# Patient Record
Sex: MALE | Race: WHITE | Employment: FULL TIME | URBAN - METROPOLITAN AREA
[De-identification: names, ages, dates, MRNs, and addresses within clinical notes are randomized per-mention and may not be internally consistent; named-entity substitution may affect disease eponyms.]

---

## 2017-05-10 PROBLEM — L91.8 ST (SKIN TAG): Status: ACTIVE | Noted: 2017-05-10

## 2017-05-10 PROBLEM — D22.9 ATYPICAL MOLE: Status: ACTIVE | Noted: 2017-05-10

## 2017-12-06 PROBLEM — M41.25 OTHER IDIOPATHIC SCOLIOSIS, THORACOLUMBAR REGION: Status: ACTIVE | Noted: 2017-12-06

## 2017-12-07 PROBLEM — L91.8 ST (SKIN TAG): Status: RESOLVED | Noted: 2017-05-10 | Resolved: 2017-12-07

## 2017-12-07 PROBLEM — D22.9 ATYPICAL MOLE: Status: RESOLVED | Noted: 2017-05-10 | Resolved: 2017-12-07

## 2018-01-02 PROBLEM — B36.9 CUTANEOUS FUNGAL INFECTION: Status: ACTIVE | Noted: 2018-01-02

## 2018-01-23 PROBLEM — J30.1 CHRONIC SEASONAL ALLERGIC RHINITIS DUE TO POLLEN: Status: ACTIVE | Noted: 2018-01-23

## 2018-01-23 PROBLEM — M51.36 BULGING OF LUMBAR INTERVERTEBRAL DISC WITHOUT MYELOPATHY: Status: ACTIVE | Noted: 2018-01-23

## 2018-01-23 PROBLEM — M51.369 BULGING OF LUMBAR INTERVERTEBRAL DISC WITHOUT MYELOPATHY: Status: ACTIVE | Noted: 2018-01-23

## 2018-04-23 ENCOUNTER — HOSPITAL ENCOUNTER (OUTPATIENT)
Age: 38
Discharge: HOME | End: 2018-04-23
Payer: MEDICARE

## 2018-04-23 ENCOUNTER — OFFICE VISIT (OUTPATIENT)
Dept: FAMILY MEDICINE CLINIC | Age: 38
End: 2018-04-23

## 2018-04-23 VITALS
OXYGEN SATURATION: 98 % | RESPIRATION RATE: 16 BRPM | WEIGHT: 226 LBS | SYSTOLIC BLOOD PRESSURE: 130 MMHG | TEMPERATURE: 98 F | HEIGHT: 69 IN | BODY MASS INDEX: 33.47 KG/M2 | HEART RATE: 72 BPM | DIASTOLIC BLOOD PRESSURE: 80 MMHG

## 2018-04-23 DIAGNOSIS — Z00.00 ROUTINE GENERAL MEDICAL EXAMINATION AT A HEALTH CARE FACILITY: ICD-10-CM

## 2018-04-23 DIAGNOSIS — Z00.00 ROUTINE GENERAL MEDICAL EXAMINATION AT HEALTH CARE FACILITY: Primary | ICD-10-CM

## 2018-04-23 DIAGNOSIS — Z71.89 ACP (ADVANCE CARE PLANNING): ICD-10-CM

## 2018-04-23 LAB
ALBUMIN SERPL-MCNC: 4.7 G/DL (ref 3.5–5.2)
ALP BLD-CCNC: 87 U/L (ref 40–129)
ALT SERPL-CCNC: 16 U/L (ref 0–40)
ANION GAP SERPL CALCULATED.3IONS-SCNC: 10 MMOL/L (ref 7–16)
AST SERPL-CCNC: 17 U/L (ref 0–39)
BASOPHILS ABSOLUTE: 0.04 E9/L (ref 0–0.2)
BASOPHILS RELATIVE PERCENT: 0.7 % (ref 0–2)
BILIRUB SERPL-MCNC: 0.3 MG/DL (ref 0–1.2)
BILIRUBIN DIRECT: <0.2 MG/DL (ref 0–0.3)
BILIRUBIN, INDIRECT: NORMAL MG/DL (ref 0–1)
BUN BLDV-MCNC: 15 MG/DL (ref 6–20)
CALCIUM SERPL-MCNC: 9.5 MG/DL (ref 8.6–10.2)
CARBAMAZEPINE DOSE: NORMAL
CARBAMAZEPINE LEVEL: 8.1 MCG/ML (ref 4–10)
CHLORIDE BLD-SCNC: 103 MMOL/L (ref 98–107)
CHOLESTEROL, FASTING: 183 MG/DL (ref 0–199)
CO2: 28 MMOL/L (ref 22–29)
CREAT SERPL-MCNC: 0.9 MG/DL (ref 0.7–1.2)
EOSINOPHILS ABSOLUTE: 0.28 E9/L (ref 0.05–0.5)
EOSINOPHILS RELATIVE PERCENT: 4.8 % (ref 0–6)
GFR AFRICAN AMERICAN: >60
GFR NON-AFRICAN AMERICAN: >60 ML/MIN/1.73
GLUCOSE FASTING: 93 MG/DL (ref 74–109)
HBA1C MFR BLD: 4.7 % (ref 4.8–5.9)
HCT VFR BLD CALC: 43.4 % (ref 37–54)
HDLC SERPL-MCNC: 48 MG/DL
HEMOGLOBIN: 14.9 G/DL (ref 12.5–16.5)
IMMATURE GRANULOCYTES #: 0.02 E9/L
IMMATURE GRANULOCYTES %: 0.3 % (ref 0–5)
LDL CHOLESTEROL CALCULATED: 119 MG/DL (ref 0–99)
LITHIUM DOSE AMOUNT: NORMAL
LITHIUM LEVEL: 0.56 MMOL/L (ref 0.5–1.5)
LYMPHOCYTES ABSOLUTE: 1.51 E9/L (ref 1.5–4)
LYMPHOCYTES RELATIVE PERCENT: 25.9 % (ref 20–42)
MCH RBC QN AUTO: 32.3 PG (ref 26–35)
MCHC RBC AUTO-ENTMCNC: 34.3 % (ref 32–34.5)
MCV RBC AUTO: 93.9 FL (ref 80–99.9)
MONOCYTES ABSOLUTE: 0.34 E9/L (ref 0.1–0.95)
MONOCYTES RELATIVE PERCENT: 5.8 % (ref 2–12)
NEUTROPHILS ABSOLUTE: 3.63 E9/L (ref 1.8–7.3)
NEUTROPHILS RELATIVE PERCENT: 62.5 % (ref 43–80)
PDW BLD-RTO: 11.3 FL (ref 11.5–15)
PLATELET # BLD: 219 E9/L (ref 130–450)
PMV BLD AUTO: 9.7 FL (ref 7–12)
POTASSIUM SERPL-SCNC: 4.2 MMOL/L (ref 3.5–5)
RBC # BLD: 4.62 E12/L (ref 3.8–5.8)
SODIUM BLD-SCNC: 141 MMOL/L (ref 132–146)
T4 FREE: 0.96 NG/DL (ref 0.93–1.7)
TOTAL PROTEIN: 7.5 G/DL (ref 6.4–8.3)
TRIGLYCERIDE, FASTING: 80 MG/DL (ref 0–149)
TSH SERPL DL<=0.05 MIU/L-ACNC: 1.39 UIU/ML (ref 0.27–4.2)
VLDLC SERPL CALC-MCNC: 16 MG/DL
WBC # BLD: 5.8 E9/L (ref 4.5–11.5)

## 2018-04-23 PROCEDURE — 80156 ASSAY CARBAMAZEPINE TOTAL: CPT

## 2018-04-23 PROCEDURE — 84443 ASSAY THYROID STIM HORMONE: CPT

## 2018-04-23 PROCEDURE — 84439 ASSAY OF FREE THYROXINE: CPT

## 2018-04-23 PROCEDURE — 83036 HEMOGLOBIN GLYCOSYLATED A1C: CPT

## 2018-04-23 PROCEDURE — G0439 PPPS, SUBSEQ VISIT: HCPCS | Performed by: NURSE PRACTITIONER

## 2018-04-23 PROCEDURE — 85025 COMPLETE CBC W/AUTO DIFF WBC: CPT

## 2018-04-23 PROCEDURE — 82248 BILIRUBIN DIRECT: CPT

## 2018-04-23 PROCEDURE — 80178 ASSAY OF LITHIUM: CPT

## 2018-04-23 PROCEDURE — 80061 LIPID PANEL: CPT

## 2018-04-23 PROCEDURE — 36415 COLL VENOUS BLD VENIPUNCTURE: CPT

## 2018-04-23 PROCEDURE — 80053 COMPREHEN METABOLIC PANEL: CPT

## 2018-04-23 RX ORDER — PALIPERIDONE 3 MG
9 TABLET, EXTENDED RELEASE 24 HR ORAL EVERY MORNING
Refills: 2 | Status: ON HOLD | COMMUNITY
Start: 2018-03-28 | End: 2018-10-01 | Stop reason: HOSPADM

## 2018-04-23 ASSESSMENT — PATIENT HEALTH QUESTIONNAIRE - PHQ9: SUM OF ALL RESPONSES TO PHQ QUESTIONS 1-9: 0

## 2018-04-23 ASSESSMENT — ANXIETY QUESTIONNAIRES: GAD7 TOTAL SCORE: 6

## 2018-04-23 ASSESSMENT — LIFESTYLE VARIABLES: HOW OFTEN DO YOU HAVE A DRINK CONTAINING ALCOHOL: 0

## 2018-09-21 ENCOUNTER — HOSPITAL ENCOUNTER (INPATIENT)
Age: 38
LOS: 9 days | Discharge: HOME | DRG: 885 | End: 2018-10-01
Attending: EMERGENCY MEDICINE | Admitting: PSYCHIATRY & NEUROLOGY
Payer: MEDICARE

## 2018-09-21 DIAGNOSIS — F20.3 UNDIFFERENTIATED SCHIZOPHRENIA (HCC): Primary | ICD-10-CM

## 2018-09-21 LAB
ACETAMINOPHEN LEVEL: <5 MCG/ML (ref 10–30)
ALBUMIN SERPL-MCNC: 4.4 G/DL (ref 3.5–5.2)
ALP BLD-CCNC: 78 U/L (ref 40–129)
ALT SERPL-CCNC: 18 U/L (ref 0–40)
AMORPHOUS: ABNORMAL
AMPHETAMINE SCREEN, URINE: NOT DETECTED
ANION GAP SERPL CALCULATED.3IONS-SCNC: 14 MMOL/L (ref 7–16)
AST SERPL-CCNC: 16 U/L (ref 0–39)
BACTERIA: ABNORMAL /HPF
BARBITURATE SCREEN URINE: NOT DETECTED
BASOPHILS ABSOLUTE: 0.04 E9/L (ref 0–0.2)
BASOPHILS RELATIVE PERCENT: 0.5 % (ref 0–2)
BENZODIAZEPINE SCREEN, URINE: POSITIVE
BILIRUB SERPL-MCNC: <0.2 MG/DL (ref 0–1.2)
BILIRUBIN URINE: NEGATIVE
BLOOD, URINE: NEGATIVE
BUN BLDV-MCNC: 17 MG/DL (ref 6–20)
CALCIUM SERPL-MCNC: 9.4 MG/DL (ref 8.6–10.2)
CANNABINOID SCREEN URINE: NOT DETECTED
CHLORIDE BLD-SCNC: 101 MMOL/L (ref 98–107)
CLARITY: ABNORMAL
CO2: 25 MMOL/L (ref 22–29)
COCAINE METABOLITE SCREEN URINE: NOT DETECTED
COLOR: YELLOW
CREAT SERPL-MCNC: 1 MG/DL (ref 0.7–1.2)
EKG ATRIAL RATE: 75 BPM
EKG P AXIS: 49 DEGREES
EKG P-R INTERVAL: 144 MS
EKG Q-T INTERVAL: 382 MS
EKG QRS DURATION: 92 MS
EKG QTC CALCULATION (BAZETT): 426 MS
EKG R AXIS: 36 DEGREES
EKG T AXIS: 35 DEGREES
EKG VENTRICULAR RATE: 75 BPM
EOSINOPHILS ABSOLUTE: 0.29 E9/L (ref 0.05–0.5)
EOSINOPHILS RELATIVE PERCENT: 3.8 % (ref 0–6)
ETHANOL: <10 MG/DL (ref 0–0.08)
GFR AFRICAN AMERICAN: >60
GFR NON-AFRICAN AMERICAN: >60 ML/MIN/1.73
GLUCOSE BLD-MCNC: 136 MG/DL (ref 74–109)
GLUCOSE URINE: NEGATIVE MG/DL
HCT VFR BLD CALC: 41.1 % (ref 37–54)
HEMOGLOBIN: 14 G/DL (ref 12.5–16.5)
IMMATURE GRANULOCYTES #: 0.03 E9/L
IMMATURE GRANULOCYTES %: 0.4 % (ref 0–5)
KETONES, URINE: NEGATIVE MG/DL
LEUKOCYTE ESTERASE, URINE: NEGATIVE
LYMPHOCYTES ABSOLUTE: 2.42 E9/L (ref 1.5–4)
LYMPHOCYTES RELATIVE PERCENT: 31.3 % (ref 20–42)
MCH RBC QN AUTO: 32.2 PG (ref 26–35)
MCHC RBC AUTO-ENTMCNC: 34.1 % (ref 32–34.5)
MCV RBC AUTO: 94.5 FL (ref 80–99.9)
METHADONE SCREEN, URINE: NOT DETECTED
MONOCYTES ABSOLUTE: 0.45 E9/L (ref 0.1–0.95)
MONOCYTES RELATIVE PERCENT: 5.8 % (ref 2–12)
NEUTROPHILS ABSOLUTE: 4.49 E9/L (ref 1.8–7.3)
NEUTROPHILS RELATIVE PERCENT: 58.2 % (ref 43–80)
NITRITE, URINE: NEGATIVE
OPIATE SCREEN URINE: NOT DETECTED
PDW BLD-RTO: 11.8 FL (ref 11.5–15)
PH UA: 6.5 (ref 5–9)
PHENCYCLIDINE SCREEN URINE: NOT DETECTED
PLATELET # BLD: 230 E9/L (ref 130–450)
PMV BLD AUTO: 9.8 FL (ref 7–12)
POTASSIUM SERPL-SCNC: 3.7 MMOL/L (ref 3.5–5)
PROPOXYPHENE SCREEN: NOT DETECTED
PROTEIN UA: NEGATIVE MG/DL
RBC # BLD: 4.35 E12/L (ref 3.8–5.8)
RBC UA: ABNORMAL /HPF (ref 0–2)
SALICYLATE, SERUM: <0.3 MG/DL (ref 0–30)
SODIUM BLD-SCNC: 140 MMOL/L (ref 132–146)
SPECIFIC GRAVITY UA: 1.02 (ref 1–1.03)
T3 UPTAKE PERCENT: 31.4 % (ref 22.5–37)
T4 FREE: 1.16 NG/DL (ref 0.93–1.7)
TOTAL PROTEIN: 6.7 G/DL (ref 6.4–8.3)
TRICYCLIC ANTIDEPRESSANTS SCREEN SERUM: NEGATIVE NG/ML
TSH SERPL DL<=0.05 MIU/L-ACNC: 1.52 UIU/ML (ref 0.27–4.2)
UROBILINOGEN, URINE: 0.2 E.U./DL
WBC # BLD: 7.7 E9/L (ref 4.5–11.5)
WBC UA: ABNORMAL /HPF (ref 0–5)

## 2018-09-21 PROCEDURE — G0480 DRUG TEST DEF 1-7 CLASSES: HCPCS

## 2018-09-21 PROCEDURE — 85025 COMPLETE CBC W/AUTO DIFF WBC: CPT

## 2018-09-21 PROCEDURE — 99285 EMERGENCY DEPT VISIT HI MDM: CPT

## 2018-09-21 PROCEDURE — 80307 DRUG TEST PRSMV CHEM ANLYZR: CPT

## 2018-09-21 PROCEDURE — 36415 COLL VENOUS BLD VENIPUNCTURE: CPT

## 2018-09-21 PROCEDURE — 80178 ASSAY OF LITHIUM: CPT

## 2018-09-21 PROCEDURE — 2580000003 HC RX 258

## 2018-09-21 PROCEDURE — 6360000002 HC RX W HCPCS: Performed by: EMERGENCY MEDICINE

## 2018-09-21 PROCEDURE — 96374 THER/PROPH/DIAG INJ IV PUSH: CPT

## 2018-09-21 PROCEDURE — 81001 URINALYSIS AUTO W/SCOPE: CPT

## 2018-09-21 PROCEDURE — 84439 ASSAY OF FREE THYROXINE: CPT

## 2018-09-21 PROCEDURE — 93005 ELECTROCARDIOGRAM TRACING: CPT | Performed by: EMERGENCY MEDICINE

## 2018-09-21 PROCEDURE — 84443 ASSAY THYROID STIM HORMONE: CPT

## 2018-09-21 PROCEDURE — 80053 COMPREHEN METABOLIC PANEL: CPT

## 2018-09-21 RX ORDER — CARBAMAZEPINE 200 MG/1
200 TABLET ORAL 2 TIMES DAILY
Status: ON HOLD | COMMUNITY
End: 2018-10-01 | Stop reason: HOSPADM

## 2018-09-21 RX ORDER — ZIPRASIDONE MESYLATE 20 MG/ML
10 INJECTION, POWDER, LYOPHILIZED, FOR SOLUTION INTRAMUSCULAR ONCE
Status: COMPLETED | OUTPATIENT
Start: 2018-09-21 | End: 2018-09-21

## 2018-09-21 RX ORDER — LORAZEPAM 2 MG/ML
2 INJECTION INTRAMUSCULAR ONCE
Status: COMPLETED | OUTPATIENT
Start: 2018-09-21 | End: 2018-09-21

## 2018-09-21 RX ADMIN — ZIPRASIDONE MESYLATE 10 MG: 20 INJECTION, POWDER, LYOPHILIZED, FOR SOLUTION INTRAMUSCULAR at 21:56

## 2018-09-21 RX ADMIN — LORAZEPAM 2 MG: 2 INJECTION INTRAMUSCULAR; INTRAVENOUS at 21:55

## 2018-09-21 RX ADMIN — WATER 1.2 ML: 1 INJECTION INTRAMUSCULAR; INTRAVENOUS; SUBCUTANEOUS at 21:56

## 2018-09-22 PROBLEM — F29 PSYCHOSIS (HCC): Status: ACTIVE | Noted: 2018-09-22

## 2018-09-22 PROBLEM — F31.9 BIPOLAR 1 DISORDER (HCC): Status: ACTIVE | Noted: 2018-09-22

## 2018-09-22 LAB
LITHIUM DOSE AMOUNT: ABNORMAL
LITHIUM LEVEL: 0.49 MMOL/L (ref 0.5–1.5)

## 2018-09-22 PROCEDURE — 6370000000 HC RX 637 (ALT 250 FOR IP): Performed by: NURSE PRACTITIONER

## 2018-09-22 PROCEDURE — 99222 1ST HOSP IP/OBS MODERATE 55: CPT | Performed by: PSYCHIATRY & NEUROLOGY

## 2018-09-22 PROCEDURE — 1240000000 HC EMOTIONAL WELLNESS R&B

## 2018-09-22 RX ORDER — MAGNESIUM HYDROXIDE/ALUMINUM HYDROXICE/SIMETHICONE 120; 1200; 1200 MG/30ML; MG/30ML; MG/30ML
30 SUSPENSION ORAL PRN
Status: DISCONTINUED | OUTPATIENT
Start: 2018-09-22 | End: 2018-10-01 | Stop reason: HOSPADM

## 2018-09-22 RX ORDER — ACETAMINOPHEN 325 MG/1
650 TABLET ORAL EVERY 4 HOURS PRN
Status: DISCONTINUED | OUTPATIENT
Start: 2018-09-22 | End: 2018-10-01 | Stop reason: HOSPADM

## 2018-09-22 RX ORDER — TRIHEXYPHENIDYL HYDROCHLORIDE 5 MG/1
5 TABLET ORAL 2 TIMES DAILY
Status: DISCONTINUED | OUTPATIENT
Start: 2018-09-22 | End: 2018-09-22

## 2018-09-22 RX ORDER — TRAZODONE HYDROCHLORIDE 50 MG/1
50 TABLET ORAL NIGHTLY PRN
Status: DISCONTINUED | OUTPATIENT
Start: 2018-09-22 | End: 2018-10-01 | Stop reason: HOSPADM

## 2018-09-22 RX ORDER — HALOPERIDOL 5 MG/ML
10 INJECTION INTRAMUSCULAR EVERY 6 HOURS PRN
Status: DISCONTINUED | OUTPATIENT
Start: 2018-09-22 | End: 2018-10-01 | Stop reason: HOSPADM

## 2018-09-22 RX ORDER — CARBAMAZEPINE 200 MG/1
200 TABLET ORAL 2 TIMES DAILY
Status: DISCONTINUED | OUTPATIENT
Start: 2018-09-22 | End: 2018-09-23

## 2018-09-22 RX ORDER — TRIHEXYPHENIDYL HYDROCHLORIDE 5 MG/1
10 TABLET ORAL
Status: DISCONTINUED | OUTPATIENT
Start: 2018-09-23 | End: 2018-10-01 | Stop reason: HOSPADM

## 2018-09-22 RX ORDER — CLONAZEPAM 0.5 MG/1
1 TABLET ORAL 4 TIMES DAILY PRN
Status: DISCONTINUED | OUTPATIENT
Start: 2018-09-22 | End: 2018-10-01 | Stop reason: HOSPADM

## 2018-09-22 RX ORDER — TRIHEXYPHENIDYL HYDROCHLORIDE 5 MG/1
5 TABLET ORAL EVERY EVENING
Status: DISCONTINUED | OUTPATIENT
Start: 2018-09-22 | End: 2018-10-01 | Stop reason: HOSPADM

## 2018-09-22 RX ORDER — LITHIUM CARBONATE 450 MG
450 TABLET, EXTENDED RELEASE ORAL 2 TIMES DAILY
Status: DISCONTINUED | OUTPATIENT
Start: 2018-09-22 | End: 2018-10-01 | Stop reason: HOSPADM

## 2018-09-22 RX ORDER — BENZTROPINE MESYLATE 1 MG/ML
2 INJECTION INTRAMUSCULAR; INTRAVENOUS 2 TIMES DAILY PRN
Status: DISCONTINUED | OUTPATIENT
Start: 2018-09-22 | End: 2018-10-01 | Stop reason: HOSPADM

## 2018-09-22 RX ORDER — OLANZAPINE 10 MG/1
10 TABLET ORAL
Status: ACTIVE | OUTPATIENT
Start: 2018-09-22 | End: 2018-09-22

## 2018-09-22 RX ORDER — HYDROXYZINE PAMOATE 50 MG/1
50 CAPSULE ORAL EVERY 6 HOURS PRN
Status: DISCONTINUED | OUTPATIENT
Start: 2018-09-22 | End: 2018-10-01 | Stop reason: HOSPADM

## 2018-09-22 RX ORDER — CETIRIZINE HYDROCHLORIDE 10 MG/1
10 TABLET ORAL DAILY
Status: DISCONTINUED | OUTPATIENT
Start: 2018-09-22 | End: 2018-10-01 | Stop reason: HOSPADM

## 2018-09-22 RX ADMIN — CARBAMAZEPINE 200 MG: 200 TABLET ORAL at 20:38

## 2018-09-22 RX ADMIN — TRIHEXYPHENIDYL HYDROCHLORIDE 5 MG: 5 TABLET ORAL at 18:58

## 2018-09-22 RX ADMIN — TRAZODONE HYDROCHLORIDE 50 MG: 50 TABLET ORAL at 20:38

## 2018-09-22 RX ADMIN — CETIRIZINE HYDROCHLORIDE 10 MG: 10 TABLET ORAL at 10:53

## 2018-09-22 RX ADMIN — LITHIUM CARBONATE 450 MG: 450 TABLET, EXTENDED RELEASE ORAL at 20:38

## 2018-09-22 RX ADMIN — PALIPERIDONE 9 MG: 6 TABLET, EXTENDED RELEASE ORAL at 10:53

## 2018-09-22 RX ADMIN — LITHIUM CARBONATE 450 MG: 450 TABLET, EXTENDED RELEASE ORAL at 10:53

## 2018-09-22 RX ADMIN — CARBAMAZEPINE 200 MG: 200 TABLET ORAL at 10:53

## 2018-09-22 ASSESSMENT — SLEEP AND FATIGUE QUESTIONNAIRES
DO YOU HAVE DIFFICULTY SLEEPING: NO
AVERAGE NUMBER OF SLEEP HOURS: 6
DO YOU HAVE DIFFICULTY SLEEPING: NO
DO YOU USE A SLEEP AID: NO
AVERAGE NUMBER OF SLEEP HOURS: 6
DO YOU USE A SLEEP AID: NO

## 2018-09-22 ASSESSMENT — PAIN - FUNCTIONAL ASSESSMENT: PAIN_FUNCTIONAL_ASSESSMENT: 0-10

## 2018-09-22 ASSESSMENT — LIFESTYLE VARIABLES
HISTORY_ALCOHOL_USE: NO
HISTORY_ALCOHOL_USE: NO

## 2018-09-22 NOTE — PROGRESS NOTES
`[facility]  Admission Note   This is a 30year old  male with hx of autism, mental retardation diagnosis. Patient has a GUARDIAN. Treats recently at [facility]. Was crying for 4 hours at workshop and mother and caregiver brought to [facility]. Recently had increase in [facility] last week and decompensating last several weeks. Upset that friend son in [place] and afraid he will get hurt with [facility]. Patient is A&O to person only on admission. Denies drugs and alcohol. Not a smoker. Was given IM Ativan 2 mg and Geodon 10 mg at 2156 in [facility] for agitation. Patient made threats at home wanting to kill self by throwing self down steps or jumping off roof. Barbara Pittman (mother) [phone]  Admission Type:   Admission Type: Involuntary    Reason for admission:  Reason for Admission: \"I was crying. \"    PATIENT STRENGTHS:  Strengths: Communication, Positive Support    Patient Strengths and Limitations:  Limitations: Limited education -> difficulty reading or writing, Difficult relationships / poor social skills, Demonstrates discomfort with /lack of social skills, Multiple barriers to leisure interests, Difficulty problem solving/relies on others to help solve problems    Addictive Behavior:   Addictive Behavior  In the past 3 months, have you felt or has someone told you that you have a problem with:  : None  Do you have a history of Chemical Use?: No  Do you have a history of Alcohol Use?: No  Do you have a history of Street Drug Abuse?: No  Histroy of Prescripton Drug Abuse?: No    Medical Problems:   Past Medical History:   Diagnosis Date    ADHD (attention deficit hyperactivity disorder)     Autism     PATIENT IS COOPERATIVE AND VERBAL PER MOM    Autism     Bipolar 1 disorder ([facility]     Mental retardation     Schizoaffective disorder (HCC)        Status EXAM:  Status and Exam  Normal: No  Facial Expression: Sad, Flat, Worried  Affect: Appropriate  Level of Consciousness:

## 2018-09-22 NOTE — H&P
Weaknesses: [x] Emotional          [] Motivational     MEDICATIONS: Current Facility-Administered Medications: carBAMazepine (TEGRETOL) tablet 200 mg, 200 mg, Oral, BID  clonazePAM (KLONOPIN) tablet 1 mg, 1 mg, Oral, 4x Daily PRN  paliperidone (INVEGA) extended release tablet 9 mg, 9 mg, Oral, QAM  lithium (ESKALITH) extended release tablet 450 mg, 450 mg, Oral, BID  cetirizine (ZYRTEC) tablet 10 mg, 10 mg, Oral, Daily  acetaminophen (TYLENOL) tablet 650 mg, 650 mg, Oral, Q4H PRN  hydrOXYzine (VISTARIL) capsule 50 mg, 50 mg, Oral, Q6H PRN  OLANZapine (ZYPREXA) tablet 10 mg, 10 mg, Oral, Once PRN  haloperidol lactate (HALDOL) injection 10 mg, 10 mg, Intramuscular, Q6H PRN  traZODone (DESYREL) tablet 50 mg, 50 mg, Oral, Nightly PRN  benztropine mesylate (COGENTIN) injection 2 mg, 2 mg, Intramuscular, BID PRN  magnesium hydroxide (MILK OF MAGNESIA) 400 MG/5ML suspension 30 mL, 30 mL, Oral, Daily PRN  aluminum & magnesium hydroxide-simethicone (MAALOX) 200-200-20 MG/5ML suspension 30 mL, 30 mL, Oral, PRN  trihexyphenidyl (ARTANE) tablet 5 mg, 5 mg, Oral, QPM    Medical Review of Systems:     All other than marked systmes have been reviewed and are all negative.     Constitutional Symptoms: []  fever []  Chills  Skin Symptoms: [] rash []  Pruritus   Eye Symptoms: [] Vision unchanged []  recent vision problems[] blurred vision   Respiratory Symptoms:[] shortness of breath [] cough  Cardiovascular Symptoms:  [] chest pain   [] palpitations   Gastrointestinal Symptoms: []  abdominal pain []  nausea []  vomiting []  diarrhea  Genitourinary Symptoms: []  dysuria  []  hematuria   Musculoskeletal Symptoms: []  back pain []  muscle pain []  joint pain  Neurologic Symptoms: []  headache []  dizziness  Hematolymphoid Symptoms: [] Adenopathy [] Bruises   [] Schimosis       VITALS: BP (!) 146/92   Pulse 90   Temp 98.1 °F (36.7 °C) (Oral)   Resp 16   Ht 5' 11\" (1.803 m)   Wt 230 lb (104.3 kg)   SpO2 98%   BMI 32.08 kg/m²

## 2018-09-22 NOTE — PROGRESS NOTES
Declined to participate in morning group. Only willing to share that he needs to rest, turning away from staff when encouragement attempted. States \"sleep\" when asked for daily goal.        Recreation assessment completed.

## 2018-09-23 PROCEDURE — 1240000000 HC EMOTIONAL WELLNESS R&B

## 2018-09-23 PROCEDURE — 6370000000 HC RX 637 (ALT 250 FOR IP): Performed by: NURSE PRACTITIONER

## 2018-09-23 PROCEDURE — 6370000000 HC RX 637 (ALT 250 FOR IP): Performed by: PSYCHIATRY & NEUROLOGY

## 2018-09-23 PROCEDURE — 99231 SBSQ HOSP IP/OBS SF/LOW 25: CPT | Performed by: PSYCHIATRY & NEUROLOGY

## 2018-09-23 RX ORDER — CARBAMAZEPINE 200 MG/1
200 TABLET ORAL 3 TIMES DAILY
Status: DISCONTINUED | OUTPATIENT
Start: 2018-09-23 | End: 2018-10-01 | Stop reason: HOSPADM

## 2018-09-23 RX ORDER — PALIPERIDONE 6 MG/1
12 TABLET, EXTENDED RELEASE ORAL EVERY MORNING
Status: DISCONTINUED | OUTPATIENT
Start: 2018-09-24 | End: 2018-09-25

## 2018-09-23 RX ADMIN — PALIPERIDONE 9 MG: 6 TABLET, EXTENDED RELEASE ORAL at 08:28

## 2018-09-23 RX ADMIN — CETIRIZINE HYDROCHLORIDE 10 MG: 10 TABLET ORAL at 08:28

## 2018-09-23 RX ADMIN — LITHIUM CARBONATE 450 MG: 450 TABLET, EXTENDED RELEASE ORAL at 20:51

## 2018-09-23 RX ADMIN — TRIHEXYPHENIDYL HYDROCHLORIDE 5 MG: 5 TABLET ORAL at 16:53

## 2018-09-23 RX ADMIN — TRAZODONE HYDROCHLORIDE 50 MG: 50 TABLET ORAL at 20:51

## 2018-09-23 RX ADMIN — CARBAMAZEPINE 200 MG: 200 TABLET ORAL at 08:28

## 2018-09-23 RX ADMIN — LITHIUM CARBONATE 450 MG: 450 TABLET, EXTENDED RELEASE ORAL at 08:28

## 2018-09-23 RX ADMIN — TRIHEXYPHENIDYL HYDROCHLORIDE 10 MG: 5 TABLET ORAL at 08:28

## 2018-09-23 RX ADMIN — CARBAMAZEPINE 200 MG: 200 TABLET ORAL at 20:51

## 2018-09-23 RX ADMIN — HYDROXYZINE PAMOATE 50 MG: 50 CAPSULE ORAL at 16:53

## 2018-09-23 RX ADMIN — CARBAMAZEPINE 200 MG: 200 TABLET ORAL at 13:49

## 2018-09-23 NOTE — PROGRESS NOTES
Heightened    [] Exaggerated       Thought Process and Association:  [] Logical [x] Illogical                                        [] Linear and Goal Directed  [x] Tangential  [] Circumstantial      Thought Content:  [] Denies [x] Endorses [x] Suicidal [] Homicidal  [x] Delusional                                       [x] Paranoid  [] Somatic  [] Grandiose     Perception: []  None  [x] Auditory   [] Visual  [] tactile   [] olfactory  [] Illusions          Insight: [] Intact  [] Fair  [x] Limited    Judgement:  [] Intact  [] Fair  [x] Limited      Assessment/Plan:        Patient Active Problem List   Diagnosis Code    Mental retardation F79    Schizoaffective disorder, bipolar type ([facility] [address] F25.0    Mild intellectual disability F70    Bipolar affective disorder, mixed, severe, with psychotic behavior ([facility] [address] F31.64    Encounter for lithium monitoring Z51.81, Z79.899    Taking multiple medications for chronic disease R69    Other idiopathic scoliosis, thoracolumbar region M41.25    Cutaneous fungal infection B36.9    Chronic seasonal allergic rhinitis due to pollen J30.1    Bulging of lumbar intervertebral disc without myelopathy M51.26    Bipolar 1 disorder ([facility] [address] F31.9    Psychosis ([facility] [address] F29         Plan:    []  Patient is refusing medications  [x] Improving as expected   [] Not improving as expected   [] Worsening    []  At Baseline     Increase Invega 12 mg QD  Increase Tegretol 200 mg TID      Reason for more than one antipsychotic:  [] N/A  [] 3 failed monotherapy(drugs tried):  [] Cross over to a new antipsychotic  [] Taper to monotherapy from polypharmacy  [] Augmentation of Clozapine therapy due to treatment resistance to single therapy      Signed:  Mathieu Esteban  9/23/2018  1:52 PM

## 2018-09-24 PROCEDURE — 99231 SBSQ HOSP IP/OBS SF/LOW 25: CPT | Performed by: PSYCHIATRY & NEUROLOGY

## 2018-09-24 PROCEDURE — 6370000000 HC RX 637 (ALT 250 FOR IP): Performed by: PSYCHIATRY & NEUROLOGY

## 2018-09-24 PROCEDURE — 6370000000 HC RX 637 (ALT 250 FOR IP): Performed by: NURSE PRACTITIONER

## 2018-09-24 PROCEDURE — 1240000000 HC EMOTIONAL WELLNESS R&B

## 2018-09-24 RX ADMIN — CARBAMAZEPINE 200 MG: 200 TABLET ORAL at 09:22

## 2018-09-24 RX ADMIN — CETIRIZINE HYDROCHLORIDE 10 MG: 10 TABLET ORAL at 09:22

## 2018-09-24 RX ADMIN — LITHIUM CARBONATE 450 MG: 450 TABLET, EXTENDED RELEASE ORAL at 20:28

## 2018-09-24 RX ADMIN — CARBAMAZEPINE 200 MG: 200 TABLET ORAL at 14:46

## 2018-09-24 RX ADMIN — PALIPERIDONE 12 MG: 6 TABLET, EXTENDED RELEASE ORAL at 09:22

## 2018-09-24 RX ADMIN — CLONAZEPAM 1 MG: 0.5 TABLET ORAL at 09:22

## 2018-09-24 RX ADMIN — LITHIUM CARBONATE 450 MG: 450 TABLET, EXTENDED RELEASE ORAL at 09:22

## 2018-09-24 RX ADMIN — TRIHEXYPHENIDYL HYDROCHLORIDE 10 MG: 5 TABLET ORAL at 09:22

## 2018-09-24 RX ADMIN — CARBAMAZEPINE 200 MG: 200 TABLET ORAL at 20:28

## 2018-09-24 RX ADMIN — TRIHEXYPHENIDYL HYDROCHLORIDE 5 MG: 5 TABLET ORAL at 16:46

## 2018-09-24 RX ADMIN — TRAZODONE HYDROCHLORIDE 50 MG: 50 TABLET ORAL at 20:28

## 2018-09-24 NOTE — PROGRESS NOTES
Spoke with pt mother by phone, she advised last dose of [place] was given on the 4th of this month. Was at 234 mg. Dosing frequency increased to about every three weeks as pt was having break through behaviors. Dennie Port, NP has been advised.

## 2018-09-25 PROCEDURE — 99232 SBSQ HOSP IP/OBS MODERATE 35: CPT | Performed by: PSYCHIATRY & NEUROLOGY

## 2018-09-25 PROCEDURE — 1240000000 HC EMOTIONAL WELLNESS R&B

## 2018-09-25 PROCEDURE — 6370000000 HC RX 637 (ALT 250 FOR IP): Performed by: PSYCHIATRY & NEUROLOGY

## 2018-09-25 PROCEDURE — 6370000000 HC RX 637 (ALT 250 FOR IP): Performed by: NURSE PRACTITIONER

## 2018-09-25 RX ORDER — PALIPERIDONE 6 MG/1
6 TABLET, EXTENDED RELEASE ORAL EVERY MORNING
Status: DISCONTINUED | OUTPATIENT
Start: 2018-09-25 | End: 2018-09-26

## 2018-09-25 RX ADMIN — TRIHEXYPHENIDYL HYDROCHLORIDE 5 MG: 5 TABLET ORAL at 17:55

## 2018-09-25 RX ADMIN — CETIRIZINE HYDROCHLORIDE 10 MG: 10 TABLET ORAL at 09:55

## 2018-09-25 RX ADMIN — LITHIUM CARBONATE 450 MG: 450 TABLET, EXTENDED RELEASE ORAL at 21:15

## 2018-09-25 RX ADMIN — CARBAMAZEPINE 200 MG: 200 TABLET ORAL at 09:55

## 2018-09-25 RX ADMIN — CARBAMAZEPINE 200 MG: 200 TABLET ORAL at 13:10

## 2018-09-25 RX ADMIN — PALIPERIDONE 6 MG: 6 TABLET, EXTENDED RELEASE ORAL at 09:54

## 2018-09-25 RX ADMIN — HYDROXYZINE PAMOATE 50 MG: 50 CAPSULE ORAL at 19:47

## 2018-09-25 RX ADMIN — TRIHEXYPHENIDYL HYDROCHLORIDE 10 MG: 5 TABLET ORAL at 08:11

## 2018-09-25 RX ADMIN — LITHIUM CARBONATE 450 MG: 450 TABLET, EXTENDED RELEASE ORAL at 09:57

## 2018-09-25 RX ADMIN — TRAZODONE HYDROCHLORIDE 50 MG: 50 TABLET ORAL at 21:17

## 2018-09-25 RX ADMIN — CARBAMAZEPINE 200 MG: 200 TABLET ORAL at 21:15

## 2018-09-26 LAB
LITHIUM DOSE AMOUNT: NORMAL
LITHIUM LEVEL: 0.8 MMOL/L (ref 0.5–1.5)

## 2018-09-26 PROCEDURE — 99231 SBSQ HOSP IP/OBS SF/LOW 25: CPT | Performed by: PSYCHIATRY & NEUROLOGY

## 2018-09-26 PROCEDURE — 80178 ASSAY OF LITHIUM: CPT

## 2018-09-26 PROCEDURE — 1240000000 HC EMOTIONAL WELLNESS R&B

## 2018-09-26 PROCEDURE — 36415 COLL VENOUS BLD VENIPUNCTURE: CPT

## 2018-09-26 PROCEDURE — 6370000000 HC RX 637 (ALT 250 FOR IP): Performed by: PSYCHIATRY & NEUROLOGY

## 2018-09-26 PROCEDURE — 6370000000 HC RX 637 (ALT 250 FOR IP): Performed by: NURSE PRACTITIONER

## 2018-09-26 RX ADMIN — CETIRIZINE HYDROCHLORIDE 10 MG: 10 TABLET ORAL at 09:09

## 2018-09-26 RX ADMIN — PALIPERIDONE 6 MG: 6 TABLET, EXTENDED RELEASE ORAL at 09:09

## 2018-09-26 RX ADMIN — TRIHEXYPHENIDYL HYDROCHLORIDE 5 MG: 5 TABLET ORAL at 16:42

## 2018-09-26 RX ADMIN — LITHIUM CARBONATE 450 MG: 450 TABLET, EXTENDED RELEASE ORAL at 09:09

## 2018-09-26 RX ADMIN — CARBAMAZEPINE 200 MG: 200 TABLET ORAL at 09:09

## 2018-09-26 RX ADMIN — CARBAMAZEPINE 200 MG: 200 TABLET ORAL at 21:02

## 2018-09-26 RX ADMIN — TRIHEXYPHENIDYL HYDROCHLORIDE 10 MG: 5 TABLET ORAL at 09:09

## 2018-09-26 RX ADMIN — CARBAMAZEPINE 200 MG: 200 TABLET ORAL at 14:39

## 2018-09-26 RX ADMIN — LITHIUM CARBONATE 450 MG: 450 TABLET, EXTENDED RELEASE ORAL at 21:02

## 2018-09-26 RX ADMIN — CLONAZEPAM 1 MG: 0.5 TABLET ORAL at 09:09

## 2018-09-26 ASSESSMENT — PAIN SCALES - GENERAL: PAINLEVEL_OUTOF10: 0

## 2018-09-27 LAB
7-AMINOCLONAZEPAM, URINE: 589 NG/ML
ALPHA-HYDROXYALPRAZOLAM, URINE: <5 NG/ML
ALPHA-HYDROXYMIDAZOLAM, URINE: <20 NG/ML
ALPRAZOLAM, URINE: <5 NG/ML
CHLORDIAZEPOXIDE, URINE: <20 NG/ML
CLONAZEPAM, URINE: 12 NG/ML
DIAZEPAM, URINE: <20 NG/ML
LORAZEPAM, URINE: <20 NG/ML
MIDAZOLAM, URINE: <20 NG/ML
NORDIAZEPAM, URINE: <20 NG/ML
OXAZEPAM, URINE: <20 NG/ML
TEMAZEPAM, URINE: <20 NG/ML

## 2018-09-27 PROCEDURE — 1240000000 HC EMOTIONAL WELLNESS R&B

## 2018-09-27 PROCEDURE — 6370000000 HC RX 637 (ALT 250 FOR IP): Performed by: NURSE PRACTITIONER

## 2018-09-27 PROCEDURE — 6370000000 HC RX 637 (ALT 250 FOR IP): Performed by: PSYCHIATRY & NEUROLOGY

## 2018-09-27 RX ORDER — QUETIAPINE FUMARATE 25 MG/1
25 TABLET, FILM COATED ORAL 3 TIMES DAILY
Status: DISCONTINUED | OUTPATIENT
Start: 2018-09-27 | End: 2018-09-28

## 2018-09-27 RX ORDER — QUETIAPINE FUMARATE 25 MG/1
50 TABLET, FILM COATED ORAL 2 TIMES DAILY
Status: DISCONTINUED | OUTPATIENT
Start: 2018-09-27 | End: 2018-09-27

## 2018-09-27 RX ADMIN — CARBAMAZEPINE 200 MG: 200 TABLET ORAL at 14:12

## 2018-09-27 RX ADMIN — TRIHEXYPHENIDYL HYDROCHLORIDE 10 MG: 5 TABLET ORAL at 08:52

## 2018-09-27 RX ADMIN — CLONAZEPAM 1 MG: 0.5 TABLET ORAL at 08:52

## 2018-09-27 RX ADMIN — CARBAMAZEPINE 200 MG: 200 TABLET ORAL at 08:52

## 2018-09-27 RX ADMIN — LITHIUM CARBONATE 450 MG: 450 TABLET, EXTENDED RELEASE ORAL at 20:32

## 2018-09-27 RX ADMIN — QUETIAPINE FUMARATE 25 MG: 25 TABLET ORAL at 08:56

## 2018-09-27 RX ADMIN — TRAZODONE HYDROCHLORIDE 50 MG: 50 TABLET ORAL at 20:32

## 2018-09-27 RX ADMIN — CETIRIZINE HYDROCHLORIDE 10 MG: 10 TABLET ORAL at 08:52

## 2018-09-27 RX ADMIN — QUETIAPINE FUMARATE 25 MG: 25 TABLET ORAL at 20:31

## 2018-09-27 RX ADMIN — TRIHEXYPHENIDYL HYDROCHLORIDE 5 MG: 5 TABLET ORAL at 17:45

## 2018-09-27 RX ADMIN — LITHIUM CARBONATE 450 MG: 450 TABLET, EXTENDED RELEASE ORAL at 08:52

## 2018-09-27 RX ADMIN — CARBAMAZEPINE 200 MG: 200 TABLET ORAL at 20:31

## 2018-09-27 RX ADMIN — QUETIAPINE FUMARATE 25 MG: 25 TABLET ORAL at 14:12

## 2018-09-27 NOTE — PROGRESS NOTES
Group Therapy Note    Date: 9/27/2018  Start Time: 10:00  End Time:  10:45  Number of Participants: 8    Type of Group: Psychoeducation    Wellness Binder Information  Module Name:  Stress  Session Number:  1    Patient's Goal:  Patient will identify ways to manage daily stressors in recovery. Notes:  Patient was interactive during group sharing ways to manage daily stressors in recovery and shared one way to implement effective coping skills. Status After Intervention:  Improved    Participation Level:  Active Listener and Interactive    Participation Quality: Appropriate, Attentive, Sharing and Supportive      Speech:  normal      Thought Process/Content: Logical      Affective Functioning: Congruent      Mood: depressed      Level of consciousness:  Alert, Oriented x4 and Attentive      Response to Learning: Able to verbalize current knowledge/experience, Able to verbalize/acknowledge new learning, Able to retain information, Capable of insight, Able to change behavior and Progressing to goal      Endings: None Reported    Modes of Intervention: Education, Support, Socialization, Exploration, Clarifying and Problem-solving      Discipline Responsible: Psychoeducational Specialist      Signature:  Lorrie Matta

## 2018-09-27 NOTE — CARE COORDINATION
Spoke with pts mother and discussed that dr will be keeping pt today and possibly a few days due to changing medication. Pts mother agrees and will talk to pt about this.

## 2018-09-27 NOTE — PROGRESS NOTES
DATE OF SERVICE:     9/27/2018    Fabio Quinones seen today for the purpose of continuation of care. Nursing, social work reports, laboratory studies and vital signs are reviewed. Patient chief complaint today is:             [] Depression      [x] Anxiety        [] Psychosis         [] Suicidal/Homicidal                         [x] Delusions           [] Aggression          Subjective: Today patient is anxious. Patient is taking medications as ordered. Patient remains paranoid and restless. Will start Seroquel today to see if it helps patient. Sleep:  [] Good [x] Fair  [] Poor  Appetite:  [] Good [x] Fair  [] Poor    Depression:  [] Mild [] Moderate [] Severe                [] Constant [] Sporadic     Anxiety: [] Mild [] Moderate [x] Severe    [x] Constant [] Sporadic     Delusions: [] Mild [] Moderate [x] Severe     [x] Constant [] Sporadic     [x] Paranoid [] Somatic [] Grandiose     Hallucinations: [] Mild [] Moderate [] Severe     [] Constant [] Sporadic    [] Auditory  [] Visual [] Tactile       Suicidal: [] Constant [] Sporadic  Homicidal: [] Constant [] Sporadic    Unscheduled Medications     [] Patient Receiving Emergency Medications \" Chemical Restraint\"   [] Requesting PRN medications for anxiety    Medical Review of Systems:     All other than marked systmes have been reviewed and are all negative.     Constitutional Symptoms: []  fever []  Chills  Skin Symptoms: [] rash []  Pruritus   Eye Symptoms: [] Vision unchanged []  recent vision problems[] blurred vision   Respiratory Symptoms:[] shortness of breath [] cough  Cardiovascular Symptoms:  [] chest pain   [] palpitations   Gastrointestinal Symptoms: []  abdominal pain []  nausea []  vomiting []  diarrhea  Genitourinary Symptoms: []  dysuria  []  hematuria   Musculoskeletal Symptoms: []  back pain []  muscle pain []  joint pain  Neurologic Symptoms: []  headache []  dizziness  Hematolymphoid Symptoms: [] Adenopathy [] Bruises   [] Somatic  [] Grandiose    Perception: [x]  None  [] Auditory   [] Visual  [] tactile   [] olfactory  [] Illusions         Insight: [] Intact  [] Fair  [x] Limited    Judgement:  [] Intact  [] Fair  [x] Limited      Assessment/Plan:        Patient Active Problem List   Diagnosis Code    Mental retardation F79    Schizoaffective disorder, bipolar type ([facility] [address] F25.0    Mild intellectual disability F70    Bipolar affective [facility], mixed, severe, with psychotic behavior [facility] F31.64    Encounter for lithium monitoring Z51.81, Z79.899    Taking multiple medications for chronic disease R69    Other idiopathic scoliosis, thoracolumbar region M41.25    Cutaneous fungal infection B36.9    Chronic seasonal allergic rhinitis due to pollen J30.1    Bulging of lumbar intervertebral disc without myelopathy M51.26    Bipolar 1 disorder ([facility] [address] F31.9    Psychosis [facility] F29         Plan:    []  Patient is refusing medications  [x] Improving as expected Will start Seroquel 25 mg TID   [] Not improving as expected   [] Worsening    []  At Baseline       Reason for more than one antipsychotic:  [x] N/A  [] 3 failed monotherapy(drugs tried):  [] Cross over to a new antipsychotic  [] Taper to monotherapy from polypharmacy  [] Augmentation of Clozapine therapy due to treatment resistance to single therapy      Signed:  Garret Streeter  9/27/2018  3:25 PM

## 2018-09-28 PROCEDURE — 6370000000 HC RX 637 (ALT 250 FOR IP): Performed by: NURSE PRACTITIONER

## 2018-09-28 PROCEDURE — 1240000000 HC EMOTIONAL WELLNESS R&B

## 2018-09-28 PROCEDURE — 6370000000 HC RX 637 (ALT 250 FOR IP): Performed by: PSYCHIATRY & NEUROLOGY

## 2018-09-28 RX ORDER — QUETIAPINE FUMARATE 25 MG/1
50 TABLET, FILM COATED ORAL 3 TIMES DAILY
Status: DISCONTINUED | OUTPATIENT
Start: 2018-09-28 | End: 2018-10-01 | Stop reason: HOSPADM

## 2018-09-28 RX ADMIN — CARBAMAZEPINE 200 MG: 200 TABLET ORAL at 20:18

## 2018-09-28 RX ADMIN — CETIRIZINE HYDROCHLORIDE 10 MG: 10 TABLET ORAL at 08:33

## 2018-09-28 RX ADMIN — QUETIAPINE FUMARATE 25 MG: 25 TABLET ORAL at 08:33

## 2018-09-28 RX ADMIN — LITHIUM CARBONATE 450 MG: 450 TABLET, EXTENDED RELEASE ORAL at 08:33

## 2018-09-28 RX ADMIN — TRIHEXYPHENIDYL HYDROCHLORIDE 10 MG: 5 TABLET ORAL at 08:33

## 2018-09-28 RX ADMIN — CLONAZEPAM 1 MG: 0.5 TABLET ORAL at 08:33

## 2018-09-28 RX ADMIN — TRIHEXYPHENIDYL HYDROCHLORIDE 5 MG: 5 TABLET ORAL at 16:59

## 2018-09-28 RX ADMIN — LITHIUM CARBONATE 450 MG: 450 TABLET, EXTENDED RELEASE ORAL at 20:18

## 2018-09-28 RX ADMIN — CARBAMAZEPINE 200 MG: 200 TABLET ORAL at 08:33

## 2018-09-28 RX ADMIN — TRAZODONE HYDROCHLORIDE 50 MG: 50 TABLET ORAL at 20:18

## 2018-09-28 RX ADMIN — QUETIAPINE FUMARATE 50 MG: 25 TABLET ORAL at 20:18

## 2018-09-28 RX ADMIN — QUETIAPINE FUMARATE 50 MG: 25 TABLET ORAL at 14:30

## 2018-09-28 RX ADMIN — CARBAMAZEPINE 200 MG: 200 TABLET ORAL at 14:30

## 2018-09-28 NOTE — PROGRESS NOTES
Patients mother reported to this nurse that the patient was taking Seroquel 300 mg in the past ( Feb) and according to her he did not do well on the medication and had multiple side effects including, tightness in his jaw and it also effected his speech. Perfect served Gee Sharma NP. To update her.

## 2018-09-28 NOTE — PROGRESS NOTES
Group Therapy Note    Date: 9/28/2018  Start Time:345  End Time: 440  Number of Participants: 14    Type of Group: Recreational    Wellness Binder Information  Module Name: music   Session Number: na  Patient's Goal: will be able to share and enjoy favorite songs. Notes:  Engaged and sharing in group. Status After Intervention:  Improved    Participation Level:  Active Listener and Interactive    Participation Quality: Appropriate, Attentive, Sharing and Supportive      Speech:  normal      Thought Process/Content: Logical      Affective Functioning: Congruent      Mood: euthymic      Level of consciousness:  Alert, Oriented x4 and Attentive      Response to Learning: Able to verbalize/acknowledge new learning, Able to retain information and Progressing to goal      Endings: None Reported    Modes of Intervention: Education, Support, Socialization, Exploration and Problem-solving      Discipline Responsible: Psychoeducational Specialist      Signature:  Jared Hicks

## 2018-09-28 NOTE — PROGRESS NOTES
Group Therapy Note    Date: 9/28/2018  Start Time: 1000  End Time:  2830  Number of Participants: 5    Type of Group: Psychoeducation    Wellness Binder Information  Module Name:  Unhealthy vs healthy relationships. Session Number: 4-4    Patient's Goal:  Will be able to id characteristics of healthy vs unhealthy relationships   Notes: pleasant and engaged in group, able to id aspects of her past healthy relationships. Status After Intervention:  Improved    Participation Level:  Active Listener and Interactive    Participation Quality: Appropriate, Attentive, Sharing and Supportive      Speech:  normal      Thought Process/Content: Logical      Affective Functioning: Congruent      Mood: euthymic      Level of consciousness:  Alert, Oriented x4 and Attentive      Response to Learning: Able to verbalize/acknowledge new learning, Able to retain information and Progressing to goal      Endings: None Reported    Modes of Intervention: Education, Support, Socialization, Exploration and Problem-solving      Discipline Responsible: Psychoeducational Specialist      Signature:  Rula Blake

## 2018-09-29 PROCEDURE — 6370000000 HC RX 637 (ALT 250 FOR IP): Performed by: NURSE PRACTITIONER

## 2018-09-29 PROCEDURE — 6370000000 HC RX 637 (ALT 250 FOR IP): Performed by: PSYCHIATRY & NEUROLOGY

## 2018-09-29 PROCEDURE — 1240000000 HC EMOTIONAL WELLNESS R&B

## 2018-09-29 RX ADMIN — CARBAMAZEPINE 200 MG: 200 TABLET ORAL at 20:47

## 2018-09-29 RX ADMIN — LITHIUM CARBONATE 450 MG: 450 TABLET, EXTENDED RELEASE ORAL at 08:26

## 2018-09-29 RX ADMIN — QUETIAPINE FUMARATE 50 MG: 25 TABLET ORAL at 08:26

## 2018-09-29 RX ADMIN — TRIHEXYPHENIDYL HYDROCHLORIDE 5 MG: 5 TABLET ORAL at 16:59

## 2018-09-29 RX ADMIN — CARBAMAZEPINE 200 MG: 200 TABLET ORAL at 08:26

## 2018-09-29 RX ADMIN — QUETIAPINE FUMARATE 50 MG: 25 TABLET ORAL at 20:47

## 2018-09-29 RX ADMIN — CLONAZEPAM 1 MG: 0.5 TABLET ORAL at 20:47

## 2018-09-29 RX ADMIN — CARBAMAZEPINE 200 MG: 200 TABLET ORAL at 13:41

## 2018-09-29 RX ADMIN — LITHIUM CARBONATE 450 MG: 450 TABLET, EXTENDED RELEASE ORAL at 20:47

## 2018-09-29 RX ADMIN — TRIHEXYPHENIDYL HYDROCHLORIDE 10 MG: 5 TABLET ORAL at 08:26

## 2018-09-29 RX ADMIN — CETIRIZINE HYDROCHLORIDE 10 MG: 10 TABLET ORAL at 08:28

## 2018-09-29 RX ADMIN — CLONAZEPAM 1 MG: 0.5 TABLET ORAL at 13:41

## 2018-09-29 RX ADMIN — QUETIAPINE FUMARATE 50 MG: 25 TABLET ORAL at 13:41

## 2018-09-29 RX ADMIN — TRAZODONE HYDROCHLORIDE 50 MG: 50 TABLET ORAL at 20:47

## 2018-09-30 PROCEDURE — 6370000000 HC RX 637 (ALT 250 FOR IP): Performed by: NURSE PRACTITIONER

## 2018-09-30 PROCEDURE — 1240000000 HC EMOTIONAL WELLNESS R&B

## 2018-09-30 PROCEDURE — 6370000000 HC RX 637 (ALT 250 FOR IP): Performed by: PSYCHIATRY & NEUROLOGY

## 2018-09-30 RX ADMIN — TRIHEXYPHENIDYL HYDROCHLORIDE 5 MG: 5 TABLET ORAL at 17:16

## 2018-09-30 RX ADMIN — QUETIAPINE FUMARATE 50 MG: 25 TABLET ORAL at 08:48

## 2018-09-30 RX ADMIN — QUETIAPINE FUMARATE 50 MG: 25 TABLET ORAL at 20:47

## 2018-09-30 RX ADMIN — CARBAMAZEPINE 200 MG: 200 TABLET ORAL at 13:48

## 2018-09-30 RX ADMIN — CLONAZEPAM 1 MG: 0.5 TABLET ORAL at 20:48

## 2018-09-30 RX ADMIN — LITHIUM CARBONATE 450 MG: 450 TABLET, EXTENDED RELEASE ORAL at 08:48

## 2018-09-30 RX ADMIN — CLONAZEPAM 1 MG: 0.5 TABLET ORAL at 08:49

## 2018-09-30 RX ADMIN — CARBAMAZEPINE 200 MG: 200 TABLET ORAL at 08:48

## 2018-09-30 RX ADMIN — LITHIUM CARBONATE 450 MG: 450 TABLET, EXTENDED RELEASE ORAL at 20:47

## 2018-09-30 RX ADMIN — CARBAMAZEPINE 200 MG: 200 TABLET ORAL at 20:47

## 2018-09-30 RX ADMIN — QUETIAPINE FUMARATE 50 MG: 25 TABLET ORAL at 13:48

## 2018-09-30 RX ADMIN — TRIHEXYPHENIDYL HYDROCHLORIDE 10 MG: 5 TABLET ORAL at 08:49

## 2018-09-30 RX ADMIN — CETIRIZINE HYDROCHLORIDE 10 MG: 10 TABLET ORAL at 08:48

## 2018-09-30 ASSESSMENT — PAIN SCALES - GENERAL
PAINLEVEL_OUTOF10: 0
PAINLEVEL_OUTOF10: 0

## 2018-09-30 NOTE — PLAN OF CARE
Problem: Altered Mood, Psychotic Behavior:  Goal: Able to demonstrate trust by eating, participating in treatment and following staff's direction  Able to demonstrate trust by eating, participating in treatment and following staff's direction   Outcome: Ongoing    Goal: Able to verbalize decrease in frequency and intensity of hallucinations  Able to verbalize decrease in frequency and intensity of hallucinations   Outcome: Ongoing      Comments: Pt. Denies SI, HI, and hallucinations this shift. Pt. Denies physical complaints currently.
Problem: Depressive Behavior With or Without Suicide Precautions:  Goal: Ability to disclose and discuss suicidal ideas will improve  Ability to disclose and discuss suicidal ideas will improve   Outcome: Met This Shift      Problem: Altered Mood, Psychotic Behavior:  Goal: Able to verbalize reality based thinking  Able to verbalize reality based thinking   Outcome: Ongoing  Patient at nurses station. Repetitive swaying. Restless. Rambling and mumbled speech is hard to understand. Intrusive. denies suicidal and homicidal thoughts. Denies hallucinations. Will continue to observe and support.
Problem: Depressive Behavior With or Without Suicide Precautions:  Goal: Ability to disclose and discuss suicidal ideas will improve  Ability to disclose and discuss suicidal ideas will improve   Outcome: Ongoing    Goal: Able to verbalize support systems  Able to verbalize support systems   Outcome: Ongoing  Pt is stable and alert. Pt cooperative. Denies suicidal or homicidal ideations. Denies hallucinations. Reports no goals at this time. Will follow and monitor.
Problem: Depressive Behavior With or Without Suicide Precautions:  Goal: Ability to disclose and discuss suicidal ideas will improve  Ability to disclose and discuss suicidal ideas will improve   Outcome: Ongoing  Pt is stable and without distress at this time. Pt denies suicidal or homicidal ideations. Pt cooperative. Denies hallucinations. Will follow and monitor.    Goal: Patient specific goal  Patient specific goal   Outcome: Ongoing
Problem: Depressive Behavior With or Without Suicide Precautions:  Goal: Able to verbalize and/or display a decrease in depressive symptoms  Able to verbalize and/or display a decrease in depressive symptoms   Outcome: Ongoing    Goal: Able to verbalize support systems  Able to verbalize support systems   Outcome: Ongoing  Pt is stable and without distress. Pt denies suicidal or homicidal ideations. Pt is without distress. Will follow and monitor.
Problem: Depressive Behavior With or Without Suicide Precautions:  Goal: Able to verbalize and/or display a decrease in depressive symptoms  Able to verbalize and/or display a decrease in depressive symptoms   Outcome: Ongoing  Patient Is pleasant and cooperative but does present with slowed cognition and delayed responses. Patient denies depression and anxiety and reports that he is doing good. Patient denies SI, HI, and AVH. Patient is attending groups and eating well. Unsure if patient understands some questions asked during assessment. Patient does not present with any on the floor issues. Will monitor closely. Goal: Absence of self-harm  Absence of self-harm   Outcome: Ongoing  No evidence of self harm. Patient reports that he does not have any thoughts of harming self. Patient denies SI as well.
Problem: Depressive Behavior With or Without Suicide Precautions:  Goal: Able to verbalize and/or display a decrease in depressive symptoms  Able to verbalize and/or display a decrease in depressive symptoms   Outcome: Ongoing  Patient is pleasant and cooperative, with improving boundaries. Patient is denying anxiety and depression. Patient denies SI, HI, AVH. Patient is in good control and denies any on the floor issues. Will monitor closely. Problem: Altered Mood, Psychotic Behavior:  Goal: Ability to interact with others will improve  Ability to interact with others will improve   Outcome: Ongoing  Patient is isolative, but periodically interacts with other patients and his boundaries have improved.
Unmotivated  Present Suicidal Ideation: Yes (no plan)  Present Homicidal Ideation: No    Daily Assessment Last Entry:   Daily Sleep (WDL): Within Defined Limits         Patient Currently in Pain: Other (comment) (Pt resting in bed apparently asleep LOLI)  Daily Nutrition (WDL): Within Defined Limits    Patient Monitoring:  Frequency of Checks: 4 times per hour, close    Psychiatric Symptoms:   Depression Symptoms  Depression Symptoms: Change in energy level, Impaired concentration  Anxiety Symptoms  Anxiety Symptoms: Compulsive  Karal Symptoms  Karla Symptoms: Flight of ideas     Psychosis Symptoms  Delusion Type: No problems reported or observed. Suicide Risk CSSR-S:  1) Within the past month, have you wished you were dead or wished you could go to sleep and not wake up? : YES  2) Within the past month, have you actually had any thoughts of killing yourself? : YES  3) Within the past month, have you been thinking about how you might kill yourself? : YES  5) Within the past month, have you started to work out or worked out the details of how to kill yourself?  Do you intend to carry out this plan? : YES  6)  Have you ever done anything, started to do anything, or prepared to do anything to end your life?: NO  Change in Result no Change in Plan of care no      EDUCATION:   Learner Progress Toward Treatment Goals: Reviewed results and recommendations of this team, Reviewed group plan and strategies, Reviewed signs, symptoms and risk of self harm and violent behavior and Reviewed goals and plan of care    Method: Small group    Outcome: Needs reinforcement    PATIENT GOALS:  \" I don't know\"    PLAN/TREATMENT RECOMMENDATIONS UPDATE: To continue to supp    GOALS UPDATE:   Time frame for Short-Term Goals:        Viktoriya Nunes RN

## 2018-09-30 NOTE — PROGRESS NOTES
Group Therapy Note     Date: 9/30/2018  Start Time: 1015  End Time:  1100  Number of Participants: 20     Type of Group: Cognitive Skills     Wellness Binder Information  Module Name:    Session Number:       Patient's Goal: Identification of the 4 types of communication and what we can improve upon in regards to bad characteristics of communication patterns that have developed. Notes:  Pt was active and verbal during group and was able to identify ways to improve interactions with others. Status After Intervention:  Improved    Participation Level:  Active Listener and Interactive    Participation Quality: Appropriate, Attentive, Sharing and Supportive      Speech:  normal      Thought Process/Content: Logical      Affective Functioning: Congruent      Mood: anxious      Level of consciousness:  Alert, Oriented x4 and Attentive      Response to Learning: Able to verbalize current knowledge/experience and Able to retain information      Endings: None Reported    Modes of Intervention: Education, Support, Socialization, Exploration, Clarifying, Problem-solving and Activity      Discipline Responsible: /Counselor      Signature:  Berny Myles MSW, LSW

## 2018-10-01 VITALS
BODY MASS INDEX: 32.2 KG/M2 | SYSTOLIC BLOOD PRESSURE: 86 MMHG | HEIGHT: 71 IN | WEIGHT: 230 LBS | TEMPERATURE: 98.1 F | DIASTOLIC BLOOD PRESSURE: 43 MMHG | HEART RATE: 55 BPM | RESPIRATION RATE: 20 BRPM | OXYGEN SATURATION: 96 %

## 2018-10-01 PROCEDURE — 99238 HOSP IP/OBS DSCHRG MGMT 30/<: CPT | Performed by: PSYCHIATRY & NEUROLOGY

## 2018-10-01 PROCEDURE — 6370000000 HC RX 637 (ALT 250 FOR IP): Performed by: NURSE PRACTITIONER

## 2018-10-01 PROCEDURE — 6370000000 HC RX 637 (ALT 250 FOR IP): Performed by: PSYCHIATRY & NEUROLOGY

## 2018-10-01 RX ORDER — CARBAMAZEPINE 200 MG/1
200 TABLET ORAL 3 TIMES DAILY
Qty: 90 TABLET | Refills: 3 | Status: ON HOLD | OUTPATIENT
Start: 2018-10-01 | End: 2019-04-15 | Stop reason: HOSPADM

## 2018-10-01 RX ORDER — QUETIAPINE FUMARATE 50 MG/1
50 TABLET, FILM COATED ORAL 3 TIMES DAILY
Qty: 90 TABLET | Refills: 0 | Status: SHIPPED | OUTPATIENT
Start: 2018-10-01 | End: 2019-01-23 | Stop reason: ALTCHOICE

## 2018-10-01 RX ORDER — LITHIUM CARBONATE 450 MG
450 TABLET, EXTENDED RELEASE ORAL 2 TIMES DAILY
Qty: 60 TABLET | Refills: 0 | Status: ON HOLD | OUTPATIENT
Start: 2018-10-01 | End: 2019-04-15 | Stop reason: HOSPADM

## 2018-10-01 RX ADMIN — LITHIUM CARBONATE 450 MG: 450 TABLET, EXTENDED RELEASE ORAL at 08:35

## 2018-10-01 RX ADMIN — QUETIAPINE FUMARATE 50 MG: 25 TABLET ORAL at 08:35

## 2018-10-01 RX ADMIN — CETIRIZINE HYDROCHLORIDE 10 MG: 10 TABLET ORAL at 08:35

## 2018-10-01 RX ADMIN — CARBAMAZEPINE 200 MG: 200 TABLET ORAL at 08:35

## 2018-10-01 RX ADMIN — CARBAMAZEPINE 200 MG: 200 TABLET ORAL at 14:34

## 2018-10-01 RX ADMIN — CLONAZEPAM 1 MG: 0.5 TABLET ORAL at 08:35

## 2018-10-01 RX ADMIN — TRIHEXYPHENIDYL HYDROCHLORIDE 10 MG: 5 TABLET ORAL at 08:35

## 2018-10-01 RX ADMIN — QUETIAPINE FUMARATE 50 MG: 25 TABLET ORAL at 14:34

## 2018-10-01 ASSESSMENT — PAIN SCALES - GENERAL: PAINLEVEL_OUTOF10: 0

## 2018-10-01 NOTE — DISCHARGE SUMMARY
Physician Discharge Summary      Physical Examination   VS/Measurements:     BP (!) 86/43   Pulse 55   Temp 98.1 °F (36.7 °C) (Oral)   Resp 20   Ht 5' 11\" (1.803 m)   Wt 230 lb (104.3 kg)   SpO2 96%   BMI 32.08 kg/m²         Discharge Medications:                    Medication List      CHANGE how you take these medications    carBAMazepine 200 MG tablet  Commonly known as:  TEGRETOL  Take 1 tablet by mouth 3 times daily  What changed:  · when to take this  · Another medication with the same name was removed. Continue taking this medication, and follow the directions you see here. QUEtiapine 50 MG tablet  Commonly known as:  SEROQUEL  Take 1 tablet by mouth 3 times daily  What changed:  · medication strength  · how much to take  · when to take this        CONTINUE taking these medications    clonazePAM 1 MG tablet  Commonly known as:  KLONOPIN     INVEGA SUSTENNA 234 MG/1.5ML Susp IM injection  Generic drug:  paliperidone palmitate     lithium 450 MG extended release tablet  Commonly known as:  ESKALITH  Take 1 tablet by mouth 2 times daily     loratadine 10 MG tablet  Commonly known as:  CLARITIN  TAKE 1 TABLET BY MOUTH DAILY     trihexyphenidyl 5 MG tablet  Commonly known as:  ARTANE        STOP taking these medications    folic acid 1 MG tablet  Commonly known as:  FOLVITE     INVEGA 3 MG extended release tablet  Generic drug:  paliperidone           Where to Get Your Medications      These medications were sent to [address] [place] [address] -  [phone]  [address] [place] [address]    Phone:  [phone]   · carBAMazepine 200 MG tablet  · lithium 450 MG extended release tablet  · QUEtiapine 50 MG tablet          Psychiatric: Mental Status Examination:    Cognition:      [x] Alert  [x] Awake  [x] Oriented  [x] Person  [x] Place [x] Time    [] drowsy  [] tired  [] lethargic  [] distractable       Attention/Concentration:   [] Attentive  [] Family    [] Guardian      SignedWilfred Camila   10/1/2018   10:20 AM

## 2018-10-01 NOTE — PROGRESS NOTES
Patient has been in control. Smiling, watching football game with the male peers on unit and getting along with everyone. Patient was showered by this nurse. One step commands given and patient followed. At times needed assistance but was smiling and laughing and talking while showering. No behavior or management problems. No aggression, remains in control. Taking meds going to groups. No problems on unit. Have not observed patient talking to unseen others. Safety needs met observing closely.

## 2018-10-01 NOTE — PROGRESS NOTES
Patient attended community meeting/morning stretch.  Patient identified goal for the day as: \"Play guitar today\"

## 2018-10-01 NOTE — CARE COORDINATION
In order to ensure appropriate transition and discharge planning is in place, the following documents have been transmitted to [facility], as the new outpatient provider:     The d/c diagnosis was transmitted to the next care provider   The reason for hospitalization was transmitted to the next care provider   The d/c medications (dosage and indication) were transmitted to the next care provider    The continuing care plan was transmitted to the next care provider

## 2018-10-01 NOTE — CARE COORDINATION
In order to ensure appropriate transition and discharge planning is in place, the following documents have been transmitted to [facility], as the new outpatient provider:     The d/c diagnosis was transmitted to the next care provider   The reason for hospitalization was transmitted to the next care provider   The d/c medications (dosage and indication) were transmitted to the next care provider    The continuing care plan was transmitted to the next care provider

## 2018-10-23 ENCOUNTER — OFFICE VISIT (OUTPATIENT)
Dept: FAMILY MEDICINE CLINIC | Age: 38
End: 2018-10-23
Payer: MEDICARE

## 2018-10-23 VITALS
HEIGHT: 71 IN | SYSTOLIC BLOOD PRESSURE: 122 MMHG | RESPIRATION RATE: 19 BRPM | WEIGHT: 242.63 LBS | HEART RATE: 75 BPM | BODY MASS INDEX: 33.97 KG/M2 | OXYGEN SATURATION: 98 % | DIASTOLIC BLOOD PRESSURE: 68 MMHG | TEMPERATURE: 97.8 F

## 2018-10-23 DIAGNOSIS — R63.2 EXCESSIVE DIETARY CALORIC INTAKE: ICD-10-CM

## 2018-10-23 DIAGNOSIS — K62.5 RECTAL BLEEDING: Primary | ICD-10-CM

## 2018-10-23 DIAGNOSIS — R19.5 POSITIVE OCCULT STOOL BLOOD TEST: ICD-10-CM

## 2018-10-23 DIAGNOSIS — Z23 FLU VACCINE NEED: ICD-10-CM

## 2018-10-23 DIAGNOSIS — K59.03 DRUG-INDUCED CONSTIPATION: ICD-10-CM

## 2018-10-23 LAB
HEMOCCULT STL QL: ABNORMAL

## 2018-10-23 PROCEDURE — 1111F DSCHRG MED/CURRENT MED MERGE: CPT | Performed by: NURSE PRACTITIONER

## 2018-10-23 PROCEDURE — 99214 OFFICE O/P EST MOD 30 MIN: CPT | Performed by: NURSE PRACTITIONER

## 2018-10-23 PROCEDURE — 90688 IIV4 VACCINE SPLT 0.5 ML IM: CPT | Performed by: NURSE PRACTITIONER

## 2018-10-23 PROCEDURE — G0008 ADMIN INFLUENZA VIRUS VAC: HCPCS | Performed by: NURSE PRACTITIONER

## 2018-10-23 PROCEDURE — 1036F TOBACCO NON-USER: CPT | Performed by: NURSE PRACTITIONER

## 2018-10-23 PROCEDURE — 82272 OCCULT BLD FECES 1-3 TESTS: CPT | Performed by: NURSE PRACTITIONER

## 2018-10-23 PROCEDURE — G8417 CALC BMI ABV UP PARAM F/U: HCPCS | Performed by: NURSE PRACTITIONER

## 2018-10-23 PROCEDURE — G8482 FLU IMMUNIZE ORDER/ADMIN: HCPCS | Performed by: NURSE PRACTITIONER

## 2018-10-23 PROCEDURE — G8427 DOCREV CUR MEDS BY ELIG CLIN: HCPCS | Performed by: NURSE PRACTITIONER

## 2018-10-23 RX ORDER — DOCUSATE SODIUM 100 MG/1
100 CAPSULE, LIQUID FILLED ORAL 2 TIMES DAILY
Qty: 60 CAPSULE | Refills: 1 | Status: ON HOLD
Start: 2018-10-23 | End: 2021-07-15 | Stop reason: HOSPADM

## 2018-10-23 ASSESSMENT — ENCOUNTER SYMPTOMS
SORE THROAT: 0
NAUSEA: 0
RHINORRHEA: 0
FACIAL SWELLING: 0
SINUS PRESSURE: 0
SHORTNESS OF BREATH: 0
BLOOD IN STOOL: 1
SINUS PAIN: 0
COLOR CHANGE: 0
CONSTIPATION: 1
TROUBLE SWALLOWING: 0
VOMITING: 0
VOICE CHANGE: 0
WHEEZING: 0
COUGH: 0
CHEST TIGHTNESS: 0
ABDOMINAL PAIN: 0
DIARRHEA: 0
BACK PAIN: 0

## 2018-10-23 NOTE — PROGRESS NOTES
environmental allergies, food allergies and immunocompromised state. Neurological: Negative for dizziness, tremors, seizures, syncope, facial asymmetry, speech difficulty, weakness, light-headedness, numbness and headaches. Hematological: Negative for adenopathy. Does not bruise/bleed easily. Psychiatric/Behavioral: Negative for agitation, behavioral problems, confusion, decreased concentration, dysphoric mood, hallucinations, self-injury, sleep disturbance and suicidal ideas. The patient is not nervous/anxious and is not hyperactive. OBJECTIVE:     VS:  Wt Readings from Last 3 Encounters:   10/23/18 242 lb 10 oz (110.1 kg)   04/23/18 226 lb (102.5 kg)   02/19/18 212 lb (96.2 kg)                       Vitals:    10/23/18 1550   BP: 122/68   Pulse: 75   Resp: 19   Temp: 97.8 °F (36.6 °C)   TempSrc: Temporal   SpO2: 98%   Weight: 242 lb 10 oz (110.1 kg)   Height: 5' 11\" (1.803 m)       General: Alert and oriented to person, place, and time, well developed and well nourished, in no acute distress  SKIN: Warm and dry, intact without any rash, masses or lesions  HEAD: normocephalic, atraumatic  Eyes: sclera/conjunctiva clear, PERRLA, EOMI's intact  Neck: supple and non-tender without mass, trachea midline, no cervical lymphadenopathy, no bruit, no thyromegaly or nodules  Cardiovascular: regular rate and regular rhythm, normal S1 and S2,  no murmurs, rubs, clicks, or gallop. Distal pulses intact, no carotid bruits. No edema  Pulmonary/Chest: clear to auscultation bilaterally, no wheezes, rales or rhonchi, normal air movement, no respiratory distress  Abdomen: soft, non-tender, non-distended, normal bowel sounds, no masses or hepatosplenomegaly  Musculoskeletal: Normal ROM, no joint swelling, deformity or tenderness   Rectal: + occult test, no external hemorrhoids or internal hemorrhoids, normal sphincter tone no rectal masses or lesions noted, tolerated well chaperone present.    Neurologic: reflexes normal

## 2018-10-23 NOTE — PATIENT INSTRUCTIONS
Patient Education        Colonoscopy: Before Your Procedure  What is a colonoscopy? A colonoscopy is a test that lets a doctor look inside your colon. The doctor uses a thin, lighted tube called a colonoscope to look for problems. These include small growths called polyps, cancer, or bleeding. During the test, the doctor can take samples of tissue that can be checked for cancer or other problems. This is called a biopsy. The doctor can also take out polyps. Before the test, you will need to stop eating solid foods. You also will drink a liquid or take a tablet that cleans out your colon. This helps your doctor be able to see inside your colon during the test.  Follow-up care is a key part of your treatment and safety. Be sure to make and go to all appointments, and call your doctor if you are having problems. It's also a good idea to know your test results and keep a list of the medicines you take. What happens before the procedure?   Preparing for the procedure    · Understand exactly what procedure is planned, along with the risks, benefits, and other options. · Tell your doctors ALL the medicines, vitamins, supplements, and herbal remedies you take. Some of these can increase the risk of bleeding or interact with anesthesia.     · If you take blood thinners, such as warfarin (Coumadin), clopidogrel (Plavix), or aspirin, be sure to talk to your doctor. He or she will tell you if you should stop taking these medicines before your procedure. Make sure that you understand exactly what your doctor wants you to do.     · Your doctor will tell you which medicines to take or stop before your procedure. You may need to stop taking certain medicines a week or more before the procedure. So talk to your doctor as soon as you can.     · If you have an advance directive, let your doctor know. It may include a living will and a durable power of  for health care.  Bring a copy to the hospital. If you don't have belly. The doctor will gently put a gloved finger into your anus. Then the doctor puts the scope in and moves it into your colon. The scope goes in easily because it is lubricated.     · The doctor may also use small tools to take tissue samples for a biopsy or to remove polyps. This does not hurt.     · The test usually takes 30 to 45 minutes. But it may take longer. It depends on what is found and what is done. Going home   · Be sure you have someone to drive you home. Anesthesia and pain medicine make it unsafe for you to drive.     · You will be given more specific instructions about recovering from your procedure. When should you call your doctor? · You have questions or concerns.     · You don't understand how to prepare for your procedure.     · You are having trouble with the bowel prep.     · You become ill before the procedure (such as fever, flu, or a cold).     · You need to reschedule or have changed your mind about having the procedure. Where can you learn more? Go to [email or web address]. org and sign in to your [employer] account. Enter C315 in the [employer] box to learn more about \"Colonoscopy: Before Your Procedure. \"     If you do not have an account, please click on the \"Sign Up Now\" link. Current as of: May 12, 2017  Content Version: 11.7  © 0714-3060 Healthwise, Incorporated. Care instructions adapted under license by [facility] ([facility]). If you have questions about a medical condition or this instruction, always ask your healthcare professional. [place] [address] any warranty or liability for your use of this information. Patient Education        Colonoscopy: What to Expect at Home  Your Recovery  After you have a colonoscopy, you will stay at the clinic for 1 to 2 hours until the medicines wear off. Then you can go home. But you will need to arrange for a ride.  Your doctor will tell you when you can eat and do your other usual activities. Your doctor will talk to you about when you will need your next colonoscopy. Your doctor can help you decide how often you need to be checked. This will depend on the results of your test and your risk for colorectal cancer. After the test, you may be bloated or have gas pains. You may need to pass gas. If a biopsy was done or a polyp was removed, you may have streaks of blood in your stool (feces) for a few days. Problems such as heavy rectal bleeding may not occur until several weeks after the test. This isn't common. But it can happen after polyps are removed. This care sheet gives you a general idea about how long it will take for you to recover. But each person recovers at a different pace. Follow the steps below to get better as quickly as possible. How can you care for yourself at home? Activity    · Rest when you feel tired.     · You can do your normal activities when it feels okay to do so. Diet    · Follow your doctor's directions for eating.     · Unless your doctor has told you not to, drink plenty of fluids. This helps to replace the fluids that were lost during the colon prep.     · Do not drink alcohol. Medicines    · Your doctor will tell you if and when you can restart your medicines. He or she will also give you instructions about taking any new medicines.     · If you take blood thinners, such as warfarin (Coumadin), clopidogrel (Plavix), or aspirin, be sure to talk to your doctor. He or she will tell you if and when to start taking those medicines again. Make sure that you understand exactly what your doctor wants you to do.     · If polyps were removed or a biopsy was done during the test, your doctor may tell you not to take aspirin or other anti-inflammatory medicines for a few days. These include ibuprofen (Advil, Motrin) and naproxen (Aleve).    Other instructions    · For your safety, do not drive or operate machinery until the medicine wears off and you can think

## 2019-01-23 ENCOUNTER — OFFICE VISIT (OUTPATIENT)
Dept: FAMILY MEDICINE CLINIC | Age: 39
End: 2019-01-23
Payer: MEDICARE

## 2019-01-23 VITALS
HEART RATE: 77 BPM | SYSTOLIC BLOOD PRESSURE: 122 MMHG | RESPIRATION RATE: 19 BRPM | OXYGEN SATURATION: 99 % | WEIGHT: 248.25 LBS | BODY MASS INDEX: 34.75 KG/M2 | DIASTOLIC BLOOD PRESSURE: 64 MMHG | TEMPERATURE: 98.7 F | HEIGHT: 71 IN

## 2019-01-23 DIAGNOSIS — F31.9 BIPOLAR 1 DISORDER (HCC): ICD-10-CM

## 2019-01-23 DIAGNOSIS — R73.01 IMPAIRED FASTING BLOOD SUGAR: ICD-10-CM

## 2019-01-23 DIAGNOSIS — R63.2 EXCESSIVE DIETARY CALORIC INTAKE: ICD-10-CM

## 2019-01-23 DIAGNOSIS — R69 TAKING MULTIPLE MEDICATIONS FOR CHRONIC DISEASE: Primary | ICD-10-CM

## 2019-01-23 DIAGNOSIS — F25.0 SCHIZOAFFECTIVE DISORDER, BIPOLAR TYPE (HCC): ICD-10-CM

## 2019-01-23 PROBLEM — B36.9 CUTANEOUS FUNGAL INFECTION: Status: RESOLVED | Noted: 2018-01-02 | Resolved: 2019-01-23

## 2019-01-23 PROCEDURE — G8427 DOCREV CUR MEDS BY ELIG CLIN: HCPCS | Performed by: NURSE PRACTITIONER

## 2019-01-23 PROCEDURE — 1036F TOBACCO NON-USER: CPT | Performed by: NURSE PRACTITIONER

## 2019-01-23 PROCEDURE — G8417 CALC BMI ABV UP PARAM F/U: HCPCS | Performed by: NURSE PRACTITIONER

## 2019-01-23 PROCEDURE — G8482 FLU IMMUNIZE ORDER/ADMIN: HCPCS | Performed by: NURSE PRACTITIONER

## 2019-01-23 PROCEDURE — 99213 OFFICE O/P EST LOW 20 MIN: CPT | Performed by: NURSE PRACTITIONER

## 2019-01-23 RX ORDER — TRAZODONE HYDROCHLORIDE 50 MG/1
200 TABLET ORAL NIGHTLY
Refills: 0 | COMMUNITY
Start: 2018-12-19 | End: 2019-05-28

## 2019-01-23 RX ORDER — PALIPERIDONE 6 MG
6 TABLET, EXTENDED RELEASE 24 HR ORAL DAILY
Refills: 0 | Status: ON HOLD | COMMUNITY
Start: 2019-01-19 | End: 2019-04-15 | Stop reason: HOSPADM

## 2019-01-23 ASSESSMENT — ENCOUNTER SYMPTOMS
SHORTNESS OF BREATH: 0
CONSTIPATION: 0
TROUBLE SWALLOWING: 0
ABDOMINAL PAIN: 0
BACK PAIN: 0
CHEST TIGHTNESS: 0
VOMITING: 0
FACIAL SWELLING: 0
WHEEZING: 0
VOICE CHANGE: 0
SORE THROAT: 0
COUGH: 0
DIARRHEA: 0
COLOR CHANGE: 0
RHINORRHEA: 0
NAUSEA: 0
SINUS PRESSURE: 0
SINUS PAIN: 0

## 2019-02-02 ENCOUNTER — HOSPITAL ENCOUNTER (OUTPATIENT)
Age: 39
Discharge: HOME | End: 2019-02-02
Payer: MEDICARE

## 2019-02-02 DIAGNOSIS — R73.01 IMPAIRED FASTING BLOOD SUGAR: ICD-10-CM

## 2019-02-02 DIAGNOSIS — R69 TAKING MULTIPLE MEDICATIONS FOR CHRONIC DISEASE: ICD-10-CM

## 2019-02-02 DIAGNOSIS — F31.9 BIPOLAR 1 DISORDER (HCC): ICD-10-CM

## 2019-02-02 DIAGNOSIS — F25.0 SCHIZOAFFECTIVE DISORDER, BIPOLAR TYPE (HCC): ICD-10-CM

## 2019-02-02 LAB
ALBUMIN SERPL-MCNC: 4.8 G/DL (ref 3.5–5.2)
ALP BLD-CCNC: 89 U/L (ref 40–129)
ALT SERPL-CCNC: 19 U/L (ref 0–40)
ANION GAP SERPL CALCULATED.3IONS-SCNC: 11 MMOL/L (ref 7–16)
AST SERPL-CCNC: 17 U/L (ref 0–39)
BASOPHILS ABSOLUTE: 0.04 E9/L (ref 0–0.2)
BASOPHILS RELATIVE PERCENT: 0.5 % (ref 0–2)
BILIRUB SERPL-MCNC: 0.5 MG/DL (ref 0–1.2)
BUN BLDV-MCNC: 13 MG/DL (ref 6–20)
CALCIUM SERPL-MCNC: 9.6 MG/DL (ref 8.6–10.2)
CARBAMAZEPINE DOSE: NORMAL
CARBAMAZEPINE LEVEL: 7.5 MCG/ML (ref 4–10)
CHLORIDE BLD-SCNC: 102 MMOL/L (ref 98–107)
CO2: 24 MMOL/L (ref 22–29)
CREAT SERPL-MCNC: 0.9 MG/DL (ref 0.7–1.2)
EOSINOPHILS ABSOLUTE: 0.15 E9/L (ref 0.05–0.5)
EOSINOPHILS RELATIVE PERCENT: 2 % (ref 0–6)
GFR AFRICAN AMERICAN: >60
GFR NON-AFRICAN AMERICAN: >60 ML/MIN/1.73
GLUCOSE BLD-MCNC: 91 MG/DL (ref 74–99)
HBA1C MFR BLD: 4.6 % (ref 4–5.6)
HCT VFR BLD CALC: 43.8 % (ref 37–54)
HEMOGLOBIN: 14.8 G/DL (ref 12.5–16.5)
IMMATURE GRANULOCYTES #: 0.02 E9/L
IMMATURE GRANULOCYTES %: 0.3 % (ref 0–5)
LITHIUM DOSE AMOUNT: NORMAL
LITHIUM LEVEL: 0.92 MMOL/L (ref 0.5–1.5)
LYMPHOCYTES ABSOLUTE: 1.73 E9/L (ref 1.5–4)
LYMPHOCYTES RELATIVE PERCENT: 22.5 % (ref 20–42)
MCH RBC QN AUTO: 31.9 PG (ref 26–35)
MCHC RBC AUTO-ENTMCNC: 33.8 % (ref 32–34.5)
MCV RBC AUTO: 94.4 FL (ref 80–99.9)
MONOCYTES ABSOLUTE: 0.42 E9/L (ref 0.1–0.95)
MONOCYTES RELATIVE PERCENT: 5.5 % (ref 2–12)
NEUTROPHILS ABSOLUTE: 5.33 E9/L (ref 1.8–7.3)
NEUTROPHILS RELATIVE PERCENT: 69.2 % (ref 43–80)
PDW BLD-RTO: 11.5 FL (ref 11.5–15)
PLATELET # BLD: 227 E9/L (ref 130–450)
PMV BLD AUTO: 10.1 FL (ref 7–12)
POTASSIUM SERPL-SCNC: 4.2 MMOL/L (ref 3.5–5)
RBC # BLD: 4.64 E12/L (ref 3.8–5.8)
SODIUM BLD-SCNC: 137 MMOL/L (ref 132–146)
TOTAL PROTEIN: 7.3 G/DL (ref 6.4–8.3)
WBC # BLD: 7.7 E9/L (ref 4.5–11.5)

## 2019-02-02 PROCEDURE — 85025 COMPLETE CBC W/AUTO DIFF WBC: CPT

## 2019-02-02 PROCEDURE — 83036 HEMOGLOBIN GLYCOSYLATED A1C: CPT

## 2019-02-02 PROCEDURE — 36415 COLL VENOUS BLD VENIPUNCTURE: CPT

## 2019-02-02 PROCEDURE — 80053 COMPREHEN METABOLIC PANEL: CPT

## 2019-02-02 PROCEDURE — 80178 ASSAY OF LITHIUM: CPT

## 2019-02-02 PROCEDURE — 80156 ASSAY CARBAMAZEPINE TOTAL: CPT

## 2019-02-04 ENCOUNTER — TELEPHONE (OUTPATIENT)
Dept: FAMILY MEDICINE CLINIC | Age: 39
End: 2019-02-04

## 2019-04-09 ENCOUNTER — HOSPITAL ENCOUNTER (INPATIENT)
Age: 39
LOS: 6 days | Discharge: HOME | DRG: 885 | End: 2019-04-15
Attending: EMERGENCY MEDICINE | Admitting: PSYCHIATRY & NEUROLOGY
Payer: MEDICARE

## 2019-04-09 DIAGNOSIS — F31.9 BIPOLAR 1 DISORDER (HCC): Primary | ICD-10-CM

## 2019-04-09 DIAGNOSIS — F23 ACUTE PSYCHOSIS (HCC): ICD-10-CM

## 2019-04-09 DIAGNOSIS — F99 PSYCHIATRIC COMPLAINT: ICD-10-CM

## 2019-04-09 LAB
ACETAMINOPHEN LEVEL: <5 MCG/ML (ref 10–30)
AMPHETAMINE SCREEN, URINE: NOT DETECTED
ANION GAP SERPL CALCULATED.3IONS-SCNC: 6 MMOL/L (ref 7–16)
BARBITURATE SCREEN URINE: NOT DETECTED
BENZODIAZEPINE SCREEN, URINE: NOT DETECTED
BUN BLDV-MCNC: 17 MG/DL (ref 6–20)
CALCIUM SERPL-MCNC: 9.1 MG/DL (ref 8.6–10.2)
CANNABINOID SCREEN URINE: NOT DETECTED
CHLORIDE BLD-SCNC: 106 MMOL/L (ref 98–107)
CO2: 29 MMOL/L (ref 22–29)
COCAINE METABOLITE SCREEN URINE: NOT DETECTED
CREAT SERPL-MCNC: 1.1 MG/DL (ref 0.7–1.2)
ETHANOL: <10 MG/DL (ref 0–0.08)
GFR AFRICAN AMERICAN: >60
GFR NON-AFRICAN AMERICAN: >60 ML/MIN/1.73
GLUCOSE BLD-MCNC: 132 MG/DL (ref 74–99)
HCT VFR BLD CALC: 41 % (ref 37–54)
HEMOGLOBIN: 13.9 G/DL (ref 12.5–16.5)
MCH RBC QN AUTO: 32.3 PG (ref 26–35)
MCHC RBC AUTO-ENTMCNC: 33.9 % (ref 32–34.5)
MCV RBC AUTO: 95.3 FL (ref 80–99.9)
METHADONE SCREEN, URINE: NOT DETECTED
OPIATE SCREEN URINE: NOT DETECTED
PDW BLD-RTO: 11.5 FL (ref 11.5–15)
PHENCYCLIDINE SCREEN URINE: NOT DETECTED
PLATELET # BLD: 191 E9/L (ref 130–450)
PMV BLD AUTO: 9.6 FL (ref 7–12)
POTASSIUM SERPL-SCNC: 3.6 MMOL/L (ref 3.5–5)
PROPOXYPHENE SCREEN: NOT DETECTED
RBC # BLD: 4.3 E12/L (ref 3.8–5.8)
SALICYLATE, SERUM: <0.3 MG/DL (ref 0–30)
SODIUM BLD-SCNC: 141 MMOL/L (ref 132–146)
TRICYCLIC ANTIDEPRESSANTS SCREEN SERUM: NEGATIVE NG/ML
WBC # BLD: 8.1 E9/L (ref 4.5–11.5)

## 2019-04-09 PROCEDURE — 1240000000 HC EMOTIONAL WELLNESS R&B

## 2019-04-09 PROCEDURE — 85027 COMPLETE CBC AUTOMATED: CPT

## 2019-04-09 PROCEDURE — 99284 EMERGENCY DEPT VISIT MOD MDM: CPT

## 2019-04-09 PROCEDURE — 80307 DRUG TEST PRSMV CHEM ANLYZR: CPT

## 2019-04-09 PROCEDURE — 80048 BASIC METABOLIC PNL TOTAL CA: CPT

## 2019-04-09 PROCEDURE — G0480 DRUG TEST DEF 1-7 CLASSES: HCPCS

## 2019-04-09 PROCEDURE — 36415 COLL VENOUS BLD VENIPUNCTURE: CPT

## 2019-04-09 RX ORDER — BENZTROPINE MESYLATE 1 MG/ML
2 INJECTION INTRAMUSCULAR; INTRAVENOUS 2 TIMES DAILY PRN
Status: DISCONTINUED | OUTPATIENT
Start: 2019-04-09 | End: 2019-04-15 | Stop reason: HOSPADM

## 2019-04-09 RX ORDER — OLANZAPINE 10 MG/1
10 TABLET ORAL
Status: ACTIVE | OUTPATIENT
Start: 2019-04-09 | End: 2019-04-09

## 2019-04-09 RX ORDER — ACETAMINOPHEN 325 MG/1
650 TABLET ORAL EVERY 4 HOURS PRN
Status: DISCONTINUED | OUTPATIENT
Start: 2019-04-09 | End: 2019-04-15 | Stop reason: HOSPADM

## 2019-04-09 RX ORDER — HALOPERIDOL 5 MG/ML
10 INJECTION INTRAMUSCULAR EVERY 6 HOURS PRN
Status: DISCONTINUED | OUTPATIENT
Start: 2019-04-09 | End: 2019-04-15 | Stop reason: HOSPADM

## 2019-04-09 RX ORDER — MAGNESIUM HYDROXIDE/ALUMINUM HYDROXICE/SIMETHICONE 120; 1200; 1200 MG/30ML; MG/30ML; MG/30ML
30 SUSPENSION ORAL PRN
Status: DISCONTINUED | OUTPATIENT
Start: 2019-04-09 | End: 2019-04-15 | Stop reason: HOSPADM

## 2019-04-09 RX ORDER — TRAZODONE HYDROCHLORIDE 50 MG/1
50 TABLET ORAL NIGHTLY PRN
Status: DISCONTINUED | OUTPATIENT
Start: 2019-04-09 | End: 2019-04-15 | Stop reason: HOSPADM

## 2019-04-09 RX ORDER — HYDROXYZINE PAMOATE 50 MG/1
50 CAPSULE ORAL EVERY 6 HOURS PRN
Status: DISCONTINUED | OUTPATIENT
Start: 2019-04-09 | End: 2019-04-15 | Stop reason: HOSPADM

## 2019-04-09 ASSESSMENT — SLEEP AND FATIGUE QUESTIONNAIRES
AVERAGE NUMBER OF SLEEP HOURS: 0
DO YOU USE A SLEEP AID: NO
DO YOU HAVE DIFFICULTY SLEEPING: NO

## 2019-04-09 ASSESSMENT — LIFESTYLE VARIABLES: HISTORY_ALCOHOL_USE: NO

## 2019-04-09 NOTE — ED NOTES
Patient was sleeping at the time SW Intern attempted to assess him. See collateral information from [address] in [address] note. Per patient's mother, the patient has been experiencing psychosis. Patient believes there are people in the wall and the  is coming to kill him. Patient has made gestures to harm others around him. Patient was punching himself in the chest. Patient has poor sleep and appetite. According to [facility]) records the patient was admitted on 09/21/18. Per [facility] note from 09/21/18: the patient's mother denied he has a hx of suicide attempts and drug/alcohol.     Juliet BRANDON Student      ROMA العراقي, [place]  04/09/19 2005

## 2019-04-09 NOTE — ED NOTES
REGINALDO Turner, received phone call from [facility]. Patient is being sent in due to increased psychosis and decompensating over the past 2 months. Patient has been pale, off balance, and agitated. Patient has a mental health hx of schizoaffective disorder, ADHD, and Pervasive Developmental Disorder. Patient has been receiving Invega injections 234 Q 3 Weeks. 6 mg tab QD. Patient mother reportedly brought patient to his outpatient appointment today and was requesting patient to be given Invega injection Q 2 Weeks. Per [facility] staff they will not provide this and recommended patient needs to be evaluated inpatient for possible medication adjustment.      ROMA Monroe, [facility]  04/09/19 6056

## 2019-04-09 NOTE — LETTER
Evelin [address] [place] [address]  Phone: [phone]          April 15, 2019     Patient: Cliff Lopez   YOB: 1980   Date of Visit: 4/9/2019       To Whom it May Concern:    Lawanda Hitchcock has been hospitalized at [facility] in [facility] from 4/9/19 through 4/15/19. He is able to return to work without restrictions    If you have any questions or concerns, please don't hesitate to call.     Sincerely,               Julian Hernandez., MSW, LSW

## 2019-04-10 PROBLEM — J06.9 VIRAL UPPER RESPIRATORY ILLNESS: Status: ACTIVE | Noted: 2019-04-10

## 2019-04-10 LAB
INFLUENZA A BY PCR: NOT DETECTED
INFLUENZA B BY PCR: NOT DETECTED

## 2019-04-10 PROCEDURE — 99221 1ST HOSP IP/OBS SF/LOW 40: CPT | Performed by: NURSE PRACTITIONER

## 2019-04-10 PROCEDURE — 87502 INFLUENZA DNA AMP PROBE: CPT

## 2019-04-10 PROCEDURE — 6370000000 HC RX 637 (ALT 250 FOR IP): Performed by: INTERNAL MEDICINE

## 2019-04-10 PROCEDURE — 6370000000 HC RX 637 (ALT 250 FOR IP): Performed by: NURSE PRACTITIONER

## 2019-04-10 PROCEDURE — 6370000000 HC RX 637 (ALT 250 FOR IP): Performed by: PSYCHIATRY & NEUROLOGY

## 2019-04-10 PROCEDURE — 1240000000 HC EMOTIONAL WELLNESS R&B

## 2019-04-10 RX ORDER — TRIHEXYPHENIDYL HYDROCHLORIDE 5 MG/1
5 TABLET ORAL 2 TIMES DAILY
Status: DISCONTINUED | OUTPATIENT
Start: 2019-04-10 | End: 2019-04-15 | Stop reason: HOSPADM

## 2019-04-10 RX ORDER — CARBAMAZEPINE 200 MG/1
200 TABLET ORAL 3 TIMES DAILY
Status: DISCONTINUED | OUTPATIENT
Start: 2019-04-10 | End: 2019-04-10

## 2019-04-10 RX ORDER — DOCUSATE SODIUM 100 MG/1
100 CAPSULE, LIQUID FILLED ORAL 2 TIMES DAILY
Status: DISCONTINUED | OUTPATIENT
Start: 2019-04-10 | End: 2019-04-15 | Stop reason: HOSPADM

## 2019-04-10 RX ORDER — LITHIUM CARBONATE 450 MG
450 TABLET, EXTENDED RELEASE ORAL 2 TIMES DAILY
Status: DISCONTINUED | OUTPATIENT
Start: 2019-04-10 | End: 2019-04-10

## 2019-04-10 RX ORDER — CARBAMAZEPINE 200 MG/1
300 TABLET ORAL 2 TIMES DAILY
Status: DISCONTINUED | OUTPATIENT
Start: 2019-04-10 | End: 2019-04-12

## 2019-04-10 RX ORDER — CLONAZEPAM 0.5 MG/1
1 TABLET ORAL 4 TIMES DAILY PRN
Status: DISCONTINUED | OUTPATIENT
Start: 2019-04-10 | End: 2019-04-10

## 2019-04-10 RX ORDER — ARIPIPRAZOLE 15 MG/1
15 TABLET ORAL 2 TIMES DAILY
Status: DISCONTINUED | OUTPATIENT
Start: 2019-04-10 | End: 2019-04-12

## 2019-04-10 RX ORDER — TRAZODONE HYDROCHLORIDE 50 MG/1
200 TABLET ORAL NIGHTLY
Status: DISCONTINUED | OUTPATIENT
Start: 2019-04-10 | End: 2019-04-15 | Stop reason: HOSPADM

## 2019-04-10 RX ORDER — CLONAZEPAM 0.5 MG/1
1 TABLET ORAL 2 TIMES DAILY
Status: DISCONTINUED | OUTPATIENT
Start: 2019-04-10 | End: 2019-04-15 | Stop reason: HOSPADM

## 2019-04-10 RX ORDER — ARIPIPRAZOLE 15 MG/1
15 TABLET ORAL 2 TIMES DAILY
Status: ON HOLD | COMMUNITY
End: 2019-04-15 | Stop reason: HOSPADM

## 2019-04-10 RX ORDER — DIVALPROEX SODIUM 500 MG/1
1000 TABLET, DELAYED RELEASE ORAL 2 TIMES DAILY
Status: DISCONTINUED | OUTPATIENT
Start: 2019-04-10 | End: 2019-04-15 | Stop reason: HOSPADM

## 2019-04-10 RX ORDER — FLUTICASONE PROPIONATE 50 MCG
1 SPRAY, SUSPENSION (ML) NASAL DAILY
Status: DISCONTINUED | OUTPATIENT
Start: 2019-04-10 | End: 2019-04-15 | Stop reason: HOSPADM

## 2019-04-10 RX ORDER — CETIRIZINE HYDROCHLORIDE 10 MG/1
10 TABLET ORAL DAILY
Status: DISCONTINUED | OUTPATIENT
Start: 2019-04-10 | End: 2019-04-15 | Stop reason: HOSPADM

## 2019-04-10 RX ORDER — GUAIFENESIN 100 MG/5ML
200 SOLUTION ORAL EVERY 4 HOURS PRN
Status: DISCONTINUED | OUTPATIENT
Start: 2019-04-10 | End: 2019-04-10

## 2019-04-10 RX ORDER — CARBAMAZEPINE 200 MG/1
200 TABLET ORAL 2 TIMES DAILY
Status: DISCONTINUED | OUTPATIENT
Start: 2019-04-10 | End: 2019-04-10

## 2019-04-10 RX ADMIN — CARBAMAZEPINE 200 MG: 200 TABLET ORAL at 11:02

## 2019-04-10 RX ADMIN — DOCUSATE SODIUM 100 MG: 100 CAPSULE, LIQUID FILLED ORAL at 11:02

## 2019-04-10 RX ADMIN — ARIPIPRAZOLE 15 MG: 15 TABLET ORAL at 11:02

## 2019-04-10 RX ADMIN — FLUTICASONE PROPIONATE 1 SPRAY: 50 SPRAY, METERED NASAL at 20:09

## 2019-04-10 RX ADMIN — CETIRIZINE HYDROCHLORIDE 10 MG: 10 TABLET, FILM COATED ORAL at 11:02

## 2019-04-10 RX ADMIN — TRAZODONE HYDROCHLORIDE 200 MG: 50 TABLET ORAL at 20:07

## 2019-04-10 RX ADMIN — CARBAMAZEPINE 300 MG: 200 TABLET ORAL at 20:06

## 2019-04-10 RX ADMIN — ARIPIPRAZOLE 15 MG: 15 TABLET ORAL at 20:07

## 2019-04-10 RX ADMIN — DIVALPROEX SODIUM 1000 MG: 500 TABLET, DELAYED RELEASE ORAL at 11:02

## 2019-04-10 RX ADMIN — DIVALPROEX SODIUM 1000 MG: 500 TABLET, DELAYED RELEASE ORAL at 20:07

## 2019-04-10 RX ADMIN — DOCUSATE SODIUM 100 MG: 100 CAPSULE, LIQUID FILLED ORAL at 20:07

## 2019-04-10 RX ADMIN — CLONAZEPAM 1 MG: 0.5 TABLET ORAL at 20:07

## 2019-04-10 RX ADMIN — CLONAZEPAM 1 MG: 0.5 TABLET ORAL at 11:01

## 2019-04-10 ASSESSMENT — PAIN SCALES - GENERAL: PAINLEVEL_OUTOF10: 0

## 2019-04-10 ASSESSMENT — SLEEP AND FATIGUE QUESTIONNAIRES
DIFFICULTY STAYING ASLEEP: YES
RESTFUL SLEEP: NO
AVERAGE NUMBER OF SLEEP HOURS: 2
DO YOU HAVE DIFFICULTY SLEEPING: YES
DIFFICULTY FALLING ASLEEP: YES
SLEEP PATTERN: DIFFICULTY FALLING ASLEEP;RESTLESSNESS
DO YOU USE A SLEEP AID: NO
DIFFICULTY ARISING: NO

## 2019-04-10 ASSESSMENT — LIFESTYLE VARIABLES: HISTORY_ALCOHOL_USE: NO

## 2019-04-10 NOTE — PROGRESS NOTES
Talked with Mother Richie Lynn. Patient is being switched to Abilify and was to receive aristoda 667 mg's yesterday  But was brought to hospital. Has been on oral Abilify one week.

## 2019-04-10 NOTE — ED NOTES
Patient accepted to 7S, room 7515 by Dr. Brent Garcia. Admitting notified.       Jonathan Morrison RN  04/09/19 2052

## 2019-04-10 NOTE — CARE COORDINATION
Met with pt to complete biopsychosocial and cssr-s. Pt irritable but cooperative. Pt stated he has a bad cold and doesn't feel well. Pt labile affect flat. Pt admitted to hearing people in the walls prior to admission. Pt denied si hi and or a/v hallucinations. Pt lives with mom and plans to return upon dc. Pt active with [facility]. SW to call to schedule appts. Phone call to mom [phone] to gather collateral information. Mom stated that there have been recent medication changes and is worried that this has been an issue for him as well. Mom stated pt has been hearing voices in the wall. Pt is able to return, there is staff in the home with him throughout the week as well. Mom is very supportive to pt.

## 2019-04-10 NOTE — PROGRESS NOTES
Attended afternoon meet and greet, was active in snoezelen room activity. Patient was # 1 out of 8. Group was facilitated from [phone].

## 2019-04-10 NOTE — PROGRESS NOTES
Attended community meeting, shared goal for the day as to shower and get my own clothes. Recreation assessment completed.

## 2019-04-10 NOTE — CONSULTS
Hospital Medicine  Consult History & Physical        Chief Complaint:    Medical management    Date of Service:   4/9/2019    History Of Present Illness:      41 y.o. male who we are asked to see/evaluate by Yvonne Rodríguez, * for medical management. Admitted to [facility] for increased aggression and psychosis. Patient has pervasive developmental disorder. Patient's medications recently changed due to ineffectiveness, last received Invega Sustenna 234 mg on 04/03/19, and has been on abilify PO for one week. Patient reportedly having AH. [facility] is consulted for medical management. Per the RN, his mom stated he was having nasal congestion. The patient himself does not have any complaints at the time of exam. Breath sounds are clear throughout, he does appear to have rhinorrhea. Robitussin will be ordered PRN  Nasal saline spray will be ordered. Past Medical History:        Diagnosis Date    ADHD (attention deficit hyperactivity disorder)     Autism     PATIENT IS COOPERATIVE AND VERBAL PER MOM    Autism     Bipolar 1 disorder (HCC)     Mental retardation     Schizoaffective disorder ([facility] [address]        Past Surgical History:        Procedure Laterality Date    OTHER SURGICAL HISTORY      FASCIITIS AND HEEL SPUR    WISDOM TOOTH EXTRACTION  2006       Medications Prior to Admission:      Prior to Admission medications    Medication Sig Start Date End Date Taking?  Authorizing Provider   ARIPiprazole (ABILIFY) 15 MG tablet Take 15 mg by mouth 2 times daily   Yes Historical Provider, MD   INVEGA 6 MG extended release tablet Take 6 mg by mouth daily  1/19/19  Yes Historical Provider, MD   traZODone (DESYREL) 50 MG tablet Take 200 mg by mouth nightly  12/19/18  Yes Historical Provider, MD   docusate sodium (COLACE) 100 MG capsule Take 1 capsule by mouth 2 times daily 10/23/18  Yes Justina Lin APRN - CNP   carBAMazepine (TEGRETOL) 200 MG tablet Take 1 tablet by mouth 3 times daily 10/1/18 Yes Latoya Hutton MD   lithium (ESKALITH) 450 MG extended release tablet Take 1 tablet by mouth 2 times daily 10/1/18  Yes Latoya Hutton MD   loratadine (CLARITIN) 10 MG tablet TAKE 1 TABLET BY MOUTH DAILY 6/1/18  Yes LUCINA Morris CNP   clonazePAM (KLONOPIN) 1 MG tablet Take 1 mg by mouth 4 times daily as needed for Anxiety. . 3/22/17  Yes Historical Provider, MD   trihexyphenidyl (ARTANE) 5 MG tablet Take 5 mg by mouth 2 times daily    Yes Historical Provider, MD       Allergies:    Seasonal    Social History:      TOBACCO:   reports that he has never smoked. He has never used smokeless tobacco.  ETOH:   reports that he does not drink alcohol. Family History:          Problem Relation Age of Onset    High Blood Pressure Mother     Diabetes Mother       family history reviewed and found to be negative for contributing factors    REVIEW OF SYSTEMS:     Pertinent positives as noted in the HPI. All other systems reviewed and negative. PHYSICAL EXAM:  /86   Pulse 110   Temp 99.8 °F (37.7 °C) (Oral)   Resp 20   Ht 5' 9\" (1.753 m)   Wt 245 lb 5 oz (111.3 kg)   SpO2 98%   BMI 36.23 kg/m²   General appearance: No apparent distress, appears stated age and cooperative. HEENT: Normal cephalic, atraumatic without obvious deformity. Pupils equal, round, and reactive to light. Extra ocular muscles intact. Conjunctivae/corneas clear. Neck: Supple, with full range of motion. No jugular venous distention. Trachea midline. Respiratory:  Normal respiratory effort. Clear to auscultation, bilaterally without Rales/Wheezes/Rhonchi. Cardiovascular: Regular rate and rhythm with normal S1/S2 without murmurs, rubs or gallops. Brisk capillary refill. 2+ lower extremity pulses (dorsalis pedis). Abdomen: Soft, non-tender, non-distended with normal bowel sounds. Musculoskeletal: No clubbing, cyanosis or edema bilaterally. Full range of motion without deformity. Skin: Normal skin color.   No rashes or lesions. Neurologic:  Neurovascularly intact without any focal sensory/motor deficits. Psychiatric: Alert and oriented, thought content appropriate, normal insight        Labs:     Recent Labs     04/09/19 1955   WBC 8.1   HGB 13.9   HCT 41.0        Recent Labs     04/09/19 1955      K 3.6      CO2 29   BUN 17   CREATININE 1.1   CALCIUM 9.1     No results for input(s): AST, ALT, BILIDIR, BILITOT, ALKPHOS in the last 72 hours. No results for input(s): INR in the last 72 hours. No results for input(s): Eward Pong in the last 72 hours. Urinalysis:      Lab Results   Component Value Date    NITRU Negative 09/21/2018    WBCUA NONE 09/21/2018    BACTERIA RARE 09/21/2018    RBCUA NONE 09/21/2018    BLOODU Negative 09/21/2018    SPECGRAV 1.020 09/21/2018    GLUCOSEU Negative 09/21/2018         ASSESSMENT:  Upper respiratory viral infection  - robitussin PRN cough  - saline nasal spray PRN for rhinorrhea  - symptom management  - flu negative    Acute psychosis  - management per primary    Will sign off, please call if services are needed in the future. -  has a past medical history of ADHD (attention deficit hyperactivity disorder), Autism, Autism, Bipolar 1 disorder ([address] Mental retardation, and Schizoaffective disorder ([address] -  Body mass index is 36.23 kg/m².   - DVT Prophylaxis  Activity and ambulation    LUICNA Amanda - CNS  [facility]  Please contact me via [address] Note     The patient was seen in conjunction with the nurse practitioner with independent history,who was seen and examined independently by myself and, after review,  I agree with the history, physical, and assessment documented by the mid-level above  the note which has been adjusted to reflect my findings, with the addition of the following:      Subjective:      COMPLAINS OF SINUS CONGESTION, WILL START ON NASAL STEROID      Physical Exam:  Fortunato Chavez  ABD:SOFT POS BS  Extrem:NO EDEMA  NEURO:CN 2-12 INTACT      Assessment  NASAL CONGESTION    PLAN  CONT NASAL SALINE  START [address] , DO

## 2019-04-10 NOTE — H&P
PSYCHIATRIC EVALUATION  (HISTORY & PHYSICAL)     CHIEF COMPLAINT:   [x] Mood Problems [x] Anxiety Problems [x] Psychosis                    [] Suicidal/Homicidal   [x] Aggression  [] Other    HISTORY OF PRESENT ILLNESS: Guille Weiner  is a 41 y.o. male who has a previous psychiatric history of schizoaffective disorder and presents for admission with increased aggression and psychosis. Patient has pervasive developmental disorder. Patient's medications recently changed due to ineffectiveness, last received Invega Sustenna 234 mg on 04/03/19, and has been on abilify PO for one week. Patient reportedly having AH. Symptoms are constant, severe, and worsened by nothing.     Precipitating Factors:     [] Family Stress   [] Recent loss/grief Stress   [] Health Stress   [] Relationship Stress    [] Legal Stress   [x] Environmental Stress    [] Occupational Stress   [] Financial Stress   [] Substance Abuse [] Other      PAST PSYCHIATRIC HISTORY:   History of psychiatric Hospitalization:    [] Denies    [x] yes  [] Days ago     []  Weeks Ago    [] Months ago  [] Years ago              [] [facility]  [] [facility]  [] Other:        [] Once  [x] More than once    Outpatient treatment:  [] Hallie Zazueta  [] Wan  [] [employer]              [] [employer]  [] [employer]      [x] [facility] [] [facility]      [] [facility] [] CSN  [] [facility] [] [facility]             [x] currently  [] in the past  [] Non-Compliant    [] Denies    Previous suicide attempt: [x]Denies            [] yes  [] OD  [] Cutting  [] Hanging  [] Gun  [] Other    Previous psych medications:  [] Was prescribed               [x] Currently Taking       [] Never taken medications      PAST MEDICAL HISTORY:       Diagnosis Date    ADHD (attention deficit hyperactivity disorder)     Autism     PATIENT IS COOPERATIVE AND VERBAL PER MOM    Autism     [facility] [address]     Mental retardation     Schizoaffective disorder ([facility] [address]        FAMILY PSYCHIATRIC HISTORY:  Family History   Problem Relation Age of Onset    High Blood Pressure Mother     Diabetes Mother            [x] Denies       [] Endorses               [] Father                [] Depression  [] Anxiety  [] Bipolar  [] Psychosis  []  Other                  [] Mother               [] Depression  [] Anxiety  [] Bipolar  [] Psychosis  []  Other                  [] Sibling               [] Depression  [] Anxiety  [] Bipolar  [] Psychosis  []  Other                  [] Grandparent               [] Depression  [] Anxiety  [] Bipolar  [] Psychosis  []  Other       SOCIAL HISTORY:     1. Living Situation:[x] Private Residence [] Homeless [] [address]             [] Assisted Living [] [address]  [] Shelter [] Other   2. Employment:  [] Unemployed  [] Employed  [x] Disabled  [] Retired   1. Legal History: [x] No Arrest [] Arrest  [] Theft  []  Assault  [] Substances   4. History of Trama/ Abuse: [x] Denies  [] Emotional [] Physical [] Sexual   5. Spirituality: [x] Spiritual [] Not Spiritual   6. Substance Abuse: [x] Denies  [] Drug of choice      [] Amphetamines [] Marijuana [] Cocaine      [] Opioids  [] Alcohol  [] Benzodiazepines     For further SH review SW note. Risk Assessment:  1. Risk Factors:   [] Depression  [x] Anxiety  [x] Psychosis   [] Suicidal/Homicidal Thoughts [] Suicide Attempt [] Substance Abuse     2. Protective Factors: [x] Controlled Environment         [x] Supportive Family []         [] Mormonism Support     3. Level of Risk: [] Mild [] Moderate [x] Severe      Strengths & Weaknesses:    1. Strengths: [x] Ability to communicate feelings     [] Independent ADL's     [x] Supportive Family    [] Current Health Status     2.  Weaknesses: [x] Emotional          [] Motivational     MEDICATIONS: Current Facility-Administered Medications: acetaminophen (TYLENOL) tablet 650 mg, 650 mg, Oral, Q4H PRN  hydrOXYzine (VISTARIL) capsule 50 mg, 50 mg, Oral, Q6H PRN  haloperidol lactate (HALDOL) injection 10 mg, 10 mg, Intramuscular, Q6H PRN  traZODone (DESYREL) tablet 50 mg, 50 mg, Oral, Nightly PRN  benztropine mesylate (COGENTIN) injection 2 mg, 2 mg, Intramuscular, BID PRN  magnesium hydroxide (MILK OF MAGNESIA) 400 MG/5ML suspension 30 mL, 30 mL, Oral, Daily PRN  aluminum & magnesium hydroxide-simethicone (MAALOX) 200-200-20 MG/5ML suspension 30 mL, 30 mL, Oral, PRN    Medical Review of Systems:     All other than marked systmes have been reviewed and are all negative.     Constitutional Symptoms: []  fever []  Chills  Skin Symptoms: [] rash []  Pruritus   Eye Symptoms: [] Vision unchanged []  recent vision problems[] blurred vision   Respiratory Symptoms:[] shortness of breath [] cough  Cardiovascular Symptoms:  [] chest pain   [] palpitations   Gastrointestinal Symptoms: []  abdominal pain []  nausea []  vomiting []  diarrhea  Genitourinary Symptoms: []  dysuria  []  hematuria   Musculoskeletal Symptoms: []  back pain []  muscle pain []  joint pain  Neurologic Symptoms: []  headache []  dizziness  Hematolymphoid Symptoms: [] Adenopathy [] Bruises   [] Schimosis       VITALS: /86   Pulse 110   Temp 99.8 °F (37.7 °C) (Oral)   Resp 20   Ht 5' 9\" (1.753 m)   Wt 245 lb 5 oz (111.3 kg)   SpO2 98%   BMI 36.23 kg/m²     ALLERGIES: Seasonal            Physical Examination:    Head:  [x] Atraumatic:  [x] normocephalic  Skin and Mucosa       [] Moist [] Dry [] Pale [x] Normal   Neck: [x] Thyroid [] Palpable    [x] Not palpable []  venus distention [] adenopathy   Chest: [x] Clear [] Rhonchi  [] Wheezing   CV: [x] S1 [x] S2 [x] No murmer   Abdomen:  [x] Soft   [] Tender  [] Viceromegaly   Extremities:  [x] No Edema   [] Edema    Cranial Nerves Examination:    CN II: [x] Pupils are reactive to light [] Pupils are non reactive to light  CN III, IV, VI:[x] No eye deviation  [x] No diplopia or ptosis   CN V: [x] Facial Sensation is intact  [] Facial Sensation is not intact   CN IIIV:  [x] Hearing is Insight: [] Intact  [] Fair  [x] Limited    Judgement:  [] Intact  [] Fair  [x] Limited        ASSESSMENT  Patient Active Problem List   Diagnosis    Mental retardation    [facility]oaffective [facility], [facility] type [facility] [address]    Mild intellectual disability    Bipolar affective disorder, mixed, severe, with psychotic behavior ([facility] [address]    Encounter for lithium monitoring    Taking multiple medications for chronic disease    Other idiopathic scoliosis, thoracolumbar region    Chronic seasonal allergic rhinitis due to pollen    Bulging of lumbar intervertebral disc without myelopathy    Bipolar 1 disorder ([address]    Psychosis ([facility] [address]    Rectal bleeding    Excessive dietary caloric intake    Drug-induced constipation    Positive occult stool blood test    Acute psychosis ([facility] [address]     Recommendations and plan of treatment:  1- admit to inpatient unit  2- Unit milleiu   3- Medication Management I discussed risk benefits and side effects of medications. Patient is aware and willing to comply with treatment. 4- Group therapy and one on one. 5- Routine precautions    Met with patient and discussed the risks and benefits associated with treatment and the patient expressed understanding.        Signed:  Verena Paget  4/10/2019  9:03 AM

## 2019-04-10 NOTE — ED PROVIDER NOTES
Patient is a 29-year-old male who presents to ED for psychiatric evaluation. Per EMR, Significant history of Schizoaffective disorder: bipolar type, autism. Patient was sent from [facility], seen outpatient by Dr. Jeffrey Jefferson. Patient sent for medication administration-- antipsychotic. Per mother, who is with the patient, patient has been having auditory hallucinations for the past several months and is going to try a new antipsychotic medication per psychiatry. Patient does not answer questions on interaction and history is taken mostly from EMR/mother. Review of Systems   Unable to perform ROS: Psychiatric disorder       Physical Exam   Constitutional: He appears well-developed and well-nourished. No distress. HENT:   Head: Normocephalic and atraumatic. Right Ear: External ear normal.   Left Ear: External ear normal.   Mouth/Throat: Oropharynx is clear and moist. No oropharyngeal exudate. Eyes: Pupils are equal, round, and reactive to light. Conjunctivae and EOM are normal. Right eye exhibits no discharge. Left eye exhibits no discharge. Neck: Normal range of motion. Neck supple. No thyromegaly present. Cardiovascular: Normal rate, regular rhythm and normal heart sounds. No murmur heard. Pulmonary/Chest: Effort normal and breath sounds normal. No respiratory distress. He has no wheezes. He has no rales. He exhibits no tenderness. Abdominal: Soft. He exhibits no distension and no mass. There is no tenderness. There is no rebound and no guarding. Musculoskeletal: Normal range of motion. He exhibits no tenderness. Neurological: He is alert. Skin: Skin is warm and dry. No rash noted. He is not diaphoretic. Psychiatric: He has a normal mood and affect. Thought content is paranoid. Cognition and memory are impaired.        Procedures    MDM  Number of Diagnoses or Management Options  Diagnosis management comments: Patient is a 29-year-old male who presents to ED for psychiatric evaluation-- history of auditory hallucinations and is receiving antipsychotic medication on admission. On arrival to ED, physical exam significant for patient alert, in no apparent distress. Labs reviewed and were unremarkable. Patient medically cleared for psychiatric evaluation.    --------------------------------------------- PAST HISTORY ---------------------------------------------  Past Medical History:  has a past medical history of ADHD (attention deficit hyperactivity disorder), Autism, Autism, Bipolar 1 disorder ([facility] [address] Mental retardation, and Schizoaffective disorder ([facility] [address] Past Surgical History:  has a past surgical history that includes [place] tooth extraction (2006) and other surgical history. Social History:  reports that he has never smoked. He has never used smokeless tobacco. He reports that he does not drink alcohol or use drugs. Family History: family history includes Diabetes in his mother; High Blood Pressure in his mother. The patients home medications have been reviewed.     Allergies: Seasonal    -------------------------------------------------- RESULTS -------------------------------------------------    LABS:  Results for orders placed or performed during the hospital encounter of 04/09/19   CBC   Result Value Ref Range    WBC 8.1 4.5 - 11.5 E9/L    RBC 4.30 3.80 - 5.80 E12/L    Hemoglobin 13.9 12.5 - 16.5 g/dL    Hematocrit 41.0 37.0 - 54.0 %    MCV 95.3 80.0 - 99.9 fL    MCH 32.3 26.0 - 35.0 pg    MCHC 33.9 32.0 - 34.5 %    RDW 11.5 11.5 - 15.0 fL    Platelets 876 119 - 991 E9/L    MPV 9.6 7.0 - 12.0 fL   Basic Metabolic Panel   Result Value Ref Range    Sodium 141 132 - 146 mmol/L    Potassium 3.6 3.5 - 5.0 mmol/L    Chloride 106 98 - 107 mmol/L    CO2 29 22 - 29 mmol/L    Anion Gap 6 (L) 7 - 16 mmol/L    Glucose 132 (H) 74 - 99 mg/dL    BUN 17 6 - 20 mg/dL    CREATININE 1.1 0.7 - 1.2 mg/dL    GFR Non-African American >60 >=60 mL/min/1.73    GFR African American >60 admission.    --------------------------------- ADDITIONAL PROVIDER NOTES ---------------------------------  Consultations:  Time: 9:33 PM. Spoke with Social Work. Discussed case. They will admit the patient. This patient's ED course included: a personal history and physicial examination, re-evaluation prior to disposition and multiple bedside re-evaluations    This patient has remained hemodynamically stable during their ED course. Diagnosis:  1. Acute psychosis ([facility] [address]    2. Psychiatric complaint        Disposition:  Patient's disposition: Admit to mental health unit - medically cleared for admission  Patient's condition is stable. Mine Gu DO  Resident  04/09/19 8110    ATTENDING PROVIDER ATTESTATION:     Abena Petty presented to the emergency department for evaluation of Psychiatric Evaluation (Sent in from [address] [place] [address], declining over last couple months, pyschotic, off balance, agitated)   and was initially evaluated by the Medical Resident. See Original ED Note for H&P and ED course above. I have reviewed and discussed the case, including pertinent history (medical, surgical, family and social) and exam findings with the Medical Resident assigned to Abena Petty. I have personally performed and/or participated in the history, exam, medical decision making, and procedures and agree with all pertinent clinical information. I have reviewed my findings and recommendations with the assigned Medical Resident, Abena Petty and members of family present at the time of disposition. My findings/plan: The primary encounter diagnosis was Bipolar 1 disorder ([facility] [address] Diagnoses of Acute psychosis ([facility] [address] and Psychiatric complaint were also pertinent to this visit.   Discharge Medication List as of 4/15/2019  2:12 PM      START taking these medications    Details   divalproex (DEPAKOTE) 500 MG DR tablet Take 2 tablets by mouth 2 times daily, Disp-120 tablet, R-0Normal Franck Brito, DO Franck Brito,   04/23/19 2046

## 2019-04-10 NOTE — GROUP NOTE
Group Therapy Note    Date: April 10    Group Start Time: 1010  Group End Time: 1055  Group Topic: Psychoeducation    [facility]  [id number] [place], [place]        Group Therapy Note    Attendees: 12                                                                      Group Therapy Note    Date: 4/10/2019  Start Time: 1010  End Time:  2162  Number of Participants: 12    Type of Group: Psychoeducation    Wellness Binder Information  Module Name:  Dimensions of Wellness  Session Number:  na    Patient's Goal: Patient will be able to identify [employer]'s 8 dimensions of wellness       Modes of Intervention: Education, Support, Socialization, Exploration and Problem-solving      Discipline Responsible: Psychoeducational Specialist      Signature:  Luisana Petty [place]      Notes:  Quiet and kept to self in group. observed rocking in chair. Status After Intervention:  Improved    Participation Level:  Active Listener and Interactive    Participation Quality: Appropriate, Attentive, Sharing and Supportive      Speech:  normal      Thought Process/Content: Logical      Affective Functioning: Flat      Mood: euthymic      Level of consciousness:  Inattentive      Response to Learning: Progressing to goal      Endings: None Reported    Modes of Intervention: Education, Support, Socialization, Exploration and Problem-solving      Discipline Responsible: Psychoeducational Specialist      Signature:  Joanie Ching

## 2019-04-10 NOTE — PROGRESS NOTES
Chance Bowen was ordered Artane which is a nonformulary medication. If the medication has not been administered by 1400 on the following day from the time the order was placed, a pharmacist will follow-up with the nurse of the patient to assess the capability of the patient to bring in the medication. If it is determined that the patient cannot supply the medication and it is not available to be dispensed from the pharmacy, a call will be placed to the ordering provider to discuss alternative options.       Celso Sahni RPh 4/10/2019 10:27 AM

## 2019-04-11 PROCEDURE — 6370000000 HC RX 637 (ALT 250 FOR IP): Performed by: PSYCHIATRY & NEUROLOGY

## 2019-04-11 PROCEDURE — 1240000000 HC EMOTIONAL WELLNESS R&B

## 2019-04-11 PROCEDURE — 6370000000 HC RX 637 (ALT 250 FOR IP): Performed by: NURSE PRACTITIONER

## 2019-04-11 PROCEDURE — 99231 SBSQ HOSP IP/OBS SF/LOW 25: CPT | Performed by: NURSE PRACTITIONER

## 2019-04-11 PROCEDURE — 6370000000 HC RX 637 (ALT 250 FOR IP): Performed by: CLINICAL NURSE SPECIALIST

## 2019-04-11 RX ADMIN — CLONAZEPAM 1 MG: 0.5 TABLET ORAL at 20:04

## 2019-04-11 RX ADMIN — DIVALPROEX SODIUM 1000 MG: 500 TABLET, DELAYED RELEASE ORAL at 20:05

## 2019-04-11 RX ADMIN — TRAZODONE HYDROCHLORIDE 200 MG: 50 TABLET ORAL at 20:04

## 2019-04-11 RX ADMIN — ARIPIPRAZOLE 15 MG: 15 TABLET ORAL at 08:27

## 2019-04-11 RX ADMIN — CETIRIZINE HYDROCHLORIDE 10 MG: 10 TABLET, FILM COATED ORAL at 08:27

## 2019-04-11 RX ADMIN — DOCUSATE SODIUM 100 MG: 100 CAPSULE, LIQUID FILLED ORAL at 08:28

## 2019-04-11 RX ADMIN — CARBAMAZEPINE 300 MG: 200 TABLET ORAL at 20:04

## 2019-04-11 RX ADMIN — DOCUSATE SODIUM 100 MG: 100 CAPSULE, LIQUID FILLED ORAL at 20:05

## 2019-04-11 RX ADMIN — FLUTICASONE PROPIONATE 1 SPRAY: 50 SPRAY, METERED NASAL at 08:29

## 2019-04-11 RX ADMIN — CARBAMAZEPINE 300 MG: 200 TABLET ORAL at 08:27

## 2019-04-11 RX ADMIN — CLONAZEPAM 1 MG: 0.5 TABLET ORAL at 08:27

## 2019-04-11 RX ADMIN — TRIHEXYPHENIDYL HYDROCHLORIDE 5 MG: 5 TABLET ORAL at 20:04

## 2019-04-11 RX ADMIN — DIVALPROEX SODIUM 1000 MG: 500 TABLET, DELAYED RELEASE ORAL at 08:28

## 2019-04-11 RX ADMIN — ARIPIPRAZOLE 15 MG: 15 TABLET ORAL at 20:04

## 2019-04-11 RX ADMIN — SALINE NASAL SPRAY 1 SPRAY: 1.5 SOLUTION NASAL at 08:27

## 2019-04-11 ASSESSMENT — PAIN SCALES - GENERAL
PAINLEVEL_OUTOF10: 0
PAINLEVEL_OUTOF10: 0

## 2019-04-11 NOTE — PLAN OF CARE
Patient slow and poverty of content. Minimal conversation. Difficult to understand. Denies suicidal and homicidal thoughts. Denies hallucinations.

## 2019-04-11 NOTE — PLAN OF CARE
Pt denies suicidal ideations, homicidal ideations and hallucinations. Pt avoids gaze when speaking. Pt quiet but in control thus far this shift. Appetite appropriate. Appropriate with his visitor. Showered. Will continue to monitor.

## 2019-04-11 NOTE — PROGRESS NOTES
DATE OF SERVICE:     4/11/2019    Janeth Bojorquez seen today for the purpose of continuation of care. Nursing, social work reports, laboratory studies and vital signs are reviewed. Patient chief complaint today is:             [x] Depression      [x] Anxiety        [x] Psychosis         [] Suicidal/Homicidal                         [x] Delusions           [] Aggression          Subjective: Today patient is isolative to room. Currently denies AVH. No irritability noted by nursing. Sleep:  [] Good [] Fair  [] Poor  Appetite:  [] Good [] Fair  [] Poor    Depression:  [] Mild [] Moderate [] Severe                [] Constant [] Sporadic     Anxiety: [] Mild [] Moderate [] Severe    [] Constant [] Sporadic     Delusions: [] Mild [] Moderate [] Severe     [] Constant [] Sporadic     [] Paranoid [] Somatic [] Grandiose     Hallucinations: [] Mild [] Moderate [] Severe     [] Constant [] Sporadic    [] Auditory  [] Visual [] Tactile       Suicidal: [] Constant [] Sporadic  Homicidal: [] Constant [] Sporadic    Unscheduled Medications     [] Patient Receiving Emergency Medications \" Chemical Restraint\"   [] Requesting PRN medications for anxiety    Medical Review of Systems:     All other than marked systmes have been reviewed and are all negative.     Constitutional Symptoms: []  fever []  Chills  Skin Symptoms: [] rash []  Pruritus   Eye Symptoms: [] Vision unchanged []  recent vision problems[] blurred vision   Respiratory Symptoms:[] shortness of breath [] cough  Cardiovascular Symptoms:  [] chest pain   [] palpitations   Gastrointestinal Symptoms: []  abdominal pain []  nausea []  vomiting []  diarrhea  Genitourinary Symptoms: []  dysuria  []  hematuria   Musculoskeletal Symptoms: []  back pain []  muscle pain []  joint pain  Neurologic Symptoms: []  headache []  dizziness  Hematolymphoid Symptoms: [] Adenopathy [] Bruises   [] Schimosis       Psychiatric Review of systems  Delusions:  [] Denies [] Endorses Withdrawals:  [] Denies [] Endorses    Hallucinations: [] Denies [] Endorses    Extra Pyramidal Symptoms: [] Denies [] Endorses      BP (!) 105/56   Pulse 89   Temp 98.1 °F (36.7 °C) (Oral)   Resp 18   Ht 5' 9\" (1.753 m)   Wt 245 lb 5 oz (111.3 kg)   SpO2 98%   BMI 36.23 kg/m²     Mental Status Examination:    Cognition:      [x] Alert  [x] Awake  [x] Oriented  [x] Person  [x] Place [x] Time      [] drowsy  [] tired  [] lethargic  [] distractable  [] Other     Attention/Concentration:   [x] Attentive  [] Distracted        Memory Recent and Remote: [x] Intact   [] Impaired [] Partially Impaired     Language: [] Able to recognize and name objects          [] Unable to recognize and name Objects    Fund of Knowledge:  [] Poor [x]  Fair  [] Good    Speech: [] Normal  [x] Soft  [] Slow  [] Fast [] Pressured            [] Loud [] Dysarthria  [] Incoherent    Appearance: [] Well Groomed  [x] Casual Dressed  [] Unkept  [] Disheveled          [] Normal weight[] Thin  [] Overweight  [] Obese           Attitude: [] Positive  [] Hostile  [] Demanding  [x] Guarded  [] Defensive         [x] Cooperative  []  Uncooperative      Behavior:  [] Normal Gait  [] Walks with Assistance  [] Nadia Chair    [] Walks with Urbano Sos  [x] In Hospital Bed  [] Sitting in Chair    Muscle-Skeletal:  [x] Normal Muscle Tone [] Muscle Atrophy       [] Abnormal Muscle Movement     Eye Contact:  [] Good eye contact  [x] Intermittent Eye Contact  [] Poor Eye Contact     Mood: [] Depressed  [x] Anxious  [] Irritated  [] Euthymic   [] Angry [] Restless    Affect:  [] Congruent  [] Incongruent  [x] Labile  [] Constricted  [] Flat  [] Bizarre     Thought Process and Association:  [] Logical [] Illogical       [] Linear and Goal Directed  [x] Tangential  [] Circumstantial     Thought Content:  [] Denies [] Endorses [] Suicidal [] Homicidal  [x] Delusional      [x] Paranoid  [] Somatic  [] Grandiose    Perception: [x]  None  [] Auditory   [] Visual  [] tactile   [] olfactory  [] Illusions         Insight: [] Intact  [] Fair  [x] Limited    Judgement:  [] Intact  [] Fair  [x] Limited      Assessment/Plan:        Patient Active Problem List   Diagnosis Code    Mental retardation F79    Schizoaffective disorder, bipolar type ([facility] [address] F25.0    Mild intellectual disability F70    Bipolar affective disorder, mixed, severe, with psychotic behavior ([facility] [address] F31.64    Encounter for lithium monitoring Z51.81, Z79.899    Taking multiple medications for chronic disease R69    Other idiopathic scoliosis, thoracolumbar region M41.25    Chronic seasonal allergic rhinitis due to pollen J30.1    Bulging of lumbar intervertebral disc without myelopathy M51.26    Bipolar 1 disorder ([facility] [address] F31.9    Psychosis ([facility] [address] F29    Rectal bleeding K62.5    Excessive dietary caloric intake R63.2    Drug-induced constipation K59.03    Positive occult stool blood test R19.5    Acute psychosis ([facility] [address] F23    Viral upper respiratory illness J06.9         Plan:    []  Patient is refusing medications  [] Improving as expected   [x] Not improving as expected   [] Worsening    []  At Baseline       Will continue current medications.      Reason for more than one antipsychotic:  [x] N/A  [] 3 failed monotherapy(drugs tried):  [] Cross over to a new antipsychotic  [] Taper to monotherapy from polypharmacy  [] Augmentation of Clozapine therapy due to treatment resistance to single therapy      Signed:  Michela Marshall  4/11/2019  9:56 AM

## 2019-04-11 NOTE — PLAN OF CARE
Patient presently denies suicidal and homicidal thoughts. Unable to assess patient A/V hallucinations due to mental status. Patient showered with assistance from mother. Quiet and cooperative. No unit issues this shift. Compliant with medications. Will continue to monitor and support throughout remainder of shift.

## 2019-04-12 PROCEDURE — 99231 SBSQ HOSP IP/OBS SF/LOW 25: CPT | Performed by: NURSE PRACTITIONER

## 2019-04-12 PROCEDURE — 6370000000 HC RX 637 (ALT 250 FOR IP): Performed by: NURSE PRACTITIONER

## 2019-04-12 PROCEDURE — 1240000000 HC EMOTIONAL WELLNESS R&B

## 2019-04-12 RX ORDER — CARBAMAZEPINE 200 MG/1
200 TABLET ORAL 2 TIMES DAILY
Status: DISCONTINUED | OUTPATIENT
Start: 2019-04-12 | End: 2019-04-15 | Stop reason: HOSPADM

## 2019-04-12 RX ADMIN — CARBAMAZEPINE 200 MG: 200 TABLET ORAL at 20:36

## 2019-04-12 RX ADMIN — TRIHEXYPHENIDYL HYDROCHLORIDE 5 MG: 5 TABLET ORAL at 20:36

## 2019-04-12 RX ADMIN — TRIHEXYPHENIDYL HYDROCHLORIDE 5 MG: 5 TABLET ORAL at 08:41

## 2019-04-12 RX ADMIN — ARIPIPRAZOLE 15 MG: 15 TABLET ORAL at 08:42

## 2019-04-12 RX ADMIN — DOCUSATE SODIUM 100 MG: 100 CAPSULE, LIQUID FILLED ORAL at 20:36

## 2019-04-12 RX ADMIN — DIVALPROEX SODIUM 1000 MG: 500 TABLET, DELAYED RELEASE ORAL at 20:36

## 2019-04-12 RX ADMIN — CETIRIZINE HYDROCHLORIDE 10 MG: 10 TABLET, FILM COATED ORAL at 08:42

## 2019-04-12 RX ADMIN — CLONAZEPAM 1 MG: 0.5 TABLET ORAL at 08:42

## 2019-04-12 RX ADMIN — DOCUSATE SODIUM 100 MG: 100 CAPSULE, LIQUID FILLED ORAL at 08:43

## 2019-04-12 RX ADMIN — CARBAMAZEPINE 200 MG: 200 TABLET ORAL at 08:42

## 2019-04-12 RX ADMIN — DIVALPROEX SODIUM 1000 MG: 500 TABLET, DELAYED RELEASE ORAL at 08:41

## 2019-04-12 RX ADMIN — TRAZODONE HYDROCHLORIDE 200 MG: 50 TABLET ORAL at 20:36

## 2019-04-12 RX ADMIN — FLUTICASONE PROPIONATE 1 SPRAY: 50 SPRAY, METERED NASAL at 08:40

## 2019-04-12 RX ADMIN — CLONAZEPAM 1 MG: 0.5 TABLET ORAL at 20:36

## 2019-04-12 ASSESSMENT — PAIN SCALES - GENERAL: PAINLEVEL_OUTOF10: 0

## 2019-04-12 NOTE — PLAN OF CARE
Anxiously waiting for mother to visit  childlike but remains in control    denies SI/HI  isolative to self  cooperative with meds  will continue to monitor

## 2019-04-12 NOTE — PROGRESS NOTES
DATE OF SERVICE:     4/12/2019    Darcy Taylor seen today for the purpose of continuation of care. Nursing, social work reports, laboratory studies and vital signs are reviewed. Patient chief complaint today is:             [x] Depression      [x] Anxiety        [] Psychosis         [] Suicidal/Homicidal                         [x] Delusions           [] Aggression          Subjective: Today patient is bright and pleasant. Is medication compliant. Patient denies AVH, SI, or HI. Has not been an issue on unit. Is medication compliant. Sleep:  [x] Good [] Fair  [] Poor  Appetite:  [x] Good [] Fair  [] Poor    Depression:  [x] Mild [] Moderate [] Severe                [x] Constant [] Sporadic     Anxiety: [] Mild [] Moderate [x] Severe    [x] Constant [] Sporadic     Delusions: [] Mild [x] Moderate [] Severe     [] Constant [x] Sporadic     [x] Paranoid [] Somatic [] Grandiose     Hallucinations: [] Mild [] Moderate [] Severe     [] Constant [] Sporadic    [] Auditory  [] Visual [] Tactile       Suicidal: [] Constant [] Sporadic  Homicidal: [] Constant [] Sporadic    Unscheduled Medications     [] Patient Receiving Emergency Medications \" Chemical Restraint\"   [] Requesting PRN medications for anxiety    Medical Review of Systems:     All other than marked systmes have been reviewed and are all negative.     Constitutional Symptoms: []  fever []  Chills  Skin Symptoms: [] rash []  Pruritus   Eye Symptoms: [] Vision unchanged []  recent vision problems[] blurred vision   Respiratory Symptoms:[] shortness of breath [] cough  Cardiovascular Symptoms:  [] chest pain   [] palpitations   Gastrointestinal Symptoms: []  abdominal pain []  nausea []  vomiting []  diarrhea  Genitourinary Symptoms: []  dysuria  []  hematuria   Musculoskeletal Symptoms: []  back pain []  muscle pain []  joint pain  Neurologic Symptoms: []  headache []  dizziness  Hematolymphoid Symptoms: [] Adenopathy [] Bruises   [] Schimosis Psychiatric Review of systems  Delusions:  [] Denies [] Endorses   Withdrawals:  [] Denies [] Endorses    Hallucinations: [] Denies [] Endorses    Extra Pyramidal Symptoms: [] Denies [] Endorses      /62   Pulse 73   Temp 98.4 °F (36.9 °C) (Oral)   Resp 16   Ht 5' 9\" (1.753 m)   Wt 245 lb 5 oz (111.3 kg)   SpO2 98%   BMI 36.23 kg/m²     Mental Status Examination:    Cognition:      [x] Alert  [x] Awake  [x] Oriented  [x] Person  [x] Place [x] Time      [] drowsy  [] tired  [] lethargic  [] distractable  [] Other     Attention/Concentration:   [x] Attentive  [] Distracted        Memory Recent and Remote: [x] Intact   [] Impaired [] Partially Impaired     Language: [] Able to recognize and name objects          [] Unable to recognize and name Objects    Fund of Knowledge:  [] Poor [x]  Fair  [] Good    Speech: [] Normal  [x] Soft  [] Slow  [] Fast [] Pressured            [] Loud [] Dysarthria  [] Incoherent    Appearance: [] Well Groomed  [x] Casual Dressed  [] Unkept  [] Disheveled          [] Normal weight[] Thin  [] Overweight  [] Obese           Attitude: [] Positive  [] Hostile  [] Demanding  [] Guarded  [] Defensive         [x] Cooperative  []  Uncooperative      Behavior:  [x] Normal Gait  [] Walks with Assistance  [] Nadia Chair    [] Walks with Makayla Reas  [] In Hospital Bed  [] Sitting in Chair    Muscle-Skeletal:  [x] Normal Muscle Tone [] Muscle Atrophy       [] Abnormal Muscle Movement     Eye Contact:  [] Good eye contact  [] Intermittent Eye Contact  [x] Poor Eye Contact     Mood: [] Depressed  [x] Anxious  [] Irritated  [] Euthymic   [] Angry [] Restless    Affect:  [] Congruent  [] Incongruent  [x] Labile  [] Constricted  [] Flat  [] Bizarre     Thought Process and Association:  [] Logical [] Illogical       [] Linear and Goal Directed  [x] Tangential  [] Circumstantial     Thought Content:  [] Denies [] Endorses [] Suicidal [] Homicidal  [] Delusional      [x] Paranoid  [] Somatic  [] Grandiose    Perception: [x]  None  [] Auditory   [] Visual  [] tactile   [] olfactory  [] Illusions         Insight: [] Intact  [] Fair  [x] Limited    Judgement:  [] Intact  [] Fair  [x] Limited      Assessment/Plan:        Patient Active Problem List   Diagnosis Code    Mental retardation F79    Schizoaffective disorder, bipolar type ([facility] F25.0    Mild intellectual disability F70    Bipolar affective disorder, mixed, severe, with psychotic behavior ([facility] F31.64    Encounter for lithium monitoring Z51.81, Z79.899    Taking multiple medications for chronic disease R69    Other idiopathic scoliosis, thoracolumbar region M41.25    Chronic seasonal allergic rhinitis due to pollen J30.1    Bulging of lumbar intervertebral disc without myelopathy M51.26    Bipolar 1 disorder (HCC) F31.9    Psychosis (HCC) F29    Rectal bleeding K62.5    Excessive dietary caloric intake R63.2    Drug-induced constipation K59.03    Positive occult stool blood test R19.5    Acute psychosis ([facility] F23    Viral upper respiratory illness J06.9         Plan:    []  Patient is refusing medications  [x] Improving as expected   [] Not improving as expected   [] Worsening    []  At Baseline       Will order Aristada 882mg and Initio     Reason for more than one antipsychotic:  [x] N/A  [] 3 failed monotherapy(drugs tried):  [] Cross over to a new antipsychotic  [] Taper to monotherapy from polypharmacy  [] Augmentation of Clozapine therapy due to treatment resistance to single therapy      Signed:  Michela Marshall  4/12/2019  8:42 AM

## 2019-04-12 NOTE — PROGRESS NOTES
Group Therapy Note    Date: 4/12/2019  Start Time: 10:00  End Time:  10:40  Number of Participants: 13    Type of Group: Psychoeducation    Wellness Binder Information  Module Name:  Happiness  Session Number:  n/a    Patient's Goal:  Patient will identify 3 things they can to to increase happiness in recovery. Notes:  Patient was interactive during group sharing 3 things he or she can do to increase happiness in recovery. Patient participated in positive ball activity. Status After Intervention:  Improved    Participation Level:  Active Listener and Interactive    Participation Quality: Appropriate, Attentive, Sharing and Supportive      Speech:  normal      Thought Process/Content: Logical      Affective Functioning: Congruent      Mood: euthymic      Level of consciousness:  Alert, Oriented x4 and Attentive      Response to Learning: Able to verbalize current knowledge/experience, Able to verbalize/acknowledge new learning, Able to retain information, Capable of insight, Able to change behavior and Progressing to goal      Endings: None Reported    Modes of Intervention: Education, Support, Socialization, Exploration, Clarifying, Problem-solving and Activity      Discipline Responsible: Psychoeducational Specialist      Signature:  Tiffany Hernandes

## 2019-04-12 NOTE — PROGRESS NOTES
Group Therapy Note     Date: 4/12/2019  Start Time:340  End Time:  048  Number of Participants: 11     Type of Group: Recreational     Wellness Binder Information  Module Name:  Meet and greet/music   Session Number:  na     Patient's Goal:  Patient will be able to identify staff flow and expectations of the floor. Patient will also be able to identify favorite song and why it is important to him/her.      Notes: pleasant and engaged in group. Status After Intervention:  Improved     Participation Level:  Active Listener and Interactive     Participation Quality: Appropriate, Attentive and Sharing        Speech:  normal        Thought Process/Content: Logical        Affective Functioning: Congruent        Mood: euthymic        Level of consciousness:  Alert, Oriented x4 and Attentive        Response to Learning: Able to retain information and Progressing to goal        Endings: None Reported     Modes of Intervention: Education, Support, Socialization, Activity and Media        Discipline Responsible: Psychoeducational Specialist        Signature:  Renetta Block

## 2019-04-13 VITALS
SYSTOLIC BLOOD PRESSURE: 118 MMHG | HEART RATE: 80 BPM | TEMPERATURE: 97.6 F | RESPIRATION RATE: 16 BRPM | WEIGHT: 245.31 LBS | OXYGEN SATURATION: 98 % | HEIGHT: 69 IN | DIASTOLIC BLOOD PRESSURE: 68 MMHG | BODY MASS INDEX: 36.33 KG/M2

## 2019-04-13 LAB
CHOLESTEROL, TOTAL: 152 MG/DL (ref 0–199)
HBA1C MFR BLD: 4.8 % (ref 4–5.6)
HDLC SERPL-MCNC: 32 MG/DL
LDL CHOLESTEROL CALCULATED: 102 MG/DL (ref 0–99)
TRIGL SERPL-MCNC: 92 MG/DL (ref 0–149)
VLDLC SERPL CALC-MCNC: 18 MG/DL

## 2019-04-13 PROCEDURE — 36415 COLL VENOUS BLD VENIPUNCTURE: CPT

## 2019-04-13 PROCEDURE — 1240000000 HC EMOTIONAL WELLNESS R&B

## 2019-04-13 PROCEDURE — 6370000000 HC RX 637 (ALT 250 FOR IP): Performed by: NURSE PRACTITIONER

## 2019-04-13 PROCEDURE — 99231 SBSQ HOSP IP/OBS SF/LOW 25: CPT | Performed by: NURSE PRACTITIONER

## 2019-04-13 PROCEDURE — 80061 LIPID PANEL: CPT

## 2019-04-13 PROCEDURE — 83036 HEMOGLOBIN GLYCOSYLATED A1C: CPT

## 2019-04-13 RX ADMIN — DIVALPROEX SODIUM 1000 MG: 500 TABLET, DELAYED RELEASE ORAL at 09:06

## 2019-04-13 RX ADMIN — CETIRIZINE HYDROCHLORIDE 10 MG: 10 TABLET, FILM COATED ORAL at 09:06

## 2019-04-13 RX ADMIN — CARBAMAZEPINE 200 MG: 200 TABLET ORAL at 09:06

## 2019-04-13 RX ADMIN — CARBAMAZEPINE 200 MG: 200 TABLET ORAL at 20:13

## 2019-04-13 RX ADMIN — DIVALPROEX SODIUM 1000 MG: 500 TABLET, DELAYED RELEASE ORAL at 20:13

## 2019-04-13 RX ADMIN — TRIHEXYPHENIDYL HYDROCHLORIDE 5 MG: 5 TABLET ORAL at 09:06

## 2019-04-13 RX ADMIN — DOCUSATE SODIUM 100 MG: 100 CAPSULE, LIQUID FILLED ORAL at 09:06

## 2019-04-13 RX ADMIN — CLONAZEPAM 1 MG: 0.5 TABLET ORAL at 20:13

## 2019-04-13 RX ADMIN — DOCUSATE SODIUM 100 MG: 100 CAPSULE, LIQUID FILLED ORAL at 20:13

## 2019-04-13 RX ADMIN — FLUTICASONE PROPIONATE 1 SPRAY: 50 SPRAY, METERED NASAL at 09:07

## 2019-04-13 RX ADMIN — CLONAZEPAM 1 MG: 0.5 TABLET ORAL at 09:06

## 2019-04-13 RX ADMIN — TRIHEXYPHENIDYL HYDROCHLORIDE 5 MG: 5 TABLET ORAL at 20:13

## 2019-04-13 RX ADMIN — TRAZODONE HYDROCHLORIDE 200 MG: 50 TABLET ORAL at 20:13

## 2019-04-13 NOTE — PLAN OF CARE
Jose Manuelaretha Tomlin w nice visit fr supportive mom. Conf re meds and progress. Denies si, hi, denies a/v H. Although rocking to music during olivier's therapy group, peers did no ridiculing beh / appeared very accepting and supportive of Jose Manuel Tomlin.    Mom had reported earlier in [facility] that Jose Manuel Tomlin responds well to music therapies

## 2019-04-13 NOTE — PLAN OF CARE
Patient presents with impaired concentration and delayed processing. Patient needs additional time for set up and to formulate answers. Patient denies SI, HI, AVH, but is seen talking to self at times. Patient presents depressed and saddened and talks about his mom frequently. Patient presents anxious at times, but only surrounded by numerous peers. Patient presents with no on the floor issues. Will monitor closely.  No self harm witnessed

## 2019-04-13 NOTE — PROGRESS NOTES
DATE OF SERVICE:     4/13/2019    Janeth Bojorquez seen today for the purpose of continuation of care. Nursing, social work reports, laboratory studies and vital signs are reviewed. Patient chief complaint today is:             [x] Depression      [x] Anxiety        [] Psychosis         [] Suicidal/Homicidal                         [x] Delusions           [] Aggression          Subjective: Today patient is bright, is smiling,  and is pleasant. Is medication compliant. Patient denies AVH, SI, or HI. Sleep:  [x] Good [] Fair  [] Poor  Appetite:  [x] Good [] Fair  [] Poor    Depression:  [x] Mild [] Moderate [] Severe                [x] Constant [] Sporadic     Anxiety: [] Mild [] Moderate [x] Severe    [x] Constant [] Sporadic     Delusions: [] Mild [x] Moderate [] Severe     [] Constant [x] Sporadic     [x] Paranoid [] Somatic [] Grandiose     Hallucinations: [] Mild [] Moderate [] Severe     [] Constant [] Sporadic    [] Auditory  [] Visual [] Tactile       Suicidal: [] Constant [] Sporadic  Homicidal: [] Constant [] Sporadic    Unscheduled Medications     [] Patient Receiving Emergency Medications \" Chemical Restraint\"   [] Requesting PRN medications for anxiety    Medical Review of Systems:     All other than marked systmes have been reviewed and are all negative.     Constitutional Symptoms: []  fever []  Chills  Skin Symptoms: [] rash []  Pruritus   Eye Symptoms: [] Vision unchanged []  recent vision problems[] blurred vision   Respiratory Symptoms:[] shortness of breath [] cough  Cardiovascular Symptoms:  [] chest pain   [] palpitations   Gastrointestinal Symptoms: []  abdominal pain []  nausea []  vomiting []  diarrhea  Genitourinary Symptoms: []  dysuria  []  hematuria   Musculoskeletal Symptoms: []  back pain []  muscle pain []  joint pain  Neurologic Symptoms: []  headache []  dizziness  Hematolymphoid Symptoms: [] Adenopathy [] Bruises   [] Schimosis       Psychiatric Review of systems  Delusions:  [] Denies [] Endorses   Withdrawals:  [] Denies [] Endorses    Hallucinations: [] Denies [] Endorses    Extra Pyramidal Symptoms: [] Denies [] Endorses      /68   Pulse 80   Temp 97.6 °F (36.4 °C) (Temporal)   Resp 16   Ht 5' 9\" (1.753 m)   Wt 245 lb 5 oz (111.3 kg)   SpO2 98%   BMI 36.23 kg/m²     Mental Status Examination:    Cognition:      [x] Alert  [x] Awake  [x] Oriented  [x] Person  [x] Place [x] Time      [] drowsy  [] tired  [] lethargic  [] distractable  [] Other     Attention/Concentration:   [x] Attentive  [] Distracted        Memory Recent and Remote: [x] Intact   [] Impaired [] Partially Impaired     Language: [] Able to recognize and name objects          [] Unable to recognize and name Objects    Fund of Knowledge:  [] Poor [x]  Fair  [] Good    Speech: [] Normal  [x] Soft  [] Slow  [] Fast [] Pressured            [] Loud [] Dysarthria  [] Incoherent    Appearance: [] Well Groomed  [x] Casual Dressed  [] Unkept  [] Disheveled          [] Normal weight[] Thin  [] Overweight  [] Obese           Attitude: [] Positive  [] Hostile  [] Demanding  [] Guarded  [] Defensive         [x] Cooperative  []  Uncooperative      Behavior:  [x] Normal Gait  [] Walks with Assistance  [] Nadia Chair    [] Walks with Caryn Pickler  [] In Hospital Bed  [] Sitting in Chair    Muscle-Skeletal:  [x] Normal Muscle Tone [] Muscle Atrophy       [] Abnormal Muscle Movement     Eye Contact:  [] Good eye contact  [] Intermittent Eye Contact  [x] Poor Eye Contact     Mood: [] Depressed  [x] Anxious  [] Irritated  [] Euthymic   [] Angry [] Restless    Affect:  [] Congruent  [] Incongruent  [x] Labile  [] Constricted  [] Flat  [] Bizarre     Thought Process and Association:  [] Logical [] Illogical       [] Linear and Goal Directed  [x] Tangential  [] Circumstantial     Thought Content:  [] Denies [] Endorses [] Suicidal [] Homicidal  [] Delusional      [x] Paranoid  [] Somatic  [] Grandiose    Perception: [x]  None  [] Auditory   [] Visual  [] tactile   [] olfactory  [] Illusions         Insight: [] Intact  [] Fair  [x] Limited    Judgement:  [] Intact  [] Fair  [x] Limited      Assessment/Plan:        Patient Active Problem List   Diagnosis Code    Mental retardation F79    Schizoaffective disorder, bipolar type ([facility] F25.0    Mild intellectual disability F70    Bipolar affective disorder, mixed, severe, with psychotic behavior [facility] F31.64    Encounter for lithium monitoring Z51.81, Z79.899    Taking multiple medications for chronic disease R69    Other idiopathic scoliosis, thoracolumbar region M41.25    Chronic seasonal allergic rhinitis due to pollen J30.1    Bulging of lumbar intervertebral disc without myelopathy M51.26    Bipolar 1 disorder (HCC) F31.9    Psychosis (HCC) F29    Rectal bleeding K62.5    Excessive dietary caloric intake R63.2    Drug-induced constipation K59.03    Positive occult stool blood test R19.5    Acute psychosis ([facility] F23    Viral upper respiratory illness J06.9         Plan:    []  Patient is refusing medications  [x] Improving as expected   [] Not improving as expected   [] Worsening    []  At Baseline       Will continue current meds and order labs for the am    Reason for more than one antipsychotic:  [x] N/A  [] 3 failed monotherapy(drugs tried):  [] Cross over to a new antipsychotic  [] Taper to monotherapy from polypharmacy  [] Augmentation of Clozapine therapy due to treatment resistance to single therapy      Signed:  Silvia Linares  4/13/2019  12:50 PM

## 2019-04-14 LAB
ALBUMIN SERPL-MCNC: 3.9 G/DL (ref 3.5–5.2)
ALP BLD-CCNC: 90 U/L (ref 40–129)
ALT SERPL-CCNC: 11 U/L (ref 0–40)
ANION GAP SERPL CALCULATED.3IONS-SCNC: 10 MMOL/L (ref 7–16)
AST SERPL-CCNC: 10 U/L (ref 0–39)
BASOPHILS ABSOLUTE: 0.02 E9/L (ref 0–0.2)
BASOPHILS RELATIVE PERCENT: 0.3 % (ref 0–2)
BILIRUB SERPL-MCNC: 0.4 MG/DL (ref 0–1.2)
BUN BLDV-MCNC: 15 MG/DL (ref 6–20)
CALCIUM SERPL-MCNC: 9 MG/DL (ref 8.6–10.2)
CARBAMAZEPINE DOSE: NORMAL
CARBAMAZEPINE LEVEL: 4.7 MCG/ML (ref 4–10)
CHLORIDE BLD-SCNC: 108 MMOL/L (ref 98–107)
CO2: 25 MMOL/L (ref 22–29)
CREAT SERPL-MCNC: 0.7 MG/DL (ref 0.7–1.2)
EOSINOPHILS ABSOLUTE: 0.42 E9/L (ref 0.05–0.5)
EOSINOPHILS RELATIVE PERCENT: 5.6 % (ref 0–6)
GFR AFRICAN AMERICAN: >60
GFR NON-AFRICAN AMERICAN: >60 ML/MIN/1.73
GLUCOSE BLD-MCNC: 87 MG/DL (ref 74–99)
HCT VFR BLD CALC: 41.6 % (ref 37–54)
HEMOGLOBIN: 14.2 G/DL (ref 12.5–16.5)
IMMATURE GRANULOCYTES #: 0.03 E9/L
IMMATURE GRANULOCYTES %: 0.4 % (ref 0–5)
LYMPHOCYTES ABSOLUTE: 2.1 E9/L (ref 1.5–4)
LYMPHOCYTES RELATIVE PERCENT: 27.8 % (ref 20–42)
MCH RBC QN AUTO: 32.3 PG (ref 26–35)
MCHC RBC AUTO-ENTMCNC: 34.1 % (ref 32–34.5)
MCV RBC AUTO: 94.8 FL (ref 80–99.9)
MONOCYTES ABSOLUTE: 0.46 E9/L (ref 0.1–0.95)
MONOCYTES RELATIVE PERCENT: 6.1 % (ref 2–12)
NEUTROPHILS ABSOLUTE: 4.53 E9/L (ref 1.8–7.3)
NEUTROPHILS RELATIVE PERCENT: 59.8 % (ref 43–80)
PDW BLD-RTO: 11.3 FL (ref 11.5–15)
PLATELET # BLD: 211 E9/L (ref 130–450)
PMV BLD AUTO: 10.1 FL (ref 7–12)
POTASSIUM SERPL-SCNC: 4 MMOL/L (ref 3.5–5)
RBC # BLD: 4.39 E12/L (ref 3.8–5.8)
SODIUM BLD-SCNC: 143 MMOL/L (ref 132–146)
TOTAL PROTEIN: 6.6 G/DL (ref 6.4–8.3)
VALPROIC ACID LEVEL: 66 MCG/ML (ref 50–100)
WBC # BLD: 7.6 E9/L (ref 4.5–11.5)

## 2019-04-14 PROCEDURE — 1240000000 HC EMOTIONAL WELLNESS R&B

## 2019-04-14 PROCEDURE — 36415 COLL VENOUS BLD VENIPUNCTURE: CPT

## 2019-04-14 PROCEDURE — 80164 ASSAY DIPROPYLACETIC ACD TOT: CPT

## 2019-04-14 PROCEDURE — 6370000000 HC RX 637 (ALT 250 FOR IP): Performed by: NURSE PRACTITIONER

## 2019-04-14 PROCEDURE — 80156 ASSAY CARBAMAZEPINE TOTAL: CPT

## 2019-04-14 PROCEDURE — 80053 COMPREHEN METABOLIC PANEL: CPT

## 2019-04-14 PROCEDURE — 85025 COMPLETE CBC W/AUTO DIFF WBC: CPT

## 2019-04-14 PROCEDURE — 99231 SBSQ HOSP IP/OBS SF/LOW 25: CPT | Performed by: NURSE PRACTITIONER

## 2019-04-14 RX ADMIN — CLONAZEPAM 1 MG: 0.5 TABLET ORAL at 08:50

## 2019-04-14 RX ADMIN — CETIRIZINE HYDROCHLORIDE 10 MG: 10 TABLET, FILM COATED ORAL at 08:50

## 2019-04-14 RX ADMIN — TRIHEXYPHENIDYL HYDROCHLORIDE 5 MG: 5 TABLET ORAL at 20:31

## 2019-04-14 RX ADMIN — CLONAZEPAM 1 MG: 0.5 TABLET ORAL at 20:31

## 2019-04-14 RX ADMIN — DOCUSATE SODIUM 100 MG: 100 CAPSULE, LIQUID FILLED ORAL at 20:31

## 2019-04-14 RX ADMIN — DIVALPROEX SODIUM 1000 MG: 500 TABLET, DELAYED RELEASE ORAL at 20:31

## 2019-04-14 RX ADMIN — DOCUSATE SODIUM 100 MG: 100 CAPSULE, LIQUID FILLED ORAL at 08:50

## 2019-04-14 RX ADMIN — FLUTICASONE PROPIONATE 1 SPRAY: 50 SPRAY, METERED NASAL at 08:51

## 2019-04-14 RX ADMIN — CARBAMAZEPINE 200 MG: 200 TABLET ORAL at 08:50

## 2019-04-14 RX ADMIN — CARBAMAZEPINE 200 MG: 200 TABLET ORAL at 20:31

## 2019-04-14 RX ADMIN — TRIHEXYPHENIDYL HYDROCHLORIDE 5 MG: 5 TABLET ORAL at 08:50

## 2019-04-14 RX ADMIN — TRAZODONE HYDROCHLORIDE 200 MG: 50 TABLET ORAL at 20:30

## 2019-04-14 RX ADMIN — DIVALPROEX SODIUM 1000 MG: 500 TABLET, DELAYED RELEASE ORAL at 08:50

## 2019-04-14 ASSESSMENT — PAIN SCALES - GENERAL: PAINLEVEL_OUTOF10: 0

## 2019-04-14 NOTE — PLAN OF CARE
Patient is pleasant and cooperative. Patient presents with impaired concentration and delayed answers. Patient presents internally stimulated and fixated on his mother. Patient frequently comes up to nurses station and just stands and smiles. Patient denies SI, HI, and AVH. PAtient free from loud outbursts. No behavorial problems, will monitor closely.

## 2019-04-15 PROCEDURE — 99238 HOSP IP/OBS DSCHRG MGMT 30/<: CPT | Performed by: NURSE PRACTITIONER

## 2019-04-15 PROCEDURE — 6370000000 HC RX 637 (ALT 250 FOR IP): Performed by: NURSE PRACTITIONER

## 2019-04-15 RX ORDER — DIVALPROEX SODIUM 500 MG/1
1000 TABLET, DELAYED RELEASE ORAL 2 TIMES DAILY
Qty: 120 TABLET | Refills: 0 | Status: ON HOLD | OUTPATIENT
Start: 2019-04-15 | End: 2019-05-06 | Stop reason: HOSPADM

## 2019-04-15 RX ORDER — CLONAZEPAM 1 MG/1
1 TABLET ORAL 2 TIMES DAILY
Qty: 6 TABLET | Refills: 0 | Status: ON HOLD
Start: 2019-04-15 | End: 2019-05-06 | Stop reason: HOSPADM

## 2019-04-15 RX ORDER — CARBAMAZEPINE 200 MG/1
200 TABLET ORAL 2 TIMES DAILY
Qty: 90 TABLET | Refills: 0 | Status: ON HOLD | OUTPATIENT
Start: 2019-04-15 | End: 2019-05-06 | Stop reason: HOSPADM

## 2019-04-15 RX ADMIN — CARBAMAZEPINE 200 MG: 200 TABLET ORAL at 08:51

## 2019-04-15 RX ADMIN — TRIHEXYPHENIDYL HYDROCHLORIDE 5 MG: 5 TABLET ORAL at 08:51

## 2019-04-15 RX ADMIN — DIVALPROEX SODIUM 1000 MG: 500 TABLET, DELAYED RELEASE ORAL at 08:51

## 2019-04-15 RX ADMIN — FLUTICASONE PROPIONATE 1 SPRAY: 50 SPRAY, METERED NASAL at 08:52

## 2019-04-15 RX ADMIN — DOCUSATE SODIUM 100 MG: 100 CAPSULE, LIQUID FILLED ORAL at 08:52

## 2019-04-15 RX ADMIN — CETIRIZINE HYDROCHLORIDE 10 MG: 10 TABLET, FILM COATED ORAL at 08:51

## 2019-04-15 RX ADMIN — CLONAZEPAM 1 MG: 0.5 TABLET ORAL at 08:51

## 2019-04-15 ASSESSMENT — PAIN SCALES - GENERAL: PAINLEVEL_OUTOF10: 0

## 2019-04-15 NOTE — PROGRESS NOTES
[facility]  Discharge Note    Pt discharged with followings belongings:   Dentures: None  Vision - Corrective Lenses: None  Hearing Aid: None  Jewelry: None  Clothing: Other (Comment)(as previous charted)  Were All Patient Medications Collected?: Not Applicable  Other Valuables: Other (Comment)(as previous charted)   Valuables  returned to patient. Patient left department with Departure Mode: With parents via Mobility at Departure: Ambulatory, discharged to Discharged to: Private Residence.  Patient education on aftercare instructions:   Patient verbalize understanding of AVS:  .    Status EXAM upon discharge:  Status and Exam  Normal: Yes  Facial Expression: Brightened, Exaggerated  Affect: Congruent  Level of Consciousness: Alert  Mood:Normal: No  Mood: Anxious  Motor Activity:Normal: Yes  Motor Activity: Decreased  Interview Behavior: Cooperative  Preception: [place] to Person, [place] to Place  Attention:Normal: No  Attention: Distractible  Thought Processes: Other(See comment)(delayed)  Thought Content:Normal: No  Thought Content: Poverty of Content, Preoccupations  Hallucinations: None  Delusions: No  Delusions: Obsessions  Memory:Normal: No  Memory: Poor Recent, Poor Remote  Insight and Judgment: No  Insight and Judgment: Poor Judgment, Poor Insight  Present Suicidal Ideation: No  Present Homicidal Ideation: No    Brian Raines RN

## 2019-04-15 NOTE — DISCHARGE SUMMARY
[]  Uncooperative      Behavior:  [x] Normal Gait  [] Walks with Assistance  [] [address]    [] Walks with Aaron Bee  [] In Hospital Bed  [] Sitting in Chair    Muscle-Skeletal:  [x] Normal Muscle Tone [] Muscle Atrophy       [] Abnormal Muscle Movement     Eye Contact:  [x] Good eye contact  [] Intermittent Eye Contact  [] Poor Eye Contact     Mood: [] Depressed  [] Anxious  [] Irritated  [x] Euthymic   [] Angry [] Restless    Affect:  [x] Congruent  [] Incongruent  [] Labile  [] Constricted  [] Flat  [] Bizarre     Thought Process and Association:  [] Logical [] Illogical       [x] Linear and Goal Directed  [] Tangential  [] Circumstantial     Thought Content:  [x] Denies [] Endorses [] Suicidal [] Homicidal  [] Delusional      [] Paranoid  [] Somatic  [] Grandiose    Perception: [x]  None  [] Auditory   [] Visual  [] tactile   [] olfactory  [] Illusions         Insight: [] Intact  [x] Fair  [] Limited    Judgement:  [] Intact  [x] Fair  [] Limited    Hospital Course:   Admit Date: 4/9/2019     Discharge Date: 4/15/2019  Admitted from:  [x]  Emergency Room  []  Home  []  Another facility   []  [facility]     Admitting diagnosis:   Patient Active Problem List   Diagnosis    Mental retardation    Schizoaffective disorder, bipolar type ([address]    Mild intellectual disability    Bipolar affective disorder, mixed, severe, with psychotic behavior ([address]    Encounter for lithium monitoring    Taking multiple medications for chronic disease    Other idiopathic scoliosis, thoracolumbar region    Chronic seasonal allergic rhinitis due to pollen    Bulging of lumbar intervertebral disc without myelopathy    Bipolar 1 disorder ([address]    Psychosis ([address]    Rectal bleeding    Excessive dietary caloric intake    Drug-induced constipation    Positive occult stool blood test    Acute psychosis ([address]    Viral upper respiratory illness      Length of stay:  6 days              Celi Ernst was admitted in Psychiatric unit

## 2019-04-15 NOTE — PLAN OF CARE
Pt is out on the unit at present and affect is brightened. Approached this RN smiling and stated \"I feel good\". Pt has been calm and behavior in control. His mother came in during visiting hours and assisted pt in getting washed up. Pt denies any harmful ideations.

## 2019-04-15 NOTE — PLAN OF CARE
Mood is improved  denies SI/HI  behavior in control  childlike  isolative to room most of this a.m.   Attended treatment team today  will continue to monitor

## 2019-04-18 ENCOUNTER — HOSPITAL ENCOUNTER (OUTPATIENT)
Age: 39
Discharge: HOME | End: 2019-04-18
Payer: MEDICARE

## 2019-04-18 LAB — VALPROIC ACID LEVEL: 73 MCG/ML (ref 50–100)

## 2019-04-18 PROCEDURE — 36415 COLL VENOUS BLD VENIPUNCTURE: CPT

## 2019-04-18 PROCEDURE — 80164 ASSAY DIPROPYLACETIC ACD TOT: CPT

## 2019-05-01 ENCOUNTER — HOSPITAL ENCOUNTER (INPATIENT)
Age: 39
LOS: 14 days | Discharge: HOME | DRG: 885 | End: 2019-05-15
Attending: EMERGENCY MEDICINE | Admitting: PSYCHIATRY & NEUROLOGY
Payer: MEDICARE

## 2019-05-01 DIAGNOSIS — F25.9 SCHIZOAFFECTIVE DISORDER, UNSPECIFIED TYPE (HCC): Primary | ICD-10-CM

## 2019-05-01 LAB
ACETAMINOPHEN LEVEL: <5 MCG/ML (ref 10–30)
ALBUMIN SERPL-MCNC: 4.3 G/DL (ref 3.5–5.2)
ALP BLD-CCNC: 70 U/L (ref 40–129)
ALT SERPL-CCNC: 14 U/L (ref 0–40)
AMPHETAMINE SCREEN, URINE: NOT DETECTED
ANION GAP SERPL CALCULATED.3IONS-SCNC: 13 MMOL/L (ref 7–16)
AST SERPL-CCNC: 12 U/L (ref 0–39)
BACTERIA: ABNORMAL /HPF
BARBITURATE SCREEN URINE: NOT DETECTED
BASOPHILS ABSOLUTE: 0.04 E9/L (ref 0–0.2)
BASOPHILS RELATIVE PERCENT: 0.6 % (ref 0–2)
BENZODIAZEPINE SCREEN, URINE: NOT DETECTED
BILIRUB SERPL-MCNC: 0.3 MG/DL (ref 0–1.2)
BILIRUBIN URINE: NEGATIVE
BLOOD, URINE: NORMAL
BUN BLDV-MCNC: 9 MG/DL (ref 6–20)
CALCIUM SERPL-MCNC: 9 MG/DL (ref 8.6–10.2)
CANNABINOID SCREEN URINE: NOT DETECTED
CHLORIDE BLD-SCNC: 105 MMOL/L (ref 98–107)
CLARITY: CLEAR
CO2: 22 MMOL/L (ref 22–29)
COCAINE METABOLITE SCREEN URINE: NOT DETECTED
COLOR: YELLOW
CREAT SERPL-MCNC: 0.7 MG/DL (ref 0.7–1.2)
EOSINOPHILS ABSOLUTE: 0.1 E9/L (ref 0.05–0.5)
EOSINOPHILS RELATIVE PERCENT: 1.4 % (ref 0–6)
ETHANOL: <10 MG/DL (ref 0–0.08)
GFR AFRICAN AMERICAN: >60
GFR NON-AFRICAN AMERICAN: >60 ML/MIN/1.73
GLUCOSE BLD-MCNC: 143 MG/DL (ref 74–99)
GLUCOSE URINE: NEGATIVE MG/DL
HCT VFR BLD CALC: 41.4 % (ref 37–54)
HEMOGLOBIN: 14.1 G/DL (ref 12.5–16.5)
IMMATURE GRANULOCYTES #: 0.04 E9/L
IMMATURE GRANULOCYTES %: 0.6 % (ref 0–5)
KETONES, URINE: NEGATIVE MG/DL
LEUKOCYTE ESTERASE, URINE: NEGATIVE
LYMPHOCYTES ABSOLUTE: 1.26 E9/L (ref 1.5–4)
LYMPHOCYTES RELATIVE PERCENT: 17.6 % (ref 20–42)
MCH RBC QN AUTO: 32.3 PG (ref 26–35)
MCHC RBC AUTO-ENTMCNC: 34.1 % (ref 32–34.5)
MCV RBC AUTO: 95 FL (ref 80–99.9)
METHADONE SCREEN, URINE: NOT DETECTED
MONOCYTES ABSOLUTE: 0.5 E9/L (ref 0.1–0.95)
MONOCYTES RELATIVE PERCENT: 7 % (ref 2–12)
NEUTROPHILS ABSOLUTE: 5.21 E9/L (ref 1.8–7.3)
NEUTROPHILS RELATIVE PERCENT: 72.8 % (ref 43–80)
NITRITE, URINE: NEGATIVE
OPIATE SCREEN URINE: NOT DETECTED
PDW BLD-RTO: 11.3 FL (ref 11.5–15)
PH UA: 7 (ref 5–9)
PHENCYCLIDINE SCREEN URINE: NOT DETECTED
PLATELET # BLD: 199 E9/L (ref 130–450)
PMV BLD AUTO: 10.2 FL (ref 7–12)
POTASSIUM REFLEX MAGNESIUM: 4 MMOL/L (ref 3.5–5)
PROPOXYPHENE SCREEN: NOT DETECTED
PROTEIN UA: NEGATIVE MG/DL
RBC # BLD: 4.36 E12/L (ref 3.8–5.8)
RBC UA: ABNORMAL /HPF (ref 0–2)
SALICYLATE, SERUM: <0.3 MG/DL (ref 0–30)
SODIUM BLD-SCNC: 140 MMOL/L (ref 132–146)
SPECIFIC GRAVITY UA: <=1.005 (ref 1–1.03)
T4 TOTAL: 7.2 MCG/DL (ref 4.5–11.7)
TOTAL PROTEIN: 6.8 G/DL (ref 6.4–8.3)
TRICYCLIC ANTIDEPRESSANTS SCREEN SERUM: NEGATIVE NG/ML
TSH SERPL DL<=0.05 MIU/L-ACNC: 0.9 UIU/ML (ref 0.27–4.2)
UROBILINOGEN, URINE: 0.2 E.U./DL
VALPROIC ACID LEVEL: 71 MCG/ML (ref 50–100)
WBC # BLD: 7.2 E9/L (ref 4.5–11.5)
WBC UA: ABNORMAL /HPF (ref 0–5)

## 2019-05-01 PROCEDURE — 1240000000 HC EMOTIONAL WELLNESS R&B

## 2019-05-01 PROCEDURE — 80053 COMPREHEN METABOLIC PANEL: CPT

## 2019-05-01 PROCEDURE — 81001 URINALYSIS AUTO W/SCOPE: CPT

## 2019-05-01 PROCEDURE — G0480 DRUG TEST DEF 1-7 CLASSES: HCPCS

## 2019-05-01 PROCEDURE — 99284 EMERGENCY DEPT VISIT MOD MDM: CPT

## 2019-05-01 PROCEDURE — 80307 DRUG TEST PRSMV CHEM ANLYZR: CPT

## 2019-05-01 PROCEDURE — 84443 ASSAY THYROID STIM HORMONE: CPT

## 2019-05-01 PROCEDURE — 85025 COMPLETE CBC W/AUTO DIFF WBC: CPT

## 2019-05-01 PROCEDURE — 80164 ASSAY DIPROPYLACETIC ACD TOT: CPT

## 2019-05-01 PROCEDURE — 84436 ASSAY OF TOTAL THYROXINE: CPT

## 2019-05-01 PROCEDURE — 93005 ELECTROCARDIOGRAM TRACING: CPT | Performed by: NURSE PRACTITIONER

## 2019-05-01 PROCEDURE — 36415 COLL VENOUS BLD VENIPUNCTURE: CPT

## 2019-05-01 RX ORDER — CLONAZEPAM 1 MG/1
1 TABLET ORAL 4 TIMES DAILY PRN
Refills: 0 | Status: ON HOLD | COMMUNITY
Start: 2019-04-23 | End: 2019-06-25 | Stop reason: HOSPADM

## 2019-05-01 NOTE — ED NOTES
Received pt to the [facility] Accompanied by mother. Per note from Gael, pt has increased talk about death and crying episodes. Treats at [facility]. Mother confirmed medications and list completed.      Nitish Ko RN  05/01/19 0624

## 2019-05-01 NOTE — ED NOTES
Completed collateral assessment. Patient is a 41year old, male presenting to ED for aggressive/combative behaviors, auditory & visual hallucinations, crying spells, & preoccupations with death. Patient reportedly became very aggressive at workshop today. Per patient mother, pt symptoms have been worsening over the past 1 week. Per mother, patient recently had a medication adjustment where his depakote was decreased. Patient has a mental health hx of schizoaffective disorder, ADHD, and Pervasive Developmental Disorder. Patient is in current treatment with Dr. Pete Hines at [facility]. Pt last psychiatric admission was on 4/9/2019. Patient has had poor sleep, poor appetite, shonna hopelessness/helplessness. SHONNA hx of suicide attempts or self injurious behaviors. Patient cooperative, oriented x 3, calm mood, congruent affect, baseline thought process/speech pattern. Per family, patient has been having auditory & visual hallucinations. Patient mother/guardian, Natty Hebert, provided verbal consent for treatment and inpatient admission.           Alyx Michelle, [place], [place]  05/01/19 [id number]

## 2019-05-01 NOTE — ED PROVIDER NOTES
Department of Emergency Medicine   ED  Provider Note  Admit Date/RoomTime: 5/1/2019  5:53 PM  ED Room: 7518/7518-A          History of Present Illness:  5/1/19, Time: 6:17 PM         Karmen Acosta is a 41 y.o. male presenting to the ED for psych eval, beginning upon arrival.  The complaint has been persistent, moderate in severity, and worsened by nothing. Per family, pt has been sent in from workshop for aggressive behavior and episodes of crying. Pt is preoccupied with death and talking about hurting himself. This has happened several times in the past. Upon arrival to the ED pt denies SI. Family believes that his medications need to be changed. He follows w/ Dr. Marily Potter. HX MR, autism, ADHD and schizoaffective disorder. Pt denies GI symptoms, abdominal pain, urinary symptoms, headache, dizziness, changes in vision, SOB, chest pain, cold symptoms, back pain, neck pain, extremity pain, weakness, numbness, tingling or any other symptoms at this time. Review of Systems:   Pertinent positives and negatives are stated within HPI, all other systems reviewed and are negative.      --------------------------------------------- PAST HISTORY ---------------------------------------------  Past Medical History:  has a past medical history of ADHD (attention deficit hyperactivity disorder), Autism, Autism, B[facility] disorder ([facility] Mental retardation, and Schizoaffective disorder ([facility] Past Surgical History:  has a past surgical history that includes [place] tooth extraction (2006) and other surgical history. Social History:  reports that he has never smoked. He has never used smokeless tobacco. He reports that he does not drink alcohol or use drugs. Family History: family history includes Diabetes in his mother; High Blood Pressure in his mother. The patients home medications have been reviewed.     Allergies: Seasonal      ---------------------------------------------------PHYSICAL EXAM--------------------------------------    Constitutional/General: Alert and oriented x3, well appearing, non toxic in NAD  Head: Normocephalic and atraumatic  Eyes: PERRL, EOMI  Neck: Supple, full ROM,  Pulmonary: Lungs clear to auscultation bilaterally, no  Cardiovascular:  Regular rate. Regular rhythm. No murmurs, gallops, or rubs. 2+ distal pulses  Chest: no chest wall tenderness  Abdomen: Soft. Non tender. Non distended. +BS. No rebound, guarding, or rigidity. No pulsatile masses appreciated. Musculoskeletal: Moves all extremities x 4. Warm and well perfused, no clubbing, cyanosis, or edema. Capillary refill <3 seconds  Skin: warm and dry. No rashes. Neurologic: GCS 15, CN II-XII grossly intact, no focal deficits, symmetric strength 5/5 in the upper and lower extremities bilaterally  Psych: Normal Affect    -------------------------------------------------- RESULTS -------------------------------------------------  I have personally reviewed all laboratory and imaging results for this patient. Results are listed below.      LABS:  Results for orders placed or performed during the hospital encounter of 05/01/19   CBC Auto Differential   Result Value Ref Range    WBC 7.2 4.5 - 11.5 E9/L    RBC 4.36 3.80 - 5.80 E12/L    Hemoglobin 14.1 12.5 - 16.5 g/dL    Hematocrit 41.4 37.0 - 54.0 %    MCV 95.0 80.0 - 99.9 fL    MCH 32.3 26.0 - 35.0 pg    MCHC 34.1 32.0 - 34.5 %    RDW 11.3 (L) 11.5 - 15.0 fL    Platelets 352 487 - 133 E9/L    MPV 10.2 7.0 - 12.0 fL    Neutrophils % 72.8 43.0 - 80.0 %    Immature Granulocytes % 0.6 0.0 - 5.0 %    Lymphocytes % 17.6 (L) 20.0 - 42.0 %    Monocytes % 7.0 2.0 - 12.0 %    Eosinophils % 1.4 0.0 - 6.0 %    Basophils % 0.6 0.0 - 2.0 %    Neutrophils # 5.21 1.80 - 7.30 E9/L    Immature Granulocytes # 0.04 E9/L    Lymphocytes # 1.26 (L) 1.50 - 4.00 E9/L    Monocytes # 0.50 0.10 - 0.95 E9/L    Eosinophils # 0.10 0.05 - 0.50 E9/L    Basophils # 0.04 0.00 - 0.20 E9/L   Comprehensive Metabolic Panel w/ Reflex to MG   Result Value Ref Range    Sodium 140 132 - 146 mmol/L    Potassium reflex Magnesium 4.0 3.5 - 5.0 mmol/L    Chloride 105 98 - 107 mmol/L    CO2 22 22 - 29 mmol/L    Anion Gap 13 7 - 16 mmol/L    Glucose 143 (H) 74 - 99 mg/dL    BUN 9 6 - 20 mg/dL    CREATININE 0.7 0.7 - 1.2 mg/dL    GFR Non-African American >60 >=60 mL/min/1.73    GFR African American >60     Calcium 9.0 8.6 - 10.2 mg/dL    Total Protein 6.8 6.4 - 8.3 g/dL    Alb 4.3 3.5 - 5.2 g/dL    Total Bilirubin 0.3 0.0 - 1.2 mg/dL    Alkaline Phosphatase 70 40 - 129 U/L    ALT 14 0 - 40 U/L    AST 12 0 - 39 U/L   Urinalysis, reflex to microscopic   Result Value Ref Range    Color, UA Yellow Straw/Yellow    Clarity, UA Clear Clear    Glucose, Ur Negative Negative mg/dL    Bilirubin Urine Negative Negative    Ketones, Urine Negative Negative mg/dL    Specific Gravity, UA <=1.005 1.005 - 1.030    Blood, Urine TRACE-INTACT Negative    pH, UA 7.0 5.0 - 9.0    Protein, UA Negative Negative mg/dL    Urobilinogen, Urine 0.2 <2.0 E.U./dL    Nitrite, Urine Negative Negative    Leukocyte Esterase, Urine Negative Negative   TSH without Reflex   Result Value Ref Range    TSH 0.903 0.270 - 4.200 uIU/mL   T4   Result Value Ref Range    T4, Total 7.2 4.5 - 11.7 mcg/dL   Serum Drug Screen   Result Value Ref Range    Ethanol Lvl <10 mg/dL    Acetaminophen Level <5.0 (L) 10.0 - 29.8 mcg/mL    Salicylate, Serum <3.2 0.0 - 30.0 mg/dL    TCA Scrn NEGATIVE Cutoff:300 ng/mL   Urine Drug Screen   Result Value Ref Range    Amphetamine Screen, Urine NOT DETECTED Negative <1000 ng/mL    Barbiturate Screen, Ur NOT DETECTED Negative < 200 ng/mL    Benzodiazepine Screen, Urine NOT DETECTED Negative < 200 ng/mL    Cannabinoid Scrn, Ur NOT DETECTED Negative < 50ng/mL    Cocaine Metabolite Screen, Urine NOT DETECTED Negative < 300 ng/mL    Opiate Scrn, Ur NOT DETECTED Negative < 300ng/mL    PCP Screen, Urine NOT DETECTED Negative < 25 ng/mL    Methadone Screen, Urine NOT DETECTED Negative <300 ng/mL    Propoxyphene Scrn, Ur NOT DETECTED Negative <300 ng/mL   Microscopic Urinalysis   Result Value Ref Range    WBC, UA NONE 0 - 5 /HPF    RBC, UA 1-3 0 - 2 /HPF    Bacteria, UA RARE (A) /HPF   Valproic acid level, total   Result Value Ref Range    Valproic Acid Lvl 71 50 - 100 mcg/mL   EKG 12 Lead   Result Value Ref Range    Ventricular Rate 86 BPM    Atrial Rate 86 BPM    P-R Interval 134 ms    QRS Duration 94 ms    Q-T Interval 370 ms    QTc Calculation (Bazett) 442 ms    P Axis 51 degrees    R Axis 26 degrees    T Axis 43 degrees       RADIOLOGY:  Interpreted by Radiologist.  No orders to display     ------------------------- NURSING NOTES AND VITALS REVIEWED ---------------------------   The nursing notes within the ED encounter and vital signs as below have been reviewed by myself. /74   Pulse 83   Temp 98.4 °F (36.9 °C) (Temporal)   Resp 18   Ht 5' 9\" (1.753 m)   Wt 245 lb (111.1 kg)   SpO2 98%   BMI 36.18 kg/m²   Oxygen Saturation Interpretation: Normal    The patients available past medical records and past encounters were reviewed.         ------------------------------ ED COURSE/MEDICAL DECISION MAKING----------------------  Medications   acetaminophen (TYLENOL) tablet 650 mg (has no administration in time range)   hydrOXYzine (VISTARIL) capsule 50 mg (has no administration in time range)   OLANZapine (ZYPREXA) tablet 10 mg (has no administration in time range)   haloperidol lactate (HALDOL) injection 10 mg (has no administration in time range)   traZODone (DESYREL) tablet 50 mg (has no administration in time range)   benztropine mesylate (COGENTIN) injection 2 mg (has no administration in time range)   magnesium hydroxide (MILK OF MAGNESIA) 400 MG/5ML suspension 30 mL (has no administration in time range)   aluminum & magnesium hydroxide-simethicone (MAALOX) 200-200-20 MG/5ML suspension 30 mL (has no administration in time range)       Medical Decision Making:    Labs and EKG obtained and reviewed. Consult placed to Social Work. Pt is medically cleared. Disposition as per consultant. Will admit patient to inpatient psych. This patient's ED course included: a personal history and physicial examination and re-evaluation prior to disposition    This patient has remained hemodynamically stable during their ED course. Re-Evaluations:           Re-evaluation. Patients symptoms show no change    Consultations:             Social Work    Counseling: The emergency provider has spoken with the patient and discussed todays results, in addition to providing specific details for the plan of care and counseling regarding the diagnosis and prognosis. Questions are answered at this time and they are agreeable with the plan.       --------------------------------- IMPRESSION AND DISPOSITION ---------------------------------    IMPRESSION  1. Schizoaffective disorder, unspecified type ([facility]        DISPOSITION  Disposition: as per consultant  Patient condition is stable    5/1/19, 6:17 PM.    This note is prepared by Yakelin Murillo, acting as Scribe for Jossie Bryson MD.    Jossie Bryson MD:  The scribe's documentation has been prepared under my direction and personally reviewed by me in its entirety. I confirm that the note above accurately reflects all work, treatment, procedures, and medical decision making performed by me.              Jossie Bryson MD  05/02/19 [id number]

## 2019-05-02 PROBLEM — F25.9 SCHIZOAFFECTIVE DISORDER, CHRONIC CONDITION WITH ACUTE EXACERBATION (HCC): Status: ACTIVE | Noted: 2019-05-02

## 2019-05-02 LAB
EKG ATRIAL RATE: 86 BPM
EKG P AXIS: 51 DEGREES
EKG P-R INTERVAL: 134 MS
EKG Q-T INTERVAL: 370 MS
EKG QRS DURATION: 94 MS
EKG QTC CALCULATION (BAZETT): 442 MS
EKG R AXIS: 26 DEGREES
EKG T AXIS: 43 DEGREES
EKG VENTRICULAR RATE: 86 BPM

## 2019-05-02 PROCEDURE — 99221 1ST HOSP IP/OBS SF/LOW 40: CPT | Performed by: NURSE PRACTITIONER

## 2019-05-02 PROCEDURE — 6370000000 HC RX 637 (ALT 250 FOR IP): Performed by: PSYCHIATRY & NEUROLOGY

## 2019-05-02 PROCEDURE — 93010 ELECTROCARDIOGRAM REPORT: CPT | Performed by: INTERNAL MEDICINE

## 2019-05-02 PROCEDURE — 1240000000 HC EMOTIONAL WELLNESS R&B

## 2019-05-02 PROCEDURE — 6370000000 HC RX 637 (ALT 250 FOR IP): Performed by: NURSE PRACTITIONER

## 2019-05-02 RX ORDER — ACETAMINOPHEN 325 MG/1
650 TABLET ORAL EVERY 4 HOURS PRN
Status: DISCONTINUED | OUTPATIENT
Start: 2019-05-02 | End: 2019-05-15 | Stop reason: HOSPADM

## 2019-05-02 RX ORDER — DOCUSATE SODIUM 100 MG/1
100 CAPSULE, LIQUID FILLED ORAL 2 TIMES DAILY PRN
Status: DISCONTINUED | OUTPATIENT
Start: 2019-05-02 | End: 2019-05-15 | Stop reason: HOSPADM

## 2019-05-02 RX ORDER — TRAZODONE HYDROCHLORIDE 50 MG/1
200 TABLET ORAL NIGHTLY
Status: DISCONTINUED | OUTPATIENT
Start: 2019-05-02 | End: 2019-05-15 | Stop reason: HOSPADM

## 2019-05-02 RX ORDER — CLONAZEPAM 0.5 MG/1
1 TABLET ORAL 4 TIMES DAILY PRN
Status: DISCONTINUED | OUTPATIENT
Start: 2019-05-02 | End: 2019-05-15 | Stop reason: HOSPADM

## 2019-05-02 RX ORDER — MAGNESIUM HYDROXIDE/ALUMINUM HYDROXICE/SIMETHICONE 120; 1200; 1200 MG/30ML; MG/30ML; MG/30ML
30 SUSPENSION ORAL PRN
Status: DISCONTINUED | OUTPATIENT
Start: 2019-05-02 | End: 2019-05-15 | Stop reason: HOSPADM

## 2019-05-02 RX ORDER — CARBAMAZEPINE 200 MG/1
200 TABLET ORAL 2 TIMES DAILY
Status: DISCONTINUED | OUTPATIENT
Start: 2019-05-02 | End: 2019-05-02

## 2019-05-02 RX ORDER — TRAZODONE HYDROCHLORIDE 50 MG/1
50 TABLET ORAL NIGHTLY PRN
Status: DISCONTINUED | OUTPATIENT
Start: 2019-05-02 | End: 2019-05-15 | Stop reason: HOSPADM

## 2019-05-02 RX ORDER — DIVALPROEX SODIUM 250 MG/1
1000 TABLET, DELAYED RELEASE ORAL 2 TIMES DAILY
Status: DISCONTINUED | OUTPATIENT
Start: 2019-05-02 | End: 2019-05-02

## 2019-05-02 RX ORDER — HYDROXYZINE PAMOATE 50 MG/1
50 CAPSULE ORAL EVERY 6 HOURS PRN
Status: DISCONTINUED | OUTPATIENT
Start: 2019-05-02 | End: 2019-05-15 | Stop reason: HOSPADM

## 2019-05-02 RX ORDER — BENZTROPINE MESYLATE 1 MG/ML
2 INJECTION INTRAMUSCULAR; INTRAVENOUS 2 TIMES DAILY PRN
Status: DISCONTINUED | OUTPATIENT
Start: 2019-05-02 | End: 2019-05-15 | Stop reason: HOSPADM

## 2019-05-02 RX ORDER — OLANZAPINE 10 MG/1
10 TABLET ORAL
Status: COMPLETED | OUTPATIENT
Start: 2019-05-02 | End: 2019-05-02

## 2019-05-02 RX ORDER — DIVALPROEX SODIUM 500 MG/1
2000 TABLET, DELAYED RELEASE ORAL 2 TIMES DAILY
Status: DISCONTINUED | OUTPATIENT
Start: 2019-05-02 | End: 2019-05-10

## 2019-05-02 RX ORDER — CETIRIZINE HYDROCHLORIDE 10 MG/1
10 TABLET ORAL DAILY
Status: DISCONTINUED | OUTPATIENT
Start: 2019-05-02 | End: 2019-05-15 | Stop reason: HOSPADM

## 2019-05-02 RX ORDER — HALOPERIDOL 5 MG/ML
10 INJECTION INTRAMUSCULAR EVERY 6 HOURS PRN
Status: DISCONTINUED | OUTPATIENT
Start: 2019-05-02 | End: 2019-05-15 | Stop reason: HOSPADM

## 2019-05-02 RX ORDER — TRIHEXYPHENIDYL HYDROCHLORIDE 5 MG/1
5 TABLET ORAL 2 TIMES DAILY
Status: DISCONTINUED | OUTPATIENT
Start: 2019-05-02 | End: 2019-05-15 | Stop reason: HOSPADM

## 2019-05-02 RX ADMIN — DIVALPROEX SODIUM 1000 MG: 250 TABLET, DELAYED RELEASE ORAL at 09:01

## 2019-05-02 RX ADMIN — TRIHEXYPHENIDYL HYDROCHLORIDE 5 MG: 5 TABLET ORAL at 20:42

## 2019-05-02 RX ADMIN — CETIRIZINE HYDROCHLORIDE 10 MG: 10 TABLET, FILM COATED ORAL at 09:01

## 2019-05-02 RX ADMIN — CLONAZEPAM 1 MG: 0.5 TABLET ORAL at 21:56

## 2019-05-02 RX ADMIN — OLANZAPINE 10 MG: 10 TABLET, FILM COATED ORAL at 01:01

## 2019-05-02 RX ADMIN — CARBAMAZEPINE 200 MG: 200 TABLET ORAL at 09:01

## 2019-05-02 RX ADMIN — HYDROXYZINE PAMOATE 50 MG: 50 CAPSULE ORAL at 01:01

## 2019-05-02 RX ADMIN — CLONAZEPAM 1 MG: 0.5 TABLET ORAL at 11:06

## 2019-05-02 RX ADMIN — TRIHEXYPHENIDYL HYDROCHLORIDE 5 MG: 5 TABLET ORAL at 09:01

## 2019-05-02 RX ADMIN — DIVALPROEX SODIUM 2000 MG: 500 TABLET, DELAYED RELEASE ORAL at 20:42

## 2019-05-02 RX ADMIN — TRAZODONE HYDROCHLORIDE 200 MG: 50 TABLET ORAL at 20:42

## 2019-05-02 RX ADMIN — TRAZODONE HYDROCHLORIDE 50 MG: 50 TABLET ORAL at 01:01

## 2019-05-02 ASSESSMENT — SLEEP AND FATIGUE QUESTIONNAIRES
RESTFUL SLEEP: NO
DIFFICULTY FALLING ASLEEP: YES
DO YOU USE A SLEEP AID: YES
DIFFICULTY ARISING: NO
DO YOU USE A SLEEP AID: YES
SLEEP PATTERN: DIFFICULTY FALLING ASLEEP
DO YOU HAVE DIFFICULTY SLEEPING: YES
SLEEP PATTERN: DIFFICULTY FALLING ASLEEP
DO YOU HAVE DIFFICULTY SLEEPING: YES
DIFFICULTY STAYING ASLEEP: YES
DIFFICULTY FALLING ASLEEP: YES
DIFFICULTY ARISING: NO
DIFFICULTY STAYING ASLEEP: YES
AVERAGE NUMBER OF SLEEP HOURS: 0
RESTFUL SLEEP: NO
AVERAGE NUMBER OF SLEEP HOURS: 0

## 2019-05-02 ASSESSMENT — LIFESTYLE VARIABLES
HISTORY_ALCOHOL_USE: NO
HISTORY_ALCOHOL_USE: NO

## 2019-05-02 ASSESSMENT — PAIN SCALES - GENERAL
PAINLEVEL_OUTOF10: 0
PAINLEVEL_OUTOF10: 0

## 2019-05-02 NOTE — PROGRESS NOTES
Patient out on unit, cooperative. Medication compliant. Pacing through halls crying. Redirectable. Will continue to monitor.

## 2019-05-02 NOTE — CARE COORDINATION
Met with pt to complete biopsychosocial and cssr-s. Pts mood depressed, anxious, affect full. Pt minimally cooperative and stated that he doesn't think he is able to return home upon d/c as he has been breaking coffee tables at home and not been able to sleep. Pt stated that she has been hearing and seeing \"things\" but denied at this time. Pt was observed to be staring at sw and slower to process than previous admissions. Pt denied si hi and or a/v hallucinations. Pt stated mom will not accept him back at this time to the home but sw will reach out to pts guardian, mom - gurwinder to discuss d/c plans [phone]. [facility] will call to confirm appts upon d/c. Phone call to pts mother who stated that she will be taking him home upon d/c and she does not know where pt is getting this information. She stated that he has been delusional and acting bizarre since last discharge from the hospital.     Mom does not want pt back on seroquel and would like depakote increased but not 2000 mg 2 times per day XR. She also asked about klonopin and or risperdal     She stated pt is not sleeping and needs a new medication for sleep. She would like to see if the dr will call her.     Electronically signed by ROMA Birch, MANDO on 5/2/2019 at 10:36 AM

## 2019-05-02 NOTE — PLAN OF CARE
Patient became tearful. \" He \" . Sad  and loud. Attempted to verbally de-escalate. his friend from school . Medicated with klonopin 1 mg with good results. denies SI/HI and hallucinations.

## 2019-05-02 NOTE — H&P
PSYCHIATRIC EVALUATION  (HISTORY & PHYSICAL)     CHIEF COMPLAINT:   [x] Mood Problems [x] Anxiety Problems [x] Psychosis                    [x] Suicidal/Homicidal   [x] Aggression  [] Other    HISTORY OF PRESENT ILLNESS: Guille Weiner  is a 41 y.o. male who has a previous psychiatric history of MR, autism, ADHD, and schizoaffective disorder presents for admission with aggressive behaviors. Symptoms onset was years ago and is becoming severe for the last week. This presentation associates with AVH, aggression, depression, crying spells, poor sleep/appetite, and preoccupied with death. Symptoms are sporadic and usually is worsened by medication change. Precipitating factor:  Per ED note, patient's Depakote was recently decreased.  Patient has had       Precipitating Factors:     [] Family Stress   [] Recent loss/grief Stress   [] Health Stress   [] Relationship Stress    [] Legal Stress   [x] Environmental Stress    [] Occupational Stress   [] Financial Stress   [] Substance Abuse [] Other      PAST PSYCHIATRIC HISTORY:     History of psychiatric Hospitalization:    [] Denies    [x] yes  [] Days ago     []  Weeks Ago    [x] Months ago  [] Years ago              [x] [facility]  [] [facility]  [] Other:        [] Once  [] More than once    Outpatient treatment:  [] [facility]  [] [place]  [] [facility]              [] [employer]  [] [employer]      [x] [address] [place] [address] [] [facility]      [] [facility] [] [facility]  [] [facility] [] [facility]  [] Other               [x] currently  [] in the past  [] Non-Compliant    [] Denies    Previous suicide attempt: [x]Denies                [] yes  [] OD  [] Cutting  [] Hanging  [] Gun  [] Other    Previous psych medications:  [] Was prescribed               [x] Currently Taking       [] Never taken medications      PAST MEDICAL HISTORY:       Diagnosis Date    ADHD (attention deficit hyperactivity disorder)     Autism     PATIENT IS COOPERATIVE AND VERBAL PER MOM    Autism     [facility] ([facility]     Mental retardation     Schizoaffective disorder ([facility]        FAMILY PSYCHIATRIC HISTORY:  Family History   Problem Relation Age of Onset    High Blood Pressure Mother     Diabetes Mother         [x] Denies      [] Endorses    [] Father     [] Depression  [] Anxiety  [] Bipolar  [] Psychosis  []  Other      [] Mother    [] Depression  [] Anxiety  [] Bipolar  [] Psychosis  []  Other      [] Sibling    [] Depression  [] Anxiety  [] Bipolar  [] Psychosis  []  Other      [] Grandparent    [] Depression  [] Anxiety  [] Bipolar  [] Psychosis  []  Other      SOCIAL HISTORY:     1. Living Situation:[x] Private Residence [] Homeless [] [address]                                   [] Assisted Living [] [address]  [] Shelter [] Other   2. Employment:  [] Unemployed  [] Employed  [x] Disabled  [] Retired   1. Legal History: [x] No Arrest [] Arrest  [] Theft  []  Assault  [] Substances   4. History of Trama/ Abuse: [x] Denies  [] Emotional [] Physical [] Sexual   5. Spirituality: [x] Spiritual [] Not Spiritual   6. Substance Abuse: [x] Denies  [] Drug of choice                                       [] Amphetamines [] Marijuana [] Cocaine                                       [] Opioids  [] Alcohol  [] Benzodiazepines      For further SH review SW note.     Risk Assessment:  1. Risk Factors:   [] Depression  [x] Anxiety  [x] Psychosis   [] Suicidal/Homicidal Thoughts [] Suicide Attempt [] Substance Abuse      2. Protective Factors: [x] Controlled Environment                                          [x] Supportive Family []                                          [] Sabianism Support      3.  Level of Risk: [] Mild [] Moderate [x] Severe       Strengths & Weaknesses:     1. Strengths: [x] Ability to communicate feelings                          [] Independent ADL's                           [x] Supportive Family                          [] Current Health Status      2. Weaknesses: [x] Emotional Examination:     CN II: [x] Pupils are reactive to light [] Pupils are non reactive to light  CN III, IV, VI:[x] No eye deviation  [x] No diplopia or ptosis   CN V: [x] Facial Sensation is intact  [] Facial Sensation is not intact   CN IIIV:  [x] Hearing is normal to rubbing fingers   CN IX, X:  [x] Normal gag reflex and phonation   CN XI: [x] Shoulder shrug and neck rotation is normal  CNXII: [x] Tongue is midline no deviation or atrophy          For further PE refer to ED note      MENTAL STATUS EXAM:       Cognition:      [x] Alert  [x] Awake  [x] Oriented  [x] Person  [x] Place [x] Time      [] drowsy  [] tired  [] lethargic  [] distractable     Attention/Concentration:   [] Attentive  [x] Distracted        Memory Recent and Remote: [] Intact   [] Impaired [] Partially Impaired     Language: [] Able to recognize and name objects          [] Unable to recognize and name Objects    Fund of Knowledge:  [] Poor [x]  Fair  [] Good    Speech: [] Normal  [] Soft  [] Slow  [] Fast [] Pressured  [x] poverty at times            [] Loud [] Dysarthria  [] Incoherent       Appearance: [] Well Groomed  [] Casual Dressed  [] Unkept  [x] Disheveled          [] Normal weight  [] Thin  [x] Overweight  [] Obese           Attitude: [] Positive  [] Hostile  [] Demanding  [] Guarded  [] Defensive         [x] Cooperative  []  Uncooperative      Behavior:  [x] Normal Gait  [] Abnormal Gait [] Walks with Assistance  [] Nadia Chair     [] Walks with Carmela Sanders  [] In Hospital Bed  [] Sitting in Chair    Muscle-Skeletal:  [x] Normal Muscle Tone [] Muscle Atrophy            [] Abnormal Muscle Movement     Eye Contact:  [] Good eye contact  [x] Intermittent Eye Contact  [] Poor Eye Contact    [] Excessive Eye Contact   [] Intrusive Eye Contact    Mood: [x] Depressed  [x] Anxious  [] Irritated  [] Euthymic   [] Angry [] Restless                   [] Apathetic    Affect:  [x] Congruent  [] Incongruent  [] Labile  [x] Constricted  [x] Flat  [x] Bizarre                     [] Heightened    [] Exaggerated      Thought Process and Association:  [] Logical [x] Illogical       [] Linear and Goal Directed  [] Tangential  [x] Circumstantial     Thought Content:  [] Denies [x] Endorses [x] Suicidal [] Homicidal  [] Delusional      [] Paranoid  [] Somatic  [] Grandiose    Perception: []  None  [x] Auditory   [x] Visual  [] tactile   [] olfactory  [] Illusions         Insight: [] Intact  [] Fair  [x] Limited    Judgement:  [] Intact  [] Fair  [x] Limited        ASSESSMENT    Patient Active Problem List   Diagnosis    Mental retardation    [facility], [facility]    Mild intellectual disability    Bipolar affective disorder, mixed, severe, with psychotic behavior ([address]    Encounter for lithium monitoring    Taking multiple medications for chronic disease    Other idiopathic scoliosis, thoracolumbar region    Chronic seasonal allergic rhinitis due to pollen    Bulging of lumbar intervertebral disc without myelopathy    Bipolar 1 disorder ([address]    Psychosis ([address]    Rectal bleeding    Excessive dietary caloric intake    Drug-induced constipation    Positive occult stool blood test    Acute psychosis ([address]    Viral upper respiratory illness    Schizoaffective disorder ([address]     Recommendations and plan of treatment:  1- admit to inpatient unit  2- [facility]   3- Medication Management  4- Group therapy and one on one. 5- Routine precautions      Signed:  Gordon Rubio  5/2/2019  7:10 AM      I saw and examined the patient and I agree with the above documentation.

## 2019-05-02 NOTE — PROGRESS NOTES
Patient declined to attend community meeting.  Attempted to have patient identify goal for the day but patient appeared internally stimulated not giving verbal response when asked to share goal.

## 2019-05-02 NOTE — PLAN OF CARE
Patient remains tearful. Liable. Minimal conversation. Difficult to understand. Denies SI?HI and hallucinations.

## 2019-05-02 NOTE — PROGRESS NOTES
Admission note:  Pt arrived on  The 3-11pm shift. Pt was a poor historian due to poverty of content, preoccupied, responding to auditory hallucinations. C/O \"can't sleep, meds aren't working. Denied suicidal/homicidal ideations and hallucinations, but was preoccupied and responding to auditory hallucinations. No delusions revealed. Pt cooperative with admission. Thoughts fragmented, slowed, blocking. Alert to person, place and situation. He completed the interview and went to bed . Refused HS snack. Cooperative with PRN medications--see MAR.      `[facility]  Admission Note     Admission Type:   Admission Type: Voluntary(by guardian )    Reason for admission:  Reason for Admission: \"I couldn't sleep\"    PATIENT STRENGTHS:  Strengths: Connection to output provider, Positive Support    Patient Strengths and Limitations:  Limitations: General negative or hopeless attitude about future/recovery, Multiple barriers to leisure interests    Addictive Behavior:   Addictive Behavior  In the past 3 months, have you felt or has someone told you that you have a problem with:  : None  Do you have a history of Chemical Use?: No  Do you have a history of Alcohol Use?: No  Do you have a history of Street Drug Abuse?: No  Histroy of Prescripton Drug Abuse?: No    Medical Problems:   Past Medical History:   Diagnosis Date    ADHD (attention deficit hyperactivity disorder)     Autism     PATIENT IS COOPERATIVE AND VERBAL PER MOM    Autism     [facility]     Mental retardation     Schizoaffective disorder (H[facility])        Status EXAM:  Status and Exam  Normal: No  Facial Expression: Flat  Affect: Blunt  Level of Consciousness: Lethargic  Mood:Normal: No  Mood: Anxious  Motor Activity:Normal: No  Motor Activity: (fidgety)  Interview Behavior: Cooperative  Preception: Grimes to Person, Grimes to Place, Grimes to Situation  Attention:Normal: No  Attention: Distractible, Unable to Concentrate  Thought Processes: Blocking  Thought Content:Normal: No  Thought Content: Preoccupations, Poverty of Content  Hallucinations: Auditory (Comment)(Denied but responded to unseen others)  Delusions: No  Memory:Normal: No  Memory: Poor Recent, Poor Remote  Insight and Judgment: No  Insight and Judgment: Poor Judgment, Unmotivated  Present Suicidal Ideation: No  Present Homicidal Ideation: No    Tobacco Screening:  Practical Counseling, on admission, michael X, if applicable and completed (first 3 are required if patient doesn't refuse):            ( )  Recognizing danger situations (included triggers and roadblocks)                    ( )  Coping skills (new ways to manage stress, exercise, relaxation techniques, changing routine, distraction)                                                           ( )  Basic information about quitting (benefits of quitting, techniques in how to quit, available resources  ( ) Referral for counseling faxed to [facility]                                           ( ) Patient refused counseling  ( x) Patient has not smoked in the last 30 days    Metabolic Screening:    Lab Results   Component Value Date    LABA1C 4.8 04/13/2019       Lab Results   Component Value Date    CHOL 152 04/13/2019    CHOL 215 (H) 04/18/2017    CHOL 165 10/07/2013    CHOL 153 02/26/2012     Lab Results   Component Value Date    TRIG 92 04/13/2019    TRIG 103 04/18/2017    TRIG 155 (H) 10/07/2013    TRIG 154 (H) 02/26/2012     Lab Results   Component Value Date    HDL 32 04/13/2019    HDL 48 04/23/2018    HDL 50 04/18/2017    HDL 35.0 (A) 10/07/2013    HDL 26.0 (A) 02/26/2012     No components found for: LDLCAL  Lab Results   Component Value Date    LABVLDL 18 04/13/2019    LABVLDL 16 04/23/2018    LABVLDL 21 04/18/2017         Body mass index is 36.18 kg/m².     BP Readings from Last 2 Encounters:   05/02/19 108/74   04/13/19 118/68           Pt admitted with followings belongings:  Dentures: None  Vision - Corrective Lenses: None  Hearing Aid: None  Jewelry: None  Body Piercings Removed: N/A  Clothing: None  Were All Patient Medications Collected?: Not Applicable  Other Valuables: None     Valuables sent home with-none. Valuables placed in safe in security envelope, number:  -none. Patient's home medications were -none. Patient oriented to surroundings and program expectations and copy of patient rights given. Received admission packet: With copies of code of conduct and treatment agreement. Consents reviewed, signed none. Refused -to complete forms. Patient verbalize understanding: To immediately seek any staff with any self threatening and/or violent ideations. Patient education on precautions: Suicide precautions staggered q 15 min checks for close observation.                     Lashonda Ng RN

## 2019-05-02 NOTE — PLAN OF CARE
[facility]  Initial Interdisciplinary Treatment Plan NOTE    Review Date & Time: 5/2/2019    10:23 AM      Patient was in treatment team    Admission Type:   Admission Type: Voluntary(by guardian )    Reason for admission:  Reason for Admission: \"I couldn't sleep\"      Estimated Length of Stay Update:   5 days  Estimated Discharge Date Update:  5/6/19    PATIENT STRENGTHS:  Patient Strengths Strengths: Connection to output provider, Positive Support  Patient Strengths and Limitations:Limitations: Difficulty problem solving/relies on others to help solve problems, Multiple barriers to leisure interests  Addictive Behavior:Addictive Behavior  In the past 3 months, have you felt or has someone told you that you have a problem with:  : None  Do you have a history of Chemical Use?: No  Do you have a history of Alcohol Use?: No  Do you have a history of Street Drug Abuse?: No  Histroy of Prescripton Drug Abuse?: No  Medical Problems:  Past Medical History:   Diagnosis Date    ADHD (attention deficit hyperactivity disorder)     Autism     PATIENT IS COOPERATIVE AND VERBAL PER MOM    Autism     Bipolar 1 disorder ([facility]     Mental retardation     Schizoaffective disorder ([facility]        EDUCATION:   Learner Progress Toward Treatment Goals: Reviewed group plan and strategies    Method: Group    Outcome: Verbalized understanding and Needs reinforcement    PATIENT GOALS:  none    PLAN/TREATMENT RECOMMENDATIONS UPDATE: encourage daily goals and groups    GOALS UPDATE:   Time frame for Short-Term Goals:  By discharge    Darnell Sage RN

## 2019-05-03 PROCEDURE — 1240000000 HC EMOTIONAL WELLNESS R&B

## 2019-05-03 PROCEDURE — 6370000000 HC RX 637 (ALT 250 FOR IP): Performed by: NURSE PRACTITIONER

## 2019-05-03 PROCEDURE — 99231 SBSQ HOSP IP/OBS SF/LOW 25: CPT | Performed by: NURSE PRACTITIONER

## 2019-05-03 RX ADMIN — TRIHEXYPHENIDYL HYDROCHLORIDE 5 MG: 5 TABLET ORAL at 09:22

## 2019-05-03 RX ADMIN — TRIHEXYPHENIDYL HYDROCHLORIDE 5 MG: 5 TABLET ORAL at 20:28

## 2019-05-03 RX ADMIN — DIVALPROEX SODIUM 2000 MG: 500 TABLET, DELAYED RELEASE ORAL at 20:28

## 2019-05-03 RX ADMIN — TRAZODONE HYDROCHLORIDE 200 MG: 50 TABLET ORAL at 20:28

## 2019-05-03 RX ADMIN — DIVALPROEX SODIUM 2000 MG: 500 TABLET, DELAYED RELEASE ORAL at 09:22

## 2019-05-03 RX ADMIN — CETIRIZINE HYDROCHLORIDE 10 MG: 10 TABLET, FILM COATED ORAL at 09:21

## 2019-05-03 ASSESSMENT — PAIN SCALES - GENERAL
PAINLEVEL_OUTOF10: 0

## 2019-05-03 NOTE — PROGRESS NOTES
DATE OF SERVICE:     5/3/2019    Guille Weiner seen today for the purpose of continuation of care. Nursing, social work reports, laboratory studies and vital signs are reviewed. Patient chief complaint today is:             [x] Depression      [x] Anxiety        [] Psychosis         [] Suicidal/Homicidal                         [] Delusions           [] Aggression          Subjective: Today patient states that he slept good last night. Converses very little. Appears sad. Med compliant. Signed voluntary. Mom is not guardian. Sleep:  [] Good [x] Fair  [] Poor  Appetite:  [] Good [x] Fair  [] Poor    Depression:  [] Mild [x] Moderate [] Severe                [x] Constant [] Sporadic     Anxiety: [] Mild [] Moderate [x] Severe    [x] Constant [] Sporadic     Delusions: [] Mild [] Moderate [] Severe     [] Constant [] Sporadic     [] Paranoid [] Somatic [] Grandiose     Hallucinations: [] Mild [] Moderate [] Severe     [] Constant [] Sporadic    [] Auditory  [] Visual [] Tactile       Suicidal: [] Constant [] Sporadic  Homicidal: [] Constant [] Sporadic    Unscheduled Medications     [] Patient Receiving Emergency Medications \" Chemical Restraint\"   [] Requesting PRN medications for anxiety    Medical Review of Systems:     All other than marked systmes have been reviewed and are all negative.     Constitutional Symptoms: []  fever []  Chills  Skin Symptoms: [] rash []  Pruritus   Eye Symptoms: [] Vision unchanged []  recent vision problems[] blurred vision   Respiratory Symptoms:[] shortness of breath [] cough  Cardiovascular Symptoms:  [] chest pain   [] palpitations   Gastrointestinal Symptoms: []  abdominal pain []  nausea []  vomiting []  diarrhea  Genitourinary Symptoms: []  dysuria  []  hematuria   Musculoskeletal Symptoms: []  back pain []  muscle pain []  joint pain  Neurologic Symptoms: []  headache []  dizziness  Hematolymphoid Symptoms: [] Adenopathy [] Bruises   [] Schimosis       Psychiatric Review of systems  Delusions:  [] Denies [] Endorses   Withdrawals:  [] Denies [] Endorses    Hallucinations: [] Denies [] Endorses    Extra Pyramidal Symptoms: [] Denies [] Endorses      /64   Pulse 84   Temp 97.5 °F (36.4 °C) (Oral)   Resp 16   Ht 5' 9\" (1.753 m)   Wt 245 lb (111.1 kg)   SpO2 98%   BMI 36.18 kg/m²     MENTAL STATUS EXAM:         Cognition:       [x] Alert  [x] Awake  [x] Oriented  [x] Person  [x] Place [x] Time       [] drowsy  [] tired  [] lethargic  [] distractable      Attention/Concentration:   [] Attentive  [x] Distracted         Memory Recent and Remote: [] Intact   [] Impaired [] Partially Impaired      Language: [] Able to recognize and name objects                         [] Unable to recognize and name [address] [place] [address] of Knowledge:  [] Poor [x]  Fair  [] Good     Speech: [] Normal  [] Soft  [] Slow  [] Fast [] Pressured  [x] poverty at times                                    [] Loud [] Dysarthria  [] Incoherent        Appearance: [] Well Groomed  [] Casual Dressed  [] Unkept  [x] Disheveled                         [] Normal weight  [] Thin  [x] Overweight  [] Obese           Attitude: [] Positive  [] Hostile  [] Demanding  [] Guarded  [] Defensive                    [x] Cooperative  []  Uncooperative       Behavior:  [x] Normal Gait  [] Abnormal Gait [] Walks with Assistance  [] Nadia Chair                           [] Walks with Dasia Beka  [] In Hospital Bed  [] Sitting in Chair     Muscle-Skeletal:  [x] Normal Muscle Tone [] Muscle Atrophy                                  [] Abnormal Muscle Movement      Eye Contact:   [] Good eye contact  [x] Intermittent Eye Contact  [] Poor Eye Contact               [] Excessive Eye Contact   [] Intrusive Eye Contact     Mood: [x] Depressed  [x] Anxious  [] Irritated  [] Euthymic   [] Angry [] Restless                   [] Apathetic     Affect:  [x] Congruent  [] Incongruent  [] Labile  [x] Constricted  [x] Flat  [x] Bizarre [] Heightened    [] Exaggerated       Thought Process and Association:  [] Logical [x] Illogical                                        [] Linear and Goal Directed  [] Tangential  [x] Circumstantial      Thought Content:  [] Denies [x] Endorses [x] Suicidal [] Homicidal  [] Delusional                                       [] Paranoid  [] Somatic  [] Grandiose     Perception: []  None  [x] Auditory   [x] Visual  [] tactile   [] olfactory  [] Illusions          Insight: [] Intact  [] Fair  [x] Limited    Judgement:  [] Intact  [] Fair  [x] Limited       Assessment/Plan:        Patient Active Problem List   Diagnosis Code    Mental retardation F79    Schizoaffective disorder, bipolar type ([facility]) F25.0    Mild intellectual disability F70    Bipolar affective disorder, mixed, severe, with psychotic behavior ([facility] F31.64    Encounter for lithium monitoring Z51.81, Z79.899    Taking multiple medications for chronic disease R69    Other idiopathic scoliosis, thoracolumbar region M41.25    Chronic seasonal allergic rhinitis due to pollen J30.1    Bulging of lumbar intervertebral disc without myelopathy M51.26    Bipolar 1 disorder ([facility]) F31.9    Psychosis ([address] F29    Rectal bleeding K62.5    Excessive dietary caloric intake R63.2    Drug-induced constipation K59.03    Positive occult stool blood test R19.5    Acute psychosis ([facility] F23    Viral upper respiratory illness J06.9    Schizoaffective disorder ([facility]) F25.9    Schizoaffective disorder, chronic condition with acute exacerbation ([facility] F25.8         Plan:    []  Patient is refusing medications  [x] Improving as expected   [] Not improving as expected   [] Worsening    []  At Baseline     Continue current treatment    Reason for more than one antipsychotic:  [x] N/A  [] 3 failed monotherapy(drugs tried):  [] Cross over to a new antipsychotic  [] Taper to monotherapy from polypharmacy  [] Augmentation of Clozapine therapy due to treatment

## 2019-05-03 NOTE — PROGRESS NOTES
Patient declined to attend community meeting. Patient identified goal for the day as:  \"Be positive\"

## 2019-05-03 NOTE — PLAN OF CARE
[address] [place] [address]  Day 3 Interdisciplinary Treatment Plan NOTE    Review Date & Time:  5/3/2019    10:54 AM      Patient was in treatment team    Admission Type:   Admission Type: Voluntary(by guardian )    Reason for admission:  Reason for Admission: \"I couldn't sleep\"  Estimated Length of Stay Update:   5 to 7 days  Estimated Discharge Date Update:  5/7/19    PATIENT STRENGTHS:  Patient Strengths Strengths: Connection to output provider, Positive Support  Patient Strengths and Limitations:Limitations: Difficulty problem solving/relies on others to help solve problems, Multiple barriers to leisure interests  Addictive Behavior:Addictive Behavior  In the past 3 months, have you felt or has someone told you that you have a problem with:  : None  Do you have a history of Chemical Use?: No  Do you have a history of Alcohol Use?: No  Do you have a history of Street Drug Abuse?: No  Histroy of Prescripton Drug Abuse?: No  Medical Problems:  Past Medical History:   Diagnosis Date    ADHD (attention deficit hyperactivity disorder)     Autism     PATIENT IS COOPERATIVE AND VERBAL PER MOM    Autism     Bipolar 1 disorder ([facility]     Mental retardation     Schizoaffective disorder ([facility]        Risk:  Fall RiskTotal: 69  Dread Scale Dread Scale Score: 22  BVC Total: 0  Change in scores no .  Changes to plan of Care  No     Status EXAM:   Status and Exam  Normal: No  Facial Expression: Sad, Expressionless, Flat  Affect: Blunt  Level of Consciousness: Alert  Mood:Normal: No  Mood: Anxious, Depressed, Sad  Motor Activity:Normal: No  Motor Activity: (fidgety)  Interview Behavior: Cooperative, Evasive  Preception: [place] to Person, [place] to Place, [place] to Situation  Attention:Normal: No  Attention: Distractible  Thought Processes: Blocking, Circumstantial  Thought Content:Normal: No  Thought Content: Poverty of Content  Hallucinations: None  Delusions: Yes  Delusions: Obsessions  Memory:Normal: No  Memory: Poor Remote, Poor Recent  Insight and Judgment: No  Insight and Judgment: Poor Judgment, Poor Insight, Unmotivated, Unrealistic  Present Suicidal Ideation: No  Present Homicidal Ideation: No    Daily Assessment Last Entry:   Daily Sleep (WDL): Within Defined Limits         Patient Currently in Pain: No  Daily Nutrition (WDL): Within Defined Limits    Patient Monitoring:  Frequency of Checks: 4 times per hour, close    Psychiatric Symptoms:   Depression Symptoms  Depression Symptoms: No problems reported or observed. Anxiety Symptoms  Anxiety Symptoms: No problems reported or observed. Karla Symptoms  Karla Symptoms: No problems reported or observed. Psychosis Symptoms  Hallucination Type: No problems reported or observed. Delusion Type: No problems reported or observed.     Suicide Risk CSSR-S:  1) Within the past month, have you wished you were dead or wished you could go to sleep and not wake up? : No  2) Have you actually had any thoughts of killing yourself? : No  6) Have you ever done anything, started to do anything, or prepared to do anything to end your life?: No  Change in Result no  Change in Plan of care no       EDUCATION:   Learner Progress Toward Treatment Goals: Reviewed results and recommendations of this team    Method: Group    Outcome: Verbalized understanding    PATIENT GOALS:  Be positive    PLAN/TREATMENT RECOMMENDATIONS UPDATE: encourage daily goals and groups    GOALS UPDATE:   Time frame for Short-Term Goals:  By discharge      Dom Hunter RN

## 2019-05-03 NOTE — PLAN OF CARE
Patient with minimal conversation. Prolong staring. Focused on death. \" kill her\". \"shoot herself\". Appetite poor. Eating 25 %. Denies suicidal and homicidal thoughts. Denies hallucinations.

## 2019-05-03 NOTE — PROGRESS NOTES
Patient continues to present with impaired concentration and requires extra time to comprehend. Patient presents flat with a blank stare. Patient is expressionless. Patient continues to be fixated on death and believes that he is dead. Patient denies SI, HI, and AVH but presents internally stimulated and is seen talking to self. Patient is hard to understand at times and presents paranoid and watchful of surroundings. Patient presents depressed and anxious. Will monitor closely.

## 2019-05-04 PROCEDURE — 6370000000 HC RX 637 (ALT 250 FOR IP): Performed by: NURSE PRACTITIONER

## 2019-05-04 PROCEDURE — 1240000000 HC EMOTIONAL WELLNESS R&B

## 2019-05-04 PROCEDURE — 99231 SBSQ HOSP IP/OBS SF/LOW 25: CPT | Performed by: NURSE PRACTITIONER

## 2019-05-04 RX ADMIN — CLONAZEPAM 1 MG: 0.5 TABLET ORAL at 10:14

## 2019-05-04 RX ADMIN — CETIRIZINE HYDROCHLORIDE 10 MG: 10 TABLET, FILM COATED ORAL at 10:14

## 2019-05-04 RX ADMIN — TRAZODONE HYDROCHLORIDE 200 MG: 50 TABLET ORAL at 21:38

## 2019-05-04 RX ADMIN — DIVALPROEX SODIUM 2000 MG: 500 TABLET, DELAYED RELEASE ORAL at 21:37

## 2019-05-04 RX ADMIN — DIVALPROEX SODIUM 2000 MG: 500 TABLET, DELAYED RELEASE ORAL at 10:14

## 2019-05-04 RX ADMIN — TRIHEXYPHENIDYL HYDROCHLORIDE 5 MG: 5 TABLET ORAL at 10:14

## 2019-05-04 RX ADMIN — TRIHEXYPHENIDYL HYDROCHLORIDE 5 MG: 5 TABLET ORAL at 21:37

## 2019-05-04 ASSESSMENT — PAIN SCALES - GENERAL: PAINLEVEL_OUTOF10: 0

## 2019-05-04 NOTE — PLAN OF CARE
Patient affect flat and withdrawn. He is isolative to his room and bed. Did not attend groups. Denies SI, HI, and hallucinations. Refused dinner despite staff encouragement. No unit problems.

## 2019-05-04 NOTE — PROGRESS NOTES
Patient's mother here to try and get him to eat and to bathe him. Good rapport noted between the two. Patient currently eating in dining area with her at his side.

## 2019-05-04 NOTE — PROGRESS NOTES
DATE OF SERVICE:     5/4/2019    Guille Weiner seen today for the purpose of continuation of care. Nursing, social work reports, laboratory studies and vital signs are reviewed. Patient chief complaint today is:             [x] Depression      [x] Anxiety        [] Psychosis         [] Suicidal/Homicidal                         [] Delusions           [] Aggression          Subjective: Today patient speaking very little. States that he slept good last night. Did not eat breakfast today. Med compliant. Sleep:  [] Good [x] Fair  [] Poor  Appetite:  [] Good [x] Fair  [] Poor    Depression:  [] Mild [x] Moderate [] Severe                [x] Constant [] Sporadic     Anxiety: [] Mild [] Moderate [x] Severe    [x] Constant [] Sporadic     Delusions: [] Mild [] Moderate [] Severe     [] Constant [] Sporadic     [] Paranoid [] Somatic [] Grandiose     Hallucinations: [] Mild [] Moderate [] Severe     [] Constant [] Sporadic    [] Auditory  [] Visual [] Tactile       Suicidal: [] Constant [] Sporadic  Homicidal: [] Constant [] Sporadic    Unscheduled Medications     [] Patient Receiving Emergency Medications \" Chemical Restraint\"   [] Requesting PRN medications for anxiety    Medical Review of Systems:     All other than marked systmes have been reviewed and are all negative.     Constitutional Symptoms: []  fever []  Chills  Skin Symptoms: [] rash []  Pruritus   Eye Symptoms: [] Vision unchanged []  recent vision problems[] blurred vision   Respiratory Symptoms:[] shortness of breath [] cough  Cardiovascular Symptoms:  [] chest pain   [] palpitations   Gastrointestinal Symptoms: []  abdominal pain []  nausea []  vomiting []  diarrhea  Genitourinary Symptoms: []  dysuria  []  hematuria   Musculoskeletal Symptoms: []  back pain []  muscle pain []  joint pain  Neurologic Symptoms: []  headache []  dizziness  Hematolymphoid Symptoms: [] Adenopathy [] Bruises   [] Schimosis       Psychiatric Review of systems  Delusions:  [] Denies [] Endorses   Withdrawals:  [] Denies [] Endorses    Hallucinations: [] Denies [] Endorses    Extra Pyramidal Symptoms: [] Denies [] Endorses      /64   Pulse 84   Temp 97.5 °F (36.4 °C) (Oral)   Resp 16   Ht 5' 9\" (1.753 m)   Wt 245 lb (111.1 kg)   SpO2 98%   BMI 36.18 kg/m²     MENTAL STATUS EXAM:         Cognition:       [x] Alert  [x] Awake  [x] Oriented  [x] Person  [x] Place [x] Time       [] drowsy  [] tired  [] lethargic  [] distractable      Attention/Concentration:   [] Attentive  [x] Distracted         Memory Recent and Remote: [] Intact   [] Impaired [] Partially Impaired      Language: [] Able to recognize and name objects                         [] Unable to recognize and name [address] [place] [address] Knowledge:  [] Poor [x]  Fair  [] Good     Speech: [] Normal  [] Soft  [] Slow  [] Fast [] Pressured  [x] poverty at times                                    [] Loud [] Dysarthria  [] Incoherent        Appearance: [] Well Groomed  [] Casual Dressed  [] Unkept  [x] Disheveled                         [] Normal weight  [] Thin  [x] Overweight  [] Obese           Attitude: [] Positive  [] Hostile  [] Demanding  [] Guarded  [] Defensive                    [x] Cooperative  []  Uncooperative       Behavior:  [x] Normal Gait  [] Abnormal Gait [] Walks with Assistance  [] Nadia Chair                           [] Walks with Carmela Sanders  [] In Hospital Bed  [] Sitting in Chair     Muscle-Skeletal:  [x] Normal Muscle Tone [] Muscle Atrophy                                  [] Abnormal Muscle Movement      Eye Contact:   [] Good eye contact  [x] Intermittent Eye Contact  [] Poor Eye Contact               [] Excessive Eye Contact   [] Intrusive Eye Contact     Mood: [x] Depressed  [x] Anxious  [] Irritated  [] Euthymic   [] Angry [] Restless                   [] Apathetic     Affect:  [x] Congruent  [] Incongruent  [] Labile  [x] Constricted  [] Flat  [x] Bizarre                     [] Heightened    [] Exaggerated       Thought Process and Association:  [] Logical [x] Illogical                                        [] Linear and Goal Directed  [] Tangential  [x] Circumstantial      Thought Content:  [] Denies [x] Endorses [x] Suicidal [] Homicidal  [] Delusional                                       [] Paranoid  [] Somatic  [] Grandiose     Perception: []  None  [x] Auditory   [x] Visual  [] tactile   [] olfactory  [] Illusions          Insight: [] Intact  [] Fair  [x] Limited    Judgement:  [] Intact  [] Fair  [x] Limited       Assessment/Plan:        Patient Active Problem List   Diagnosis Code    Mental retardation F79    Schizoaffective disorder, bipolar type ([facility]) F25.0    Mild intellectual disability F70    Bipolar affective disorder, mixed, severe, with psychotic behavior ([facility] F31.64    Encounter for lithium monitoring Z51.81, Z79.899    Taking multiple medications for chronic disease R69    Other idiopathic scoliosis, thoracolumbar region M41.25    Chronic seasonal allergic rhinitis due to pollen J30.1    Bulging of lumbar intervertebral disc without myelopathy M51.26    Bipolar 1 disorder ([facility]) F31.9    Psychosis ([address] F29    Rectal bleeding K62.5    Excessive dietary caloric intake R63.2    Drug-induced constipation K59.03    Positive occult stool blood test R19.5    Acute psychosis ([facility] F23    Viral upper respiratory illness J06.9    Schizoaffective disorder ([facility]) F25.9    Schizoaffective disorder, chronic condition with acute exacerbation ([facility] F25.8         Plan:    []  Patient is refusing medications  [x] Improving as expected   [] Not improving as expected   [] Worsening    []  At Baseline     Continue current treatment    Reason for more than one antipsychotic:  [x] N/A  [] 3 failed monotherapy(drugs tried):  [] Cross over to a new antipsychotic  [] Taper to monotherapy from polypharmacy  [] Augmentation of Clozapine therapy due to treatment resistance to

## 2019-05-04 NOTE — PLAN OF CARE
Pt is stable, though has a flat affect, avoids eye contact. Pt speaks very little. Does denies suicidal / homicidal ideations. Not able to assess for hallucinations due to present state of mind. Will follow and monitor.

## 2019-05-05 PROCEDURE — 6370000000 HC RX 637 (ALT 250 FOR IP): Performed by: NURSE PRACTITIONER

## 2019-05-05 PROCEDURE — 99231 SBSQ HOSP IP/OBS SF/LOW 25: CPT | Performed by: NURSE PRACTITIONER

## 2019-05-05 PROCEDURE — 1240000000 HC EMOTIONAL WELLNESS R&B

## 2019-05-05 RX ADMIN — DIVALPROEX SODIUM 2000 MG: 500 TABLET, DELAYED RELEASE ORAL at 21:42

## 2019-05-05 RX ADMIN — CETIRIZINE HYDROCHLORIDE 10 MG: 10 TABLET, FILM COATED ORAL at 09:54

## 2019-05-05 RX ADMIN — TRIHEXYPHENIDYL HYDROCHLORIDE 5 MG: 5 TABLET ORAL at 21:43

## 2019-05-05 RX ADMIN — CLONAZEPAM 1 MG: 0.5 TABLET ORAL at 09:54

## 2019-05-05 RX ADMIN — TRAZODONE HYDROCHLORIDE 200 MG: 50 TABLET ORAL at 21:44

## 2019-05-05 RX ADMIN — DIVALPROEX SODIUM 2000 MG: 500 TABLET, DELAYED RELEASE ORAL at 09:54

## 2019-05-05 RX ADMIN — TRIHEXYPHENIDYL HYDROCHLORIDE 5 MG: 5 TABLET ORAL at 09:54

## 2019-05-05 NOTE — PROGRESS NOTES
Patient flat guarded , gives yes no answers to questions. Patient denies SI,HI, and hallucinations. Patient did not remember where is room was patient was taken by staff to his room. Patient too medications this evening without difficulty.  Voices no concerns at this time will continue to monitor and observe,

## 2019-05-05 NOTE — GROUP NOTE
Group Therapy Note    Date: May 4    Group Start Time: 2000  Group End Time: 2030  Group Topic: Wrap-Up    [facility] ACUTE  1    Anamaria Leija, RN        Group Therapy Note  Patient attended [address] [facility] group.

## 2019-05-05 NOTE — PROGRESS NOTES
Ate well at breakfast. Was initially fed by staff but he soon took over and did well. Talkative-talked about his friends, motorcycles, and fires. Rocks when he gets excited.  Will continue to monitor and assess

## 2019-05-05 NOTE — PROGRESS NOTES
DATE OF SERVICE:     5/5/2019    Elana Clement seen today for the purpose of continuation of care. Nursing, social work reports, laboratory studies and vital signs are reviewed. Patient chief complaint today is:             [x] Depression      [x] Anxiety        [] Psychosis         [] Suicidal/Homicidal                         [] Delusions           [] Aggression          Subjective: Today patient is pleasant. Speaking very little. Mom came last night to give shower and feed him. Med compliant. Sleep:  [] Good [x] Fair  [] Poor  Appetite:  [] Good [x] Fair  [] Poor    Depression:  [] Mild [x] Moderate [] Severe                [x] Constant [] Sporadic     Anxiety: [] Mild [] Moderate [x] Severe    [x] Constant [] Sporadic     Delusions: [] Mild [] Moderate [] Severe     [] Constant [] Sporadic     [] Paranoid [] Somatic [] Grandiose     Hallucinations: [] Mild [] Moderate [] Severe     [] Constant [] Sporadic    [] Auditory  [] Visual [] Tactile       Suicidal: [] Constant [] Sporadic  Homicidal: [] Constant [] Sporadic    Unscheduled Medications     [] Patient Receiving Emergency Medications \" Chemical Restraint\"   [] Requesting PRN medications for anxiety    Medical Review of Systems:     All other than marked systmes have been reviewed and are all negative.     Constitutional Symptoms: []  fever []  Chills  Skin Symptoms: [] rash []  Pruritus   Eye Symptoms: [] Vision unchanged []  recent vision problems[] blurred vision   Respiratory Symptoms:[] shortness of breath [] cough  Cardiovascular Symptoms:  [] chest pain   [] palpitations   Gastrointestinal Symptoms: []  abdominal pain []  nausea []  vomiting []  diarrhea  Genitourinary Symptoms: []  dysuria  []  hematuria   Musculoskeletal Symptoms: []  back pain []  muscle pain []  joint pain  Neurologic Symptoms: []  headache []  dizziness  Hematolymphoid Symptoms: [] Adenopathy [] Bruises   [] Schimosis       Psychiatric Review of systems  Delusions: [] Denies [] Endorses   Withdrawals:  [] Denies [] Endorses    Hallucinations: [] Denies [] Endorses    Extra Pyramidal Symptoms: [] Denies [] Endorses      /76   Pulse 88   Temp 98.8 °F (37.1 °C) (Temporal)   Resp 18   Ht 5' 9\" (1.753 m)   Wt 245 lb (111.1 kg)   SpO2 98%   BMI 36.18 kg/m²     MENTAL STATUS EXAM:         Cognition:       [x] Alert  [x] Awake  [x] Oriented  [x] Person  [x] Place [x] Time       [] drowsy  [] tired  [] lethargic  [] distractable      Attention/Concentration:   [] Attentive  [x] Distracted         Memory Recent and Remote: [] Intact   [] Impaired [] Partially Impaired      Language: [] Able to recognize and name objects                         [] Unable to recognize and name [facility] of Knowledge:  [] Poor [x]  Fair  [] Good     Speech: [] Normal  [] Soft  [] Slow  [] Fast [] Pressured  [x] poverty at times                                    [] Loud [] Dysarthria  [] Incoherent        Appearance: [] Well Groomed  [] Casual Dressed  [] Unkept  [x] Disheveled                         [] Normal weight  [] Thin  [x] Overweight  [] Obese           Attitude: [] Positive  [] Hostile  [] Demanding  [] Guarded  [] Defensive                    [x] Cooperative  []  Uncooperative       Behavior:  [x] Normal Gait  [] Abnormal Gait [] Walks with Assistance  [] Gladies Shepard                           [] Walks with Lorena Hawthorne  [] In Hospital Bed  [] Sitting in Chair     Muscle-Skeletal:  [x] Normal Muscle Tone [] Muscle Atrophy                                  [] Abnormal Muscle Movement      Eye Contact:   [] Good eye contact  [x] Intermittent Eye Contact  [] Poor Eye Contact               [] Excessive Eye Contact   [] Intrusive Eye Contact     Mood: [x] Depressed  [x] Anxious  [] Irritated  [] Euthymic   [] Angry [] Restless                   [] Apathetic     Affect:  [x] Congruent  [] Incongruent  [] Labile  [x] Constricted  [] Flat  [x] Bizarre                     [] Heightened    [] Exaggerated       Thought Process and Association:  [] Logical [x] Illogical                                        [] Linear and Goal Directed  [] Tangential  [x] Circumstantial      Thought Content:  [] Denies [x] Endorses [x] Suicidal [] Homicidal  [] Delusional                                       [] Paranoid  [] Somatic  [] Grandiose     Perception: []  None  [x] Auditory   [x] Visual  [] tactile   [] olfactory  [] Illusions          Insight: [] Intact  [] Fair  [x] Limited    Judgement:  [] Intact  [] Fair  [x] Limited       Assessment/Plan:        Patient Active Problem List   Diagnosis Code    Mental retardation F79    Schizoaffective disorder, bipolar type ([facility]) F25.0    Mild intellectual disability F70    Bipolar affective disorder, mixed, severe, with psychotic behavior ([address] F31.64    Encounter for lithium monitoring Z51.81, Z79.899    Taking multiple medications for chronic disease R69    Other idiopathic scoliosis, thoracolumbar region M41.25    Chronic seasonal allergic rhinitis due to pollen J30.1    Bulging of lumbar intervertebral disc without myelopathy M51.26    Bipolar 1 disorder ([facility]) F31.9    Psychosis ([address] F29    Rectal bleeding K62.5    Excessive dietary caloric intake R63.2    Drug-induced constipation K59.03    Positive occult stool blood test R19.5    Acute psychosis ([address] F23    Viral upper respiratory illness J06.9    Schizoaffective disorder ([facility]) F25.9    Schizoaffective disorder, chronic condition with acute exacerbation ([address] F25.8         Plan:    []  Patient is refusing medications  [x] Improving as expected   [] Not improving as expected   [] Worsening    []  At Baseline     Continue current treatment    Reason for more than one antipsychotic:  [x] N/A  [] 3 failed monotherapy(drugs tried):  [] Cross over to a new antipsychotic  [] Taper to monotherapy from polypharmacy  [] Augmentation of Clozapine therapy due to treatment resistance to single therapy      Signed:  Yumiko Dowling  5/5/2019  11:04 AM

## 2019-05-06 LAB — VALPROIC ACID LEVEL: 148 MCG/ML (ref 50–100)

## 2019-05-06 PROCEDURE — 1240000000 HC EMOTIONAL WELLNESS R&B

## 2019-05-06 PROCEDURE — 6370000000 HC RX 637 (ALT 250 FOR IP): Performed by: NURSE PRACTITIONER

## 2019-05-06 PROCEDURE — 80164 ASSAY DIPROPYLACETIC ACD TOT: CPT

## 2019-05-06 PROCEDURE — 36415 COLL VENOUS BLD VENIPUNCTURE: CPT

## 2019-05-06 PROCEDURE — 99231 SBSQ HOSP IP/OBS SF/LOW 25: CPT | Performed by: NURSE PRACTITIONER

## 2019-05-06 RX ORDER — DIVALPROEX SODIUM 500 MG/1
2000 TABLET, DELAYED RELEASE ORAL 2 TIMES DAILY
Qty: 240 TABLET | Refills: 0 | Status: SHIPPED | OUTPATIENT
Start: 2019-05-06 | End: 2019-05-13 | Stop reason: HOSPADM

## 2019-05-06 RX ADMIN — CETIRIZINE HYDROCHLORIDE 10 MG: 10 TABLET, FILM COATED ORAL at 10:48

## 2019-05-06 RX ADMIN — TRIHEXYPHENIDYL HYDROCHLORIDE 5 MG: 5 TABLET ORAL at 10:48

## 2019-05-06 NOTE — GROUP NOTE
Group Therapy Note    Date: May 5    Group Start Time: 2000  Group End Time: 2022  Group Topic: Wrap-Up    [facility]    Perla Waller, BARBARA; Мария Edwards RN        Group Therapy Note         Pt attended and participated in  group.

## 2019-05-06 NOTE — PLAN OF CARE
Problem: Anger Management/Homicidal Ideation:  Goal: Ability to verbalize frustrations and anger appropriately will improve  Description  Ability to verbalize frustrations and anger appropriately will improve  5/6/2019 1952 by Monica Lew RN  Outcome: Ongoing     Problem: Altered Mood, Psychotic Behavior:  Goal: Able to verbalize reality based thinking  Description  Able to verbalize reality based thinking  5/6/2019 1952 by Monica Lew RN  Outcome: Ongoing   Pt was irritable earlier in the shift but his mood did improve when his mother came to visit. Affect is brighter, smiling more but it is inappropriate much of the time because he talks a lot about guns and people getting shot or him shooting people. Also asking to have gasoline poured on him and set on fire. Remains in control at present.

## 2019-05-06 NOTE — PROGRESS NOTES
DATE OF SERVICE:     5/6/2019    Keiry Benavides seen today for the purpose of continuation of care. Nursing, social work reports, laboratory studies and vital signs are reviewed. Patient chief complaint today is:             [x] Depression      [x] Anxiety        [] Psychosis         [] Suicidal/Homicidal                         [] Delusions           [] Aggression          Subjective: Today patient is pleasant. Patient's mother reports she does not feel patient is ready to be discharged per . Patient is medication compliant, denies SI, HI, or AVH. Sleep:  [] Good [x] Fair  [] Poor  Appetite:  [] Good [x] Fair  [] Poor    Depression:  [] Mild [x] Moderate [] Severe                [x] Constant [] Sporadic     Anxiety: [] Mild [] Moderate [x] Severe    [x] Constant [] Sporadic     Delusions: [] Mild [x] Moderate [] Severe     [] Constant [x] Sporadic     [x] Paranoid [] Somatic [] Grandiose     Hallucinations: [] Mild [] Moderate [] Severe     [] Constant [] Sporadic    [] Auditory  [] Visual [] Tactile       Suicidal: [] Constant [] Sporadic  Homicidal: [] Constant [] Sporadic    Unscheduled Medications     [] Patient Receiving Emergency Medications \" Chemical Restraint\"   [] Requesting PRN medications for anxiety    Medical Review of Systems:     All other than marked systmes have been reviewed and are all negative.     Constitutional Symptoms: []  fever []  Chills  Skin Symptoms: [] rash []  Pruritus   Eye Symptoms: [] Vision unchanged []  recent vision problems[] blurred vision   Respiratory Symptoms:[] shortness of breath [] cough  Cardiovascular Symptoms:  [] chest pain   [] palpitations   Gastrointestinal Symptoms: []  abdominal pain []  nausea []  vomiting []  diarrhea  Genitourinary Symptoms: []  dysuria  []  hematuria   Musculoskeletal Symptoms: []  back pain []  muscle pain []  joint pain  Neurologic Symptoms: []  headache []  dizziness  Hematolymphoid Symptoms: [] Adenopathy [] Constricted  [] Flat  [] Bizarre                     [] Heightened    [] Exaggerated       Thought Process and Association:  [] Logical [x] Illogical                                        [] Linear and Goal Directed  [] Tangential  [x] Circumstantial      Thought Content:  [] Denies [] Endorses [] Suicidal [] Homicidal  [x] Delusional                                       [x] Paranoid  [] Somatic  [] Grandiose     Perception: [x]  None  [] Auditory   [] Visual  [] tactile   [] olfactory  [] Illusions          Insight: [] Intact  [] Fair  [x] Limited    Judgement:  [] Intact  [] Fair  [x] Limited       Assessment/Plan:        Patient Active Problem List   Diagnosis Code    Mental retardation F79    Schizoaffective disorder, bipolar type (HCC) F25.0    Mild intellectual disability F70    Bipolar affective disorder, mixed, severe, with psychotic behavior ([facility] F31.64    Encounter for lithium monitoring Z51.81, Z79.899    Taking multiple medications for chronic disease R69    Other idiopathic scoliosis, thoracolumbar region M41.25    Chronic seasonal allergic rhinitis due to pollen J30.1    Bulging of lumbar intervertebral disc without myelopathy M51.26    Bipolar 1 disorder (HCC) F31.9    Psychosis ([address] F29    Rectal bleeding K62.5    Excessive dietary caloric intake R63.2    Drug-induced constipation K59.03    Positive occult stool blood test R19.5    Acute psychosis ([address] F23    Viral upper respiratory illness J06.9    Schizoaffective disorder (HCC) F25.9    Schizoaffective disorder, chronic condition with acute exacerbation ([facility] F25.8         Plan:    []  Patient is refusing medications  [x] Improving as expected   [] Not improving as expected   [] Worsening    []  At Baseline     Continue current treatment, patient's mother does not feel patient is ready for discharge.     Reason for more than one antipsychotic:  [x] N/A  [] 3 failed monotherapy(drugs tried):  [] Cross over to a new antipsychotic  [] Taper to monotherapy from polypharmacy  [] Augmentation of Clozapine therapy due to treatment resistance to single therapy      Signed:  Liu Batista  5/6/2019  10:56 AM

## 2019-05-06 NOTE — CARE COORDINATION
Mom stated pt is not ready to come home as she was here lastnight and he is still pre-occupied with delusions of fires. She would like sw to speak with      Phone call to NP to advise of moms concerns.

## 2019-05-07 PROCEDURE — 6370000000 HC RX 637 (ALT 250 FOR IP): Performed by: NURSE PRACTITIONER

## 2019-05-07 PROCEDURE — 99231 SBSQ HOSP IP/OBS SF/LOW 25: CPT | Performed by: NURSE PRACTITIONER

## 2019-05-07 PROCEDURE — 1240000000 HC EMOTIONAL WELLNESS R&B

## 2019-05-07 RX ADMIN — DIVALPROEX SODIUM 2000 MG: 500 TABLET, DELAYED RELEASE ORAL at 18:39

## 2019-05-07 RX ADMIN — TRIHEXYPHENIDYL HYDROCHLORIDE 5 MG: 5 TABLET ORAL at 09:52

## 2019-05-07 RX ADMIN — TRIHEXYPHENIDYL HYDROCHLORIDE 5 MG: 5 TABLET ORAL at 18:40

## 2019-05-07 RX ADMIN — DIVALPROEX SODIUM 2000 MG: 500 TABLET, DELAYED RELEASE ORAL at 09:51

## 2019-05-07 RX ADMIN — CETIRIZINE HYDROCHLORIDE 10 MG: 10 TABLET, FILM COATED ORAL at 09:52

## 2019-05-07 RX ADMIN — TRAZODONE HYDROCHLORIDE 200 MG: 50 TABLET ORAL at 18:40

## 2019-05-07 ASSESSMENT — PAIN SCALES - GENERAL: PAINLEVEL_OUTOF10: 0

## 2019-05-07 NOTE — PROGRESS NOTES
DATE OF SERVICE:     5/7/2019    Keiry Benavides seen today for the purpose of continuation of care. Nursing, social work reports, laboratory studies and vital signs are reviewed. Patient chief complaint today is:             [x] Depression      [x] Anxiety        [] Psychosis         [] Suicidal/Homicidal                         [x] Delusions           [] Aggression          Subjective: Today patient is pleasant, but kept threatening me while in his room about calling various people on me. Patient was reportedly talking about guns last night on evening shift and about having gasoline poured on him and him being set on fire. Patient is medication compliant, denies SI, HI, or AVH. Sleep:  [] Good [x] Fair  [] Poor  Appetite:  [] Good [x] Fair  [] Poor    Depression:  [] Mild [x] Moderate [] Severe                [x] Constant [] Sporadic     Anxiety: [] Mild [] Moderate [x] Severe    [x] Constant [] Sporadic     Delusions: [] Mild [x] Moderate [] Severe     [] Constant [x] Sporadic     [x] Paranoid [] Somatic [] Grandiose     Hallucinations: [] Mild [] Moderate [] Severe     [] Constant [] Sporadic    [] Auditory  [] Visual [] Tactile       Suicidal: [] Constant [] Sporadic  Homicidal: [] Constant [] Sporadic    Unscheduled Medications     [] Patient Receiving Emergency Medications \" Chemical Restraint\"   [] Requesting PRN medications for anxiety    Medical Review of Systems:     All other than marked systmes have been reviewed and are all negative.     Constitutional Symptoms: []  fever []  Chills  Skin Symptoms: [] rash []  Pruritus   Eye Symptoms: [] Vision unchanged []  recent vision problems[] blurred vision   Respiratory Symptoms:[] shortness of breath [] cough  Cardiovascular Symptoms:  [] chest pain   [] palpitations   Gastrointestinal Symptoms: []  abdominal pain []  nausea []  vomiting []  diarrhea  Genitourinary Symptoms: []  dysuria  []  hematuria   Musculoskeletal Symptoms: []  back pain []  muscle pain []  joint pain  Neurologic Symptoms: []  headache []  dizziness  Hematolymphoid Symptoms: [] Adenopathy [] Bruises   [] Schimosis       Psychiatric Review of systems  Delusions:  [] Denies [] Endorses   Withdrawals:  [] Denies [] Endorses    Hallucinations: [] Denies [] Endorses    Extra Pyramidal Symptoms: [] Denies [] Endorses      /76   Pulse 88   Temp 98.8 °F (37.1 °C) (Temporal)   Resp 18   Ht 5' 9\" (1.753 m)   Wt 245 lb (111.1 kg)   SpO2 98%   BMI 36.18 kg/m²     MENTAL STATUS EXAM:         Cognition:       [x] Alert  [x] Awake  [x] Oriented  [x] Person  [x] Place [x] Time       [] drowsy  [] tired  [] lethargic  [] distractable      Attention/Concentration:   [] Attentive  [x] Distracted         Memory Recent and Remote: [x] Intact   [] Impaired [] Partially Impaired      Language: [] Able to recognize and name objects                         [] Unable to recognize and name [address] [place] [address] of Knowledge:  [] Poor [x]  Fair  [] Good     Speech: [] Normal  [] Soft  [x] Slow  [] Fast [] Pressured  [] poverty at times                                    [] Loud [] Dysarthria  [] Incoherent        Appearance: [] Well Groomed  [] Casual Dressed  [] Unkept  [x] Disheveled                         [] Normal weight  [] Thin  [x] Overweight  [] Obese           Attitude: [] Positive  [] Hostile  [] Demanding  [] Guarded  [] Defensive                    [x] Cooperative  []  Uncooperative       Behavior:  [x] Normal Gait  [] Abnormal Gait [] Walks with Assistance  [] Nadia Chair                           [] Walks with Yamila Jacks  [] In Hospital Bed  [] Sitting in Chair     Muscle-Skeletal:  [x] Normal Muscle Tone [] Muscle Atrophy                                  [] Abnormal Muscle Movement      Eye Contact:   [] Good eye contact  [x] Intermittent Eye Contact  [] Poor Eye Contact               [] Excessive Eye Contact   [] Intrusive Eye Contact     Mood: [x] Depressed  [x] Anxious  [] Irritated  [] Euthymic   [] Angry [] Restless                   [] Apathetic     Affect:  [x] Congruent  [] Incongruent  [] Labile  [] Constricted  [] Flat  [] Bizarre                     [] Heightened    [] Exaggerated       Thought Process and Association:  [] Logical [x] Illogical                                        [] Linear and Goal Directed  [] Tangential  [x] Circumstantial      Thought Content:  [] Denies [] Endorses [] Suicidal [] Homicidal  [x] Delusional                                       [x] Paranoid  [] Somatic  [] Grandiose     Perception: [x]  None  [] Auditory   [] Visual  [] tactile   [] olfactory  [] Illusions          Insight: [] Intact  [] Fair  [x] Limited    Judgement:  [] Intact  [] Fair  [x] Limited       Assessment/Plan:        Patient Active Problem List   Diagnosis Code    Mental retardation F79    Schizoaffective disorder, bipolar type (HCC) F25.0    Mild intellectual disability F70    Bipolar affective disorder, mixed, severe, with psychotic behavior ([facility] F31.64    Encounter for lithium monitoring Z51.81, Z79.899    Taking multiple medications for chronic disease R69    Other idiopathic scoliosis, thoracolumbar region M41.25    Chronic seasonal allergic rhinitis due to pollen J30.1    Bulging of lumbar intervertebral disc without myelopathy M51.26    Bipolar 1 disorder (HCC) F31.9    Psychosis ([address] F29    Rectal bleeding K62.5    Excessive dietary caloric intake R63.2    Drug-induced constipation K59.03    Positive occult stool blood test R19.5    Acute psychosis ([address] F23    Viral upper respiratory illness J06.9    Schizoaffective disorder (HCC) F25.9    Schizoaffective disorder, chronic condition with acute exacerbation ([facility] F25.8         Plan:    []  Patient is refusing medications  [x] Improving as expected   [] Not improving as expected   [] Worsening    []  At Baseline     Continue current treatment    Reason for more than one antipsychotic:  [x] N/A  [] 3 failed monotherapy(drugs tried):  [] Cross over to a new antipsychotic  [] Taper to monotherapy from polypharmacy  [] Augmentation of Clozapine therapy due to treatment resistance to single therapy      Signed:  Haroon Childress  5/7/2019  12:30 PM

## 2019-05-07 NOTE — PROGRESS NOTES
Patient given mscheduled evening medications at this time, in the presence of his mother for encouragement. Patient did take all scheduled evening meds.

## 2019-05-07 NOTE — PLAN OF CARE
Denies suicidal ideation, denies homicidal ideation, and denies hallucinations. No angry outbursts, calm seclusive to room.

## 2019-05-07 NOTE — PLAN OF CARE
Problem: Depressive Behavior With or Without Suicide Precautions:  Goal: Absence of self-harm  Description  Absence of self-harm  Outcome: Met This Shift     Problem: Anger Management/Homicidal Ideation:  Goal: Ability to verbalize frustrations and anger appropriately will improve  Description  Ability to verbalize frustrations and anger appropriately will improve  Outcome: Ongoing  Goal: Absence of angry outbursts  Description  Absence of angry outbursts  5/7/2019 1620 by Bryant Baker RN  Outcome: Ongoing  5/7/2019 0942 by Merari Cummins RN  Outcome: Ongoing     Problem: Depressive Behavior With or Without Suicide Precautions:  Goal: Able to verbalize and/or display a decrease in depressive symptoms  Description  Able to verbalize and/or display a decrease in depressive symptoms  5/7/2019 1620 by Bryant Baker RN  Outcome: Ongoing  5/7/2019 0942 by Merari Cummins RN  Outcome: Ongoing     Problem: Altered Mood, Psychotic Behavior:  Goal: Able to verbalize decrease in frequency and intensity of hallucinations  Description  Able to verbalize decrease in frequency and intensity of hallucinations  Outcome: Ongoing  Goal: Able to verbalize reality based thinking  Description  Able to verbalize reality based thinking  Outcome: Ongoing       Pt out on the unit with increased energy. Exaggerated but brightened. Pt rocks when he is excited talking about his friends Luke's visit or Dima. When patient is asked questions he becomes quiet and sometimes responds, other questions he just stares without response. Pt has loose associations and flight of ideas. No outbursts this shift. Will continue to monitor.

## 2019-05-08 PROCEDURE — 1240000000 HC EMOTIONAL WELLNESS R&B

## 2019-05-08 PROCEDURE — 99231 SBSQ HOSP IP/OBS SF/LOW 25: CPT | Performed by: NURSE PRACTITIONER

## 2019-05-08 PROCEDURE — 6370000000 HC RX 637 (ALT 250 FOR IP): Performed by: NURSE PRACTITIONER

## 2019-05-08 RX ADMIN — CETIRIZINE HYDROCHLORIDE 10 MG: 10 TABLET, FILM COATED ORAL at 09:07

## 2019-05-08 RX ADMIN — DIVALPROEX SODIUM 2000 MG: 500 TABLET, DELAYED RELEASE ORAL at 18:52

## 2019-05-08 RX ADMIN — TRAZODONE HYDROCHLORIDE 200 MG: 50 TABLET ORAL at 18:53

## 2019-05-08 RX ADMIN — DIVALPROEX SODIUM 2000 MG: 500 TABLET, DELAYED RELEASE ORAL at 09:07

## 2019-05-08 RX ADMIN — TRIHEXYPHENIDYL HYDROCHLORIDE 5 MG: 5 TABLET ORAL at 09:07

## 2019-05-08 RX ADMIN — TRIHEXYPHENIDYL HYDROCHLORIDE 5 MG: 5 TABLET ORAL at 18:53

## 2019-05-08 ASSESSMENT — PAIN SCALES - GENERAL: PAINLEVEL_OUTOF10: 0

## 2019-05-08 NOTE — DISCHARGE SUMMARY
Tone [] Muscle Atrophy       [] Abnormal Muscle Movement     Eye Contact:  [x] Good eye contact  [] Intermittent Eye Contact  [] Poor Eye Contact     Mood: [] Depressed  [] Anxious  [] Irritated  [x] Euthymic   [] Angry [] Restless    Affect:  [x] Congruent  [] Incongruent  [] Labile  [] Constricted  [] Flat  [] Bizarre     Thought Process and Association:  [] Logical [] Illogical       [x] Linear and Goal Directed  [] Tangential  [] Circumstantial     Thought Content:  [] Denies [] Endorses [] Suicidal [] Homicidal  [] Delusional      [x] Paranoid  [] Somatic  [] Grandiose    Perception: [x]  None  [] Auditory   [] Visual  [] tactile   [] olfactory  [] Illusions         Insight: [] Intact  [x] Fair  [] Limited    Judgement:  [] Intact  [x] Fair  [] Limited    Hospital Course:   Admit Date: 5/1/2019     Discharge Date: 5/8/2019  Admitted from:  [x]  Emergency Room  []  Home  []  Another facility   []  [facility]     Admitting diagnosis:   Patient Active Problem List   Diagnosis    Mental retardation    [facility]    Mild intellectual disability    Bipolar affective disorder, mixed, severe, with psychotic behavior ([facility]    Encounter for lithium monitoring    Taking multiple medications for chronic disease    Other idiopathic scoliosis, thoracolumbar region    Chronic seasonal allergic rhinitis due to pollen    Bulging of lumbar intervertebral disc without myelopathy    Bipolar 1 disorder ([address]    Psychosis ([address]    Rectal bleeding    Excessive dietary caloric intake    Drug-induced constipation    Positive occult stool blood test    Acute psychosis ([facility]    Viral upper respiratory illness    Schizoaffective disorder (HCC)    Schizoaffective disorder, chronic condition with acute exacerbation ([facility]      Length of stay:  7 days               Danya Amaya was admitted in Psychiatric unit  from ER with psychosis. Patient was treated            With the above . Patient

## 2019-05-08 NOTE — PROGRESS NOTES
Spoke with Sherry Santiago at [facility]  She did not feel he is appropriate for them and will speak with his

## 2019-05-08 NOTE — CARE COORDINATION
PEGGY Note: d/c planning: PEGGY faxed info to [facility]. SW received phone call from [facility] from [facility] [place]. She states that mom had called her as she doesn't feel he is ready for d/c yet. Per Kayleen Galicia, pt is not at [facility] line yet as mom had to come in last night to convince pt to take his meds. Per Kayleen Galicia, pt has always been compliant with meds. She also expressed concern that pt still needs much encouragement to eat anything which is not like his usual self of always wanting to eat. She is also worried that pt will need to be readmitted quickly and that pt will be less than receptive to coming back to unit again and they would have to start readmission process all over again. Pt was declined at [facility] as they don't feel pt would be appropriate for [facility] as they don't feel they can meet his present needs and they expressed concern that he is not yet ready for d/c. Mo did contact [facility] and contest d/c. PT will not be d/c until they have reviewed chart.

## 2019-05-08 NOTE — CARE COORDINATION
I was notified by case management of mom's appeal of discharge via [address] [place]. Pt refused to sign [facility] letter. I did call mom  Keli Valdivia and explained letter and she will be in to visit this evening. I notified her that the charge nurse would have a copy of the [facility] letter for her.

## 2019-05-08 NOTE — PROGRESS NOTES
Patient's mother in to visit  States he is not ready to go home  She stated that she wanted to speak  to the  and wasn't taking the patient home because he wasn't ready   When told the discharge was written she left the unit and stated she was going to make some phone calls and refused to take the patient with her

## 2019-05-08 NOTE — PROGRESS NOTES
Pt has flight of ideas and disorganized with thoughts. Pt is very restless rocking when he is sitting or standing. Pt smiling Pt refused to eat dinner. Tray held. Pt ate with mother present. Pt also took medications with mother present. Pt had incontinent episode with bowel in the day room. Pt took shower with mother. Pt staying to room after dinner. Will continue to monitor.

## 2019-05-08 NOTE — PROGRESS NOTES
Pt slept well throughout shift. No issues or complaints overnight. Will continue to monitor q 15 min for safety.

## 2019-05-08 NOTE — PROGRESS NOTES
Danielle Jacosb CLINICAL PHARMACY NOTE: MEDS TO [address] Select Patient?: No  Total # of Prescriptions Filled: 1   The following medications were delivered to the patient:  · Depakote 500 mg  Total # of Interventions Completed: 3  Time Spent (min): 30    Additional Documentation:

## 2019-05-09 PROCEDURE — 1240000000 HC EMOTIONAL WELLNESS R&B

## 2019-05-09 PROCEDURE — 99231 SBSQ HOSP IP/OBS SF/LOW 25: CPT | Performed by: NURSE PRACTITIONER

## 2019-05-09 PROCEDURE — 6370000000 HC RX 637 (ALT 250 FOR IP): Performed by: NURSE PRACTITIONER

## 2019-05-09 RX ADMIN — DIVALPROEX SODIUM 2000 MG: 500 TABLET, DELAYED RELEASE ORAL at 08:31

## 2019-05-09 RX ADMIN — CETIRIZINE HYDROCHLORIDE 10 MG: 10 TABLET, FILM COATED ORAL at 08:31

## 2019-05-09 RX ADMIN — TRAZODONE HYDROCHLORIDE 200 MG: 50 TABLET ORAL at 21:13

## 2019-05-09 RX ADMIN — TRIHEXYPHENIDYL HYDROCHLORIDE 5 MG: 5 TABLET ORAL at 08:31

## 2019-05-09 RX ADMIN — DIVALPROEX SODIUM 2000 MG: 500 TABLET, DELAYED RELEASE ORAL at 21:12

## 2019-05-09 RX ADMIN — TRIHEXYPHENIDYL HYDROCHLORIDE 5 MG: 5 TABLET ORAL at 21:13

## 2019-05-09 ASSESSMENT — PAIN SCALES - GENERAL: PAINLEVEL_OUTOF10: 0

## 2019-05-09 NOTE — PROGRESS NOTES
DATE OF SERVICE:     5/9/2019    Hunter Carbajal seen today for the purpose of continuation of care. Nursing, social work reports, laboratory studies and vital signs are reviewed. Patient chief complaint today is:             [x] Depression      [x] Anxiety        [] Psychosis         [] Suicidal/Homicidal                         [x] Delusions           [] Aggression          Subjective: Today patient is pleasant, slightly irritable at times. Patient is bizarre, and selectively taking medications. Patient's mother is not ready for patient to come home. Sleep:  [] Good [x] Fair  [] Poor  Appetite:  [] Good [x] Fair  [] Poor    Depression:  [] Mild [x] Moderate [] Severe                [x] Constant [] Sporadic     Anxiety: [] Mild [] Moderate [x] Severe    [x] Constant [] Sporadic     Delusions: [] Mild [x] Moderate [] Severe     [] Constant [x] Sporadic     [x] Paranoid [] Somatic [] Grandiose     Hallucinations: [] Mild [] Moderate [] Severe     [] Constant [] Sporadic    [] Auditory  [] Visual [] Tactile       Suicidal: [] Constant [] Sporadic  Homicidal: [] Constant [] Sporadic    Unscheduled Medications     [] Patient Receiving Emergency Medications \" Chemical Restraint\"   [] Requesting PRN medications for anxiety    Medical Review of Systems:     All other than marked systmes have been reviewed and are all negative.     Constitutional Symptoms: []  fever []  Chills  Skin Symptoms: [] rash []  Pruritus   Eye Symptoms: [] Vision unchanged []  recent vision problems[] blurred vision   Respiratory Symptoms:[] shortness of breath [] cough  Cardiovascular Symptoms:  [] chest pain   [] palpitations   Gastrointestinal Symptoms: []  abdominal pain []  nausea []  vomiting []  diarrhea  Genitourinary Symptoms: []  dysuria  []  hematuria   Musculoskeletal Symptoms: []  back pain []  muscle pain []  joint pain  Neurologic Symptoms: []  headache []  dizziness  Hematolymphoid Symptoms: [] Adenopathy [] Bruises [] Schimosis       Psychiatric Review of systems  Delusions:  [] Denies [] Endorses   Withdrawals:  [] Denies [] Endorses    Hallucinations: [] Denies [] Endorses    Extra Pyramidal Symptoms: [] Denies [] Endorses      /76   Pulse 88   Temp 98.8 °F (37.1 °C) (Temporal)   Resp 18   Ht 5' 9\" (1.753 m)   Wt 245 lb (111.1 kg)   SpO2 98%   BMI 36.18 kg/m²     MENTAL STATUS EXAM:         Cognition:       [x] Alert  [x] Awake  [x] Oriented  [x] Person  [x] Place [x] Time       [] drowsy  [] tired  [] lethargic  [] distractable      Attention/Concentration:   [] Attentive  [x] Distracted         Memory Recent and Remote: [x] Intact   [] Impaired [] Partially Impaired      Language: [] Able to recognize and name objects                         [] Unable to recognize and name [address] [place] [address] of Knowledge:  [] Poor [x]  Fair  [] Good     Speech: [] Normal  [] Soft  [x] Slow  [] Fast [] Pressured  [] poverty at times                                    [] Loud [] Dysarthria  [] Incoherent        Appearance: [] Well Groomed  [] Casual Dressed  [] Unkept  [x] Disheveled                         [] Normal weight  [] Thin  [x] Overweight  [] Obese           Attitude: [] Positive  [] Hostile  [] Demanding  [] Guarded  [] Defensive                    [x] Cooperative  []  Uncooperative       Behavior:  [x] Normal Gait  [] Abnormal Gait [] Walks with Assistance  [] Nadia Chair                           [] Walks with [place] Bon  [] In Hospital Bed  [] Sitting in Chair     Muscle-Skeletal:  [x] Normal Muscle Tone [] Muscle Atrophy                                  [] Abnormal Muscle Movement      Eye Contact:   [] Good eye contact  [x] Intermittent Eye Contact  [] Poor Eye Contact               [] Excessive Eye Contact   [] Intrusive Eye Contact     Mood: [] Depressed  [x] Anxious  [x] Irritated  [] Euthymic   [] Angry [] Restless                   [] Apathetic     Affect:  [x] Congruent  [] Incongruent  [] Labile  [] Constricted from polypharmacy  [] Augmentation of Clozapine therapy due to treatment resistance to single therapy      Signed:  May Gray  5/9/2019  8:24 AM

## 2019-05-09 NOTE — PROGRESS NOTES
Patient is resting quietly in bed with eyes closed at this time. No signs of distress or discomfort noted. No PRN medications given thus far. Safety needs met. No unit problems reported. Will continue to observe and support.

## 2019-05-10 PROCEDURE — 1240000000 HC EMOTIONAL WELLNESS R&B

## 2019-05-10 PROCEDURE — 6370000000 HC RX 637 (ALT 250 FOR IP): Performed by: NURSE PRACTITIONER

## 2019-05-10 PROCEDURE — 99231 SBSQ HOSP IP/OBS SF/LOW 25: CPT | Performed by: NURSE PRACTITIONER

## 2019-05-10 RX ORDER — DIVALPROEX SODIUM 500 MG/1
1500 TABLET, DELAYED RELEASE ORAL 2 TIMES DAILY
Status: DISCONTINUED | OUTPATIENT
Start: 2019-05-10 | End: 2019-05-13

## 2019-05-10 RX ADMIN — DIVALPROEX SODIUM 2000 MG: 500 TABLET, DELAYED RELEASE ORAL at 09:10

## 2019-05-10 RX ADMIN — DIVALPROEX SODIUM 1500 MG: 500 TABLET, DELAYED RELEASE ORAL at 21:55

## 2019-05-10 RX ADMIN — CLONAZEPAM 1 MG: 0.5 TABLET ORAL at 21:55

## 2019-05-10 RX ADMIN — TRAZODONE HYDROCHLORIDE 200 MG: 50 TABLET ORAL at 21:55

## 2019-05-10 RX ADMIN — TRIHEXYPHENIDYL HYDROCHLORIDE 5 MG: 5 TABLET ORAL at 22:21

## 2019-05-10 RX ADMIN — TRIHEXYPHENIDYL HYDROCHLORIDE 5 MG: 5 TABLET ORAL at 09:10

## 2019-05-10 RX ADMIN — CETIRIZINE HYDROCHLORIDE 10 MG: 10 TABLET, FILM COATED ORAL at 09:10

## 2019-05-10 ASSESSMENT — PAIN SCALES - GENERAL: PAINLEVEL_OUTOF10: 0

## 2019-05-10 NOTE — PROGRESS NOTES
Patient is resting quietly in bed with eyes closed at this time. No signs of distress or discomfort noted. Will continue monitor and support as needed during shift.

## 2019-05-10 NOTE — PLAN OF CARE
Denies suicidal ideation, denies homicidal ideation, and denies hallucinations. Pt. Seclusive to self and to room continues depressed, no self harm.

## 2019-05-10 NOTE — PROGRESS NOTES
Psychiatric Review of systems  Delusions:  [] Denies [] Endorses   Withdrawals:  [] Denies [] Endorses    Hallucinations: [] Denies [] Endorses    Extra Pyramidal Symptoms: [] Denies [] Endorses      /74   Pulse 69   Temp 97.6 °F (36.4 °C) (Oral)   Resp 14   Ht 5' 9\" (1.753 m)   Wt 245 lb (111.1 kg)   SpO2 98%   BMI 36.18 kg/m²     MENTAL STATUS EXAM:         Cognition:       [x] Alert  [x] Awake  [x] Oriented  [x] Person  [x] Place [x] Time       [] drowsy  [] tired  [] lethargic  [] distractable      Attention/Concentration:   [] Attentive  [x] Distracted         Memory Recent and Remote: [x] Intact   [] Impaired [] Partially Impaired      Language: [] Able to recognize and name objects                         [] Unable to recognize and name [address] [place] [address] of Knowledge:  [] Poor [x]  Fair  [] Good     Speech: [] Normal  [] Soft  [x] Slow  [] Fast [] Pressured  [] poverty at times                                    [] Loud [] Dysarthria  [] Incoherent        Appearance: [] Well Groomed  [] Casual Dressed  [] Unkept  [x] Disheveled                         [] Normal weight  [] Thin  [x] Overweight  [] Obese           Attitude: [] Positive  [] Hostile  [] Demanding  [] Guarded  [] Defensive                    [x] Cooperative  []  Uncooperative       Behavior:  [x] Normal Gait  [] Abnormal Gait [] Walks with Assistance  [] Nadia Chair                           [] Walks with Caryn Penningtonler  [] In Hospital Bed  [] Sitting in Chair     Muscle-Skeletal:  [x] Normal Muscle Tone [] Muscle Atrophy                                  [] Abnormal Muscle Movement      Eye Contact:   [] Good eye contact  [x] Intermittent Eye Contact  [] Poor Eye Contact               [] Excessive Eye Contact   [] Intrusive Eye Contact     Mood: [] Depressed  [x] Anxious  [x] Irritated  [] Euthymic   [] Angry [] Restless                   [] Apathetic     Affect:  [x] Congruent  [] Incongruent  [] Labile  [] Constricted  [] Flat  [] Bizarre Augmentation of Clozapine therapy due to treatment resistance to single therapy      Signed:  Wanda Speak  5/10/2019  10:25 AM

## 2019-05-11 PROCEDURE — 99231 SBSQ HOSP IP/OBS SF/LOW 25: CPT | Performed by: NURSE PRACTITIONER

## 2019-05-11 PROCEDURE — 6370000000 HC RX 637 (ALT 250 FOR IP): Performed by: NURSE PRACTITIONER

## 2019-05-11 PROCEDURE — 1240000000 HC EMOTIONAL WELLNESS R&B

## 2019-05-11 RX ADMIN — TRAZODONE HYDROCHLORIDE 200 MG: 50 TABLET ORAL at 21:06

## 2019-05-11 RX ADMIN — TRIHEXYPHENIDYL HYDROCHLORIDE 5 MG: 5 TABLET ORAL at 11:12

## 2019-05-11 RX ADMIN — DIVALPROEX SODIUM 1500 MG: 500 TABLET, DELAYED RELEASE ORAL at 21:06

## 2019-05-11 RX ADMIN — CETIRIZINE HYDROCHLORIDE 10 MG: 10 TABLET, FILM COATED ORAL at 11:12

## 2019-05-11 RX ADMIN — DIVALPROEX SODIUM 1500 MG: 500 TABLET, DELAYED RELEASE ORAL at 11:12

## 2019-05-11 RX ADMIN — TRIHEXYPHENIDYL HYDROCHLORIDE 5 MG: 5 TABLET ORAL at 21:06

## 2019-05-11 ASSESSMENT — PAIN SCALES - GENERAL: PAINLEVEL_OUTOF10: 0

## 2019-05-11 NOTE — PROGRESS NOTES
Patient came to the nurses' station and requested medication. All HS meds except Artane given due to having to reorder from pharmacy. Will continue to monitor patient.

## 2019-05-11 NOTE — PROGRESS NOTES
This nurse attempted to offer medications but patient refused x5 attempts. Patient stated to this nurse \"I took my medicine yesterday. \" Educated patient on medication compliance but patient proceeded to refuse medication.

## 2019-05-11 NOTE — PROGRESS NOTES
DATE OF SERVICE:     5/11/2019    Zack Hernandez seen today for the purpose of continuation of care. Nursing, social work reports, laboratory studies and vital signs are reviewed. Patient chief complaint today is:             [x] Depression      [x] Anxiety        [] Psychosis         [] Suicidal/Homicidal                         [x] Delusions           [] Aggression          Subjective: Today patient remains the same. Patient is not talking this morning, just rolls over and looks at the wall. Patient takes select medications     Sleep:  [] Good [x] Fair  [] Poor  Appetite:  [] Good [x] Fair  [] Poor    Depression:  [] Mild [x] Moderate [] Severe                [x] Constant [] Sporadic     Anxiety: [] Mild [] Moderate [x] Severe    [x] Constant [] Sporadic     Delusions: [] Mild [x] Moderate [] Severe     [] Constant [x] Sporadic     [x] Paranoid [] Somatic [] Grandiose     Hallucinations: [] Mild [] Moderate [] Severe     [] Constant [] Sporadic    [] Auditory  [] Visual [] Tactile       Suicidal: [] Constant [] Sporadic  Homicidal: [] Constant [] Sporadic    Unscheduled Medications     [] Patient Receiving Emergency Medications \" Chemical Restraint\"   [] Requesting PRN medications for anxiety    Medical Review of Systems:     All other than marked systmes have been reviewed and are all negative.     Constitutional Symptoms: []  fever []  Chills  Skin Symptoms: [] rash []  Pruritus   Eye Symptoms: [] Vision unchanged []  recent vision problems[] blurred vision   Respiratory Symptoms:[] shortness of breath [] cough  Cardiovascular Symptoms:  [] chest pain   [] palpitations   Gastrointestinal Symptoms: []  abdominal pain []  nausea []  vomiting []  diarrhea  Genitourinary Symptoms: []  dysuria  []  hematuria   Musculoskeletal Symptoms: []  back pain []  muscle pain []  joint pain  Neurologic Symptoms: []  headache []  dizziness  Hematolymphoid Symptoms: [] Adenopathy [] Bruises   [] Schimosis Psychiatric Review of systems  Delusions:  [] Denies [] Endorses   Withdrawals:  [] Denies [] Endorses    Hallucinations: [] Denies [] Endorses    Extra Pyramidal Symptoms: [] Denies [] Endorses      /74   Pulse 69   Temp 97.6 °F (36.4 °C) (Oral)   Resp 14   Ht 5' 9\" (1.753 m)   Wt 245 lb (111.1 kg)   SpO2 98%   BMI 36.18 kg/m²     MENTAL STATUS EXAM:         Cognition:       [x] Alert  [x] Awake  [x] Oriented  [x] Person  [x] Place [x] Time       [] drowsy  [] tired  [] lethargic  [] distractable      Attention/Concentration:   [] Attentive  [x] Distracted         Memory Recent and Remote: [x] Intact   [] Impaired [] Partially Impaired      Language: [] Able to recognize and name objects                         [] Unable to recognize and name [address] [place] [address] of Knowledge:  [] Poor [x]  Fair  [] Good     Speech: [] Normal  [] Soft  [x] Slow  [] Fast [] Pressured  [] poverty at times                                    [] Loud [] Dysarthria  [] Incoherent        Appearance: [] Well Groomed  [] Casual Dressed  [] Unkept  [x] Disheveled                         [] Normal weight  [] Thin  [x] Overweight  [] Obese           Attitude: [] Positive  [] Hostile  [] Demanding  [] Guarded  [] Defensive                    [x] Cooperative  []  Uncooperative       Behavior:  [x] Normal Gait  [] Abnormal Gait [] Walks with Assistance  [] Nadia Chair                           [] Walks with Carmela Sanders  [] In Hospital Bed  [] Sitting in Chair     Muscle-Skeletal:  [x] Normal Muscle Tone [] Muscle Atrophy                                  [] Abnormal Muscle Movement      Eye Contact:   [] Good eye contact  [x] Intermittent Eye Contact  [] Poor Eye Contact               [] Excessive Eye Contact   [] Intrusive Eye Contact     Mood: [] Depressed  [x] Anxious  [x] Irritated  [] Euthymic   [] Angry [] Restless                   [] Apathetic     Affect:  [x] Congruent  [] Incongruent  [] Labile  [] Constricted  [] Flat  [] Bizarre [] Heightened    [] Exaggerated       Thought Process and Association:  [] Logical [x] Illogical                                        [] Linear and Goal Directed  [] Tangential  [x] Circumstantial      Thought Content:  [] Denies [] Endorses [] Suicidal [] Homicidal  [x] Delusional                                       [x] Paranoid  [] Somatic  [] Grandiose     Perception: [x]  None  [] Auditory   [] Visual  [] tactile   [] olfactory  [] Illusions          Insight: [] Intact  [] Fair  [x] Limited    Judgement:  [] Intact  [] Fair  [x] Limited       Assessment/Plan:        Patient Active Problem List   Diagnosis Code    Mental retardation F79    Schizoaffective disorder, bipolar type ([facility]) F25.0    Mild intellectual disability F70    Bipolar affective disorder, mixed, severe, with psychotic behavior ([facility] F31.64    Encounter for lithium monitoring Z51.81, Z79.899    Taking multiple medications for chronic disease R69    Other idiopathic scoliosis, thoracolumbar region M41.25    Chronic seasonal allergic rhinitis due to pollen J30.1    Bulging of lumbar intervertebral disc without myelopathy M51.26    Bipolar 1 disorder ([facility]) F31.9    Psychosis ([facility] F29    Rectal bleeding K62.5    Excessive dietary caloric intake R63.2    Drug-induced constipation K59.03    Positive occult stool blood test R19.5    Acute psychosis ([facility] F23    Viral upper respiratory illness J06.9    Schizoaffective disorder ([facility]) F25.9    Schizoaffective disorder, chronic condition with acute exacerbation ([facility] F25.8         Plan:    []  Patient is refusing medications  [x] Improving as expected   [] Not improving as expected   [] Worsening    []  At Baseline     Will order valproic acid level for Monday morning    Reason for more than one antipsychotic:  [x] N/A  [] 3 failed monotherapy(drugs tried):  [] Cross over to a new antipsychotic  [] Taper to monotherapy from polypharmacy  [] Augmentation of Clozapine therapy due to treatment resistance to single therapy      Signed:  Magi Cardenas  5/11/2019  12:18 PM

## 2019-05-11 NOTE — PLAN OF CARE
Patient presents with impaired concentration and no insight. Patient still focused on death and presents with rapid pressured speech. Patient displays unpurposeful speech and is hard to understand. Patient can become irritable if misunderstood. Patient denies SI, HI, and AVH and presents evasive during assessment. Patient still eating poorly and needs extra reinforcement on education. Will monitor closely.

## 2019-05-12 PROCEDURE — 1240000000 HC EMOTIONAL WELLNESS R&B

## 2019-05-12 PROCEDURE — 99231 SBSQ HOSP IP/OBS SF/LOW 25: CPT | Performed by: NURSE PRACTITIONER

## 2019-05-12 PROCEDURE — 6370000000 HC RX 637 (ALT 250 FOR IP): Performed by: NURSE PRACTITIONER

## 2019-05-12 RX ADMIN — TRIHEXYPHENIDYL HYDROCHLORIDE 5 MG: 5 TABLET ORAL at 20:51

## 2019-05-12 RX ADMIN — TRIHEXYPHENIDYL HYDROCHLORIDE 5 MG: 5 TABLET ORAL at 09:12

## 2019-05-12 RX ADMIN — DIVALPROEX SODIUM 1500 MG: 500 TABLET, DELAYED RELEASE ORAL at 09:12

## 2019-05-12 RX ADMIN — DIVALPROEX SODIUM 1500 MG: 500 TABLET, DELAYED RELEASE ORAL at 20:51

## 2019-05-12 RX ADMIN — TRAZODONE HYDROCHLORIDE 200 MG: 50 TABLET ORAL at 20:51

## 2019-05-12 RX ADMIN — CETIRIZINE HYDROCHLORIDE 10 MG: 10 TABLET, FILM COATED ORAL at 09:12

## 2019-05-12 NOTE — PLAN OF CARE
Problem: Depressive Behavior With or Without Suicide Precautions:  Goal: Able to verbalize and/or display a decrease in depressive symptoms  Description  Able to verbalize and/or display a decrease in depressive symptoms   5/12/2019 1714 by Charmayne Mori, RN  Outcome: Met This Shift     Problem: Altered Mood, Psychotic Behavior:  Goal: Able to verbalize reality based thinking  Description  Able to verbalize reality based thinking   Outcome: Ongoing  Pt was sleeping prior to dinner but did get up to eat. Affect is brighter and is pleasant with his behavior in control. Is not focused on talking about violence this shift and concentration is slightly better.

## 2019-05-12 NOTE — GROUP NOTE
Group Therapy Note     Date: 5/12/2019  Start Time: 215  End Time:  829  Number of Participants: 12     Type of Group: Recovery     Wellness Binder Information  Module Name: Ish  to [employer] recreational activity  Session Number:       Patient's Goal:  Pt will be able to socialize with others while enjoying game activity; pt will be able to identify importance of using distraction and pleasant activities to bring suzie to our life and increase our ability to deal with life stressors.     Notes:  Pt was active listener in activity but declined to play. He stayed for a short while and then left ajnd didn't return. He was thanked for attending.     Status After Intervention:  Improved     Participation Level:  Active Listener and Interactive     Participation Quality: Appropriate, Attentive, Sharing and Supportive        Speech:  normal        Thought Process/Content: Logical, linear        Affective Functioning: Congruent        Mood: euthymic        Level of consciousness:  Alert, Oriented x4 and Attentive        Response to Learning: Able to verbalize current knowledge/experience, and Progressing to goal        Endings: None Reported     Modes of Intervention: Education, Support, Socialization, Problem-solving and Activity        Discipline Responsible: /Counselor        Signature: FREDIS Atkins

## 2019-05-12 NOTE — PLAN OF CARE
Patient presents with impaired concentration, repetitive acts and paranoia. Patient is pleasant compared to yesterday. No signs of irritability. Patient continues to rosaline and presents with increased movement. Patient denies SI, HI, and AVH. Patient presents depressed and anxious but is not focused on death like days prior. Will monitor closely.

## 2019-05-13 LAB — VALPROIC ACID LEVEL: 138 MCG/ML (ref 50–100)

## 2019-05-13 PROCEDURE — 6370000000 HC RX 637 (ALT 250 FOR IP): Performed by: PSYCHIATRY & NEUROLOGY

## 2019-05-13 PROCEDURE — 6370000000 HC RX 637 (ALT 250 FOR IP): Performed by: NURSE PRACTITIONER

## 2019-05-13 PROCEDURE — 80164 ASSAY DIPROPYLACETIC ACD TOT: CPT

## 2019-05-13 PROCEDURE — 1240000000 HC EMOTIONAL WELLNESS R&B

## 2019-05-13 PROCEDURE — 99232 SBSQ HOSP IP/OBS MODERATE 35: CPT | Performed by: NURSE PRACTITIONER

## 2019-05-13 PROCEDURE — 36415 COLL VENOUS BLD VENIPUNCTURE: CPT

## 2019-05-13 RX ORDER — DIVALPROEX SODIUM 500 MG/1
1500 TABLET, DELAYED RELEASE ORAL 2 TIMES DAILY
Qty: 90 TABLET | Refills: 0 | Status: SHIPPED | OUTPATIENT
Start: 2019-05-13 | End: 2019-05-15 | Stop reason: HOSPADM

## 2019-05-13 RX ADMIN — CETIRIZINE HYDROCHLORIDE 10 MG: 10 TABLET, FILM COATED ORAL at 09:15

## 2019-05-13 RX ADMIN — TRIHEXYPHENIDYL HYDROCHLORIDE 5 MG: 5 TABLET ORAL at 09:15

## 2019-05-13 RX ADMIN — TRIHEXYPHENIDYL HYDROCHLORIDE 5 MG: 5 TABLET ORAL at 20:56

## 2019-05-13 RX ADMIN — DIVALPROEX SODIUM 1250 MG: 500 TABLET, DELAYED RELEASE ORAL at 20:56

## 2019-05-13 RX ADMIN — TRAZODONE HYDROCHLORIDE 200 MG: 50 TABLET ORAL at 20:56

## 2019-05-13 RX ADMIN — CLONAZEPAM 1 MG: 0.5 TABLET ORAL at 23:26

## 2019-05-13 NOTE — DISCHARGE SUMMARY
Physician Discharge Summary      Physical Examination   VS/Measurements:     /74   Pulse 69   Temp 97.6 °F (36.4 °C) (Oral)   Resp 14   Ht 5' 9\" (1.753 m)   Wt 245 lb (111.1 kg)   SpO2 98%   BMI 36.18 kg/m²         Discharge Medications:              [facility]   Home Medication Instructions [id number]    Printed on:05/13/19 5097   Medication Information                      clonazePAM (KLONOPIN) 1 MG tablet  Take 1 mg by mouth 4 times daily as needed.              divalproex (DEPAKOTE) 500 MG DR tablet  Take 4 tablets by mouth 2 times daily             docusate sodium (COLACE) 100 MG capsule  Take 1 capsule by mouth 2 times daily             loratadine (CLARITIN) 10 MG tablet  TAKE 1 TABLET BY MOUTH DAILY             traZODone (DESYREL) 50 MG tablet  Take 200 mg by mouth nightly              trihexyphenidyl (ARTANE) 5 MG tablet  Take 5 mg by mouth 2 times daily                      Psychiatric: Mental Status Examination:    Cognition:      [x] Alert  [x] Awake  [x] Oriented  [x] Person  [x] Place [x] Time    [] drowsy  [] tired  [] lethargic  [] distractable       Attention/Concentration:   [] Attentive  [x] Distracted        Memory Recent and Remote: [x] Intact   [] Impaired [] Partially Impaired     Language: [] Able to recognize and name objects          [] Unable to recognize and name Objects    Fund of Knowledge:  [] Poor []  Fair  [] Good    Speech: [x] Normal  [] Soft  [] Slow  [] Fast [] Pressured            [] Loud [] Dysarthria  [] Incoherent       Appearance: [] Well Groomed  [x] Casual Dressed  [] Unkept  [] Disheveled          [] Normal weight[] Thin  [] Overweight  [] Obese           Attitude: [] Positive  [] Hostile  [] Demanding  [] Guarded  [] Defensive         [x] Cooperative  []  Uncooperative      Behavior:  [x] Normal Gait  [] Walks with Assistance  [] Nadia Chair    [] Walks with Debbi Rater  [] In Hospital Bed  [] Sitting in Chair    Muscle-Skeletal:  [x] Normal Muscle Tone [] Muscle Atrophy       [] Abnormal Muscle Movement     Eye Contact:  [x] Good eye contact  [] Intermittent Eye Contact  [] Poor Eye Contact     Mood: [] Depressed  [] Anxious  [] Irritated  [x] Euthymic   [] Angry [] Restless    Affect:  [x] Congruent  [] Incongruent  [] Labile  [] Constricted  [] Flat  [] Bizarre     Thought Process and Association:  [] Logical [] Illogical       [x] Linear and Goal Directed  [] Tangential  [] Circumstantial     Thought Content:  [] Denies [] Endorses [] Suicidal [] Homicidal  [] Delusional      [x] Paranoid  [] Somatic  [] Grandiose    Perception: [x]  None  [] Auditory   [] Visual  [] tactile   [] olfactory  [] Illusions         Insight: [] Intact  [x] Fair  [] Limited    Judgement:  [] Intact  [x] Fair  [] Limited    Hospital Course:   Admit Date: 5/1/2019     Discharge Date: 5/13/2019  Admitted from:  [x]  Emergency Room  []  Home  []  Another facility   []  [facility]     Admitting diagnosis:   Patient Active Problem List   Diagnosis    Mental retardation    [facility]oaffective [facility] type [facility]    Mild intellectual disability    Bipolar affective disorder, mixed, severe, with psychotic behavior ([facility]    Encounter for lithium monitoring    Taking multiple medications for chronic disease    Other idiopathic scoliosis, thoracolumbar region    Chronic seasonal allergic rhinitis due to pollen    Bulging of lumbar intervertebral disc without myelopathy    Bipolar 1 disorder ([address]    Psychosis ([facility]    Rectal bleeding    Excessive dietary caloric intake    Drug-induced constipation    Positive occult stool blood test    Acute psychosis ([facility] [facility]    Viral upper respiratory illness    Schizoaffective disorder (HCC)    Schizoaffective disorder, chronic condition with acute exacerbation ([facility]      Length of stay:  7 days               Lawanda Lewis was admitted in Psychiatric unit  from ER with psychosis. Patient was treated            With the above . Patient responded well to the treatment. Discharge Summary Plan:     Discharge Status:    [x] Improved [] Unchanged    [] Worse       Discharge instructions given:  [x] Patient    [] Family [] Other         Discharge disposition:  [] Home [x] Step Down unit  [] Group Home []  [facility]                                                    [] [facility]    [] AMA  [] Other           Prescriptions: Continue same medications, review with patient.        Reason for more than one antipsychotic:  [x] N/A  [] 3 failed monotherapy(drugs tried):  [] Cross over to a new antipsychotic  [] Taper to monotherapy from polypharmacy  [] Augmentation of Clozapine therapy due to treatment resistance to single therapy      Diagnosis:        Patient Active Problem List   Diagnosis Code    Mental retardation F79    Schizoaffective disorder, bipolar type ([address] F25.0    Mild intellectual disability F70    Bipolar affective disorder, mixed, severe, with psychotic behavior ([address] F31.64    Encounter for lithium monitoring Z51.81, Z79.899    Taking multiple medications for chronic disease R69    Other idiopathic scoliosis, thoracolumbar region M41.25    Chronic seasonal allergic rhinitis due to pollen J30.1    Bulging of lumbar intervertebral disc without myelopathy M51.26    Bipolar 1 disorder ([address] F31.9    Psychosis ([address] F29    Rectal bleeding K62.5    Excessive dietary caloric intake R63.2    Drug-induced constipation K59.03    Positive occult stool blood test R19.5    Acute psychosis ([address] F23    Viral upper respiratory illness J06.9    Schizoaffective disorder ([address] F25.9    Schizoaffective disorder, chronic condition with acute exacerbation ([address] F25.8       Education and Follow-up:  Counseled:  [x] Patient     [x] Family    [] Guardian      Signed:   Gordon Rubio   5/13/2019   9:27 AM

## 2019-05-13 NOTE — CARE COORDINATION
PEGGY spoke with pt's mother Tico Cook. She confirmed that pt is able to return home and she will be picking him up today. Informed Enedelia of follow-up appointments with Tuan Shah, agreeable with plans.

## 2019-05-13 NOTE — PROGRESS NOTES
systems  Delusions:  [] Denies [] Endorses   Withdrawals:  [] Denies [] Endorses    Hallucinations: [] Denies [] Endorses    Extra Pyramidal Symptoms: [] Denies [] Endorses      /74   Pulse 69   Temp 97.6 °F (36.4 °C) (Oral)   Resp 14   Ht 5' 9\" (1.753 m)   Wt 245 lb (111.1 kg)   SpO2 98%   BMI 36.18 kg/m²     MENTAL STATUS EXAM:         Cognition:       [x] Alert  [x] Awake  [x] Oriented  [x] Person  [x] Place [x] Time       [] drowsy  [] tired  [] lethargic  [] distractable      Attention/Concentration:   [] Attentive  [x] Distracted         Memory Recent and Remote: [x] Intact   [] Impaired [] Partially Impaired      Language: [] Able to recognize and name objects                         [] Unable to recognize and name [address] [place] [address] of Knowledge:  [] Poor [x]  Fair  [] Good     Speech: [] Normal  [] Soft  [x] Slow  [] Fast [] Pressured  [] poverty at times                                    [x] Loud [] Dysarthria  [] Incoherent        Appearance: [] Well Groomed  [] Casual Dressed  [] Unkept  [] Disheveled                         [] Normal weight  [] Thin  [x] Overweight  [] Obese           Attitude: [] Positive  [] Hostile  [] Demanding  [] Guarded  [] Defensive                    [x] Cooperative  []  Uncooperative       Behavior:  [x] Normal Gait  [] Abnormal Gait [] Walks with Assistance  [] Nadia Chair                           [] Walks with [facility] [place]  [] In Hospital Bed  [] Sitting in Chair     Muscle-Skeletal:  [x] Normal Muscle Tone [] Muscle Atrophy                                  [] Abnormal Muscle Movement      Eye Contact:   [] Good eye contact  [x] Intermittent Eye Contact  [] Poor Eye Contact               [] Excessive Eye Contact   [] Intrusive Eye Contact     Mood: [] Depressed  [] Anxious  [] Irritated  [] Euthymic   [] Angry [x] Restless                   [] Apathetic     Affect:  [] Congruent  [] Incongruent  [x] Labile  [] Constricted  [] Flat  [] Bizarre                     [] Heightened    [] Exaggerated       Thought Process and Association:  [] Logical [x] Illogical                                        [] Linear and Goal Directed  [] Tangential  [x] Circumstantial      Thought Content:  [] Denies [] Endorses [] Suicidal [] Homicidal  [x] Delusional                                       [x] Paranoid  [] Somatic  [] Grandiose     Perception: [x]  None  [] Auditory   [] Visual  [] tactile   [] olfactory  [] Illusions          Insight: [] Intact  [] Fair  [x] Limited    Judgement:  [] Intact  [] Fair  [x] Limited       Assessment/Plan:        Patient Active Problem List   Diagnosis Code    Mental retardation F79    Schizoaffective disorder, bipolar type ([facility]) F25.0    Mild intellectual disability F70    Bipolar affective disorder, mixed, severe, with psychotic behavior ([facility] F31.64    Encounter for lithium monitoring Z51.81, Z79.899    Taking multiple medications for chronic disease R69    Other idiopathic scoliosis, thoracolumbar region M41.25    Chronic seasonal allergic rhinitis due to pollen J30.1    Bulging of lumbar intervertebral disc without myelopathy M51.26    Bipolar 1 disorder ([facility]) F31.9    Psychosis ([address] F29    Rectal bleeding K62.5    Excessive dietary caloric intake R63.2    Drug-induced constipation K59.03    Positive occult stool blood test R19.5    Acute psychosis ([address] F23    Viral upper respiratory illness J06.9    Schizoaffective disorder ([facility]) F25.9    Schizoaffective disorder, chronic condition with acute exacerbation ([facility] [address] F25.8         Plan:    []  Patient is refusing medications  [x] Improving as expected   [] Not improving as expected   [] Worsening    []  At Baseline     Hold discharge today.    Decrease depakote to 1000 mg BID    Reason for more than one antipsychotic:  [x] N/A  [] 3 failed monotherapy(drugs tried):  [] Cross over to a new antipsychotic  [] Taper to monotherapy from polypharmacy  [] Augmentation of Clozapine therapy due to treatment resistance to single therapy      Signed:  Fabiana Reyes  5/13/2019  1:16 PM

## 2019-05-13 NOTE — PLAN OF CARE
Pt is stable and cooperative. Pt is social with staff. Pt denies wanting to hurt self or to hurt others. Pt denies hallucinations. Will follow and monitor.

## 2019-05-13 NOTE — GROUP NOTE
Group Therapy Note    Date: May 13    Group Start Time: 1017  Group End Time: 1100  Group Topic: Psychoeducation    [facility]  [id number], [place]        Group Therapy Note    Attendees: 12                                                                      Wellness Binder Information  Module Name:  Relapse planning   Session Number:  14-4  Objective : Will be able to identify crucial steps to a positive relapse plan. Status After Intervention:  Improved    Pending's: None Reported    Modes of Intervention: Education, Support, Socialization, Exploration and Problem-solving      Discipline Responsible: Psychoeducational Specialist      Signature:  Michelle Gregory [facility]      Notes: talkative thru-out, unable to redirect. Status After Intervention:  Improved    Participation Level:  Active Listener and Interactive    Participation Quality: Appropriate, Attentive, Sharing and Supportive      Speech:  normal      Thought Process/Content: Logical      Affective Functioning: Congruent      Mood: euthymic      Level of consciousness:  Alert, Oriented x4 and Attentive      Response to Learning: Able to verbalize/acknowledge new learning, Able to retain information and Progressing to goal      Endings: None Reported    Modes of Intervention: Education, Support, Socialization, Exploration and Problem-solving      Discipline Responsible: Psychoeducational Specialist      Signature:  Stefania Cortez

## 2019-05-13 NOTE — PROGRESS NOTES
Attended afternoon meet and greet and recreation activity of [facility]. Patient was number of 1 of 12 today. Patient activly engaged.

## 2019-05-14 PROCEDURE — 99231 SBSQ HOSP IP/OBS SF/LOW 25: CPT | Performed by: NURSE PRACTITIONER

## 2019-05-14 PROCEDURE — 6370000000 HC RX 637 (ALT 250 FOR IP): Performed by: PSYCHIATRY & NEUROLOGY

## 2019-05-14 PROCEDURE — 6370000000 HC RX 637 (ALT 250 FOR IP): Performed by: NURSE PRACTITIONER

## 2019-05-14 PROCEDURE — 1240000000 HC EMOTIONAL WELLNESS R&B

## 2019-05-14 RX ADMIN — CETIRIZINE HYDROCHLORIDE 10 MG: 10 TABLET, FILM COATED ORAL at 09:06

## 2019-05-14 RX ADMIN — TRIHEXYPHENIDYL HYDROCHLORIDE 5 MG: 5 TABLET ORAL at 09:06

## 2019-05-14 RX ADMIN — DIVALPROEX SODIUM 1250 MG: 500 TABLET, DELAYED RELEASE ORAL at 20:29

## 2019-05-14 RX ADMIN — TRIHEXYPHENIDYL HYDROCHLORIDE 5 MG: 5 TABLET ORAL at 20:29

## 2019-05-14 RX ADMIN — CLONAZEPAM 1 MG: 0.5 TABLET ORAL at 21:18

## 2019-05-14 RX ADMIN — ACETAMINOPHEN 650 MG: 325 TABLET, FILM COATED ORAL at 16:59

## 2019-05-14 RX ADMIN — DIVALPROEX SODIUM 1250 MG: 500 TABLET, DELAYED RELEASE ORAL at 09:06

## 2019-05-14 RX ADMIN — TRAZODONE HYDROCHLORIDE 200 MG: 50 TABLET ORAL at 20:29

## 2019-05-14 ASSESSMENT — PAIN DESCRIPTION - LOCATION: LOCATION: FOOT

## 2019-05-14 ASSESSMENT — PAIN DESCRIPTION - PAIN TYPE: TYPE: ACUTE PAIN

## 2019-05-14 ASSESSMENT — PAIN DESCRIPTION - DESCRIPTORS: DESCRIPTORS: ACHING;DISCOMFORT;SORE

## 2019-05-14 ASSESSMENT — PAIN SCALES - GENERAL
PAINLEVEL_OUTOF10: 6
PAINLEVEL_OUTOF10: 0
PAINLEVEL_OUTOF10: 0

## 2019-05-14 ASSESSMENT — PAIN DESCRIPTION - ORIENTATION: ORIENTATION: RIGHT;LEFT

## 2019-05-14 ASSESSMENT — PAIN - FUNCTIONAL ASSESSMENT: PAIN_FUNCTIONAL_ASSESSMENT: ACTIVITIES ARE NOT PREVENTED

## 2019-05-14 NOTE — PLAN OF CARE
Problem: Altered Mood, Psychotic Behavior:  Goal: Able to verbalize decrease in frequency and intensity of hallucinations  Description  Able to verbalize decrease in frequency and intensity of hallucinations   5/14/2019 1653 by Beverly Chaparro RN  Outcome: Met This Shift     Problem: Altered Mood, Psychotic Behavior:  Goal: Able to verbalize reality based thinking  Description  Able to verbalize reality based thinking   5/14/2019 1653 by Beverly Chaparro RN  Outcome: Ongoing   Up at nurses station. Talking about someone killing a man but unwilling/unable to be specific. Denies harmful thoughts or hallucinations. Rocks while he talks. C/o right foot hurting due to \"spurs\" but unwilling/unable to stay off it and rest. \"The skilled nursing is full\" but doesn't elaborate. Says they Lynn Zavala having a cookout at the office. \" will continue to monitor and assess

## 2019-05-14 NOTE — PROGRESS NOTES
Patient denies SI, HI, and hallucinations. Patient requested to call his mother . Called his mom at this time. Patient is bright and social . Voices no concerns at this time . Will conitnue to monitor and observe.

## 2019-05-14 NOTE — PROGRESS NOTES
Patient declined to attend community meeting.   Attempted t have patient identify goal for the day but patient stated: \"No\"

## 2019-05-14 NOTE — PLAN OF CARE
Problem: Altered Mood, Psychotic Behavior:  Goal: Able to verbalize decrease in frequency and intensity of hallucinations  Description  Able to verbalize decrease in frequency and intensity of hallucinations   Outcome: Met This Shift     Problem: Altered Mood, Psychotic Behavior:  Goal: Able to verbalize reality based thinking  Description  Able to verbalize reality based thinking   Outcome: Ongoing   Up and about. Denies harmful thoughts or hallucinations. Rocking movements. More social with peers. Behavior has been in control. Called mom this morning and she is coming for dinner.  Will continue to monitor and assess

## 2019-05-14 NOTE — PROGRESS NOTES
Complaints later in the day about bilateral feet pain, rated 6/10 on a 0-10 pain scale at 17:00. In bed resting with eyes closed at 18:08.

## 2019-05-15 VITALS
HEIGHT: 69 IN | TEMPERATURE: 97.4 F | DIASTOLIC BLOOD PRESSURE: 86 MMHG | OXYGEN SATURATION: 98 % | SYSTOLIC BLOOD PRESSURE: 126 MMHG | BODY MASS INDEX: 36.29 KG/M2 | WEIGHT: 245 LBS | HEART RATE: 70 BPM | RESPIRATION RATE: 16 BRPM

## 2019-05-15 LAB — VALPROIC ACID LEVEL: 128 MCG/ML (ref 50–100)

## 2019-05-15 PROCEDURE — 99238 HOSP IP/OBS DSCHRG MGMT 30/<: CPT | Performed by: NURSE PRACTITIONER

## 2019-05-15 PROCEDURE — 6370000000 HC RX 637 (ALT 250 FOR IP): Performed by: NURSE PRACTITIONER

## 2019-05-15 PROCEDURE — 80164 ASSAY DIPROPYLACETIC ACD TOT: CPT

## 2019-05-15 PROCEDURE — 36415 COLL VENOUS BLD VENIPUNCTURE: CPT

## 2019-05-15 RX ORDER — DIVALPROEX SODIUM 250 MG/1
1200 TABLET, DELAYED RELEASE ORAL 2 TIMES DAILY
Qty: 90 TABLET | Refills: 0 | Status: ON HOLD | OUTPATIENT
Start: 2019-05-15 | End: 2019-06-25 | Stop reason: HOSPADM

## 2019-05-15 RX ADMIN — CETIRIZINE HYDROCHLORIDE 10 MG: 10 TABLET, FILM COATED ORAL at 09:08

## 2019-05-15 RX ADMIN — TRIHEXYPHENIDYL HYDROCHLORIDE 5 MG: 5 TABLET ORAL at 09:08

## 2019-05-15 ASSESSMENT — PAIN SCALES - GENERAL: PAINLEVEL_OUTOF10: 0

## 2019-05-15 NOTE — CARE COORDINATION
I called Malissa Tucker to clarify information I was given and spoke with Polajesus Jerry [phone] and she was able to confirm that both appeals have been rescinded/cases closed on 5/15/19  so that there shouldn't be any billing issues for pt's mom or facility. First case was initiated by mom 5/8/19  With case number [id number], second case also initiated by mom on 5/11/19 with case number [id number].

## 2019-05-15 NOTE — DISCHARGE SUMMARY
Physician Discharge Summary      Physical Examination   VS/Measurements:     /86   Pulse 70   Temp 97.4 °F (36.3 °C) (Oral)   Resp 16   Ht 5' 9\" (1.753 m)   Wt 245 lb (111.1 kg)   SpO2 98%   BMI 36.18 kg/m²         Discharge Medications:              Zhang Destinee   Home Medication Instructions SEZ:[id number]    Printed on:05/15/19 4031   Medication Information                      clonazePAM (KLONOPIN) 1 MG tablet  Take 1 mg by mouth 4 times daily as needed.              divalproex (DEPAKOTE) 250 MG DR tablet  Take 5 tablets by mouth 2 times daily             docusate sodium (COLACE) 100 MG capsule  Take 1 capsule by mouth 2 times daily             loratadine (CLARITIN) 10 MG tablet  TAKE 1 TABLET BY MOUTH DAILY             traZODone (DESYREL) 50 MG tablet  Take 200 mg by mouth nightly              trihexyphenidyl (ARTANE) 5 MG tablet  Take 5 mg by mouth 2 times daily                      Psychiatric: Mental Status Examination:    Cognition:      [x] Alert  [x] Awake  [x] Oriented  [x] Person  [x] Place [x] Time    [] drowsy  [] tired  [] lethargic  [] distractable       Attention/Concentration:   [x] Attentive  [] Distracted        Memory Recent and Remote: [] Intact   [] Impaired [] Partially Impaired     Language: [] Able to recognize and name objects          [] Unable to recognize and name Objects    Fund of Knowledge:  [] Poor []  Fair  [] Good    Speech: [x] Normal  [] Soft  [] Slow  [] Fast [] Pressured            [] Loud [] Dysarthria  [] Incoherent       Appearance: [] Well Groomed  [x] Casual Dressed  [] Unkept  [] Disheveled          [] Normal weight[] Thin  [x] Overweight  [] Obese           Attitude: [] Positive  [] Hostile  [] Demanding  [] Guarded  [] Defensive         [x] Cooperative  []  Uncooperative      Behavior:  [x] Normal Gait  [] Walks with Assistance  [] Nadia Chair    [] Walks with [place]ode[place] Noun  [] In Hospital Bed  [] Sitting in Chair    Muscle-Skeletal:  [x] Normal Muscle Tone [] Muscle Atrophy       [] Abnormal Muscle Movement     Eye Contact:  [x] Good eye contact  [] Intermittent Eye Contact  [] Poor Eye Contact     Mood: [] Depressed  [] Anxious  [] Irritated  [x] Euthymic   [] Angry [] Restless    Affect:  [x] Congruent  [] Incongruent  [] Labile  [] Constricted  [] Flat  [] Bizarre     Thought Process and Association:  [] Logical [] Illogical       [x] Linear and Goal Directed  [] Tangential  [] Circumstantial     Thought Content:  [x] Denies [] Endorses [] Suicidal [] Homicidal  [] Delusional      [] Paranoid  [] Somatic  [] Grandiose    Perception: [x]  None  [] Auditory   [] Visual  [] tactile   [] olfactory  [] Illusions         Insight: [] Intact  [x] Fair  [] Limited    Judgement:  [] Intact  [x] Fair  [] Limited    Hospital Course:   Admit Date: 5/1/2019     Discharge Date: 5/15/2019  Admitted from:  [x]  Emergency Room  []  Home  []  Another facility   []  [facility]     Admitting diagnosis:   Patient Active Problem List   Diagnosis    Mental retardation    [facility]oa[facility] [address]    Mild intellectual disability    Bipolar affective disorder, mixed, severe, with psychotic behavior ([facility] [address]    Encounter for lithium monitoring    Taking multiple medications for chronic disease    Other idiopathic scoliosis, thoracolumbar region    Chronic seasonal allergic rhinitis due to pollen    Bulging of lumbar intervertebral disc without myelopathy    Bipolar 1 disorder ([address]    Psychosis ([facility] [address]    Rectal bleeding    Excessive dietary caloric intake    Drug-induced constipation    Positive occult stool blood test    Acute psychosis ([facility] [address]    Viral upper respiratory illness    Schizoaffective disorder ([facility] [address]    Schizoaffective disorder, chronic condition with acute exacerbation ([facility] [address]      Length of stay:  14 days              Cliff Lopez was admitted in Psychiatric unit  from ED with psychosis. Patient was treated with the above.  Patient responded well to the treatment. Discharge Summary Plan:     Discharge Status:    [x] Improved [] Unchanged    [] Worse       Discharge instructions given:  [x] Patient    [] Family [] Other         Discharge disposition:  [x] Home [] Step Down unit  [] Group Home []  [facility]                                                    [] [facility]    [] AMA  [] Other           Prescriptions: Continue same medications, review with patient.        Reason for more than one antipsychotic:  [x] N/A  [] 3 failed monotherapy(drugs tried):  [] Cross over to a new antipsychotic  [] Taper to monotherapy from polypharmacy  [] Augmentation of Clozapine therapy due to treatment resistance to single therapy      Diagnosis:        Patient Active Problem List   Diagnosis Code    Mental retardation F79    Schizoaffective disorder, bipolar type ([address] F25.0    Mild intellectual disability F70    Bipolar affective disorder, mixed, severe, with psychotic behavior ([address] F31.64    Encounter for lithium monitoring Z51.81, Z79.899    Taking multiple medications for chronic disease R69    Other idiopathic scoliosis, thoracolumbar region M41.25    Chronic seasonal allergic rhinitis due to pollen J30.1    Bulging of lumbar intervertebral disc without myelopathy M51.26    Bipolar 1 disorder ([address] F31.9    Psychosis ([address] F29    Rectal bleeding K62.5    Excessive dietary caloric intake R63.2    Drug-induced constipation K59.03    Positive occult stool blood test R19.5    Acute psychosis ([address] F23    Viral upper respiratory illness J06.9    Schizoaffective disorder ([address] F25.9    Schizoaffective disorder, chronic condition with acute exacerbation ([address] F25.8       Education and Follow-up:  Counseled:  [x] Patient     [] Family    [] Guardian      Signed:   Sandy Ding   5/15/2019   9:13 AM

## 2019-05-15 NOTE — CARE COORDINATION
In order to ensure appropriate transition and discharge planning is in place, the following documents have been transmitted to [address] [place] [address], as the new outpatient provider:     The d/c diagnosis was transmitted to the next care provider   The reason for hospitalization was transmitted to the next care provider   The d/c medications (dosage and indication) were transmitted to the next care provider    The continuing care plan was transmitted to the next care provider

## 2019-05-15 NOTE — PLAN OF CARE
Denies suicidal ideation, denies homicidal ideation, and denies hallucinations. More independent with own adl's appetite improving and is good.

## 2019-06-07 ENCOUNTER — HOSPITAL ENCOUNTER (OUTPATIENT)
Age: 39
Discharge: HOME | End: 2019-06-07
Payer: MEDICARE

## 2019-06-07 ENCOUNTER — OFFICE VISIT (OUTPATIENT)
Dept: FAMILY MEDICINE CLINIC | Age: 39
End: 2019-06-07
Payer: MEDICARE

## 2019-06-07 VITALS
BODY MASS INDEX: 35.31 KG/M2 | RESPIRATION RATE: 19 BRPM | WEIGHT: 238.44 LBS | TEMPERATURE: 98.1 F | HEART RATE: 82 BPM | DIASTOLIC BLOOD PRESSURE: 68 MMHG | HEIGHT: 69 IN | OXYGEN SATURATION: 98 % | SYSTOLIC BLOOD PRESSURE: 102 MMHG

## 2019-06-07 DIAGNOSIS — Z23 NEED FOR PROPHYLACTIC VACCINATION AGAINST DIPHTHERIA AND TETANUS: Primary | ICD-10-CM

## 2019-06-07 DIAGNOSIS — Z00.00 ROUTINE GENERAL MEDICAL EXAMINATION AT A HEALTH CARE FACILITY: ICD-10-CM

## 2019-06-07 LAB — VALPROIC ACID LEVEL: 104 MCG/ML (ref 50–100)

## 2019-06-07 PROCEDURE — 80164 ASSAY DIPROPYLACETIC ACD TOT: CPT

## 2019-06-07 PROCEDURE — 36415 COLL VENOUS BLD VENIPUNCTURE: CPT

## 2019-06-07 PROCEDURE — G0438 PPPS, INITIAL VISIT: HCPCS | Performed by: NURSE PRACTITIONER

## 2019-06-07 ASSESSMENT — PATIENT HEALTH QUESTIONNAIRE - PHQ9: SUM OF ALL RESPONSES TO PHQ QUESTIONS 1-9: 15

## 2019-06-07 ASSESSMENT — ANXIETY QUESTIONNAIRES: GAD7 TOTAL SCORE: 2

## 2019-06-07 NOTE — PATIENT INSTRUCTIONS
Learning About Low-Carbohydrate Diets for Weight Loss  What is a low-carbohydrate diet? Low-carb diets avoid foods that are high in carbohydrate. These high-carb foods include pasta, bread, rice, cereal, fruits, and starchy vegetables. Instead, these diets usually have you eat foods that are high in fat and protein. Many people lose weight quickly on a low-carb diet. But the early weight loss is water. People on this diet often gain the weight back after they start eating carbs again. Not all diet plans are safe or work well. A lot of the evidence shows that low-carb diets aren't healthy. That's because these diets often don't include healthy foods like fruits and vegetables. Losing weight safely means balancing protein, fat, and carbs with every meal and snack. And low-carb diets don't always provide the vitamins, minerals, and fiber you need. If you have a serious medical condition, talk to your doctor before you try any diet. These conditions include kidney disease, heart disease, type 2 diabetes, high cholesterol, and high blood pressure. If you are pregnant, it may not be safe for your baby if you are on a low-carb diet. How can you lose weight safely? You might have heard that a diet plan helped another person lose weight. But that doesn't mean that it will work for you. It is very hard to stay on a diet that includes lots of big changes in your eating habits. If you want to get to a healthy weight and stay there, making healthy lifestyle changes will often work better than dieting. These steps can help. · Make a plan for change. Work with your doctor to create a plan that is right for you. · See a dietitian. He or she can show you how to make healthy changes in your eating habits. · Manage stress. If you have a lot of stress in your life, it can be hard to focus on making healthy changes to your daily habits. · Track your food and activity.  You are likely to do better at losing weight if you keep track of what you eat and what you do. Follow-up care is a key part of your treatment and safety. Be sure to make and go to all appointments, and call your doctor if you are having problems. It's also a good idea to know your test results and keep a list of the medicines you take. Where can you learn more? Go to https://chpepiceweb.[employer]. org and sign in to your [employer] account. Enter A121 in the [employer] box to learn more about \"Learning About Low-Carbohydrate Diets for Weight Loss. \"     If you do not have an account, please click on the \"Sign Up Now\" link. Current as of: November 7, 2018  Content Version: 12.0  © 6872-1627 Healthwise, Inc[employer]. Care instructions adapted under license by [facility] ([facility]). If you have questions about a medical condition or this instruction, always ask your healthcare professional. [place] [address] any warranty or liability for your use of this information. Personalized Preventive Plan for Lawanda Lewis - 6/7/2019  Medicare offers a range of preventive health benefits. Some of the tests and screenings are paid in full while other may be subject to a deductible, co-insurance, and/or copay. Some of these benefits include a comprehensive review of your medical history including lifestyle, illnesses that may run in your family, and various assessments and screenings as appropriate. After reviewing your medical record and screening and assessments performed today your provider may have ordered immunizations, labs, imaging, and/or referrals for you. A list of these orders (if applicable) as well as your Preventive Care list are included within your After Visit Summary for your review. Other Preventive Recommendations:    · A preventive eye exam performed by an eye specialist is recommended every 1-2 years to screen for glaucoma; cataracts, macular degeneration, and other eye disorders.   · A preventive dental visit is recommended every 6 months. · Try to get at least 150 minutes of exercise per week or 10,000 steps per day on a pedometer . · Order or download the FREE \"Exercise & Physical Activity: Your Everyday Guide\" from [employer]. Call 7-340.348.8743 or search [employer] online. · You need 9198-9139 mg of calcium and 0904-9540 IU of vitamin D per day. It is possible to meet your calcium requirement with diet alone, but a vitamin D supplement is usually necessary to meet this goal.  · When exposed to the sun, use a sunscreen that protects against both UVA and UVB radiation with an SPF of 30 or greater. Reapply every 2 to 3 hours or after sweating, drying off with a towel, or swimming. · Always wear a seat belt when traveling in a car. Always wear a helmet when riding a bicycle or motorcycle.   · RX for Tdap to be given at the pharmacy

## 2019-06-07 NOTE — PROGRESS NOTES
Medicare Annual Wellness Visit  Name: Selin Riley Date: 2019   MRN: <[id number]> Sex: Male   Age: 41 y.o. Ethnicity: Non-/Non    : 1980 Race: Joseluis Plaza is here for Medicare AWV    Screenings for behavioral, psychosocial and functional/safety risks, and cognitive dysfunction are all negative except as indicated below. These results, as well as other patient data from the [address] form, are documented in Flowsheets linked to this Encounter. Allergies   Allergen Reactions    Seasonal      Prior to Visit Medications    Medication Sig Taking? Authorizing Provider   ARIPiprazole (ABILIFY) 30 MG tablet  Yes Historical Provider, MD   ARISTADA 662 MG/2.4ML PRSY injection INJECT 1 SYRINGE IM Q MONTH UTD Yes Historical Provider, MD Ortiz Killer 234 MG/1.5ML FARA IM injection INJECT 234 MG EVERY 3 WEEKS AT MD OFFICE Yes Historical Provider, MD   divalproex (DEPAKOTE) 250 MG DR tablet Take 5 tablets by mouth 2 times daily Yes Joselito Chase MD   clonazePAM (KLONOPIN) 1 MG tablet Take 1 mg by mouth 4 times daily as needed.  Yes Historical Provider, MD   docusate sodium (COLACE) 100 MG capsule Take 1 capsule by mouth 2 times daily  Patient taking differently: Take 100 mg by mouth 2 times daily as needed  Yes LUCINA Whitt Se, CNP   loratadine (CLARITIN) 10 MG tablet TAKE 1 TABLET BY MOUTH DAILY Yes LUCINA Whitt Se, CNP   trihexyphenidyl (ARTANE) 5 MG tablet Take 5 mg by mouth 2 times daily  Yes Historical Provider, MD   traZODone (DESYREL) 100 MG tablet   Historical Provider, MD     Past Medical History:   Diagnosis Date    ADHD (attention deficit hyperactivity disorder)     Autism     PATIENT IS COOPERATIVE AND VERBAL PER MOM    Autism     Bipolar 1 disorder ([facility]     Mental retardation     Schizoaffective disorder ([facility]      Past Surgical History:   Procedure Laterality Date    OTHER SURGICAL HISTORY      FASCIITIS AND HEEL SPUR    grooming, bathing, toileting, or walking/balance?: (!) Eating, Dressing, Grooming, Bathing  In the past 7 days, did you need help from others to take care of any of the following? Laundry, housekeeping, banking/finances, shopping, telephone use, food preparation, transportation, or taking medications?: (!) Taking Medications, Transportation, Laundry  ADL Interventions:  · has Staff Care for patient care    Personalized Preventive Plan   Current Health Maintenance Status  Immunization History   Administered Date(s) Administered    Influenza Vaccine, unspecified formulation 02/02/2017    Influenza, Intradermal, Preservative free 12/09/2015    Influenza, Quadv, 3 yrs and older, IM, PF (Fluzone 3 yrs and older or Afluria 5 yrs and older) 11/27/2017    Influenza, Ilsa Asa, 6 mo and older, IM (Flulaval) 10/23/2018        Health Maintenance   Topic Date Due    Varicella Vaccine (1 of 2 - 13+ 2-dose series) 12/25/1995    HIV screen  12/25/1997    DTaP/Tdap/Td vaccine (1 - Tdap) 12/25/2001    Flu vaccine  Completed    Pneumococcal 0-64 years Vaccine  Aged Out     Recommendations for [employer] Due: see orders and patient instructions/AVS.  . Recommended screening schedule for the next 5-10 years is provided to the patient in written form: see Patient Instructions/AVS.      Advance Care Planning   Advanced Care Planning: Discussed the patients choices for care and treatment in case of a health event that adversely affects decision-making abilities. Also discussed the patients long-term treatment options. Reviewed with the patient the [address] [place] [address] Declaration forms  Reviewed the process of designating a competent adult as an Agent (or -in-fact) that could take make health care decisions for the patient if incompetent.  Patient was asked to complete the declaration forms, either acknowledge the forms by a public notary or an eligible witness and provide a signed copy to the practice office. Time spent (minutes): 15    Obesity Counseling: Assessed behavioral health risks and factors affecting choice of behavior. Suggested weight control approaches, including dietary changes behavioral modification and follow up plan. Provided educational and support documentation.   Time spent (minutes): 10

## 2019-06-14 ENCOUNTER — HOSPITAL ENCOUNTER (INPATIENT)
Age: 39
LOS: 14 days | Discharge: HOME | DRG: 885 | End: 2019-06-28
Attending: EMERGENCY MEDICINE | Admitting: PSYCHIATRY & NEUROLOGY
Payer: MEDICARE

## 2019-06-14 DIAGNOSIS — R46.89 AGGRESSIVE BEHAVIOR: Primary | ICD-10-CM

## 2019-06-14 LAB
ACETAMINOPHEN LEVEL: <5 MCG/ML (ref 10–30)
ALBUMIN SERPL-MCNC: 4.3 G/DL (ref 3.5–5.2)
ALP BLD-CCNC: 63 U/L (ref 40–129)
ALT SERPL-CCNC: 21 U/L (ref 0–40)
AMPHETAMINE SCREEN, URINE: NOT DETECTED
ANION GAP SERPL CALCULATED.3IONS-SCNC: 11 MMOL/L (ref 7–16)
AST SERPL-CCNC: 23 U/L (ref 0–39)
BARBITURATE SCREEN URINE: NOT DETECTED
BASOPHILS ABSOLUTE: 0.05 E9/L (ref 0–0.2)
BASOPHILS RELATIVE PERCENT: 0.7 % (ref 0–2)
BENZODIAZEPINE SCREEN, URINE: NOT DETECTED
BILIRUB SERPL-MCNC: 0.4 MG/DL (ref 0–1.2)
BUN BLDV-MCNC: 18 MG/DL (ref 6–20)
CALCIUM SERPL-MCNC: 9.4 MG/DL (ref 8.6–10.2)
CANNABINOID SCREEN URINE: NOT DETECTED
CHLORIDE BLD-SCNC: 103 MMOL/L (ref 98–107)
CO2: 25 MMOL/L (ref 22–29)
COCAINE METABOLITE SCREEN URINE: NOT DETECTED
CREAT SERPL-MCNC: 1 MG/DL (ref 0.7–1.2)
EOSINOPHILS ABSOLUTE: 0.04 E9/L (ref 0.05–0.5)
EOSINOPHILS RELATIVE PERCENT: 0.5 % (ref 0–6)
ETHANOL: <10 MG/DL (ref 0–0.08)
GFR AFRICAN AMERICAN: >60
GFR NON-AFRICAN AMERICAN: >60 ML/MIN/1.73
GLUCOSE BLD-MCNC: 102 MG/DL (ref 74–99)
HCT VFR BLD CALC: 43.3 % (ref 37–54)
HEMOGLOBIN: 14.9 G/DL (ref 12.5–16.5)
IMMATURE GRANULOCYTES #: 0.05 E9/L
IMMATURE GRANULOCYTES %: 0.7 % (ref 0–5)
LYMPHOCYTES ABSOLUTE: 1.65 E9/L (ref 1.5–4)
LYMPHOCYTES RELATIVE PERCENT: 21.8 % (ref 20–42)
MCH RBC QN AUTO: 33.2 PG (ref 26–35)
MCHC RBC AUTO-ENTMCNC: 34.4 % (ref 32–34.5)
MCV RBC AUTO: 96.4 FL (ref 80–99.9)
METHADONE SCREEN, URINE: NOT DETECTED
MONOCYTES ABSOLUTE: 0.58 E9/L (ref 0.1–0.95)
MONOCYTES RELATIVE PERCENT: 7.7 % (ref 2–12)
NEUTROPHILS ABSOLUTE: 5.2 E9/L (ref 1.8–7.3)
NEUTROPHILS RELATIVE PERCENT: 68.6 % (ref 43–80)
OPIATE SCREEN URINE: NOT DETECTED
PDW BLD-RTO: 12 FL (ref 11.5–15)
PHENCYCLIDINE SCREEN URINE: NOT DETECTED
PLATELET # BLD: 133 E9/L (ref 130–450)
PMV BLD AUTO: 10 FL (ref 7–12)
POTASSIUM SERPL-SCNC: 4.4 MMOL/L (ref 3.5–5)
PROPOXYPHENE SCREEN: NOT DETECTED
RBC # BLD: 4.49 E12/L (ref 3.8–5.8)
SALICYLATE, SERUM: <0.3 MG/DL (ref 0–30)
SODIUM BLD-SCNC: 139 MMOL/L (ref 132–146)
TOTAL PROTEIN: 7 G/DL (ref 6.4–8.3)
TRICYCLIC ANTIDEPRESSANTS SCREEN SERUM: NEGATIVE NG/ML
WBC # BLD: 7.6 E9/L (ref 4.5–11.5)

## 2019-06-14 PROCEDURE — 36415 COLL VENOUS BLD VENIPUNCTURE: CPT

## 2019-06-14 PROCEDURE — G0480 DRUG TEST DEF 1-7 CLASSES: HCPCS

## 2019-06-14 PROCEDURE — 99285 EMERGENCY DEPT VISIT HI MDM: CPT

## 2019-06-14 PROCEDURE — 85025 COMPLETE CBC W/AUTO DIFF WBC: CPT

## 2019-06-14 PROCEDURE — 80307 DRUG TEST PRSMV CHEM ANLYZR: CPT

## 2019-06-14 PROCEDURE — 1240000000 HC EMOTIONAL WELLNESS R&B

## 2019-06-14 PROCEDURE — 6370000000 HC RX 637 (ALT 250 FOR IP): Performed by: PSYCHIATRY & NEUROLOGY

## 2019-06-14 PROCEDURE — 80053 COMPREHEN METABOLIC PANEL: CPT

## 2019-06-14 RX ORDER — TRAZODONE HYDROCHLORIDE 50 MG/1
50 TABLET ORAL NIGHTLY PRN
Status: DISCONTINUED | OUTPATIENT
Start: 2019-06-14 | End: 2019-06-28 | Stop reason: HOSPADM

## 2019-06-14 RX ORDER — BENZTROPINE MESYLATE 1 MG/ML
2 INJECTION INTRAMUSCULAR; INTRAVENOUS 2 TIMES DAILY PRN
Status: DISCONTINUED | OUTPATIENT
Start: 2019-06-14 | End: 2019-06-28 | Stop reason: HOSPADM

## 2019-06-14 RX ORDER — OLANZAPINE 10 MG/1
10 TABLET ORAL
Status: DISPENSED | OUTPATIENT
Start: 2019-06-14 | End: 2019-06-14

## 2019-06-14 RX ORDER — HALOPERIDOL 5 MG/ML
10 INJECTION INTRAMUSCULAR EVERY 6 HOURS PRN
Status: DISCONTINUED | OUTPATIENT
Start: 2019-06-14 | End: 2019-06-28 | Stop reason: HOSPADM

## 2019-06-14 RX ORDER — ACETAMINOPHEN 325 MG/1
650 TABLET ORAL EVERY 4 HOURS PRN
Status: DISCONTINUED | OUTPATIENT
Start: 2019-06-14 | End: 2019-06-28 | Stop reason: HOSPADM

## 2019-06-14 RX ORDER — HYDROXYZINE PAMOATE 25 MG/1
50 CAPSULE ORAL EVERY 6 HOURS PRN
Status: DISCONTINUED | OUTPATIENT
Start: 2019-06-14 | End: 2019-06-28 | Stop reason: HOSPADM

## 2019-06-14 RX ORDER — MAGNESIUM HYDROXIDE/ALUMINUM HYDROXICE/SIMETHICONE 120; 1200; 1200 MG/30ML; MG/30ML; MG/30ML
30 SUSPENSION ORAL PRN
Status: DISCONTINUED | OUTPATIENT
Start: 2019-06-14 | End: 2019-06-28 | Stop reason: HOSPADM

## 2019-06-14 RX ADMIN — TRAZODONE HYDROCHLORIDE 50 MG: 50 TABLET ORAL at 23:16

## 2019-06-14 RX ADMIN — ACETAMINOPHEN 650 MG: 325 TABLET, FILM COATED ORAL at 23:17

## 2019-06-14 ASSESSMENT — SLEEP AND FATIGUE QUESTIONNAIRES
SLEEP PATTERN: NORMAL
DIFFICULTY FALLING ASLEEP: YES
RESTFUL SLEEP: NO
DIFFICULTY ARISING: NO
DIFFICULTY STAYING ASLEEP: NO
DO YOU USE A SLEEP AID: YES
DO YOU HAVE DIFFICULTY SLEEPING: YES

## 2019-06-14 ASSESSMENT — PAIN SCALES - GENERAL
PAINLEVEL_OUTOF10: 0

## 2019-06-14 ASSESSMENT — LIFESTYLE VARIABLES: HISTORY_ALCOHOL_USE: NO

## 2019-06-14 NOTE — ED NOTES
THE PT WAS ACCEPTED TO [address] [place] [address] BED 7517. DISPOSITION CALLED TO ILEANA IN ADMITTING.      [facility]  06/14/19 9218

## 2019-06-15 LAB — VALPROIC ACID LEVEL: 29 MCG/ML (ref 50–100)

## 2019-06-15 PROCEDURE — 36415 COLL VENOUS BLD VENIPUNCTURE: CPT

## 2019-06-15 PROCEDURE — 6370000000 HC RX 637 (ALT 250 FOR IP): Performed by: PSYCHIATRY & NEUROLOGY

## 2019-06-15 PROCEDURE — 1240000000 HC EMOTIONAL WELLNESS R&B

## 2019-06-15 PROCEDURE — 99222 1ST HOSP IP/OBS MODERATE 55: CPT | Performed by: NURSE PRACTITIONER

## 2019-06-15 PROCEDURE — 6370000000 HC RX 637 (ALT 250 FOR IP): Performed by: NURSE PRACTITIONER

## 2019-06-15 PROCEDURE — 80164 ASSAY DIPROPYLACETIC ACD TOT: CPT

## 2019-06-15 RX ORDER — TRIHEXYPHENIDYL HYDROCHLORIDE 5 MG/1
5 TABLET ORAL 2 TIMES DAILY
Status: DISCONTINUED | OUTPATIENT
Start: 2019-06-15 | End: 2019-06-28 | Stop reason: HOSPADM

## 2019-06-15 RX ORDER — ARIPIPRAZOLE 5 MG/1
5 TABLET ORAL 2 TIMES DAILY
Status: DISCONTINUED | OUTPATIENT
Start: 2019-06-15 | End: 2019-06-16

## 2019-06-15 RX ORDER — DOCUSATE SODIUM 100 MG/1
100 CAPSULE, LIQUID FILLED ORAL 2 TIMES DAILY PRN
Status: DISCONTINUED | OUTPATIENT
Start: 2019-06-15 | End: 2019-06-28 | Stop reason: HOSPADM

## 2019-06-15 RX ORDER — CETIRIZINE HYDROCHLORIDE 10 MG/1
10 TABLET ORAL DAILY
Status: DISCONTINUED | OUTPATIENT
Start: 2019-06-15 | End: 2019-06-28 | Stop reason: HOSPADM

## 2019-06-15 RX ADMIN — HYDROXYZINE PAMOATE 50 MG: 25 CAPSULE ORAL at 12:57

## 2019-06-15 RX ADMIN — CETIRIZINE HYDROCHLORIDE 10 MG: 10 TABLET, FILM COATED ORAL at 08:54

## 2019-06-15 RX ADMIN — TRAZODONE HYDROCHLORIDE 50 MG: 50 TABLET ORAL at 20:20

## 2019-06-15 RX ADMIN — DIVALPROEX SODIUM 1250 MG: 500 TABLET, DELAYED RELEASE ORAL at 20:23

## 2019-06-15 RX ADMIN — TRIHEXYPHENIDYL HYDROCHLORIDE 5 MG: 5 TABLET ORAL at 08:54

## 2019-06-15 RX ADMIN — HYDROXYZINE PAMOATE 50 MG: 25 CAPSULE ORAL at 20:20

## 2019-06-15 RX ADMIN — ARIPIPRAZOLE 5 MG: 5 TABLET ORAL at 20:20

## 2019-06-15 RX ADMIN — TRIHEXYPHENIDYL HYDROCHLORIDE 5 MG: 5 TABLET ORAL at 20:20

## 2019-06-15 RX ADMIN — ARIPIPRAZOLE 5 MG: 5 TABLET ORAL at 08:54

## 2019-06-15 RX ADMIN — DIVALPROEX SODIUM 1250 MG: 500 TABLET, DELAYED RELEASE ORAL at 08:54

## 2019-06-15 ASSESSMENT — SLEEP AND FATIGUE QUESTIONNAIRES
SLEEP PATTERN: DIFFICULTY FALLING ASLEEP
DIFFICULTY STAYING ASLEEP: NO
DO YOU USE A SLEEP AID: YES
DIFFICULTY FALLING ASLEEP: YES
DO YOU HAVE DIFFICULTY SLEEPING: YES
RESTFUL SLEEP: NO
DIFFICULTY ARISING: NO

## 2019-06-15 ASSESSMENT — PAIN SCALES - GENERAL: PAINLEVEL_OUTOF10: 0

## 2019-06-15 ASSESSMENT — LIFESTYLE VARIABLES: HISTORY_ALCOHOL_USE: NO

## 2019-06-15 NOTE — H&P
Mental retardation     Schizoaffective disorder ([facility] [address]        FAMILY PSYCHIATRIC HISTORY:  Family History   Problem Relation Age of Onset    High Blood Pressure Mother     Diabetes Mother         [x] Denies      [] Endorses    [] Father     [] Depression  [] Anxiety  [] Bipolar  [] Psychosis  []  Other      [] Mother    [] Depression  [] Anxiety  [] Bipolar  [] Psychosis  []  Other      [] Sibling    [] Depression  [] Anxiety  [] Bipolar  [] Psychosis  []  Other      [] Grandparent    [] Depression  [] Anxiety  [] Bipolar  [] Psychosis  []  Other      SOCIAL HISTORY:     1. Living Situation:[x] Private Residence [] Homeless [] Nursing Home                                   [] Assisted Living [] Group Home  [] Shelter [] Other   2. Employment:  [] Unemployed  [] Employed  [x] Disabled  [] Retired   3. Legal History: [x] No Arrest [] Arrest  [] Theft  []  Assault  [] Substances   4. History of Trama/ Abuse: [x] Denies  [] Emotional [] Physical [] Sexual   5. Spirituality: [x] Spiritual [] Not Spiritual   6. Substance Abuse: [x] Denies  [] Drug of choice                                       [] Amphetamines [] Marijuana [] Cocaine                                       [] Opioids  [] Alcohol  [] Benzodiazepines      For further SH review SW note.     Risk Assessment:  1. Risk Factors:   [] Depression  [x] Anxiety  [x] Psychosis   [] Suicidal/Homicidal Thoughts [] Suicide Attempt [] Substance Abuse      2. Protective Factors: [x] Controlled Environment                                          [x] Supportive Family []                                          [] Restorationist Support      3.  Level of Risk: [] Mild [] Moderate [x] Severe       Strengths & Weaknesses:     1. Strengths: [x] Ability to communicate feelings                          [] Independent ADL's                           [x] Supportive Family                          [] Current Health Status      2. Weaknesses: [x] Emotional          [] Motivational

## 2019-06-15 NOTE — PROGRESS NOTES
[facility]  Initial Interdisciplinary Treatment Plan NOTE    Review Date & Time: 06/15/2019 9615    Patient was in treatment team    Admission Type:   Admission Type: Voluntary(pt has guardian/mom)    Reason for admission:  Reason for Admission: Jt Colvin were talking to me through the walls. I had to make them stop. \"       Estimated Length of Stay Update:  3-5 days  Estimated Discharge Date Update: 5-8 days    PATIENT STRENGTHS:  Patient Strengths Strengths: Connection to output provider, Positive Support  Patient Strengths and Limitations:Limitations: Tendency to isolate self, Multiple barriers to leisure interests  Addictive Behavior:Addictive Behavior  In the past 3 months, have you felt or has someone told you that you have a problem with:  : None  Do you have a history of Chemical Use?: No  Do you have a history of Alcohol Use?: No  Do you have a history of Street Drug Abuse?: No  Histroy of Prescripton Drug Abuse?: No  Medical Problems:  Past Medical History:   Diagnosis Date    ADHD (attention deficit hyperactivity disorder)     Autism     PATIENT IS COOPERATIVE AND VERBAL PER MOM    Autism     Bipolar 1 disorder ([facility]     Mental retardation     Schizoaffective disorder ([facility] [facility]        EDUCATION:   Learner Progress Toward Treatment Goals: progressing    Method: follow care plan    Outcome: meet goals and return home    PATIENT GOALS: \"take my medicine\"    PLAN/TREATMENT RECOMMENDATIONS UPDATE: continue present care plan    GOALS UPDATE:   Time frame for Short-Term Goals: 3-5 days    Angélica James RN

## 2019-06-16 PROCEDURE — 6370000000 HC RX 637 (ALT 250 FOR IP): Performed by: PSYCHIATRY & NEUROLOGY

## 2019-06-16 PROCEDURE — 1240000000 HC EMOTIONAL WELLNESS R&B

## 2019-06-16 PROCEDURE — 99232 SBSQ HOSP IP/OBS MODERATE 35: CPT | Performed by: NURSE PRACTITIONER

## 2019-06-16 PROCEDURE — 6370000000 HC RX 637 (ALT 250 FOR IP): Performed by: NURSE PRACTITIONER

## 2019-06-16 RX ORDER — ARIPIPRAZOLE 10 MG/1
10 TABLET ORAL NIGHTLY
Status: DISCONTINUED | OUTPATIENT
Start: 2019-06-16 | End: 2019-06-17

## 2019-06-16 RX ADMIN — TRAZODONE HYDROCHLORIDE 50 MG: 50 TABLET ORAL at 20:07

## 2019-06-16 RX ADMIN — CETIRIZINE HYDROCHLORIDE 10 MG: 10 TABLET, FILM COATED ORAL at 08:41

## 2019-06-16 RX ADMIN — TRIHEXYPHENIDYL HYDROCHLORIDE 5 MG: 5 TABLET ORAL at 20:07

## 2019-06-16 RX ADMIN — TRIHEXYPHENIDYL HYDROCHLORIDE 5 MG: 5 TABLET ORAL at 08:41

## 2019-06-16 RX ADMIN — ARIPIPRAZOLE 10 MG: 10 TABLET ORAL at 20:07

## 2019-06-16 RX ADMIN — ARIPIPRAZOLE 5 MG: 5 TABLET ORAL at 08:41

## 2019-06-16 RX ADMIN — DIVALPROEX SODIUM 1250 MG: 500 TABLET, DELAYED RELEASE ORAL at 20:07

## 2019-06-16 RX ADMIN — DIVALPROEX SODIUM 1250 MG: 500 TABLET, DELAYED RELEASE ORAL at 08:41

## 2019-06-16 ASSESSMENT — PAIN SCALES - GENERAL: PAINLEVEL_OUTOF10: 0

## 2019-06-16 NOTE — PROGRESS NOTES
[] Logical [x] Illogical                                        [] Linear and Goal Directed  [x] poverty  [] Circumstantial      Thought Content:  [] Denies [] Endorses [] Suicidal [] Homicidal  [x] Delusional                                       [x] Paranoid  [] Somatic  [] Grandiose     Perception: []  None  [x] Auditory   [x] Visual  [] tactile   [] olfactory  [] Illusions          Insight: [] Intact  [] Fair  [x] Limited    Judgement:  [] Intact  [] Fair  [x] Limited         Assessment/Plan:        Patient Active Problem List   Diagnosis Code    Mental retardation F79    Schizoaffective disorder, bipolar type ([address] F25.0    Mild intellectual disability F70    Bipolar affective disorder, mixed, severe, with psychotic behavior ([address] F31.64    Encounter for lithium monitoring Z51.81, Z79.899    Taking multiple medications for chronic disease R69    Other idiopathic scoliosis, thoracolumbar region M41.25    Chronic seasonal allergic rhinitis due to pollen J30.1    Bulging of lumbar intervertebral disc without myelopathy M51.26    Bipolar 1 disorder ([address] F31.9    Psychosis ([address] F29    Rectal bleeding K62.5    Excessive dietary caloric intake R63.2    Drug-induced constipation K59.03    Positive occult stool blood test R19.5    Acute psychosis ([address] F23    Viral upper respiratory illness J06.9    Schizoaffective disorder (HCC) F25.9    Schizoaffective disorder, chronic condition with acute exacerbation ([address] F25.8         Plan:    []  Patient is refusing medications  [x] Improving as expected   [] Not improving as expected   [] Worsening    []  At Baseline     Change Abilify to all at HS starting tonight    Reason for more than one antipsychotic:  [x] N/A  [] 3 failed monotherapy(drugs tried):  [] Cross over to a new antipsychotic  [] Taper to monotherapy from polypharmacy  [] Augmentation of Clozapine therapy due to treatment resistance to single therapy      Signed:  Kristofer

## 2019-06-17 PROCEDURE — 6370000000 HC RX 637 (ALT 250 FOR IP): Performed by: NURSE PRACTITIONER

## 2019-06-17 PROCEDURE — 99231 SBSQ HOSP IP/OBS SF/LOW 25: CPT | Performed by: NURSE PRACTITIONER

## 2019-06-17 PROCEDURE — 6370000000 HC RX 637 (ALT 250 FOR IP): Performed by: PSYCHIATRY & NEUROLOGY

## 2019-06-17 PROCEDURE — 1240000000 HC EMOTIONAL WELLNESS R&B

## 2019-06-17 RX ORDER — ARIPIPRAZOLE 15 MG/1
15 TABLET ORAL EVERY EVENING
Status: DISCONTINUED | OUTPATIENT
Start: 2019-06-17 | End: 2019-06-19

## 2019-06-17 RX ADMIN — DIVALPROEX SODIUM 1250 MG: 500 TABLET, DELAYED RELEASE ORAL at 09:40

## 2019-06-17 RX ADMIN — CETIRIZINE HYDROCHLORIDE 10 MG: 10 TABLET, FILM COATED ORAL at 09:40

## 2019-06-17 RX ADMIN — ARIPIPRAZOLE 15 MG: 15 TABLET ORAL at 17:01

## 2019-06-17 RX ADMIN — TRIHEXYPHENIDYL HYDROCHLORIDE 5 MG: 5 TABLET ORAL at 09:40

## 2019-06-17 RX ADMIN — DIVALPROEX SODIUM 1250 MG: 500 TABLET, DELAYED RELEASE ORAL at 20:36

## 2019-06-17 RX ADMIN — TRIHEXYPHENIDYL HYDROCHLORIDE 5 MG: 5 TABLET ORAL at 20:36

## 2019-06-17 RX ADMIN — TRAZODONE HYDROCHLORIDE 50 MG: 50 TABLET ORAL at 20:36

## 2019-06-17 ASSESSMENT — PAIN SCALES - GENERAL: PAINLEVEL_OUTOF10: 0

## 2019-06-17 NOTE — PLAN OF CARE
Problem: Anger Management/Homicidal Ideation:  Goal: Able to display appropriate communication and problem solving  Description  Able to display appropriate communication and problem solving  Outcome: Ongoing     Problem: Altered Mood, Depressive Behavior:  Goal: Able to verbalize and/or display a decrease in depressive symptoms  Description  Able to verbalize and/or display a decrease in depressive symptoms  Outcome: Ongoing     Patient out on unit. Keeps to self. Would not answer questions asked by this nurse. Patient appears to be in no distress at this time. Purposeful rounding continued.

## 2019-06-17 NOTE — PROGRESS NOTES
DATE OF SERVICE:     6/17/2019    Blayne Jean seen today for the purpose of continuation of care. Nursing, social work reports, laboratory studies and vital signs are reviewed. Patient chief complaint today is:             [x] Depression      [x] Anxiety        [x] Psychosis         [] Suicidal/Homicidal                         [x] Delusions           [] Aggression          Subjective: Today patient's mood is labile, however patient is pleasant. Patient is medication compliant. Denies AVH currently. Sleep:  [] Good [] Fair  [x] Poor  Appetite:  [] Good [x] Fair  [] Poor    Depression:  [] Mild [] Moderate [x] Severe                [x] Constant [] Sporadic     Anxiety: [] Mild [] Moderate [x] Severe    [x] Constant [] Sporadic     Delusions: [] Mild [x] Moderate [] Severe     [x] Constant [] Sporadic     [x] Paranoid [] Somatic [] Grandiose     Hallucinations: [x] Mild [] Moderate [] Severe     [] Constant [x] Sporadic    [x] Auditory  [] Visual [] Tactile       Suicidal: [] Constant [] Sporadic  Homicidal: [] Constant [] Sporadic    Unscheduled Medications     [] Patient Receiving Emergency Medications \" Chemical Restraint\"   [] Requesting PRN medications for anxiety    Medical Review of Systems:     All other than marked systmes have been reviewed and are all negative.     Constitutional Symptoms: []  fever []  Chills  Skin Symptoms: [] rash []  Pruritus   Eye Symptoms: [] Vision unchanged []  recent vision problems[] blurred vision   Respiratory Symptoms:[] shortness of breath [] cough  Cardiovascular Symptoms:  [] chest pain   [] palpitations   Gastrointestinal Symptoms: []  abdominal pain []  nausea []  vomiting []  diarrhea  Genitourinary Symptoms: []  dysuria  []  hematuria   Musculoskeletal Symptoms: []  back pain []  muscle pain []  joint pain  Neurologic Symptoms: []  headache []  dizziness  Hematolymphoid Symptoms: [] Adenopathy [] Bruises   [] Schimosis       Psychiatric Review of

## 2019-06-17 NOTE — GROUP NOTE
Group Therapy Note    Date: June 16    Group Start Time: 2100  Group End Time: 2115  Group Topic: Relaxation    [facility]    Terry Reid RN        Group Therapy Note    Patient attended and participated in relaxation group.      Attendees: 5         Signature:  Terry Reid RN

## 2019-06-18 PROCEDURE — 6370000000 HC RX 637 (ALT 250 FOR IP): Performed by: NURSE PRACTITIONER

## 2019-06-18 PROCEDURE — 6370000000 HC RX 637 (ALT 250 FOR IP): Performed by: PSYCHIATRY & NEUROLOGY

## 2019-06-18 PROCEDURE — 1240000000 HC EMOTIONAL WELLNESS R&B

## 2019-06-18 PROCEDURE — 6360000002 HC RX W HCPCS: Performed by: PSYCHIATRY & NEUROLOGY

## 2019-06-18 PROCEDURE — 99231 SBSQ HOSP IP/OBS SF/LOW 25: CPT | Performed by: NURSE PRACTITIONER

## 2019-06-18 RX ORDER — CLONAZEPAM 0.5 MG/1
1 TABLET ORAL NIGHTLY PRN
Status: DISCONTINUED | OUTPATIENT
Start: 2019-06-18 | End: 2019-06-19

## 2019-06-18 RX ADMIN — TRAZODONE HYDROCHLORIDE 50 MG: 50 TABLET ORAL at 20:27

## 2019-06-18 RX ADMIN — TRIHEXYPHENIDYL HYDROCHLORIDE 5 MG: 5 TABLET ORAL at 09:02

## 2019-06-18 RX ADMIN — CETIRIZINE HYDROCHLORIDE 10 MG: 10 TABLET, FILM COATED ORAL at 09:02

## 2019-06-18 RX ADMIN — DIVALPROEX SODIUM 1250 MG: 500 TABLET, DELAYED RELEASE ORAL at 20:26

## 2019-06-18 RX ADMIN — BENZTROPINE MESYLATE 2 MG: 1 INJECTION INTRAMUSCULAR; INTRAVENOUS at 09:21

## 2019-06-18 RX ADMIN — DIVALPROEX SODIUM 1250 MG: 500 TABLET, DELAYED RELEASE ORAL at 09:02

## 2019-06-18 RX ADMIN — HALOPERIDOL LACTATE 10 MG: 5 INJECTION INTRAMUSCULAR at 09:20

## 2019-06-18 RX ADMIN — TRIHEXYPHENIDYL HYDROCHLORIDE 5 MG: 5 TABLET ORAL at 20:26

## 2019-06-18 RX ADMIN — BENZTROPINE MESYLATE 2 MG: 1 INJECTION INTRAMUSCULAR; INTRAVENOUS at 23:48

## 2019-06-18 RX ADMIN — HALOPERIDOL LACTATE 10 MG: 5 INJECTION INTRAMUSCULAR at 23:47

## 2019-06-18 RX ADMIN — HYDROXYZINE PAMOATE 50 MG: 25 CAPSULE ORAL at 04:55

## 2019-06-18 RX ADMIN — CLONAZEPAM 1 MG: 0.5 TABLET ORAL at 21:34

## 2019-06-18 RX ADMIN — ARIPIPRAZOLE 15 MG: 15 TABLET ORAL at 17:32

## 2019-06-18 ASSESSMENT — PAIN SCALES - GENERAL: PAINLEVEL_OUTOF10: 0

## 2019-06-18 NOTE — PLAN OF CARE
Problem: Anger Management/Homicidal Ideation:  Goal: Able to display appropriate communication and problem solving  Description  Able to display appropriate communication and problem solving  Outcome: Ongoing  Goal: Ability to verbalize frustrations and anger appropriately will improve  Description  Ability to verbalize frustrations and anger appropriately will improve  Outcome: Ongoing  Goal: Absence of angry outbursts  Description  Absence of angry outbursts  Outcome: Ongoing  Goal: Participates in care planning  Description  Participates in care planning  Outcome: Ongoing   Patient had angry outburst in day room, loudly screaming Shar Barboza, elvis! \" and \"burn the house down, burn it! \" was medicated with PRN medication for agitation and patient safety after verbal redirection not effective. Much calmer after PRN. Patient denies thoughts to harm self, denies hallucinations. Is medication compliant.  Appetite good, will continue to monitor

## 2019-06-19 PROCEDURE — 99231 SBSQ HOSP IP/OBS SF/LOW 25: CPT | Performed by: NURSE PRACTITIONER

## 2019-06-19 PROCEDURE — 6360000002 HC RX W HCPCS: Performed by: PSYCHIATRY & NEUROLOGY

## 2019-06-19 PROCEDURE — 6370000000 HC RX 637 (ALT 250 FOR IP): Performed by: PSYCHIATRY & NEUROLOGY

## 2019-06-19 PROCEDURE — 1240000000 HC EMOTIONAL WELLNESS R&B

## 2019-06-19 PROCEDURE — 6370000000 HC RX 637 (ALT 250 FOR IP): Performed by: NURSE PRACTITIONER

## 2019-06-19 RX ORDER — DIAZEPAM 5 MG/1
5 TABLET ORAL 3 TIMES DAILY
Status: DISCONTINUED | OUTPATIENT
Start: 2019-06-19 | End: 2019-06-24

## 2019-06-19 RX ORDER — LORAZEPAM 2 MG/ML
2 INJECTION INTRAMUSCULAR
Status: COMPLETED | OUTPATIENT
Start: 2019-06-19 | End: 2019-06-19

## 2019-06-19 RX ORDER — ARIPIPRAZOLE 10 MG/1
10 TABLET ORAL EVERY EVENING
Status: DISCONTINUED | OUTPATIENT
Start: 2019-06-19 | End: 2019-06-20

## 2019-06-19 RX ADMIN — DIVALPROEX SODIUM 1250 MG: 500 TABLET, DELAYED RELEASE ORAL at 20:29

## 2019-06-19 RX ADMIN — HALOPERIDOL LACTATE 10 MG: 5 INJECTION INTRAMUSCULAR at 23:55

## 2019-06-19 RX ADMIN — TRAZODONE HYDROCHLORIDE 50 MG: 50 TABLET ORAL at 20:29

## 2019-06-19 RX ADMIN — TRIHEXYPHENIDYL HYDROCHLORIDE 5 MG: 5 TABLET ORAL at 08:48

## 2019-06-19 RX ADMIN — DIAZEPAM 5 MG: 5 TABLET ORAL at 09:40

## 2019-06-19 RX ADMIN — TRIHEXYPHENIDYL HYDROCHLORIDE 5 MG: 5 TABLET ORAL at 20:29

## 2019-06-19 RX ADMIN — ARIPIPRAZOLE 10 MG: 10 TABLET ORAL at 16:52

## 2019-06-19 RX ADMIN — LORAZEPAM 2 MG: 2 INJECTION INTRAMUSCULAR; INTRAVENOUS at 00:43

## 2019-06-19 RX ADMIN — HYDROXYZINE PAMOATE 50 MG: 25 CAPSULE ORAL at 20:29

## 2019-06-19 RX ADMIN — DIAZEPAM 5 MG: 5 TABLET ORAL at 13:18

## 2019-06-19 RX ADMIN — DIAZEPAM 5 MG: 5 TABLET ORAL at 20:29

## 2019-06-19 RX ADMIN — CETIRIZINE HYDROCHLORIDE 10 MG: 10 TABLET, FILM COATED ORAL at 08:48

## 2019-06-19 RX ADMIN — DIVALPROEX SODIUM 1250 MG: 500 TABLET, DELAYED RELEASE ORAL at 08:48

## 2019-06-19 RX ADMIN — BENZTROPINE MESYLATE 2 MG: 1 INJECTION INTRAMUSCULAR; INTRAVENOUS at 23:55

## 2019-06-19 ASSESSMENT — PAIN SCALES - GENERAL: PAINLEVEL_OUTOF10: 0

## 2019-06-19 NOTE — PROGRESS NOTES
Patient attended afternoon meet and greet, and recreation activity of the [facility] room. Patient was 1 of 13 and was activitly engaged in group. Group ran from [phone].

## 2019-06-19 NOTE — PROGRESS NOTES
Pt has been out on unit anxious and tense pacing at NS frequently impulsive yells out periodically intrusive with peers invading personal space \"call the \"  Cognitively impaired continue with redirection for safety med with klonopin prn

## 2019-06-19 NOTE — CONSULTS
file     Relationship status: Not on file    Intimate partner violence:     Fear of current or ex partner: Not on file     Emotionally abused: Not on file     Physically abused: Not on file     Forced sexual activity: Not on file   Other Topics Concern    Not on file   Social History Narrative    Not on file       Family History   Problem Relation Age of Onset    High Blood Pressure Mother     Diabetes Mother        REVIEW OF SYSTEMS:     CONSTITUTIONAL:  negative for  fevers, chills, sweats , + fatigue  EYES:  negative for  double vision, blurred vision and blind spots  HEENT:  negative for  tinnitus, earaches, nasal congestion and epistaxis  RESPIRATORY:  negative for  dry cough, cough with sputum, dyspnea, wheezing and hemoptysis  CARDIOVASCULAR: as per HPI  GASTROINTESTINAL:  negative for nausea, vomiting, diarrhea, constipation, pruritus and jaundice  GENITOURINARY:  negative for frequency, dysuria, nocturia, urinary incontinence and hesitancy  HEMATOLOGIC/LYMPHATIC:  negative for easy bruising, bleeding, lymphadenopathy and petechiae  ALLERGIC/IMMUNOLOGIC:  negative for urticaria, hay fever and angioedema  ENDOCRINE:  negative for heat intolerance, cold intolerance, tremor, hair loss and diabetic symptoms including neither polyuria nor polydipsia nor blurred vision  MUSCULOSKELETAL:  negative for  myalgias, arthralgias, joint swelling, stiff joints and decreased range of motion  NEUROLOGICAL:  negative for memory problems, speech problems, visual disturbance, dysphagia, weakness and numbness      PHYSICAL EXAM:   CONSTITUTIONAL:  awake, alert, cooperative, no apparent distress, and appears stated age  EYES:  lids and lashes normal and pupils equal, round and reactive to light, anicteric sclerae  HEAD:  normocepalic, without obvious abnormality, atraumatic, pink, moist mucous membranes.   NECK:  Supple, symmetrical, trachea midline, no adenopathy, thyroid symmetric, not enlarged and no tenderness, skin APTTHEP  Magnesium:  No results found for: MG  TSH:    Lab Results   Component Value Date    TSH 0.903 05/01/2019     TROPONIN:  No components found for: TROP  BNP:  No results found for: BNP  FASTING LIPID PANEL:    Lab Results   Component Value Date    CHOL 152 04/13/2019    HDL 32 04/13/2019    TRIG 92 04/13/2019     No orders to display     I have personally reviewed the laboratory, cardiac diagnostic and radiographic testing as outlined above:      IMPRESSION:  1. Sinus tachycardia: Secondary to comorbid conditions  2. Schizoaffective disorder  3. Obesity    RECOMMENDATIONS:   1.  Okay to start propranolol from cardiology standpoint. 2.  Further cardiac recommendations are forthcoming pending his clinical course and diagnostic tests     I have reviewed my findings and recommendations with patient  Discussed with nursing staff    Thank you for the consult    Electronically signed by Deja Do MD on 6/19/2019 at 7:23 PM  NOTE: This report was transcribed using voice recognition software.  Every effort was made to ensure accuracy; however, inadvertent computerized transcription errors may be present

## 2019-06-20 PROCEDURE — 1240000000 HC EMOTIONAL WELLNESS R&B

## 2019-06-20 PROCEDURE — 6370000000 HC RX 637 (ALT 250 FOR IP): Performed by: NURSE PRACTITIONER

## 2019-06-20 PROCEDURE — 6360000002 HC RX W HCPCS

## 2019-06-20 PROCEDURE — 6370000000 HC RX 637 (ALT 250 FOR IP): Performed by: PSYCHIATRY & NEUROLOGY

## 2019-06-20 PROCEDURE — 6360000002 HC RX W HCPCS: Performed by: PSYCHIATRY & NEUROLOGY

## 2019-06-20 PROCEDURE — 99231 SBSQ HOSP IP/OBS SF/LOW 25: CPT | Performed by: NURSE PRACTITIONER

## 2019-06-20 RX ORDER — ARIPIPRAZOLE 5 MG/1
5 TABLET ORAL EVERY EVENING
Status: DISCONTINUED | OUTPATIENT
Start: 2019-06-20 | End: 2019-06-21

## 2019-06-20 RX ORDER — DIPHENHYDRAMINE HYDROCHLORIDE 50 MG/ML
INJECTION INTRAMUSCULAR; INTRAVENOUS
Status: COMPLETED
Start: 2019-06-20 | End: 2019-06-20

## 2019-06-20 RX ORDER — QUETIAPINE 150 MG/1
150 TABLET, FILM COATED, EXTENDED RELEASE ORAL EVERY EVENING
Status: DISCONTINUED | OUTPATIENT
Start: 2019-06-20 | End: 2019-06-21

## 2019-06-20 RX ORDER — DIPHENHYDRAMINE HYDROCHLORIDE 50 MG/ML
50 INJECTION INTRAMUSCULAR; INTRAVENOUS ONCE
Status: COMPLETED | OUTPATIENT
Start: 2019-06-20 | End: 2019-06-20

## 2019-06-20 RX ORDER — DIPHENHYDRAMINE HYDROCHLORIDE 50 MG/ML
50 INJECTION INTRAMUSCULAR; INTRAVENOUS ONCE
Status: DISCONTINUED | OUTPATIENT
Start: 2019-06-20 | End: 2019-06-20

## 2019-06-20 RX ADMIN — DIPHENHYDRAMINE HYDROCHLORIDE 50 MG: 50 INJECTION, SOLUTION INTRAMUSCULAR; INTRAVENOUS at 19:58

## 2019-06-20 RX ADMIN — QUETIAPINE FUMARATE 150 MG: 150 TABLET, EXTENDED RELEASE ORAL at 16:46

## 2019-06-20 RX ADMIN — DIPHENHYDRAMINE HYDROCHLORIDE 50 MG: 50 INJECTION INTRAMUSCULAR; INTRAVENOUS at 19:58

## 2019-06-20 RX ADMIN — TRIHEXYPHENIDYL HYDROCHLORIDE 5 MG: 5 TABLET ORAL at 08:52

## 2019-06-20 RX ADMIN — DIVALPROEX SODIUM 1250 MG: 500 TABLET, DELAYED RELEASE ORAL at 20:57

## 2019-06-20 RX ADMIN — DIAZEPAM 5 MG: 5 TABLET ORAL at 20:57

## 2019-06-20 RX ADMIN — DIAZEPAM 5 MG: 5 TABLET ORAL at 13:18

## 2019-06-20 RX ADMIN — TRIHEXYPHENIDYL HYDROCHLORIDE 5 MG: 5 TABLET ORAL at 20:57

## 2019-06-20 RX ADMIN — DIAZEPAM 5 MG: 5 TABLET ORAL at 08:52

## 2019-06-20 RX ADMIN — ARIPIPRAZOLE 5 MG: 5 TABLET ORAL at 16:46

## 2019-06-20 RX ADMIN — HALOPERIDOL LACTATE 10 MG: 5 INJECTION INTRAMUSCULAR at 20:02

## 2019-06-20 RX ADMIN — DIVALPROEX SODIUM 1250 MG: 500 TABLET, DELAYED RELEASE ORAL at 08:52

## 2019-06-20 RX ADMIN — CETIRIZINE HYDROCHLORIDE 10 MG: 10 TABLET, FILM COATED ORAL at 08:52

## 2019-06-20 RX ADMIN — TRAZODONE HYDROCHLORIDE 50 MG: 50 TABLET ORAL at 20:57

## 2019-06-20 ASSESSMENT — PAIN SCALES - GENERAL: PAINLEVEL_OUTOF10: 0

## 2019-06-20 NOTE — PROGRESS NOTES
Pleasant and cooperative with medications this morning. Remaining in control at this time. Will continue to monitor and support.

## 2019-06-20 NOTE — PROGRESS NOTES
Patient continues to present with impaired concentration and internal stimulation. Patient continues to present with flight of ideas, tangential and labile. Patient can be explosive at times and has poor boundaries and is intrusive. Patient denies SI, HI, and AVH. Patient is fixated on his dad and will frequently snap at staff attempting to redirect him. Patient has very little insight into condition and struggles with interact with peers. Patient is evasive and irritable at times as well. Will monitor closely.

## 2019-06-21 LAB — VALPROIC ACID LEVEL: 96 MCG/ML (ref 50–100)

## 2019-06-21 PROCEDURE — 6370000000 HC RX 637 (ALT 250 FOR IP): Performed by: PSYCHIATRY & NEUROLOGY

## 2019-06-21 PROCEDURE — 1240000000 HC EMOTIONAL WELLNESS R&B

## 2019-06-21 PROCEDURE — 36415 COLL VENOUS BLD VENIPUNCTURE: CPT

## 2019-06-21 PROCEDURE — 6370000000 HC RX 637 (ALT 250 FOR IP): Performed by: NURSE PRACTITIONER

## 2019-06-21 PROCEDURE — 99231 SBSQ HOSP IP/OBS SF/LOW 25: CPT | Performed by: NURSE PRACTITIONER

## 2019-06-21 PROCEDURE — 6360000002 HC RX W HCPCS: Performed by: PSYCHIATRY & NEUROLOGY

## 2019-06-21 PROCEDURE — 80164 ASSAY DIPROPYLACETIC ACD TOT: CPT

## 2019-06-21 RX ADMIN — QUETIAPINE FUMARATE 150 MG: 100 TABLET ORAL at 20:36

## 2019-06-21 RX ADMIN — TRIHEXYPHENIDYL HYDROCHLORIDE 5 MG: 5 TABLET ORAL at 20:36

## 2019-06-21 RX ADMIN — DIVALPROEX SODIUM 1250 MG: 500 TABLET, DELAYED RELEASE ORAL at 09:17

## 2019-06-21 RX ADMIN — HYDROXYZINE PAMOATE 50 MG: 25 CAPSULE ORAL at 20:36

## 2019-06-21 RX ADMIN — DIVALPROEX SODIUM 1250 MG: 500 TABLET, DELAYED RELEASE ORAL at 20:36

## 2019-06-21 RX ADMIN — CETIRIZINE HYDROCHLORIDE 10 MG: 10 TABLET, FILM COATED ORAL at 09:17

## 2019-06-21 RX ADMIN — BENZTROPINE MESYLATE 2 MG: 1 INJECTION INTRAMUSCULAR; INTRAVENOUS at 13:08

## 2019-06-21 RX ADMIN — HALOPERIDOL LACTATE 10 MG: 5 INJECTION INTRAMUSCULAR at 13:08

## 2019-06-21 RX ADMIN — DIAZEPAM 5 MG: 5 TABLET ORAL at 09:18

## 2019-06-21 RX ADMIN — TRAZODONE HYDROCHLORIDE 50 MG: 50 TABLET ORAL at 20:36

## 2019-06-21 RX ADMIN — TRIHEXYPHENIDYL HYDROCHLORIDE 5 MG: 5 TABLET ORAL at 09:17

## 2019-06-21 ASSESSMENT — PAIN SCALES - GENERAL
PAINLEVEL_OUTOF10: 0
PAINLEVEL_OUTOF10: 0

## 2019-06-21 NOTE — PLAN OF CARE
Patient with other. Went into shower and slammed his body wash on the counter. Loud when attempted to de escalate. \"Ok army girl\". Patient darted at me but than stopped. Patient shower with his mom. After the shower he loud and delusional' \"he was shot\". \" Tylene Pillar is going to shoot you\". Restless and rocking. Medicated with Haldol 10 mg's and cogentin 2 mg's Im. Zaid Torrez

## 2019-06-21 NOTE — PROGRESS NOTES
DATE OF SERVICE:     6/21/2019    Jem Brown seen today for the purpose of continuation of care. Nursing, social work reports, laboratory studies and vital signs are reviewed. Patient chief complaint today is:             [x] Depression      [x] Anxiety        [x] Psychosis         [] Suicidal/Homicidal                         [x] Delusions           [] Aggression          Subjective: Today patient's remains the same. Patient received PRN medications again last night. Patient is medication compliant. Slept 8.5 hours. Sleep:  [x] Good [] Fair  [] Poor  Appetite:  [] Good [x] Fair  [] Poor    Depression:  [x] Mild [] Moderate [] Severe                [x] Constant [] Sporadic     Anxiety: [] Mild [] Moderate [x] Severe    [x] Constant [] Sporadic     Delusions: [] Mild [x] Moderate [] Severe     [x] Constant [] Sporadic     [x] Paranoid [] Somatic [] Grandiose     Hallucinations: [x] Mild [] Moderate [] Severe     [] Constant [x] Sporadic    [x] Auditory  [] Visual [] Tactile       Suicidal: [] Constant [] Sporadic  Homicidal: [] Constant [] Sporadic    Unscheduled Medications     [x] Patient Receiving Emergency Medications \" Chemical Restraint\"   [] Requesting PRN medications for anxiety    Medical Review of Systems:     All other than marked systmes have been reviewed and are all negative.     Constitutional Symptoms: []  fever []  Chills  Skin Symptoms: [] rash []  Pruritus   Eye Symptoms: [] Vision unchanged []  recent vision problems[] blurred vision   Respiratory Symptoms:[] shortness of breath [] cough  Cardiovascular Symptoms:  [] chest pain   [] palpitations   Gastrointestinal Symptoms: []  abdominal pain []  nausea []  vomiting []  diarrhea  Genitourinary Symptoms: []  dysuria  []  hematuria   Musculoskeletal Symptoms: []  back pain []  muscle pain []  joint pain  Neurologic Symptoms: []  headache []  dizziness  Hematolymphoid Symptoms: [] Adenopathy [] Bruises   [] Schimosis treatment resistance to single therapy      Signed:  Adelaide Monday 6/21/2019  8:30 AM

## 2019-06-22 PROCEDURE — 1240000000 HC EMOTIONAL WELLNESS R&B

## 2019-06-22 PROCEDURE — 99231 SBSQ HOSP IP/OBS SF/LOW 25: CPT | Performed by: NURSE PRACTITIONER

## 2019-06-22 PROCEDURE — 6370000000 HC RX 637 (ALT 250 FOR IP): Performed by: NURSE PRACTITIONER

## 2019-06-22 PROCEDURE — 6370000000 HC RX 637 (ALT 250 FOR IP): Performed by: PSYCHIATRY & NEUROLOGY

## 2019-06-22 RX ORDER — QUETIAPINE FUMARATE 300 MG/1
300 TABLET, FILM COATED ORAL NIGHTLY
Status: DISCONTINUED | OUTPATIENT
Start: 2019-06-22 | End: 2019-06-23

## 2019-06-22 RX ADMIN — DIVALPROEX SODIUM 1250 MG: 500 TABLET, DELAYED RELEASE ORAL at 20:43

## 2019-06-22 RX ADMIN — HYDROXYZINE PAMOATE 50 MG: 25 CAPSULE ORAL at 20:43

## 2019-06-22 RX ADMIN — TRAZODONE HYDROCHLORIDE 50 MG: 50 TABLET ORAL at 20:43

## 2019-06-22 RX ADMIN — CETIRIZINE HYDROCHLORIDE 10 MG: 10 TABLET, FILM COATED ORAL at 08:59

## 2019-06-22 RX ADMIN — DIVALPROEX SODIUM 1250 MG: 500 TABLET, DELAYED RELEASE ORAL at 08:59

## 2019-06-22 RX ADMIN — TRIHEXYPHENIDYL HYDROCHLORIDE 5 MG: 5 TABLET ORAL at 20:43

## 2019-06-22 RX ADMIN — TRIHEXYPHENIDYL HYDROCHLORIDE 5 MG: 5 TABLET ORAL at 08:59

## 2019-06-22 RX ADMIN — QUETIAPINE FUMARATE 300 MG: 300 TABLET ORAL at 20:43

## 2019-06-22 ASSESSMENT — PAIN SCALES - GENERAL: PAINLEVEL_OUTOF10: 0

## 2019-06-22 NOTE — GROUP NOTE
Group Therapy Note    Date: June 22    Group Start Time: 1105  Group End Time: 1140  Group Topic: Psychoeducation    [facility]  [address] [place], [place]        Group Therapy Note    Attendees: [id number] [facility]  Module Name:  Chair yoga/relaxation exercises  Patient's Objective:  patient will be able to participate in relaxation exercises, and chair yoga. Status After Intervention:  Improved  Participation Level: Active Listener and Interactive  Participation Quality: Appropriate, Attentive and Sharing  Speech:  normal   Thought Process/Content: Logical  Affective Functioning: Congruent  Mood: euthymic  Level of consciousness:  Alert, Oriented x4 and Attentive  Response to Learning: Able to verbalize/acknowledge new learning, Able to retain information and Progressing to goal  Endings: None Reported  Modes of Intervention: Education, Support, Socialization, Activity and Movement  Discipline Responsible: Psychoeducational Specialist  Signature:  Syd Sy       notes: quiet in group, in and out of group.

## 2019-06-23 PROCEDURE — 6370000000 HC RX 637 (ALT 250 FOR IP): Performed by: NURSE PRACTITIONER

## 2019-06-23 PROCEDURE — 6370000000 HC RX 637 (ALT 250 FOR IP): Performed by: PSYCHIATRY & NEUROLOGY

## 2019-06-23 PROCEDURE — 99231 SBSQ HOSP IP/OBS SF/LOW 25: CPT | Performed by: NURSE PRACTITIONER

## 2019-06-23 PROCEDURE — 1240000000 HC EMOTIONAL WELLNESS R&B

## 2019-06-23 RX ADMIN — QUETIAPINE FUMARATE 400 MG: 100 TABLET ORAL at 20:06

## 2019-06-23 RX ADMIN — HYDROXYZINE PAMOATE 50 MG: 25 CAPSULE ORAL at 20:06

## 2019-06-23 RX ADMIN — DIVALPROEX SODIUM 1250 MG: 500 TABLET, DELAYED RELEASE ORAL at 10:06

## 2019-06-23 RX ADMIN — TRAZODONE HYDROCHLORIDE 50 MG: 50 TABLET ORAL at 20:06

## 2019-06-23 RX ADMIN — TRIHEXYPHENIDYL HYDROCHLORIDE 5 MG: 5 TABLET ORAL at 20:06

## 2019-06-23 RX ADMIN — CETIRIZINE HYDROCHLORIDE 10 MG: 10 TABLET, FILM COATED ORAL at 10:06

## 2019-06-23 RX ADMIN — DIVALPROEX SODIUM 1250 MG: 500 TABLET, DELAYED RELEASE ORAL at 20:06

## 2019-06-23 RX ADMIN — TRIHEXYPHENIDYL HYDROCHLORIDE 5 MG: 5 TABLET ORAL at 10:06

## 2019-06-23 ASSESSMENT — PAIN SCALES - GENERAL: PAINLEVEL_OUTOF10: 0

## 2019-06-24 PROCEDURE — 1240000000 HC EMOTIONAL WELLNESS R&B

## 2019-06-24 PROCEDURE — 99231 SBSQ HOSP IP/OBS SF/LOW 25: CPT | Performed by: NURSE PRACTITIONER

## 2019-06-24 PROCEDURE — 6370000000 HC RX 637 (ALT 250 FOR IP): Performed by: NURSE PRACTITIONER

## 2019-06-24 PROCEDURE — 6370000000 HC RX 637 (ALT 250 FOR IP): Performed by: PSYCHIATRY & NEUROLOGY

## 2019-06-24 RX ADMIN — TRIHEXYPHENIDYL HYDROCHLORIDE 5 MG: 5 TABLET ORAL at 20:52

## 2019-06-24 RX ADMIN — DIVALPROEX SODIUM 1250 MG: 500 TABLET, DELAYED RELEASE ORAL at 20:51

## 2019-06-24 RX ADMIN — QUETIAPINE FUMARATE 400 MG: 100 TABLET ORAL at 20:52

## 2019-06-24 RX ADMIN — DIVALPROEX SODIUM 1250 MG: 500 TABLET, DELAYED RELEASE ORAL at 09:41

## 2019-06-24 RX ADMIN — TRAZODONE HYDROCHLORIDE 50 MG: 50 TABLET ORAL at 20:52

## 2019-06-24 RX ADMIN — TRIHEXYPHENIDYL HYDROCHLORIDE 5 MG: 5 TABLET ORAL at 09:40

## 2019-06-24 RX ADMIN — CETIRIZINE HYDROCHLORIDE 10 MG: 10 TABLET, FILM COATED ORAL at 09:40

## 2019-06-24 RX ADMIN — HYDROXYZINE PAMOATE 50 MG: 25 CAPSULE ORAL at 20:52

## 2019-06-24 NOTE — PLAN OF CARE
Problem: Anger Management/Homicidal Ideation:  Goal: Ability to verbalize frustrations and anger appropriately will improve  Description  Ability to verbalize frustrations and anger appropriately will improve  2019 2317 by Kareem Lou RN  Outcome: Ongoing     Problem: Anger Management/Homicidal Ideation:  Goal: Absence of angry outbursts  Description  Absence of angry outbursts  2019 by Kareem Lou RN  Outcome: Ongoing   Pt was calm with behavior in control at the beginning of the shift but became agitated with increased rocking when his mom came to visit. Pt was repeatedly asking his mom if someone had . Mom called nurses station later in the evening stating that he had become upset over not having clean underwear and had told her he was going to kill himself and attempted to put shirt around his neck which she stopped him from doing. Pt went to bed after mother left and has been sleeping.

## 2019-06-25 PROCEDURE — 6370000000 HC RX 637 (ALT 250 FOR IP): Performed by: NURSE PRACTITIONER

## 2019-06-25 PROCEDURE — 6360000002 HC RX W HCPCS: Performed by: PSYCHIATRY & NEUROLOGY

## 2019-06-25 PROCEDURE — 99231 SBSQ HOSP IP/OBS SF/LOW 25: CPT | Performed by: NURSE PRACTITIONER

## 2019-06-25 PROCEDURE — 1240000000 HC EMOTIONAL WELLNESS R&B

## 2019-06-25 RX ORDER — DIVALPROEX SODIUM 250 MG/1
1200 TABLET, DELAYED RELEASE ORAL 2 TIMES DAILY
Qty: 300 TABLET | Refills: 0 | Status: SHIPPED | OUTPATIENT
Start: 2019-06-25 | End: 2021-05-26 | Stop reason: ALTCHOICE

## 2019-06-25 RX ORDER — QUETIAPINE FUMARATE 400 MG/1
400 TABLET, FILM COATED ORAL NIGHTLY
Qty: 60 TABLET | Refills: 0 | Status: SHIPPED | OUTPATIENT
Start: 2019-06-25 | End: 2019-09-18 | Stop reason: ALTCHOICE

## 2019-06-25 RX ADMIN — TRIHEXYPHENIDYL HYDROCHLORIDE 5 MG: 5 TABLET ORAL at 09:03

## 2019-06-25 RX ADMIN — DIVALPROEX SODIUM 1250 MG: 500 TABLET, DELAYED RELEASE ORAL at 09:03

## 2019-06-25 RX ADMIN — TRIHEXYPHENIDYL HYDROCHLORIDE 5 MG: 5 TABLET ORAL at 21:29

## 2019-06-25 RX ADMIN — CETIRIZINE HYDROCHLORIDE 10 MG: 10 TABLET, FILM COATED ORAL at 09:03

## 2019-06-25 RX ADMIN — DIVALPROEX SODIUM 1250 MG: 500 TABLET, DELAYED RELEASE ORAL at 21:30

## 2019-06-25 RX ADMIN — QUETIAPINE FUMARATE 400 MG: 100 TABLET ORAL at 21:29

## 2019-06-25 RX ADMIN — HALOPERIDOL LACTATE 10 MG: 5 INJECTION INTRAMUSCULAR at 12:37

## 2019-06-25 RX ADMIN — BENZTROPINE MESYLATE 2 MG: 1 INJECTION INTRAMUSCULAR; INTRAVENOUS at 12:38

## 2019-06-25 ASSESSMENT — PAIN SCALES - GENERAL
PAINLEVEL_OUTOF10: 0
PAINLEVEL_OUTOF10: 0

## 2019-06-25 NOTE — PROGRESS NOTES
RESTING IN BED AFTER PRN MEDICATIONS GIVEN. REFUSED MEALS ON DAY SHIFT. REMAINED IN BED EXCEPT FOR EPISODE OF AGITATION.

## 2019-06-25 NOTE — PROGRESS NOTES
Patient became very agitated on the unit, staff tried to redirect patient and patient became more agitated. IM injections were given.

## 2019-06-25 NOTE — PROGRESS NOTES
CLINICAL PHARMACY NOTE: MEDS TO [address] Select Patient?: No  Total # of Prescriptions Filled: 2   The following medications were delivered to the patient:  · divalproex sodium 250mgdr tabec  · Quetiapine fumarate 400mg tabs  Total # of Interventions Completed: 3  Time Spent (min): 30    Additional Documentation:

## 2019-06-25 NOTE — PROGRESS NOTES
Psychiatric Review of systems  Delusions:  [] Denies [] Endorses   Withdrawals:  [] Denies [] Endorses    Hallucinations: [] Denies [] Endorses    Extra Pyramidal Symptoms: [] Denies [] Endorses      /65   Pulse 59   Temp 97.4 °F (36.3 °C) (Oral)   Resp 16   Ht 5' 9\" (1.753 m)   Wt 238 lb (108 kg)   SpO2 98%   BMI 35.15 kg/m²     MENTAL STATUS EXAM:         Cognition:       [x] Alert  [x] Awake  [x] Oriented  [x] Person  [x] Place [] Time       [] drowsy  [] tired  [] lethargic  [] distractable      Attention/Concentration:   [] Attentive  [x] Distracted         Memory Recent and Remote: [] Intact   [] Impaired [] Partially Impaired      Language: [] Able to recognize and name objects                         [] Unable to recognize and name [address] [place] [address] Knowledge:  [x] Poor []  Fair  [] Good     Speech: [] Normal  [] Soft  [] Slow  [] Fast [] Pressured                                    [x] Loud [] Dysarthria  [] Incoherent        Appearance: [] Well Groomed  [] Casual Dressed  [] Unkept  [x] Disheveled                         [x] Normal weight  [] Thin  [] Overweight  [] Obese           Attitude: [] Positive  [] Hostile  [] Demanding  [] Guarded  [] Defensive                    [x] Cooperative  []  Uncooperative       Behavior:  [] Normal Gait  [] Abnormal Gait [] Walks with Assistance  [] Nadia Chair                           [] Walks with Jomar Dipak  [x] In Hospital Bed  [] Sitting in Chair     Muscle-Skeletal:  [x] Normal Muscle Tone [] Muscle Atrophy                                  [] Abnormal Muscle Movement      Eye Contact:   [] Good eye contact  [x] Intermittent Eye Contact  [] Poor Eye Contact               [] Excessive Eye Contact   [] Intrusive Eye Contact     Mood: [] Depressed  [] Anxious  [x] Irritated  [] Euthymic   [] Angry [] Restless                   [] Apathetic     Affect:  [] Congruent  [] Incongruent  [x] Labile  [] Constricted  [] Flat  [x] Bizarre                     []

## 2019-06-26 PROCEDURE — 99231 SBSQ HOSP IP/OBS SF/LOW 25: CPT | Performed by: NURSE PRACTITIONER

## 2019-06-26 PROCEDURE — 6370000000 HC RX 637 (ALT 250 FOR IP): Performed by: NURSE PRACTITIONER

## 2019-06-26 PROCEDURE — 1240000000 HC EMOTIONAL WELLNESS R&B

## 2019-06-26 PROCEDURE — 6370000000 HC RX 637 (ALT 250 FOR IP): Performed by: PSYCHIATRY & NEUROLOGY

## 2019-06-26 RX ADMIN — CETIRIZINE HYDROCHLORIDE 10 MG: 10 TABLET, FILM COATED ORAL at 08:26

## 2019-06-26 RX ADMIN — QUETIAPINE FUMARATE 400 MG: 100 TABLET ORAL at 20:19

## 2019-06-26 RX ADMIN — TRIHEXYPHENIDYL HYDROCHLORIDE 5 MG: 5 TABLET ORAL at 20:19

## 2019-06-26 RX ADMIN — TRIHEXYPHENIDYL HYDROCHLORIDE 5 MG: 5 TABLET ORAL at 08:26

## 2019-06-26 RX ADMIN — DIVALPROEX SODIUM 1250 MG: 500 TABLET, DELAYED RELEASE ORAL at 08:25

## 2019-06-26 RX ADMIN — DIVALPROEX SODIUM 1250 MG: 500 TABLET, DELAYED RELEASE ORAL at 20:18

## 2019-06-26 RX ADMIN — TRAZODONE HYDROCHLORIDE 50 MG: 50 TABLET ORAL at 20:19

## 2019-06-26 NOTE — PROGRESS NOTES
Attended afternoon meet and greet and group activity of the [facility] room. Patient was 1 of 14. Group was facilitated from 400-440. Patient was activly engaged in group. Please see your primary care doctor or gynecologist in one day for a recheck. You can always return to the emergency room for a recheck too if you cannot get an appointment.    Return to the Emergency Room if you develop fevers more than 102, worsening pain  or if you have any new concerns about your health.        It was my pleasure to take care of you today. Thanks for visiting [facility]   Emergency Room.     Kirby Foster MD        Discharge Instructions    Pelvic Inflammatory Disease    Pelvic Inflammatory Disease (PID) is a serious form of infection affecting women.  PID is commonly caused by chlamydia or gonorrhea but can be caused by normal bacteria in the vaginal area getting up higher into your uterus or fallopian tubes.  It develops when the infection spreads from a woman s vagina throughout the genital tract to the uterus and fallopian tubes.  Symptoms of PID can include pain in the lower belly that can be severe during sex or even with walking, fever, chills, vaginal discharge, bleeding or spotting.  PID is diagnosed by pelvic exam.    PID can cause serious problems such as difficulty getting pregnant due to scarring of the fallopian tubes and ectopic pregnancy, a pregnancy that develops in the fallopian tubes.  If you have had PID, it is important for your gynecologist to know.    Return to the Emergency Department right away if:    You get an oral temperature above 101oF or as directed by your doctor.    You have new symptoms or anything that worries you.    What can I do to help myself?    Take any medication prescribed by your doctor It is very important to take all the pills in your prescription, even if you feel better before you finish them. If you don t take all the pills, the infection could come back.    You may use Tylenol  (acetaminophen) or Motrin , Advil  (ibuprofen) for pain. Be sure to read and follow the package directions, and ask your doctor if you have questions.    Narcotic  "pain pills. If you have been given a narcotic such as Vicodin  (hydrocodone with acetaminophen), Percocet  (oxycodone with acetaminophen), or Tylenol  with codeine, do not drive for four hours after you have taken it. If the narcotic contains Tylenol  (acetaminophen), do not take Tylenol  with it. All narcotics will cause constipation, so eat a high fiber diet.      Make certain that all your recent sex partners (anyone you had sex with in the 2 months before your symptoms started) are tested and treated. If your sex partners are not treated, they can infect you again.  Probiotics: If you have been given an antibiotic, you may want to also take a probiotic pill or eat yogurt with live cultures. Probiotics have \"good bacteria\" to help your intestines stay healthy. Studies have shown that probiotics help prevent diarrhea and other intestine problems (including C. diff infection) when you take antibiotics. You can buy these without a prescription in the pharmacy section of the store.     Your symptoms today could be related to a sexually transmitted infection (STI).  Common symptoms of STIs include burning with urination, discharge from penis, vaginal itching, vaginal discharge, and pelvic pain.      Depending on your symptoms, the physical examination performed by your provider and test results today, your provider may have prescribed antibiotics today or may have decided to wait for test results.     The best ways to avoid sexually transmitted infections are:    Using a condom every time you have sex.    Avoiding sex when you or your partner has any symptoms that could be caused by an infection (such as itching, discharge, or pain with urination).    If you are diagnosed with an infection, make sure your sexual partners are treated too.      If you were given a prescription for medicine here today, be sure to read all of the information (including the package insert) that comes with your prescription.  This will " include important information about the medicine, its side effects, and any warnings that you need to know about.  The pharmacist who fills the prescription can provide more information and answer questions you may have about the medicine.  If you have questions or concerns that the pharmacist cannot address, please call or return to the Emergency Department.

## 2019-06-26 NOTE — GROUP NOTE
Group Therapy Note    Date: June 26    Group Start Time: 1115  Group End Time: 1200  Group Topic: Psychotherapy    [facility]  [address] [facility], [place], [place]    Number of participants:10  Type of group: Psychotherapy  Mode of intervention: Support, Socialization and Exploration  Topic: interpersonal psychotherapy  Objective: to increase social interaction and relationships with others     Notes:  Pt was active and verbal during group and was able to relate to others    Status After Intervention:  Improved    Participation Level:  Active Listener and Interactive    Participation Quality: Appropriate, Attentive, Sharing and Supportive      Speech:  normal      Thought Process/Content: Logical      Affective Functioning: Congruent      Mood: anxious      Level of consciousness:  Alert, Oriented x4 and Attentive      Response to Learning: Able to verbalize current knowledge/experience and Able to retain information      Endings: None Reported      Discipline Responsible: /Counselor      Signature:  [place] Dixon, [place]

## 2019-06-26 NOTE — GROUP NOTE
Group Therapy Note    Date: June 26    Group Start Time: 1000  Group End Time: 3460  Group Topic: Psychoeducation    [facility]  [address]        Group Therapy Note    Attendees: 13                                                      Wellness Binder Information  Module Name:  basics to wellness   Session Number: na  Patient's objective: patient will be able to identify basic health and wellness steps to increase ones wellness. Status After Intervention:  Improved  Participation Level: Active Listener and Interactive  Participation Quality: Appropriate, Attentive, Sharing and Supportive  Speech:  normal   Thought Process/Content: Logical  Affective Functioning: Congruent  Mood: euthymic  Level of consciousness:  Alert, Oriented x4 and Attentive  Response to Learning: Able to verbalize/acknowledge new learning, Able to retain information and Progressing to goal  Endings: None Reported  Modes of Intervention: Education, Support, Socialization, Exploration and Problem-solving  Discipline Responsible: Psychoeducational Specialist  Signature:  DANNIELLE Borges      Notes: quiet in group. Willing to share when prompted.

## 2019-06-26 NOTE — PLAN OF CARE
Problem: Anger Management/Homicidal Ideation:  Goal: Able to display appropriate communication and problem solving  Description  Able to display appropriate communication and problem solving  Outcome: Ongoing     Problem: Anger Management/Homicidal Ideation:  Goal: Ability to verbalize frustrations and anger appropriately will improve  Description  Ability to verbalize frustrations and anger appropriately will improve  Outcome: Ongoing     Problem: Anger Management/Homicidal Ideation:  Goal: Absence of angry outbursts  Description  Absence of angry outbursts  Outcome: Ongoing                Patient out on unit. Quieter. Rambles and pressure at times. \"Burn house down\". Eating at least 25 to 50 % of trays. Attending select groups and mediaions compliant. No voices SI or HI . Denies hallucinations.

## 2019-06-27 PROCEDURE — 99231 SBSQ HOSP IP/OBS SF/LOW 25: CPT | Performed by: NURSE PRACTITIONER

## 2019-06-27 PROCEDURE — 6370000000 HC RX 637 (ALT 250 FOR IP): Performed by: NURSE PRACTITIONER

## 2019-06-27 PROCEDURE — 6370000000 HC RX 637 (ALT 250 FOR IP): Performed by: PSYCHIATRY & NEUROLOGY

## 2019-06-27 PROCEDURE — 1240000000 HC EMOTIONAL WELLNESS R&B

## 2019-06-27 RX ADMIN — HYDROXYZINE PAMOATE 50 MG: 25 CAPSULE ORAL at 10:24

## 2019-06-27 RX ADMIN — QUETIAPINE FUMARATE 400 MG: 100 TABLET ORAL at 21:22

## 2019-06-27 RX ADMIN — TRIHEXYPHENIDYL HYDROCHLORIDE 5 MG: 5 TABLET ORAL at 21:22

## 2019-06-27 RX ADMIN — CETIRIZINE HYDROCHLORIDE 10 MG: 10 TABLET, FILM COATED ORAL at 09:25

## 2019-06-27 RX ADMIN — TRIHEXYPHENIDYL HYDROCHLORIDE 5 MG: 5 TABLET ORAL at 09:25

## 2019-06-27 RX ADMIN — DIVALPROEX SODIUM 1250 MG: 500 TABLET, DELAYED RELEASE ORAL at 21:22

## 2019-06-27 RX ADMIN — DIVALPROEX SODIUM 1250 MG: 500 TABLET, DELAYED RELEASE ORAL at 09:25

## 2019-06-27 ASSESSMENT — PAIN - FUNCTIONAL ASSESSMENT: PAIN_FUNCTIONAL_ASSESSMENT: 0-10

## 2019-06-27 ASSESSMENT — PAIN SCALES - GENERAL: PAINLEVEL_OUTOF10: 0

## 2019-06-27 NOTE — GROUP NOTE
Group Therapy Note    Date: June 26    Group Start Time: 1955  Group End Time: 2040  Group Topic: Healthy Living/Wellness    [facility]    Randell Chiang, RN        Group Therapy Note    Pt attended and participated in wellness group and wrap up group.

## 2019-06-27 NOTE — PROGRESS NOTES
DATE OF SERVICE:     6/27/2019    Marcela Celis seen today for the purpose of continuation of care. Nursing, social work reports, laboratory studies and vital signs are reviewed. Patient chief complaint today is:             [x] Depression      [x] Anxiety        [x] Psychosis         [] Suicidal/Homicidal                         [x] Delusions           [] Aggression          Subjective:     Patient is pleasant. Continues with flight of ideas and pacing. Med compliant and went to groups today. No issues on the unit. Sleep:  [] Good [x] Fair  [] Poor  Appetite:  [] Good [x] Fair  [] Poor    Depression:  [x] Mild [] Moderate [] Severe                [x] Constant [] Sporadic     Anxiety: [] Mild [] Moderate [x] Severe    [x] Constant [] Sporadic     Delusions: [x] Mild [] Moderate [] Severe     [] Constant [x] Sporadic     [x] Paranoid [] Somatic [] Grandiose     Hallucinations: [x] Mild [] Moderate [] Severe     [] Constant [x] Sporadic    [x] Auditory  [] Visual [] Tactile       Suicidal: [] Constant [] Sporadic  Homicidal: [] Constant [] Sporadic    Unscheduled Medications     [] Patient Receiving Emergency Medications \" Chemical Restraint\"   [] Requesting PRN medications for anxiety    Medical Review of Systems:     All other than marked systmes have been reviewed and are all negative.     Constitutional Symptoms: []  fever []  Chills  Skin Symptoms: [] rash []  Pruritus   Eye Symptoms: [] Vision unchanged []  recent vision problems[] blurred vision   Respiratory Symptoms:[] shortness of breath [] cough  Cardiovascular Symptoms:  [] chest pain   [] palpitations   Gastrointestinal Symptoms: []  abdominal pain []  nausea []  vomiting []  diarrhea  Genitourinary Symptoms: []  dysuria  []  hematuria   Musculoskeletal Symptoms: []  back pain []  muscle pain []  joint pain  Neurologic Symptoms: []  headache []  dizziness  Hematolymphoid Symptoms: [] Adenopathy [] Bruises   [] Schimosis       Psychiatric Review of systems  Delusions:  [] Denies [] Endorses   Withdrawals:  [] Denies [] Endorses    Hallucinations: [] Denies [] Endorses    Extra Pyramidal Symptoms: [] Denies [] Endorses      /78   Pulse 79   Temp 96.8 °F (36 °C) (Oral)   Resp 19   Ht 5' 9\" (1.753 m)   Wt 238 lb (108 kg)   SpO2 98%   BMI 35.15 kg/m²     MENTAL STATUS EXAM:         Cognition:       [x] Alert  [x] Awake  [x] Oriented  [x] Person  [x] Place [] Time       [] drowsy  [] tired  [] lethargic  [] distractable      Attention/Concentration:   [] Attentive  [x] Distracted         Memory Recent and Remote: [] Intact   [] Impaired [] Partially Impaired      Language: [] Able to recognize and name objects                         [] Unable to recognize and name [address] [facility] Knowledge:  [x] Poor []  Fair  [] Good     Speech: [] Normal  [] Soft  [] Slow  [] Fast [] Pressured                                    [x] Loud [] Dysarthria  [] Incoherent        Appearance: [] Well Groomed  [] Casual Dressed  [] Unkept  [x] Disheveled                         [x] Normal weight  [] Thin  [] Overweight  [] Obese           Attitude: [] Positive  [] Hostile  [] Demanding  [] Guarded  [] Defensive                    [x] Cooperative  []  Uncooperative       Behavior:  [x] Normal Gait  [] Abnormal Gait [] Walks with Assistance  [] Nadia Chair                           [] Walks with Rajani Rucks  [] In Hospital Bed  [] Sitting in Chair     Muscle-Skeletal:  [x] Normal Muscle Tone [] Muscle Atrophy                                  [] Abnormal Muscle Movement      Eye Contact:   [] Good eye contact  [x] Intermittent Eye Contact  [] Poor Eye Contact               [] Excessive Eye Contact   [] Intrusive Eye Contact     Mood: [] Depressed  [] Anxious  [x] Irritated  [] Euthymic   [] Angry [] Restless                   [] Apathetic     Affect:  [] Congruent  [] Incongruent  [x] Labile  [] Constricted  [] Flat  [x] Bizarre                     [] Heightened    []

## 2019-06-27 NOTE — PROGRESS NOTES
Patient attended community meeting/morning stretch.  Attempted to have patient identify goal for the day but patient stated: \"I don't know\"

## 2019-06-28 VITALS
TEMPERATURE: 97.6 F | HEART RATE: 72 BPM | BODY MASS INDEX: 35.25 KG/M2 | HEIGHT: 69 IN | WEIGHT: 238 LBS | RESPIRATION RATE: 16 BRPM | OXYGEN SATURATION: 98 % | DIASTOLIC BLOOD PRESSURE: 74 MMHG | SYSTOLIC BLOOD PRESSURE: 115 MMHG

## 2019-06-28 PROBLEM — R21 RASH: Status: ACTIVE | Noted: 2019-06-28

## 2019-06-28 PROCEDURE — 99238 HOSP IP/OBS DSCHRG MGMT 30/<: CPT | Performed by: NURSE PRACTITIONER

## 2019-06-28 PROCEDURE — 6370000000 HC RX 637 (ALT 250 FOR IP): Performed by: NURSE PRACTITIONER

## 2019-06-28 RX ORDER — DIAPER,BRIEF,INFANT-TODD,DISP
EACH MISCELLANEOUS 2 TIMES DAILY
Status: DISCONTINUED | OUTPATIENT
Start: 2019-06-28 | End: 2019-06-28 | Stop reason: HOSPADM

## 2019-06-28 RX ORDER — DIAPER,BRIEF,INFANT-TODD,DISP
EACH MISCELLANEOUS
Qty: 1 TUBE | Refills: 1 | Status: SHIPPED | OUTPATIENT
Start: 2019-06-28 | End: 2019-07-05

## 2019-06-28 RX ADMIN — CETIRIZINE HYDROCHLORIDE 10 MG: 10 TABLET, FILM COATED ORAL at 09:51

## 2019-06-28 RX ADMIN — DIVALPROEX SODIUM 1250 MG: 500 TABLET, DELAYED RELEASE ORAL at 09:51

## 2019-06-28 RX ADMIN — TRIHEXYPHENIDYL HYDROCHLORIDE 5 MG: 5 TABLET ORAL at 09:51

## 2019-06-28 ASSESSMENT — PAIN SCALES - GENERAL
PAINLEVEL_OUTOF10: 0

## 2019-06-28 NOTE — GROUP NOTE
Group Therapy Note    Date: June 28    Group Start Time: 1100  Group End Time: 7999  Group Topic: Psychoeducation    [facility]  [address], [place]        Group Therapy Note    Attendees: 13    Group facilitated by Horace Wellness Binder Information  Module Name: Positive mindset   Patient's objective: patient will be able to id positive mantras to help one keep a positive attitude. Endings: None Reported  Modes of Intervention: Education, Support, Socialization, Exploration and Problem-solving  Discipline Responsible: Psychoeducational Specialist  Signature[facility]  [facility]       Notes:  Quiet and kept to self in group. Status After Intervention:  Improved    Participation Level:  Active Listener and Interactive    Participation Quality: Appropriate, Attentive, Sharing and Supportive      Speech:  normal      Thought Process/Content: Logical      Affective Functioning: Congruent      Mood: euthymic      Level of consciousness:  Alert, Oriented x4 and Attentive      Response to Learning: Able to verbalize/acknowledge new learning, Able to retain information and Progressing to goal      Endings: None Reported    Modes of Intervention: Education, Support, Socialization, Exploration and Problem-solving      Discipline Responsible: Psychoeducational Specialist      Signature:  Gabrielle Cool

## 2019-06-28 NOTE — CONSULTS
Allergies:  Seasonal    Social History:    TOBACCO:   reports that he has never smoked. He has never used smokeless tobacco.  ETOH:   reports that he does not drink alcohol. Family History:        Problem Relation Age of Onset    High Blood Pressure Mother     Diabetes Mother        REVIEW OF SYSTEMS:   Pertinent positives as noted in the HPI. All other systems reviewed and negative. PHYSICAL EXAM:  /74   Pulse 72   Temp 97.6 °F (36.4 °C) (Oral)   Resp 16   Ht 5' 9\" (1.753 m)   Wt 238 lb (108 kg)   SpO2 98%   BMI 35.15 kg/m²      General appearance: No apparent distress, appears stated age and cooperative. HEENT: Normal cephalic, atraumatic without obvious deformity. Pupils equal, round, and reactive to light. Neck: Supple, with full range of motion. No jugular venous distention. Trachea midline. Respiratory:  Normal respiratory effort. Clear to auscultation, bilaterally without Rales/Wheezes/Rhonchi. Cardiovascular: Regular rate and rhythm with normal S1/S2 without murmurs, rubs or gallops. Abdomen: Soft, non-tender, non-distended with normal bowel sounds. Musculoskeletal: No clubbing, cyanosis or edema bilaterally. Full range of motion without deformity. Skin: Normal skin color. Slightly red, puritic rash on right buttock. Neurologic:  Neurovascularly intact without any focal sensory/motor deficits. Labs:     No results for input(s): WBC, HGB, HCT, PLT in the last 72 hours. No results for input(s): NA, K, CL, CO2, BUN, CREATININE, CALCIUM, PHOS in the last 72 hours. Invalid input(s): MAGNES  No results for input(s): AST, ALT, BILIDIR, BILITOT, ALKPHOS in the last 72 hours. No results for input(s): INR in the last 72 hours. No results for input(s): Bekah  in the last 72 hours.     Urinalysis:      Lab Results   Component Value Date    NITRU Negative 05/01/2019    WBCUA NONE 05/01/2019    BACTERIA RARE 05/01/2019    RBCUA 1-3 05/01/2019    BLOODU

## 2019-07-19 NOTE — GROUP NOTE
Group Therapy Note    Date: June 28    Group Start Time: 1000  Group End Time: 4505  Group Topic: Psychoeducation    [facility] 1    [place], [place]        Group Therapy Note    Number of participants: 12  Type of group: Psychoeducation  Mode of intervention: Education, Support, Socialization, Exploration, Clarifying and Problem-solving  Topic: A Mindfulness Response to Thoughts: APPLE  Objective:  Patient will identify ways to acknowledge, pause, pull back, let go, and explore (APPLE) in recovery. Notes:  Patient was interactive during group sharing ways to utilize APPLE in recovery. Patient gave support and feedback to others. Status After Intervention:  Improved    Participation Level:  Active Listener and Interactive    Participation Quality: Appropriate, Attentive, Sharing and Supportive      Speech:  normal      Thought Process/Content: Logical      Affective Functioning: Congruent      Mood: euthymic      Level of consciousness:  Alert, Oriented x4 and Attentive      Response to Learning: Able to verbalize current knowledge/experience, Able to verbalize/acknowledge new learning, Able to retain information, Capable of insight, Able to change behavior and Progressing to goal      Endings: None Reported    Modes of Intervention: Education, Support, Socialization, Exploration, Clarifying and Problem-solving no

## 2019-08-26 ENCOUNTER — HOSPITAL ENCOUNTER (OUTPATIENT)
Age: 39
Discharge: HOME | End: 2019-08-26
Payer: MEDICARE

## 2019-08-26 LAB
BASOPHILS ABSOLUTE: 0.06 E9/L (ref 0–0.2)
BASOPHILS RELATIVE PERCENT: 1 % (ref 0–2)
EOSINOPHILS ABSOLUTE: 0.15 E9/L (ref 0.05–0.5)
EOSINOPHILS RELATIVE PERCENT: 2.4 % (ref 0–6)
HCT VFR BLD CALC: 42.9 % (ref 37–54)
HEMOGLOBIN: 14.7 G/DL (ref 12.5–16.5)
IMMATURE GRANULOCYTES #: 0.12 E9/L
IMMATURE GRANULOCYTES %: 1.9 % (ref 0–5)
LYMPHOCYTES ABSOLUTE: 2.41 E9/L (ref 1.5–4)
LYMPHOCYTES RELATIVE PERCENT: 38.3 % (ref 20–42)
MCH RBC QN AUTO: 33.6 PG (ref 26–35)
MCHC RBC AUTO-ENTMCNC: 34.3 % (ref 32–34.5)
MCV RBC AUTO: 97.9 FL (ref 80–99.9)
MONOCYTES ABSOLUTE: 0.38 E9/L (ref 0.1–0.95)
MONOCYTES RELATIVE PERCENT: 6 % (ref 2–12)
NEUTROPHILS ABSOLUTE: 3.18 E9/L (ref 1.8–7.3)
NEUTROPHILS RELATIVE PERCENT: 50.4 % (ref 43–80)
PDW BLD-RTO: 11.5 FL (ref 11.5–15)
PLATELET # BLD: 122 E9/L (ref 130–450)
PMV BLD AUTO: 9.9 FL (ref 7–12)
RBC # BLD: 4.38 E12/L (ref 3.8–5.8)
VALPROIC ACID LEVEL: 90 MCG/ML (ref 50–100)
WBC # BLD: 6.3 E9/L (ref 4.5–11.5)

## 2019-08-26 PROCEDURE — 85025 COMPLETE CBC W/AUTO DIFF WBC: CPT

## 2019-08-26 PROCEDURE — 80164 ASSAY DIPROPYLACETIC ACD TOT: CPT

## 2019-08-26 PROCEDURE — 36415 COLL VENOUS BLD VENIPUNCTURE: CPT

## 2019-08-28 LAB
EKG ATRIAL RATE: 82 BPM
EKG P AXIS: 57 DEGREES
EKG P-R INTERVAL: 130 MS
EKG Q-T INTERVAL: 342 MS
EKG QRS DURATION: 90 MS
EKG QTC CALCULATION (BAZETT): 399 MS
EKG R AXIS: 49 DEGREES
EKG T AXIS: 64 DEGREES
EKG VENTRICULAR RATE: 82 BPM

## 2019-08-28 PROCEDURE — 93005 ELECTROCARDIOGRAM TRACING: CPT | Performed by: PEDIATRICS

## 2019-09-04 ENCOUNTER — HOSPITAL ENCOUNTER (OUTPATIENT)
Age: 39
Discharge: HOME | End: 2019-09-04
Payer: MEDICARE

## 2019-09-04 LAB
BASOPHILS ABSOLUTE: 0.04 E9/L (ref 0–0.2)
BASOPHILS RELATIVE PERCENT: 0.5 % (ref 0–2)
EOSINOPHILS ABSOLUTE: 0.1 E9/L (ref 0.05–0.5)
EOSINOPHILS RELATIVE PERCENT: 1.3 % (ref 0–6)
HCT VFR BLD CALC: 44.6 % (ref 37–54)
HEMOGLOBIN: 15 G/DL (ref 12.5–16.5)
IMMATURE GRANULOCYTES #: 0.12 E9/L
IMMATURE GRANULOCYTES %: 1.5 % (ref 0–5)
LYMPHOCYTES ABSOLUTE: 1.89 E9/L (ref 1.5–4)
LYMPHOCYTES RELATIVE PERCENT: 24.2 % (ref 20–42)
MCH RBC QN AUTO: 32.9 PG (ref 26–35)
MCHC RBC AUTO-ENTMCNC: 33.6 % (ref 32–34.5)
MCV RBC AUTO: 97.8 FL (ref 80–99.9)
MONOCYTES ABSOLUTE: 0.51 E9/L (ref 0.1–0.95)
MONOCYTES RELATIVE PERCENT: 6.5 % (ref 2–12)
NEUTROPHILS ABSOLUTE: 5.15 E9/L (ref 1.8–7.3)
NEUTROPHILS RELATIVE PERCENT: 66 % (ref 43–80)
PDW BLD-RTO: 11.7 FL (ref 11.5–15)
PLATELET # BLD: 147 E9/L (ref 130–450)
PMV BLD AUTO: 9.3 FL (ref 7–12)
RBC # BLD: 4.56 E12/L (ref 3.8–5.8)
VALPROIC ACID LEVEL: 83 MCG/ML (ref 50–100)
WBC # BLD: 7.8 E9/L (ref 4.5–11.5)

## 2019-09-04 PROCEDURE — 80164 ASSAY DIPROPYLACETIC ACD TOT: CPT

## 2019-09-04 PROCEDURE — 85025 COMPLETE CBC W/AUTO DIFF WBC: CPT

## 2019-09-04 PROCEDURE — 36415 COLL VENOUS BLD VENIPUNCTURE: CPT

## 2019-09-10 ENCOUNTER — HOSPITAL ENCOUNTER (OUTPATIENT)
Age: 39
Discharge: HOME | End: 2019-09-10
Payer: MEDICARE

## 2019-09-10 LAB
BASOPHILS ABSOLUTE: 0.06 E9/L (ref 0–0.2)
BASOPHILS RELATIVE PERCENT: 0.6 % (ref 0–2)
EOSINOPHILS ABSOLUTE: 0.06 E9/L (ref 0.05–0.5)
EOSINOPHILS RELATIVE PERCENT: 0.6 % (ref 0–6)
HCT VFR BLD CALC: 43.9 % (ref 37–54)
HEMOGLOBIN: 15 G/DL (ref 12.5–16.5)
IMMATURE GRANULOCYTES #: 0.08 E9/L
IMMATURE GRANULOCYTES %: 0.8 % (ref 0–5)
LYMPHOCYTES ABSOLUTE: 2.32 E9/L (ref 1.5–4)
LYMPHOCYTES RELATIVE PERCENT: 22.4 % (ref 20–42)
MCH RBC QN AUTO: 33.4 PG (ref 26–35)
MCHC RBC AUTO-ENTMCNC: 34.2 % (ref 32–34.5)
MCV RBC AUTO: 97.8 FL (ref 80–99.9)
MONOCYTES ABSOLUTE: 0.71 E9/L (ref 0.1–0.95)
MONOCYTES RELATIVE PERCENT: 6.8 % (ref 2–12)
NEUTROPHILS ABSOLUTE: 7.14 E9/L (ref 1.8–7.3)
NEUTROPHILS RELATIVE PERCENT: 68.8 % (ref 43–80)
PDW BLD-RTO: 11.6 FL (ref 11.5–15)
PLATELET # BLD: 145 E9/L (ref 130–450)
PMV BLD AUTO: 9.6 FL (ref 7–12)
RBC # BLD: 4.49 E12/L (ref 3.8–5.8)
WBC # BLD: 10.4 E9/L (ref 4.5–11.5)

## 2019-09-10 PROCEDURE — 36415 COLL VENOUS BLD VENIPUNCTURE: CPT

## 2019-09-10 PROCEDURE — 85025 COMPLETE CBC W/AUTO DIFF WBC: CPT

## 2019-09-17 ENCOUNTER — HOSPITAL ENCOUNTER (OUTPATIENT)
Age: 39
Discharge: HOME | End: 2019-09-17
Payer: MEDICARE

## 2019-09-17 LAB
BASOPHILS ABSOLUTE: 0.04 E9/L (ref 0–0.2)
BASOPHILS RELATIVE PERCENT: 0.4 % (ref 0–2)
EOSINOPHILS ABSOLUTE: 0.26 E9/L (ref 0.05–0.5)
EOSINOPHILS RELATIVE PERCENT: 2.3 % (ref 0–6)
HCT VFR BLD CALC: 42.8 % (ref 37–54)
HEMOGLOBIN: 14.2 G/DL (ref 12.5–16.5)
IMMATURE GRANULOCYTES #: 0.14 E9/L
IMMATURE GRANULOCYTES %: 1.2 % (ref 0–5)
LYMPHOCYTES ABSOLUTE: 1.72 E9/L (ref 1.5–4)
LYMPHOCYTES RELATIVE PERCENT: 15.2 % (ref 20–42)
MCH RBC QN AUTO: 32.9 PG (ref 26–35)
MCHC RBC AUTO-ENTMCNC: 33.2 % (ref 32–34.5)
MCV RBC AUTO: 99.3 FL (ref 80–99.9)
MONOCYTES ABSOLUTE: 1.36 E9/L (ref 0.1–0.95)
MONOCYTES RELATIVE PERCENT: 12 % (ref 2–12)
NEUTROPHILS ABSOLUTE: 7.77 E9/L (ref 1.8–7.3)
NEUTROPHILS RELATIVE PERCENT: 68.9 % (ref 43–80)
PDW BLD-RTO: 11.6 FL (ref 11.5–15)
PLATELET # BLD: 144 E9/L (ref 130–450)
PMV BLD AUTO: 9.2 FL (ref 7–12)
RBC # BLD: 4.31 E12/L (ref 3.8–5.8)
VALPROIC ACID LEVEL: 80 MCG/ML (ref 50–100)
WBC # BLD: 11.3 E9/L (ref 4.5–11.5)

## 2019-09-17 PROCEDURE — 36415 COLL VENOUS BLD VENIPUNCTURE: CPT

## 2019-09-17 PROCEDURE — 85025 COMPLETE CBC W/AUTO DIFF WBC: CPT

## 2019-09-17 PROCEDURE — 80164 ASSAY DIPROPYLACETIC ACD TOT: CPT

## 2019-09-18 ENCOUNTER — OFFICE VISIT (OUTPATIENT)
Dept: FAMILY MEDICINE CLINIC | Age: 39
End: 2019-09-18
Payer: MEDICARE

## 2019-09-18 VITALS
OXYGEN SATURATION: 98 % | BODY MASS INDEX: 34.11 KG/M2 | WEIGHT: 231 LBS | HEART RATE: 99 BPM | DIASTOLIC BLOOD PRESSURE: 68 MMHG | TEMPERATURE: 98 F | SYSTOLIC BLOOD PRESSURE: 104 MMHG

## 2019-09-18 DIAGNOSIS — L27.0 ERUPTION DUE TO DRUG: Primary | ICD-10-CM

## 2019-09-18 DIAGNOSIS — N39.44 ENURESIS, NOCTURNAL ONLY: ICD-10-CM

## 2019-09-18 LAB
BILIRUBIN, POC: NORMAL
BLOOD URINE, POC: NORMAL
CLARITY, POC: CLEAR
COLOR, POC: NORMAL
GLUCOSE URINE, POC: 100
KETONES, POC: 15
LEUKOCYTE EST, POC: NORMAL
NITRITE, POC: NORMAL
PH, POC: 6
PROTEIN, POC: 30
SPECIFIC GRAVITY, POC: >=1.03
UROBILINOGEN, POC: >=8

## 2019-09-18 PROCEDURE — 81002 URINALYSIS NONAUTO W/O SCOPE: CPT | Performed by: NURSE PRACTITIONER

## 2019-09-18 PROCEDURE — 99214 OFFICE O/P EST MOD 30 MIN: CPT | Performed by: NURSE PRACTITIONER

## 2019-09-18 RX ORDER — CLOZAPINE 100 MG/1
150 TABLET ORAL 2 TIMES DAILY
COMMUNITY
Start: 2019-09-12 | End: 2021-05-26

## 2019-09-18 RX ORDER — CLONAZEPAM 0.5 MG/1
TABLET ORAL
Refills: 0 | COMMUNITY
Start: 2019-08-30 | End: 2021-05-26

## 2019-09-18 RX ORDER — VALPROIC ACID 250 MG/1
250 CAPSULE, LIQUID FILLED ORAL
Refills: 0 | COMMUNITY
Start: 2019-08-27 | End: 2021-05-26 | Stop reason: ALTCHOICE

## 2019-09-18 RX ORDER — LORATADINE 10 MG/1
10 TABLET ORAL DAILY
Qty: 30 TABLET | Refills: 11 | Status: CANCELLED | OUTPATIENT
Start: 2019-09-18

## 2019-09-18 RX ORDER — TRAZODONE HYDROCHLORIDE 50 MG/1
100 TABLET ORAL PRN
Refills: 0 | COMMUNITY
Start: 2019-09-06 | End: 2021-05-26

## 2019-09-18 ASSESSMENT — ENCOUNTER SYMPTOMS
COUGH: 0
BACK PAIN: 0
WHEEZING: 0
COLOR CHANGE: 1
SHORTNESS OF BREATH: 0

## 2019-09-18 NOTE — PROGRESS NOTES
OFFICE PROGRESS NOTE  [address] [place] [address] Rd  [address] [facility] [address]  Dept: [phone]   Chief Complaint   Patient presents with    Rash     on buttock x 1 wk, Recently started on new medication 1 month ago.  Incontinence     x 1 wk, Wetting the bed at night. 3x last night         HPI:     Rash:  Patient is here today with complaints of a rash. This has been going on for 1 week(s). Rash is located on buttocks. It is not spreading. Exacerbating factors include mom thinks from the Clozapine . Alleviating factors include none. Associated signs and symptoms include none. Patient has not changed hygiene products. Patient does not have pets. This has not happened to patient in the past.  Patient has not had recent travel. He also has been having problems with bedwetting over the last week with the increase in medication from psych. Mom has been adjusting his medications. He is sleeping so sound he doesn't realize he is wetting. He has been taking 2 mg Klonopin at night, she decreased the Trazodone from 100 mg to 50 mg, and Clozaril 150 mg at night. She did call psych but no return call today.      Current Outpatient Medications:     clonazePAM (KLONOPIN) 0.5 MG tablet, , Disp: , Rfl: 0    cloZAPine (CLOZARIL) 100 MG tablet, Take 150 mg by mouth 2 times daily, Disp: , Rfl:     traZODone (DESYREL) 50 MG tablet, Take 100 mg by mouth as needed, Disp: , Rfl: 0    valproic acid (DEPAKENE) 250 MG capsule, Take 250 mg by mouth 5 tabs BID, Disp: , Rfl: 0    ARIPiprazole lauroxil (ARISTADA) 882 MG/3.2ML PRSY injection, Inject 3.2 mLs into the muscle every 30 days, Disp: 3 mL, Rfl: 0    loratadine (CLARITIN) 10 MG tablet, TAKE 1 TABLET BY MOUTH DAILY, Disp: 30 tablet, Rfl: 11    docusate sodium (COLACE) 100 MG capsule, Take 1 capsule by mouth 2 times daily (Patient taking differently: Take 100 mg by mouth 2 times daily as needed ), Disp: 60 capsule, Rfl: non-tender, non-distended, normal bowel sounds, no masses or hepatosplenomegaly  Musculoskeletal: Normal ROM, no joint swelling, deformity or tenderness   Neurologic: reflexes normal and symmetric, no cranial nerve deficit, gait, coordination and speech normal  Extremities: no clubbing, cyanosis, or edema. Psychiatric: Poor eye contact, agitated mood and affect, answers questions  With somewhat garbled speech. ASSESSMENT/PLAN   Krystal Norwood was seen today for rash and incontinence. Diagnoses and all orders for this visit:    Eruption due to drug New  -     Comprehensive Metabolic Panel; Future  Contact psychiatry and call the emergency number    Enuresis, nocturnal only New  -     Comprehensive Metabolic Panel; Future          Return in about 1 week (around 9/25/2019) for rash, review lab. I have reviewed my findings and recommendations with Cesilia Stinson.     Nora Harden, NP-C, FNP-BC

## 2019-09-19 ENCOUNTER — HOSPITAL ENCOUNTER (OUTPATIENT)
Age: 39
Discharge: HOME | End: 2019-09-19
Payer: MEDICARE

## 2019-09-19 DIAGNOSIS — L27.0 ERUPTION DUE TO DRUG: ICD-10-CM

## 2019-09-19 DIAGNOSIS — N39.44 ENURESIS, NOCTURNAL ONLY: ICD-10-CM

## 2019-09-19 LAB
ALBUMIN SERPL-MCNC: 3.6 G/DL (ref 3.5–5.2)
ALP BLD-CCNC: 63 U/L (ref 40–129)
ALT SERPL-CCNC: 27 U/L (ref 0–40)
ANION GAP SERPL CALCULATED.3IONS-SCNC: 11 MMOL/L (ref 7–16)
AST SERPL-CCNC: 15 U/L (ref 0–39)
BILIRUB SERPL-MCNC: 0.6 MG/DL (ref 0–1.2)
BUN BLDV-MCNC: 17 MG/DL (ref 6–20)
CALCIUM SERPL-MCNC: 8.7 MG/DL (ref 8.6–10.2)
CHLORIDE BLD-SCNC: 103 MMOL/L (ref 98–107)
CO2: 27 MMOL/L (ref 22–29)
CREAT SERPL-MCNC: 0.8 MG/DL (ref 0.7–1.2)
GFR AFRICAN AMERICAN: >60
GFR NON-AFRICAN AMERICAN: >60 ML/MIN/1.73
GLUCOSE BLD-MCNC: 92 MG/DL (ref 74–99)
POTASSIUM SERPL-SCNC: 4.2 MMOL/L (ref 3.5–5)
SODIUM BLD-SCNC: 141 MMOL/L (ref 132–146)
TOTAL PROTEIN: 6.9 G/DL (ref 6.4–8.3)

## 2019-09-19 PROCEDURE — 80074 ACUTE HEPATITIS PANEL: CPT

## 2019-09-19 PROCEDURE — 80053 COMPREHEN METABOLIC PANEL: CPT

## 2019-09-19 PROCEDURE — 36415 COLL VENOUS BLD VENIPUNCTURE: CPT

## 2019-09-20 ENCOUNTER — TELEPHONE (OUTPATIENT)
Dept: FAMILY MEDICINE CLINIC | Age: 39
End: 2019-09-20

## 2019-09-20 LAB
HAV IGM SER IA-ACNC: NORMAL
HEPATITIS B CORE IGM ANTIBODY: NORMAL
HEPATITIS B SURFACE ANTIGEN INTERPRETATION: NORMAL
HEPATITIS C ANTIBODY INTERPRETATION: NORMAL

## 2019-10-16 ENCOUNTER — TELEPHONE (OUTPATIENT)
Dept: FAMILY MEDICINE CLINIC | Age: 39
End: 2019-10-16

## 2020-01-30 ENCOUNTER — NURSE ONLY (OUTPATIENT)
Dept: FAMILY MEDICINE CLINIC | Age: 40
End: 2020-01-30
Payer: MEDICARE

## 2020-01-30 PROCEDURE — G0008 ADMIN INFLUENZA VIRUS VAC: HCPCS | Performed by: NURSE PRACTITIONER

## 2020-01-30 PROCEDURE — 90686 IIV4 VACC NO PRSV 0.5 ML IM: CPT | Performed by: NURSE PRACTITIONER

## 2020-04-21 ENCOUNTER — HOSPITAL ENCOUNTER (OUTPATIENT)
Age: 40
Discharge: HOME | End: 2020-04-21
Payer: MEDICARE

## 2020-04-21 LAB
BASOPHILS ABSOLUTE: 0.05 E9/L (ref 0–0.2)
BASOPHILS RELATIVE PERCENT: 0.7 % (ref 0–2)
EOSINOPHILS ABSOLUTE: 0.15 E9/L (ref 0.05–0.5)
EOSINOPHILS RELATIVE PERCENT: 2.1 % (ref 0–6)
HCT VFR BLD CALC: 43.8 % (ref 37–54)
HEMOGLOBIN: 14.8 G/DL (ref 12.5–16.5)
IMMATURE GRANULOCYTES #: 0.08 E9/L
IMMATURE GRANULOCYTES %: 1.1 % (ref 0–5)
LYMPHOCYTES ABSOLUTE: 2.58 E9/L (ref 1.5–4)
LYMPHOCYTES RELATIVE PERCENT: 35.7 % (ref 20–42)
MCH RBC QN AUTO: 33.3 PG (ref 26–35)
MCHC RBC AUTO-ENTMCNC: 33.8 % (ref 32–34.5)
MCV RBC AUTO: 98.4 FL (ref 80–99.9)
MONOCYTES ABSOLUTE: 0.48 E9/L (ref 0.1–0.95)
MONOCYTES RELATIVE PERCENT: 6.6 % (ref 2–12)
NEUTROPHILS ABSOLUTE: 3.88 E9/L (ref 1.8–7.3)
NEUTROPHILS RELATIVE PERCENT: 53.8 % (ref 43–80)
PDW BLD-RTO: 11.5 FL (ref 11.5–15)
PLATELET # BLD: 157 E9/L (ref 130–450)
PMV BLD AUTO: 9.6 FL (ref 7–12)
RBC # BLD: 4.45 E12/L (ref 3.8–5.8)
WBC # BLD: 7.2 E9/L (ref 4.5–11.5)

## 2020-04-21 PROCEDURE — 85025 COMPLETE CBC W/AUTO DIFF WBC: CPT

## 2020-04-21 PROCEDURE — 36415 COLL VENOUS BLD VENIPUNCTURE: CPT

## 2020-05-05 ENCOUNTER — HOSPITAL ENCOUNTER (OUTPATIENT)
Age: 40
Discharge: HOME | End: 2020-05-05
Payer: MEDICARE

## 2020-05-05 LAB
BASOPHILS ABSOLUTE: 0.08 E9/L (ref 0–0.2)
BASOPHILS RELATIVE PERCENT: 1.2 % (ref 0–2)
EOSINOPHILS ABSOLUTE: 0.28 E9/L (ref 0.05–0.5)
EOSINOPHILS RELATIVE PERCENT: 4.1 % (ref 0–6)
HCT VFR BLD CALC: 42.7 % (ref 37–54)
HEMOGLOBIN: 14.1 G/DL (ref 12.5–16.5)
IMMATURE GRANULOCYTES #: 0.16 E9/L
IMMATURE GRANULOCYTES %: 2.4 % (ref 0–5)
LYMPHOCYTES ABSOLUTE: 2.33 E9/L (ref 1.5–4)
LYMPHOCYTES RELATIVE PERCENT: 34.5 % (ref 20–42)
MCH RBC QN AUTO: 32.9 PG (ref 26–35)
MCHC RBC AUTO-ENTMCNC: 33 % (ref 32–34.5)
MCV RBC AUTO: 99.5 FL (ref 80–99.9)
MONOCYTES ABSOLUTE: 0.56 E9/L (ref 0.1–0.95)
MONOCYTES RELATIVE PERCENT: 8.3 % (ref 2–12)
NEUTROPHILS ABSOLUTE: 3.35 E9/L (ref 1.8–7.3)
NEUTROPHILS RELATIVE PERCENT: 49.5 % (ref 43–80)
PDW BLD-RTO: 12 FL (ref 11.5–15)
PLATELET # BLD: 161 E9/L (ref 130–450)
PMV BLD AUTO: 10.1 FL (ref 7–12)
RBC # BLD: 4.29 E12/L (ref 3.8–5.8)
WBC # BLD: 6.8 E9/L (ref 4.5–11.5)

## 2020-05-05 PROCEDURE — 85025 COMPLETE CBC W/AUTO DIFF WBC: CPT

## 2020-05-05 PROCEDURE — 36415 COLL VENOUS BLD VENIPUNCTURE: CPT

## 2020-05-13 ENCOUNTER — HOSPITAL ENCOUNTER (OUTPATIENT)
Age: 40
Discharge: HOME | End: 2020-05-13
Payer: MEDICARE

## 2020-05-13 LAB
BASOPHILS ABSOLUTE: 0.06 E9/L (ref 0–0.2)
BASOPHILS RELATIVE PERCENT: 0.9 % (ref 0–2)
EOSINOPHILS ABSOLUTE: 0.19 E9/L (ref 0.05–0.5)
EOSINOPHILS RELATIVE PERCENT: 2.9 % (ref 0–6)
HCT VFR BLD CALC: 44 % (ref 37–54)
HEMOGLOBIN: 14.8 G/DL (ref 12.5–16.5)
IMMATURE GRANULOCYTES #: 0.11 E9/L
IMMATURE GRANULOCYTES %: 1.7 % (ref 0–5)
LYMPHOCYTES ABSOLUTE: 2.25 E9/L (ref 1.5–4)
LYMPHOCYTES RELATIVE PERCENT: 34.2 % (ref 20–42)
MCH RBC QN AUTO: 33.2 PG (ref 26–35)
MCHC RBC AUTO-ENTMCNC: 33.6 % (ref 32–34.5)
MCV RBC AUTO: 98.7 FL (ref 80–99.9)
MONOCYTES ABSOLUTE: 0.48 E9/L (ref 0.1–0.95)
MONOCYTES RELATIVE PERCENT: 7.3 % (ref 2–12)
NEUTROPHILS ABSOLUTE: 3.49 E9/L (ref 1.8–7.3)
NEUTROPHILS RELATIVE PERCENT: 53 % (ref 43–80)
PDW BLD-RTO: 11.6 FL (ref 11.5–15)
PLATELET # BLD: 160 E9/L (ref 130–450)
PMV BLD AUTO: 9.4 FL (ref 7–12)
RBC # BLD: 4.46 E12/L (ref 3.8–5.8)
WBC # BLD: 6.6 E9/L (ref 4.5–11.5)

## 2020-05-13 PROCEDURE — 36415 COLL VENOUS BLD VENIPUNCTURE: CPT

## 2020-05-13 PROCEDURE — 85025 COMPLETE CBC W/AUTO DIFF WBC: CPT

## 2020-05-20 ENCOUNTER — HOSPITAL ENCOUNTER (OUTPATIENT)
Age: 40
Discharge: HOME | End: 2020-05-20
Payer: MEDICARE

## 2020-05-20 LAB
BASOPHILS ABSOLUTE: 0.05 E9/L (ref 0–0.2)
BASOPHILS RELATIVE PERCENT: 0.8 % (ref 0–2)
EOSINOPHILS ABSOLUTE: 0.11 E9/L (ref 0.05–0.5)
EOSINOPHILS RELATIVE PERCENT: 1.7 % (ref 0–6)
HCT VFR BLD CALC: 42.3 % (ref 37–54)
HEMOGLOBIN: 13.9 G/DL (ref 12.5–16.5)
IMMATURE GRANULOCYTES #: 0.11 E9/L
IMMATURE GRANULOCYTES %: 1.7 % (ref 0–5)
LYMPHOCYTES ABSOLUTE: 1.69 E9/L (ref 1.5–4)
LYMPHOCYTES RELATIVE PERCENT: 25.7 % (ref 20–42)
MCH RBC QN AUTO: 32.7 PG (ref 26–35)
MCHC RBC AUTO-ENTMCNC: 32.9 % (ref 32–34.5)
MCV RBC AUTO: 99.5 FL (ref 80–99.9)
MONOCYTES ABSOLUTE: 0.47 E9/L (ref 0.1–0.95)
MONOCYTES RELATIVE PERCENT: 7.2 % (ref 2–12)
NEUTROPHILS ABSOLUTE: 4.14 E9/L (ref 1.8–7.3)
NEUTROPHILS RELATIVE PERCENT: 62.9 % (ref 43–80)
PDW BLD-RTO: 11.9 FL (ref 11.5–15)
PLATELET # BLD: 157 E9/L (ref 130–450)
PMV BLD AUTO: 9.4 FL (ref 7–12)
RBC # BLD: 4.25 E12/L (ref 3.8–5.8)
WBC # BLD: 6.6 E9/L (ref 4.5–11.5)

## 2020-05-20 PROCEDURE — 36415 COLL VENOUS BLD VENIPUNCTURE: CPT

## 2020-05-20 PROCEDURE — 85025 COMPLETE CBC W/AUTO DIFF WBC: CPT

## 2020-06-09 ENCOUNTER — HOSPITAL ENCOUNTER (OUTPATIENT)
Age: 40
Discharge: HOME | End: 2020-06-09
Payer: MEDICARE

## 2020-06-09 LAB
BASOPHILS ABSOLUTE: 0.07 E9/L (ref 0–0.2)
BASOPHILS RELATIVE PERCENT: 1.1 % (ref 0–2)
EOSINOPHILS ABSOLUTE: 0.17 E9/L (ref 0.05–0.5)
EOSINOPHILS RELATIVE PERCENT: 2.6 % (ref 0–6)
HCT VFR BLD CALC: 42.3 % (ref 37–54)
HEMOGLOBIN: 14.1 G/DL (ref 12.5–16.5)
IMMATURE GRANULOCYTES #: 0.11 E9/L
IMMATURE GRANULOCYTES %: 1.7 % (ref 0–5)
LYMPHOCYTES ABSOLUTE: 2.5 E9/L (ref 1.5–4)
LYMPHOCYTES RELATIVE PERCENT: 38.2 % (ref 20–42)
MCH RBC QN AUTO: 33.1 PG (ref 26–35)
MCHC RBC AUTO-ENTMCNC: 33.3 % (ref 32–34.5)
MCV RBC AUTO: 99.3 FL (ref 80–99.9)
MONOCYTES ABSOLUTE: 0.49 E9/L (ref 0.1–0.95)
MONOCYTES RELATIVE PERCENT: 7.5 % (ref 2–12)
NEUTROPHILS ABSOLUTE: 3.21 E9/L (ref 1.8–7.3)
NEUTROPHILS RELATIVE PERCENT: 48.9 % (ref 43–80)
PDW BLD-RTO: 11.6 FL (ref 11.5–15)
PLATELET # BLD: 152 E9/L (ref 130–450)
PMV BLD AUTO: 9.7 FL (ref 7–12)
RBC # BLD: 4.26 E12/L (ref 3.8–5.8)
WBC # BLD: 6.6 E9/L (ref 4.5–11.5)

## 2020-06-09 PROCEDURE — 85025 COMPLETE CBC W/AUTO DIFF WBC: CPT

## 2020-06-09 PROCEDURE — 36415 COLL VENOUS BLD VENIPUNCTURE: CPT

## 2020-06-22 ENCOUNTER — HOSPITAL ENCOUNTER (OUTPATIENT)
Age: 40
Discharge: HOME | End: 2020-06-22
Payer: MEDICARE

## 2020-06-22 LAB
BASOPHILS ABSOLUTE: 0.06 E9/L (ref 0–0.2)
BASOPHILS RELATIVE PERCENT: 0.9 % (ref 0–2)
EOSINOPHILS ABSOLUTE: 0.19 E9/L (ref 0.05–0.5)
EOSINOPHILS RELATIVE PERCENT: 2.9 % (ref 0–6)
HCT VFR BLD CALC: 43 % (ref 37–54)
HEMOGLOBIN: 14.7 G/DL (ref 12.5–16.5)
IMMATURE GRANULOCYTES #: 0.09 E9/L
IMMATURE GRANULOCYTES %: 1.4 % (ref 0–5)
LYMPHOCYTES ABSOLUTE: 2.34 E9/L (ref 1.5–4)
LYMPHOCYTES RELATIVE PERCENT: 35.6 % (ref 20–42)
MCH RBC QN AUTO: 33.8 PG (ref 26–35)
MCHC RBC AUTO-ENTMCNC: 34.2 % (ref 32–34.5)
MCV RBC AUTO: 98.9 FL (ref 80–99.9)
MONOCYTES ABSOLUTE: 0.54 E9/L (ref 0.1–0.95)
MONOCYTES RELATIVE PERCENT: 8.2 % (ref 2–12)
NEUTROPHILS ABSOLUTE: 3.36 E9/L (ref 1.8–7.3)
NEUTROPHILS RELATIVE PERCENT: 51 % (ref 43–80)
PDW BLD-RTO: 11.8 FL (ref 11.5–15)
PLATELET # BLD: 153 E9/L (ref 130–450)
PMV BLD AUTO: 9.5 FL (ref 7–12)
RBC # BLD: 4.35 E12/L (ref 3.8–5.8)
WBC # BLD: 6.6 E9/L (ref 4.5–11.5)

## 2020-06-22 PROCEDURE — 36415 COLL VENOUS BLD VENIPUNCTURE: CPT

## 2020-06-22 PROCEDURE — 85025 COMPLETE CBC W/AUTO DIFF WBC: CPT

## 2020-07-01 RX ORDER — LORATADINE 10 MG/1
10 TABLET ORAL DAILY
Qty: 30 TABLET | Refills: 11 | OUTPATIENT
Start: 2020-07-01

## 2020-07-07 ENCOUNTER — HOSPITAL ENCOUNTER (OUTPATIENT)
Age: 40
Discharge: HOME | End: 2020-07-07
Payer: MEDICARE

## 2020-07-07 LAB
BASOPHILS ABSOLUTE: 0.06 E9/L (ref 0–0.2)
BASOPHILS RELATIVE PERCENT: 0.9 % (ref 0–2)
EOSINOPHILS ABSOLUTE: 0.28 E9/L (ref 0.05–0.5)
EOSINOPHILS RELATIVE PERCENT: 4.1 % (ref 0–6)
HCT VFR BLD CALC: 41.3 % (ref 37–54)
HEMOGLOBIN: 14.3 G/DL (ref 12.5–16.5)
IMMATURE GRANULOCYTES #: 0.22 E9/L
IMMATURE GRANULOCYTES %: 3.2 % (ref 0–5)
LYMPHOCYTES ABSOLUTE: 2.26 E9/L (ref 1.5–4)
LYMPHOCYTES RELATIVE PERCENT: 33.2 % (ref 20–42)
MCH RBC QN AUTO: 33.1 PG (ref 26–35)
MCHC RBC AUTO-ENTMCNC: 34.6 % (ref 32–34.5)
MCV RBC AUTO: 95.6 FL (ref 80–99.9)
MONOCYTES ABSOLUTE: 0.6 E9/L (ref 0.1–0.95)
MONOCYTES RELATIVE PERCENT: 8.8 % (ref 2–12)
NEUTROPHILS ABSOLUTE: 3.38 E9/L (ref 1.8–7.3)
NEUTROPHILS RELATIVE PERCENT: 49.8 % (ref 43–80)
PDW BLD-RTO: 11.5 FL (ref 11.5–15)
PLATELET # BLD: 142 E9/L (ref 130–450)
PMV BLD AUTO: 9.3 FL (ref 7–12)
RBC # BLD: 4.32 E12/L (ref 3.8–5.8)
VALPROIC ACID LEVEL: 55 MCG/ML (ref 50–100)
WBC # BLD: 6.8 E9/L (ref 4.5–11.5)

## 2020-07-07 PROCEDURE — 80164 ASSAY DIPROPYLACETIC ACD TOT: CPT

## 2020-07-07 PROCEDURE — 36415 COLL VENOUS BLD VENIPUNCTURE: CPT

## 2020-07-07 PROCEDURE — 85025 COMPLETE CBC W/AUTO DIFF WBC: CPT

## 2020-07-21 ENCOUNTER — HOSPITAL ENCOUNTER (OUTPATIENT)
Age: 40
Discharge: HOME | End: 2020-07-21
Payer: MEDICARE

## 2020-07-21 LAB
BASOPHILS ABSOLUTE: 0.05 E9/L (ref 0–0.2)
BASOPHILS RELATIVE PERCENT: 0.7 % (ref 0–2)
EOSINOPHILS ABSOLUTE: 0.17 E9/L (ref 0.05–0.5)
EOSINOPHILS RELATIVE PERCENT: 2.2 % (ref 0–6)
HCT VFR BLD CALC: 42.7 % (ref 37–54)
HEMOGLOBIN: 14.4 G/DL (ref 12.5–16.5)
IMMATURE GRANULOCYTES #: 0.09 E9/L
IMMATURE GRANULOCYTES %: 1.2 % (ref 0–5)
LYMPHOCYTES ABSOLUTE: 2.66 E9/L (ref 1.5–4)
LYMPHOCYTES RELATIVE PERCENT: 35 % (ref 20–42)
MCH RBC QN AUTO: 32.9 PG (ref 26–35)
MCHC RBC AUTO-ENTMCNC: 33.7 % (ref 32–34.5)
MCV RBC AUTO: 97.5 FL (ref 80–99.9)
MONOCYTES ABSOLUTE: 0.58 E9/L (ref 0.1–0.95)
MONOCYTES RELATIVE PERCENT: 7.6 % (ref 2–12)
NEUTROPHILS ABSOLUTE: 4.06 E9/L (ref 1.8–7.3)
NEUTROPHILS RELATIVE PERCENT: 53.3 % (ref 43–80)
PDW BLD-RTO: 11.5 FL (ref 11.5–15)
PLATELET # BLD: 140 E9/L (ref 130–450)
PMV BLD AUTO: 10.5 FL (ref 7–12)
RBC # BLD: 4.38 E12/L (ref 3.8–5.8)
WBC # BLD: 7.6 E9/L (ref 4.5–11.5)

## 2020-07-21 PROCEDURE — 85025 COMPLETE CBC W/AUTO DIFF WBC: CPT

## 2020-07-21 PROCEDURE — 36415 COLL VENOUS BLD VENIPUNCTURE: CPT

## 2020-08-04 ENCOUNTER — HOSPITAL ENCOUNTER (OUTPATIENT)
Age: 40
Discharge: HOME | End: 2020-08-04
Payer: MEDICARE

## 2020-08-04 LAB
BASOPHILS ABSOLUTE: 0.04 E9/L (ref 0–0.2)
BASOPHILS RELATIVE PERCENT: 0.7 % (ref 0–2)
EOSINOPHILS ABSOLUTE: 0.22 E9/L (ref 0.05–0.5)
EOSINOPHILS RELATIVE PERCENT: 3.8 % (ref 0–6)
HCT VFR BLD CALC: 43.3 % (ref 37–54)
HEMOGLOBIN: 14.7 G/DL (ref 12.5–16.5)
IMMATURE GRANULOCYTES #: 0.07 E9/L
IMMATURE GRANULOCYTES %: 1.2 % (ref 0–5)
LYMPHOCYTES ABSOLUTE: 2.59 E9/L (ref 1.5–4)
LYMPHOCYTES RELATIVE PERCENT: 44.7 % (ref 20–42)
MCH RBC QN AUTO: 32.8 PG (ref 26–35)
MCHC RBC AUTO-ENTMCNC: 33.9 % (ref 32–34.5)
MCV RBC AUTO: 96.7 FL (ref 80–99.9)
MONOCYTES ABSOLUTE: 0.33 E9/L (ref 0.1–0.95)
MONOCYTES RELATIVE PERCENT: 5.7 % (ref 2–12)
NEUTROPHILS ABSOLUTE: 2.55 E9/L (ref 1.8–7.3)
NEUTROPHILS RELATIVE PERCENT: 43.9 % (ref 43–80)
PDW BLD-RTO: 11.5 FL (ref 11.5–15)
PLATELET # BLD: 148 E9/L (ref 130–450)
PMV BLD AUTO: 9.4 FL (ref 7–12)
RBC # BLD: 4.48 E12/L (ref 3.8–5.8)
WBC # BLD: 5.8 E9/L (ref 4.5–11.5)

## 2020-08-04 PROCEDURE — 85025 COMPLETE CBC W/AUTO DIFF WBC: CPT

## 2020-08-04 PROCEDURE — 36415 COLL VENOUS BLD VENIPUNCTURE: CPT

## 2020-08-19 ENCOUNTER — HOSPITAL ENCOUNTER (OUTPATIENT)
Age: 40
Discharge: HOME | End: 2020-08-19
Payer: MEDICARE

## 2020-08-19 LAB
BASOPHILS ABSOLUTE: 0.05 E9/L (ref 0–0.2)
BASOPHILS RELATIVE PERCENT: 0.8 % (ref 0–2)
EOSINOPHILS ABSOLUTE: 0.12 E9/L (ref 0.05–0.5)
EOSINOPHILS RELATIVE PERCENT: 1.9 % (ref 0–6)
HCT VFR BLD CALC: 44.3 % (ref 37–54)
HEMOGLOBIN: 14.7 G/DL (ref 12.5–16.5)
IMMATURE GRANULOCYTES #: 0.06 E9/L
IMMATURE GRANULOCYTES %: 0.9 % (ref 0–5)
LYMPHOCYTES ABSOLUTE: 1.94 E9/L (ref 1.5–4)
LYMPHOCYTES RELATIVE PERCENT: 29.9 % (ref 20–42)
MCH RBC QN AUTO: 31.7 PG (ref 26–35)
MCHC RBC AUTO-ENTMCNC: 33.2 % (ref 32–34.5)
MCV RBC AUTO: 95.7 FL (ref 80–99.9)
MONOCYTES ABSOLUTE: 0.38 E9/L (ref 0.1–0.95)
MONOCYTES RELATIVE PERCENT: 5.9 % (ref 2–12)
NEUTROPHILS ABSOLUTE: 3.93 E9/L (ref 1.8–7.3)
NEUTROPHILS RELATIVE PERCENT: 60.6 % (ref 43–80)
PDW BLD-RTO: 11.6 FL (ref 11.5–15)
PLATELET # BLD: 172 E9/L (ref 130–450)
PMV BLD AUTO: 9.2 FL (ref 7–12)
RBC # BLD: 4.63 E12/L (ref 3.8–5.8)
WBC # BLD: 6.5 E9/L (ref 4.5–11.5)

## 2020-08-19 PROCEDURE — 36415 COLL VENOUS BLD VENIPUNCTURE: CPT

## 2020-08-19 PROCEDURE — 85025 COMPLETE CBC W/AUTO DIFF WBC: CPT

## 2020-09-04 ENCOUNTER — HOSPITAL ENCOUNTER (OUTPATIENT)
Age: 40
Discharge: HOME | End: 2020-09-04
Payer: MEDICARE

## 2020-09-04 LAB
BASOPHILS ABSOLUTE: 0.04 E9/L (ref 0–0.2)
BASOPHILS RELATIVE PERCENT: 0.6 % (ref 0–2)
EOSINOPHILS ABSOLUTE: 0.19 E9/L (ref 0.05–0.5)
EOSINOPHILS RELATIVE PERCENT: 2.7 % (ref 0–6)
HCT VFR BLD CALC: 41.8 % (ref 37–54)
HEMOGLOBIN: 14.4 G/DL (ref 12.5–16.5)
IMMATURE GRANULOCYTES #: 0.11 E9/L
IMMATURE GRANULOCYTES %: 1.6 % (ref 0–5)
LYMPHOCYTES ABSOLUTE: 2.14 E9/L (ref 1.5–4)
LYMPHOCYTES RELATIVE PERCENT: 30.8 % (ref 20–42)
MCH RBC QN AUTO: 33 PG (ref 26–35)
MCHC RBC AUTO-ENTMCNC: 34.4 % (ref 32–34.5)
MCV RBC AUTO: 95.9 FL (ref 80–99.9)
MONOCYTES ABSOLUTE: 0.48 E9/L (ref 0.1–0.95)
MONOCYTES RELATIVE PERCENT: 6.9 % (ref 2–12)
NEUTROPHILS ABSOLUTE: 3.99 E9/L (ref 1.8–7.3)
NEUTROPHILS RELATIVE PERCENT: 57.4 % (ref 43–80)
PDW BLD-RTO: 11.6 FL (ref 11.5–15)
PLATELET # BLD: 144 E9/L (ref 130–450)
PMV BLD AUTO: 10 FL (ref 7–12)
RBC # BLD: 4.36 E12/L (ref 3.8–5.8)
WBC # BLD: 7 E9/L (ref 4.5–11.5)

## 2020-09-04 PROCEDURE — 85025 COMPLETE CBC W/AUTO DIFF WBC: CPT

## 2020-09-04 PROCEDURE — 36415 COLL VENOUS BLD VENIPUNCTURE: CPT

## 2020-09-14 ENCOUNTER — HOSPITAL ENCOUNTER (OUTPATIENT)
Age: 40
Discharge: HOME | End: 2020-09-14
Payer: MEDICARE

## 2020-09-14 LAB
BASOPHILS ABSOLUTE: 0.04 E9/L (ref 0–0.2)
BASOPHILS RELATIVE PERCENT: 0.7 % (ref 0–2)
EOSINOPHILS ABSOLUTE: 0.16 E9/L (ref 0.05–0.5)
EOSINOPHILS RELATIVE PERCENT: 2.8 % (ref 0–6)
HCT VFR BLD CALC: 43.9 % (ref 37–54)
HEMOGLOBIN: 14.8 G/DL (ref 12.5–16.5)
IMMATURE GRANULOCYTES #: 0.1 E9/L
IMMATURE GRANULOCYTES %: 1.7 % (ref 0–5)
LYMPHOCYTES ABSOLUTE: 1.8 E9/L (ref 1.5–4)
LYMPHOCYTES RELATIVE PERCENT: 31 % (ref 20–42)
MCH RBC QN AUTO: 32.5 PG (ref 26–35)
MCHC RBC AUTO-ENTMCNC: 33.7 % (ref 32–34.5)
MCV RBC AUTO: 96.3 FL (ref 80–99.9)
MONOCYTES ABSOLUTE: 0.5 E9/L (ref 0.1–0.95)
MONOCYTES RELATIVE PERCENT: 8.6 % (ref 2–12)
NEUTROPHILS ABSOLUTE: 3.2 E9/L (ref 1.8–7.3)
NEUTROPHILS RELATIVE PERCENT: 55.2 % (ref 43–80)
PDW BLD-RTO: 11.8 FL (ref 11.5–15)
PLATELET # BLD: 151 E9/L (ref 130–450)
PMV BLD AUTO: 9.4 FL (ref 7–12)
RBC # BLD: 4.56 E12/L (ref 3.8–5.8)
WBC # BLD: 5.8 E9/L (ref 4.5–11.5)

## 2020-09-14 PROCEDURE — 85025 COMPLETE CBC W/AUTO DIFF WBC: CPT

## 2020-09-14 PROCEDURE — 36415 COLL VENOUS BLD VENIPUNCTURE: CPT

## 2020-10-12 ENCOUNTER — HOSPITAL ENCOUNTER (OUTPATIENT)
Age: 40
Discharge: HOME | End: 2020-10-12
Payer: MEDICARE

## 2020-10-12 LAB
BASOPHILS ABSOLUTE: 0.03 E9/L (ref 0–0.2)
BASOPHILS RELATIVE PERCENT: 0.4 % (ref 0–2)
EOSINOPHILS ABSOLUTE: 0.2 E9/L (ref 0.05–0.5)
EOSINOPHILS RELATIVE PERCENT: 2.5 % (ref 0–6)
HCT VFR BLD CALC: 42.3 % (ref 37–54)
HEMOGLOBIN: 14.4 G/DL (ref 12.5–16.5)
IMMATURE GRANULOCYTES #: 0.1 E9/L
IMMATURE GRANULOCYTES %: 1.3 % (ref 0–5)
LYMPHOCYTES ABSOLUTE: 2.39 E9/L (ref 1.5–4)
LYMPHOCYTES RELATIVE PERCENT: 30.4 % (ref 20–42)
MCH RBC QN AUTO: 32.7 PG (ref 26–35)
MCHC RBC AUTO-ENTMCNC: 34 % (ref 32–34.5)
MCV RBC AUTO: 95.9 FL (ref 80–99.9)
MONOCYTES ABSOLUTE: 0.41 E9/L (ref 0.1–0.95)
MONOCYTES RELATIVE PERCENT: 5.2 % (ref 2–12)
NEUTROPHILS ABSOLUTE: 4.72 E9/L (ref 1.8–7.3)
NEUTROPHILS RELATIVE PERCENT: 60.2 % (ref 43–80)
PDW BLD-RTO: 11.8 FL (ref 11.5–15)
PLATELET # BLD: 134 E9/L (ref 130–450)
PMV BLD AUTO: 9.4 FL (ref 7–12)
RBC # BLD: 4.41 E12/L (ref 3.8–5.8)
WBC # BLD: 7.9 E9/L (ref 4.5–11.5)

## 2020-10-12 PROCEDURE — 85025 COMPLETE CBC W/AUTO DIFF WBC: CPT

## 2020-10-12 PROCEDURE — 36415 COLL VENOUS BLD VENIPUNCTURE: CPT

## 2020-10-14 PROBLEM — G25.71 AKATHISIA: Status: ACTIVE | Noted: 2020-10-14

## 2020-10-14 PROBLEM — F84.0 AUTISM SPECTRUM DISORDER: Status: ACTIVE | Noted: 2020-10-14

## 2020-10-14 PROBLEM — G21.19 DRUG-INDUCED PARKINSONISM (HCC): Status: ACTIVE | Noted: 2020-10-14

## 2020-10-14 PROBLEM — D70.2 DRUG-INDUCED NEUTROPENIA (HCC): Status: ACTIVE | Noted: 2020-10-14

## 2020-11-05 ENCOUNTER — HOSPITAL ENCOUNTER (OUTPATIENT)
Age: 40
Discharge: HOME | End: 2020-11-05
Payer: MEDICARE

## 2020-11-05 LAB
BASOPHILS ABSOLUTE: 0.05 E9/L (ref 0–0.2)
BASOPHILS RELATIVE PERCENT: 0.8 % (ref 0–2)
EOSINOPHILS ABSOLUTE: 0.22 E9/L (ref 0.05–0.5)
EOSINOPHILS RELATIVE PERCENT: 3.4 % (ref 0–6)
HCT VFR BLD CALC: 45.3 % (ref 37–54)
HEMOGLOBIN: 15.1 G/DL (ref 12.5–16.5)
IMMATURE GRANULOCYTES #: 0.11 E9/L
IMMATURE GRANULOCYTES %: 1.7 % (ref 0–5)
LYMPHOCYTES ABSOLUTE: 2.65 E9/L (ref 1.5–4)
LYMPHOCYTES RELATIVE PERCENT: 40.7 % (ref 20–42)
MCH RBC QN AUTO: 31.9 PG (ref 26–35)
MCHC RBC AUTO-ENTMCNC: 33.3 % (ref 32–34.5)
MCV RBC AUTO: 95.8 FL (ref 80–99.9)
MONOCYTES ABSOLUTE: 0.43 E9/L (ref 0.1–0.95)
MONOCYTES RELATIVE PERCENT: 6.6 % (ref 2–12)
NEUTROPHILS ABSOLUTE: 3.05 E9/L (ref 1.8–7.3)
NEUTROPHILS RELATIVE PERCENT: 46.8 % (ref 43–80)
PDW BLD-RTO: 12 FL (ref 11.5–15)
PLATELET # BLD: 148 E9/L (ref 130–450)
PMV BLD AUTO: 9.7 FL (ref 7–12)
RBC # BLD: 4.73 E12/L (ref 3.8–5.8)
WBC # BLD: 6.5 E9/L (ref 4.5–11.5)

## 2020-11-05 PROCEDURE — 85025 COMPLETE CBC W/AUTO DIFF WBC: CPT

## 2020-11-05 PROCEDURE — 36415 COLL VENOUS BLD VENIPUNCTURE: CPT

## 2020-12-05 ENCOUNTER — HOSPITAL ENCOUNTER (OUTPATIENT)
Age: 40
Discharge: HOME | End: 2020-12-05
Payer: MEDICARE

## 2020-12-11 ENCOUNTER — HOSPITAL ENCOUNTER (OUTPATIENT)
Age: 40
Discharge: HOME | End: 2020-12-11
Payer: MEDICARE

## 2020-12-14 ENCOUNTER — HOSPITAL ENCOUNTER (EMERGENCY)
Age: 40
Discharge: HOME | End: 2020-12-14
Attending: EMERGENCY MEDICINE
Payer: MEDICARE

## 2020-12-14 VITALS
RESPIRATION RATE: 18 BRPM | SYSTOLIC BLOOD PRESSURE: 140 MMHG | HEART RATE: 92 BPM | OXYGEN SATURATION: 95 % | TEMPERATURE: 97.1 F | DIASTOLIC BLOOD PRESSURE: 79 MMHG

## 2020-12-14 LAB
ANION GAP SERPL CALCULATED.3IONS-SCNC: 10 MMOL/L (ref 7–16)
BASOPHILS ABSOLUTE: 0.05 E9/L (ref 0–0.2)
BASOPHILS RELATIVE PERCENT: 0.6 % (ref 0–2)
BUN BLDV-MCNC: 26 MG/DL (ref 6–20)
CALCIUM SERPL-MCNC: 9.2 MG/DL (ref 8.6–10.2)
CHLORIDE BLD-SCNC: 103 MMOL/L (ref 98–107)
CO2: 25 MMOL/L (ref 22–29)
CREAT SERPL-MCNC: 1 MG/DL (ref 0.7–1.2)
EOSINOPHILS ABSOLUTE: 0.14 E9/L (ref 0.05–0.5)
EOSINOPHILS RELATIVE PERCENT: 1.7 % (ref 0–6)
GFR AFRICAN AMERICAN: >60
GFR NON-AFRICAN AMERICAN: >60 ML/MIN/1.73
GLUCOSE BLD-MCNC: 116 MG/DL (ref 74–99)
HCT VFR BLD CALC: 45.8 % (ref 37–54)
HEMOGLOBIN: 15.5 G/DL (ref 12.5–16.5)
IMMATURE GRANULOCYTES #: 0.1 E9/L
IMMATURE GRANULOCYTES %: 1.2 % (ref 0–5)
LYMPHOCYTES ABSOLUTE: 2.54 E9/L (ref 1.5–4)
LYMPHOCYTES RELATIVE PERCENT: 31.4 % (ref 20–42)
MCH RBC QN AUTO: 32.4 PG (ref 26–35)
MCHC RBC AUTO-ENTMCNC: 33.8 % (ref 32–34.5)
MCV RBC AUTO: 95.6 FL (ref 80–99.9)
MONOCYTES ABSOLUTE: 0.69 E9/L (ref 0.1–0.95)
MONOCYTES RELATIVE PERCENT: 8.5 % (ref 2–12)
NEUTROPHILS ABSOLUTE: 4.57 E9/L (ref 1.8–7.3)
NEUTROPHILS RELATIVE PERCENT: 56.6 % (ref 43–80)
PDW BLD-RTO: 11.9 FL (ref 11.5–15)
PLATELET # BLD: 144 E9/L (ref 130–450)
PMV BLD AUTO: 10.1 FL (ref 7–12)
POTASSIUM SERPL-SCNC: 4.4 MMOL/L (ref 3.5–5)
RBC # BLD: 4.79 E12/L (ref 3.8–5.8)
SODIUM BLD-SCNC: 138 MMOL/L (ref 132–146)
VALPROIC ACID LEVEL: 58 MCG/ML (ref 50–100)
WBC # BLD: 8.1 E9/L (ref 4.5–11.5)

## 2020-12-14 PROCEDURE — 2580000003 HC RX 258: Performed by: EMERGENCY MEDICINE

## 2020-12-14 PROCEDURE — 36415 COLL VENOUS BLD VENIPUNCTURE: CPT

## 2020-12-14 PROCEDURE — 80164 ASSAY DIPROPYLACETIC ACD TOT: CPT

## 2020-12-14 PROCEDURE — 80048 BASIC METABOLIC PNL TOTAL CA: CPT

## 2020-12-14 PROCEDURE — 85025 COMPLETE CBC W/AUTO DIFF WBC: CPT

## 2020-12-14 PROCEDURE — 99283 EMERGENCY DEPT VISIT LOW MDM: CPT

## 2020-12-14 RX ORDER — 0.9 % SODIUM CHLORIDE 0.9 %
1000 INTRAVENOUS SOLUTION INTRAVENOUS ONCE
Status: COMPLETED | OUTPATIENT
Start: 2020-12-14 | End: 2020-12-14

## 2020-12-14 RX ADMIN — SODIUM CHLORIDE 1000 ML: 9 INJECTION, SOLUTION INTRAVENOUS at 14:04

## 2020-12-14 NOTE — ED PROVIDER NOTES
Chief complaint:  Labs    HPI History provided by family  Patient brought in to have laboratory studies done, he is supposed to have CBC drawn every month and Depakote levels checked. Family states that they are having trouble drawing him as an outpatient so they were told by the doctors to have brought to the emergency room to have them drawn. No new changes, apparently there stating that his overall autism and psychiatric issues are gradually worsening for months and they are considering try to get him into a permanent or inpatient facility but are going to do that as an outpatient. He has had no vomiting or diarrhea. No fevers, sweats or chills. No cough or congestion. No apparent shortness of breath. The patient himself is significantly autistic and offers no chief complaint although he does state he feels fine and does not hurt anywhere he offers no real other information. There is no treatment prior to arrival.      Review of Systems   Unable to perform ROS: Psychiatric disorder        Physical Exam  Vitals signs and nursing note reviewed. Constitutional:       General: He is not in acute distress. Appearance: He is well-developed. He is not ill-appearing, toxic-appearing or diaphoretic. HENT:      Head: Normocephalic and atraumatic. Eyes:      General: No scleral icterus. Pupils: Pupils are equal, round, and reactive to light. Neck:      Musculoskeletal: Normal range of motion and neck supple. Normal range of motion. No neck rigidity, injury, pain with movement, spinous process tenderness or muscular tenderness. Cardiovascular:      Rate and Rhythm: Normal rate and regular rhythm. Heart sounds: Normal heart sounds. No murmur. Pulmonary:      Effort: Pulmonary effort is normal. No respiratory distress. Breath sounds: Normal breath sounds. No stridor, decreased air movement or transmitted upper airway sounds. No decreased breath sounds, wheezing, rhonchi or rales.    Chest: Chest wall: No tenderness. Abdominal:      General: Bowel sounds are normal. There is no distension. Palpations: Abdomen is soft. Tenderness: There is no abdominal tenderness. There is no right CVA tenderness, left CVA tenderness, guarding or rebound. Musculoskeletal:         General: No swelling, tenderness, deformity or signs of injury. Right lower leg: No edema. Left lower leg: No edema. Lymphadenopathy:      Cervical: No cervical adenopathy. Skin:     General: Skin is warm and dry. Coloration: Skin is not jaundiced or pale. Findings: No erythema or rash. Neurological:      General: No focal deficit present. Mental Status: He is alert. GCS: GCS eye subscore is 4. GCS verbal subscore is 5. GCS motor subscore is 6. Cranial Nerves: No cranial nerve deficit. Coordination: Coordination normal.          Procedures     MDM                --------------------------------------------- PAST HISTORY ---------------------------------------------  Past Medical History:  has a past medical history of ADHD (attention deficit hyperactivity disorder), Autism, Autism, B[facility] Mental retardation, and Schizoaffective disorder ([facility] Past Surgical History:  has a past surgical history that includes [place] tooth extraction (2006) and other surgical history. Social History:  reports that he has never smoked. He has never used smokeless tobacco. He reports that he does not drink alcohol or use drugs. Family History: family history includes Diabetes in his mother; High Blood Pressure in his mother. The patients home medications have been reviewed.     Allergies: Seasonal    -------------------------------------------------- RESULTS -------------------------------------------------  Labs:  Results for orders placed or performed during the hospital encounter of 12/14/20   CBC Auto Differential   Result Value Ref Range    WBC 8.1 4.5 - 11.5 E9/L RBC 4.79 3.80 - 5.80 E12/L    Hemoglobin 15.5 12.5 - 16.5 g/dL    Hematocrit 45.8 37.0 - 54.0 %    MCV 95.6 80.0 - 99.9 fL    MCH 32.4 26.0 - 35.0 pg    MCHC 33.8 32.0 - 34.5 %    RDW 11.9 11.5 - 15.0 fL    Platelets 480 648 - 596 E9/L    MPV 10.1 7.0 - 12.0 fL    Neutrophils % 56.6 43.0 - 80.0 %    Immature Granulocytes % 1.2 0.0 - 5.0 %    Lymphocytes % 31.4 20.0 - 42.0 %    Monocytes % 8.5 2.0 - 12.0 %    Eosinophils % 1.7 0.0 - 6.0 %    Basophils % 0.6 0.0 - 2.0 %    Neutrophils Absolute 4.57 1.80 - 7.30 E9/L    Immature Granulocytes # 0.10 E9/L    Lymphocytes Absolute 2.54 1.50 - 4.00 E9/L    Monocytes Absolute 0.69 0.10 - 0.95 E9/L    Eosinophils Absolute 0.14 0.05 - 0.50 E9/L    Basophils Absolute 0.05 0.00 - 0.20 L1/L   Basic Metabolic Panel   Result Value Ref Range    Sodium 138 132 - 146 mmol/L    Potassium 4.4 3.5 - 5.0 mmol/L    Chloride 103 98 - 107 mmol/L    CO2 25 22 - 29 mmol/L    Anion Gap 10 7 - 16 mmol/L    Glucose 116 (H) 74 - 99 mg/dL    BUN 26 (H) 6 - 20 mg/dL    CREATININE 1.0 0.7 - 1.2 mg/dL    GFR Non-African American >60 >=60 mL/min/1.73    GFR African American >60     Calcium 9.2 8.6 - 10.2 mg/dL   Valproic acid level, total   Result Value Ref Range    Valproic Acid Lvl 58 50 - 100 mcg/mL       Radiology:  No orders to display       ------------------------- NURSING NOTES AND VITALS REVIEWED ---------------------------  Date / Time Roomed:  12/14/2020 11:44 AM  ED Bed Assignment:  25/25    The nursing notes within the ED encounter and vital signs as below have been reviewed.    BP (!) 140/79   Pulse 92   Temp 97.1 °F (36.2 °C) (Infrared)   Resp 18   SpO2 95%   Oxygen Saturation Interpretation: Normal      ------------------------------------------ PROGRESS NOTES ------------------------------------------  I have spoken with the patient and family and  discussed todays results, in addition to providing specific details for the plan of care and counseling regarding the diagnosis and prognosis. Their questions are answered at this time and they are agreeable with the plan. I discussed at length with them reasons for immediate return here for re evaluation. They will followup with primary care by calling their office tomorrow. --------------------------------- ADDITIONAL PROVIDER NOTES ---------------------------------  At this time the patient is without objective evidence of an acute process requiring hospitalization or inpatient management. They have remained hemodynamically stable throughout their entire ED visit and are stable for discharge with outpatient follow-up. The plan has been discussed in detail and they are aware of the specific conditions for emergent return, as well as the importance of follow-up. New Prescriptions    No medications on file       Diagnosis:  1. Well adult health check        Disposition:  Patient's disposition: Discharge to home  Patient's condition is stable.             Dania Tavera,   12/14/20 1441

## 2021-01-29 ENCOUNTER — HOSPITAL ENCOUNTER (OUTPATIENT)
Age: 41
Discharge: HOME | End: 2021-01-29
Payer: MEDICARE

## 2021-01-29 LAB
BASOPHILS ABSOLUTE: 0.08 E9/L (ref 0–0.2)
BASOPHILS RELATIVE PERCENT: 1.1 % (ref 0–2)
EOSINOPHILS ABSOLUTE: 0.31 E9/L (ref 0.05–0.5)
EOSINOPHILS RELATIVE PERCENT: 4.4 % (ref 0–6)
HCT VFR BLD CALC: 43.4 % (ref 37–54)
HEMOGLOBIN: 14.8 G/DL (ref 12.5–16.5)
IMMATURE GRANULOCYTES #: 0.17 E9/L
IMMATURE GRANULOCYTES %: 2.4 % (ref 0–5)
LYMPHOCYTES ABSOLUTE: 2.95 E9/L (ref 1.5–4)
LYMPHOCYTES RELATIVE PERCENT: 42.3 % (ref 20–42)
MCH RBC QN AUTO: 32.8 PG (ref 26–35)
MCHC RBC AUTO-ENTMCNC: 34.1 % (ref 32–34.5)
MCV RBC AUTO: 96.2 FL (ref 80–99.9)
MONOCYTES ABSOLUTE: 0.5 E9/L (ref 0.1–0.95)
MONOCYTES RELATIVE PERCENT: 7.2 % (ref 2–12)
NEUTROPHILS ABSOLUTE: 2.97 E9/L (ref 1.8–7.3)
NEUTROPHILS RELATIVE PERCENT: 42.6 % (ref 43–80)
PDW BLD-RTO: 12.3 FL (ref 11.5–15)
PLATELET # BLD: 128 E9/L (ref 130–450)
PMV BLD AUTO: 10.1 FL (ref 7–12)
RBC # BLD: 4.51 E12/L (ref 3.8–5.8)
WBC # BLD: 7 E9/L (ref 4.5–11.5)

## 2021-01-29 PROCEDURE — 85025 COMPLETE CBC W/AUTO DIFF WBC: CPT

## 2021-01-29 PROCEDURE — 36415 COLL VENOUS BLD VENIPUNCTURE: CPT

## 2021-02-25 ENCOUNTER — HOSPITAL ENCOUNTER (OUTPATIENT)
Age: 41
Discharge: HOME | End: 2021-02-25
Payer: MEDICARE

## 2021-02-25 LAB
BASOPHILS ABSOLUTE: 0.04 E9/L (ref 0–0.2)
BASOPHILS RELATIVE PERCENT: 0.7 % (ref 0–2)
EOSINOPHILS ABSOLUTE: 0.24 E9/L (ref 0.05–0.5)
EOSINOPHILS RELATIVE PERCENT: 4 % (ref 0–6)
HCT VFR BLD CALC: 41.6 % (ref 37–54)
HEMOGLOBIN: 14 G/DL (ref 12.5–16.5)
IMMATURE GRANULOCYTES #: 0.11 E9/L
IMMATURE GRANULOCYTES %: 1.8 % (ref 0–5)
LYMPHOCYTES ABSOLUTE: 2.37 E9/L (ref 1.5–4)
LYMPHOCYTES RELATIVE PERCENT: 39.5 % (ref 20–42)
MCH RBC QN AUTO: 32.7 PG (ref 26–35)
MCHC RBC AUTO-ENTMCNC: 33.7 % (ref 32–34.5)
MCV RBC AUTO: 97.2 FL (ref 80–99.9)
MONOCYTES ABSOLUTE: 0.42 E9/L (ref 0.1–0.95)
MONOCYTES RELATIVE PERCENT: 7 % (ref 2–12)
NEUTROPHILS ABSOLUTE: 2.82 E9/L (ref 1.8–7.3)
NEUTROPHILS RELATIVE PERCENT: 47 % (ref 43–80)
PDW BLD-RTO: 12.6 FL (ref 11.5–15)
PLATELET # BLD: 145 E9/L (ref 130–450)
PMV BLD AUTO: 9.5 FL (ref 7–12)
RBC # BLD: 4.28 E12/L (ref 3.8–5.8)
WBC # BLD: 6 E9/L (ref 4.5–11.5)

## 2021-02-25 PROCEDURE — 36415 COLL VENOUS BLD VENIPUNCTURE: CPT

## 2021-02-25 PROCEDURE — 85025 COMPLETE CBC W/AUTO DIFF WBC: CPT

## 2021-03-08 ENCOUNTER — HOSPITAL ENCOUNTER (OUTPATIENT)
Age: 41
Discharge: HOME | End: 2021-03-08
Payer: MEDICARE

## 2021-03-08 LAB
AMMONIA: 120 UMOL/L (ref 16–60)
BASOPHILS ABSOLUTE: 0.02 E9/L (ref 0–0.2)
BASOPHILS RELATIVE PERCENT: 0.5 % (ref 0–2)
EOSINOPHILS ABSOLUTE: 0.12 E9/L (ref 0.05–0.5)
EOSINOPHILS RELATIVE PERCENT: 3.3 % (ref 0–6)
HCT VFR BLD CALC: 42.2 % (ref 37–54)
HEMOGLOBIN: 14.1 G/DL (ref 12.5–16.5)
IMMATURE GRANULOCYTES #: 0.03 E9/L
IMMATURE GRANULOCYTES %: 0.8 % (ref 0–5)
LYMPHOCYTES ABSOLUTE: 1.56 E9/L (ref 1.5–4)
LYMPHOCYTES RELATIVE PERCENT: 42.7 % (ref 20–42)
MCH RBC QN AUTO: 32.6 PG (ref 26–35)
MCHC RBC AUTO-ENTMCNC: 33.4 % (ref 32–34.5)
MCV RBC AUTO: 97.5 FL (ref 80–99.9)
MONOCYTES ABSOLUTE: 0.27 E9/L (ref 0.1–0.95)
MONOCYTES RELATIVE PERCENT: 7.4 % (ref 2–12)
NEUTROPHILS ABSOLUTE: 1.65 E9/L (ref 1.8–7.3)
NEUTROPHILS RELATIVE PERCENT: 45.3 % (ref 43–80)
PDW BLD-RTO: 12 FL (ref 11.5–15)
PLATELET # BLD: 113 E9/L (ref 130–450)
PMV BLD AUTO: 10.5 FL (ref 7–12)
RBC # BLD: 4.33 E12/L (ref 3.8–5.8)
VALPROIC ACID LEVEL: 87 MCG/ML (ref 50–100)
WBC # BLD: 3.7 E9/L (ref 4.5–11.5)

## 2021-03-08 PROCEDURE — 36415 COLL VENOUS BLD VENIPUNCTURE: CPT

## 2021-03-08 PROCEDURE — 82140 ASSAY OF AMMONIA: CPT

## 2021-03-08 PROCEDURE — 80164 ASSAY DIPROPYLACETIC ACD TOT: CPT

## 2021-03-08 PROCEDURE — 85025 COMPLETE CBC W/AUTO DIFF WBC: CPT

## 2021-04-28 ENCOUNTER — HOSPITAL ENCOUNTER (OUTPATIENT)
Age: 41
Discharge: HOME | End: 2021-04-28
Payer: MEDICARE

## 2021-04-28 LAB — AMMONIA: 20 UMOL/L (ref 16–60)

## 2021-04-28 PROCEDURE — 36415 COLL VENOUS BLD VENIPUNCTURE: CPT

## 2021-04-28 PROCEDURE — 82140 ASSAY OF AMMONIA: CPT

## 2021-05-12 ENCOUNTER — HOSPITAL ENCOUNTER (OUTPATIENT)
Age: 41
Discharge: HOME | End: 2021-05-12
Payer: MEDICARE

## 2021-05-12 LAB
BASOPHILS ABSOLUTE: 0.05 E9/L (ref 0–0.2)
BASOPHILS RELATIVE PERCENT: 0.7 % (ref 0–2)
EOSINOPHILS ABSOLUTE: 0.07 E9/L (ref 0.05–0.5)
EOSINOPHILS RELATIVE PERCENT: 1 % (ref 0–6)
HCT VFR BLD CALC: 44.1 % (ref 37–54)
HEMOGLOBIN: 15.1 G/DL (ref 12.5–16.5)
IMMATURE GRANULOCYTES #: 0.03 E9/L
IMMATURE GRANULOCYTES %: 0.4 % (ref 0–5)
LYMPHOCYTES ABSOLUTE: 1.94 E9/L (ref 1.5–4)
LYMPHOCYTES RELATIVE PERCENT: 27.7 % (ref 20–42)
MCH RBC QN AUTO: 32.8 PG (ref 26–35)
MCHC RBC AUTO-ENTMCNC: 34.2 % (ref 32–34.5)
MCV RBC AUTO: 95.9 FL (ref 80–99.9)
MONOCYTES ABSOLUTE: 0.45 E9/L (ref 0.1–0.95)
MONOCYTES RELATIVE PERCENT: 6.4 % (ref 2–12)
NEUTROPHILS ABSOLUTE: 4.47 E9/L (ref 1.8–7.3)
NEUTROPHILS RELATIVE PERCENT: 63.8 % (ref 43–80)
PDW BLD-RTO: 11.4 FL (ref 11.5–15)
PLATELET # BLD: 229 E9/L (ref 130–450)
PMV BLD AUTO: 9.9 FL (ref 7–12)
RBC # BLD: 4.6 E12/L (ref 3.8–5.8)
WBC # BLD: 7 E9/L (ref 4.5–11.5)

## 2021-05-12 PROCEDURE — 36415 COLL VENOUS BLD VENIPUNCTURE: CPT

## 2021-05-12 PROCEDURE — 85025 COMPLETE CBC W/AUTO DIFF WBC: CPT

## 2021-05-17 NOTE — PROGRESS NOTES
DATE OF SERVICE:     9/29/2018    Hollie Graham seen today for the purpose of continuation of care. Nursing, social work reports, laboratory studies and vital signs are reviewed. Patient chief complaint today is:             [] Depression      [x] Anxiety        [] Psychosis         [] Suicidal/Homicidal                         [x] Delusions           [] Aggression          Subjective: Today patient is pleasant and smiling. Patient is taking medications as ordered. Patient remains paranoid but is improving. Sleep:  [] Good [x] Fair  [] Poor  Appetite:  [] Good [x] Fair  [] Poor    Depression:  [] Mild [] Moderate [] Severe                [] Constant [] Sporadic     Anxiety: [] Mild [] Moderate [x] Severe    [x] Constant [] Sporadic     Delusions: [] Mild [] Moderate [x] Severe     [x] Constant [] Sporadic     [x] Paranoid [] Somatic [] Grandiose     Hallucinations: [] Mild [] Moderate [] Severe     [] Constant [] Sporadic    [] Auditory  [] Visual [] Tactile       Suicidal: [] Constant [] Sporadic  Homicidal: [] Constant [] Sporadic    Unscheduled Medications     [] Patient Receiving Emergency Medications \" Chemical Restraint\"   [] Requesting PRN medications for anxiety    Medical Review of Systems:     All other than marked systmes have been reviewed and are all negative.     Constitutional Symptoms: []  fever []  Chills  Skin Symptoms: [] rash []  Pruritus   Eye Symptoms: [] Vision unchanged []  recent vision problems[] blurred vision   Respiratory Symptoms:[] shortness of breath [] cough  Cardiovascular Symptoms:  [] chest pain   [] palpitations   Gastrointestinal Symptoms: []  abdominal pain []  nausea []  vomiting []  diarrhea  Genitourinary Symptoms: []  dysuria  []  hematuria   Musculoskeletal Symptoms: []  back pain []  muscle pain []  joint pain  Neurologic Symptoms: []  headache []  dizziness  Hematolymphoid Symptoms: [] Adenopathy [] Bruises   [] Schimosis       Psychiatric Review of Detail Level: Detailed Quality 226: Preventive Care And Screening: Tobacco Use: Screening And Cessation Intervention: Patient screened for tobacco use, is a smoker AND received Cessation Counseling

## 2021-05-24 ENCOUNTER — NURSE TRIAGE (OUTPATIENT)
Dept: OTHER | Facility: CLINIC | Age: 41
End: 2021-05-24

## 2021-05-24 ENCOUNTER — TELEPHONE (OUTPATIENT)
Dept: ADMINISTRATIVE | Age: 41
End: 2021-05-24

## 2021-05-24 NOTE — TELEPHONE ENCOUNTER
Pt's mother Winifrede called stating Pt is having some urine retention no other symptoms noted . Call transferred to [address] [place] [address] in nurse triage

## 2021-05-24 NOTE — TELEPHONE ENCOUNTER
Received call from [facility] at [facility]) [place] [place] with [employer] Complaint. Brief description of triage: UTI with dark urine and retension    Triage indicates for patient to see PCP today or tomorrow    Care advice provided, patient verbalizes understanding; denies any other questions or concerns; instructed to call back for any new or worsening symptoms. Writer [facility] warm [facility]. Attention Provider: Thank you for allowing me to participate in the care of your patient. The patient was connected to triage in response to information provided to the [facility]. Please do not respond through this encounter as the response is not directed to a shared pool. Reason for Disposition   Bad or foul-smelling urine    Answer Assessment - Initial Assessment Questions  1. SYMPTOM: \"What's the main symptom you're concerned about? \" (e.g., frequency, incontinence)      Retention , then dark urine    2. ONSET: \"When did the     start?\"      1 month    3. PAIN: \"Is there any pain? \" If so, ask: \"How bad is it? \" (Scale: 1-10; mild, moderate, severe)      Denies    4. CAUSE: \"What do you think is causing the symptoms? \"      Psych meds may be the cause    5. OTHER SYMPTOMS: \"Do you have any other symptoms? \" (e.g., fever, flank pain, blood in urine, pain with urination)      Back pain    6. PREGNANCY: \"Is there any chance you are pregnant? \" \"When was your last menstrual period? \"      N/a    Protocols used: URINARY SYMPTOMS-ADULT-[place]

## 2021-05-26 ENCOUNTER — OFFICE VISIT (OUTPATIENT)
Dept: FAMILY MEDICINE CLINIC | Age: 41
End: 2021-05-26
Payer: MEDICARE

## 2021-05-26 VITALS
TEMPERATURE: 96.6 F | BODY MASS INDEX: 33.92 KG/M2 | OXYGEN SATURATION: 99 % | WEIGHT: 229 LBS | HEART RATE: 72 BPM | DIASTOLIC BLOOD PRESSURE: 85 MMHG | RESPIRATION RATE: 16 BRPM | HEIGHT: 69 IN | SYSTOLIC BLOOD PRESSURE: 134 MMHG

## 2021-05-26 DIAGNOSIS — R13.19 ESOPHAGEAL DYSPHAGIA: ICD-10-CM

## 2021-05-26 DIAGNOSIS — R30.0 DYSURIA: Primary | ICD-10-CM

## 2021-05-26 DIAGNOSIS — F25.8 OTHER SCHIZOAFFECTIVE DISORDERS (HCC): ICD-10-CM

## 2021-05-26 PROBLEM — R19.5 POSITIVE OCCULT STOOL BLOOD TEST: Status: RESOLVED | Noted: 2018-10-23 | Resolved: 2021-05-26

## 2021-05-26 PROBLEM — L27.0 ERUPTION DUE TO DRUG: Status: RESOLVED | Noted: 2019-06-28 | Resolved: 2021-05-26

## 2021-05-26 PROBLEM — F25.9 SCHIZOAFFECTIVE DISORDER, CHRONIC CONDITION WITH ACUTE EXACERBATION (HCC): Status: RESOLVED | Noted: 2019-05-02 | Resolved: 2021-05-26

## 2021-05-26 PROBLEM — D70.2 DRUG-INDUCED NEUTROPENIA (HCC): Status: RESOLVED | Noted: 2020-10-14 | Resolved: 2021-05-26

## 2021-05-26 PROBLEM — K76.82 HE (HEPATIC ENCEPHALOPATHY): Status: RESOLVED | Noted: 2021-05-26 | Resolved: 2021-05-26

## 2021-05-26 PROBLEM — J06.9 VIRAL UPPER RESPIRATORY ILLNESS: Status: RESOLVED | Noted: 2019-04-10 | Resolved: 2021-05-26

## 2021-05-26 PROBLEM — N30.01 ACUTE CYSTITIS WITH HEMATURIA: Status: ACTIVE | Noted: 2021-05-26

## 2021-05-26 PROBLEM — G21.19 DRUG-INDUCED PARKINSONISM (HCC): Status: RESOLVED | Noted: 2020-10-14 | Resolved: 2021-05-26

## 2021-05-26 PROBLEM — K76.82 HE (HEPATIC ENCEPHALOPATHY) (HCC): Status: ACTIVE | Noted: 2021-05-26

## 2021-05-26 PROBLEM — K62.5 RECTAL BLEEDING: Status: RESOLVED | Noted: 2018-10-23 | Resolved: 2021-05-26

## 2021-05-26 PROBLEM — F23 ACUTE PSYCHOSIS (HCC): Status: RESOLVED | Noted: 2019-04-09 | Resolved: 2021-05-26

## 2021-05-26 PROCEDURE — 99214 OFFICE O/P EST MOD 30 MIN: CPT | Performed by: NURSE PRACTITIONER

## 2021-05-26 PROCEDURE — G8417 CALC BMI ABV UP PARAM F/U: HCPCS | Performed by: NURSE PRACTITIONER

## 2021-05-26 PROCEDURE — 1036F TOBACCO NON-USER: CPT | Performed by: NURSE PRACTITIONER

## 2021-05-26 PROCEDURE — G8427 DOCREV CUR MEDS BY ELIG CLIN: HCPCS | Performed by: NURSE PRACTITIONER

## 2021-05-26 PROCEDURE — 81002 URINALYSIS NONAUTO W/O SCOPE: CPT | Performed by: NURSE PRACTITIONER

## 2021-05-26 RX ORDER — CLOZAPINE 50 MG/1
1 TABLET ORAL 2 TIMES DAILY
Status: ON HOLD | COMMUNITY
Start: 2021-02-25 | End: 2021-07-15 | Stop reason: HOSPADM

## 2021-05-26 RX ORDER — CLONAZEPAM 0.5 MG/1
1 TABLET ORAL 4 TIMES DAILY PRN
Status: ON HOLD | COMMUNITY
Start: 2019-08-27 | End: 2021-07-15 | Stop reason: HOSPADM

## 2021-05-26 RX ORDER — HALOPERIDOL 5 MG
2.5 TABLET ORAL 2 TIMES DAILY
Status: ON HOLD | COMMUNITY
Start: 2021-05-21 | End: 2021-07-15 | Stop reason: HOSPADM

## 2021-05-26 RX ORDER — TRAZODONE HYDROCHLORIDE 150 MG/1
150 TABLET ORAL NIGHTLY
Status: ON HOLD | COMMUNITY
Start: 2019-09-11 | End: 2021-07-15 | Stop reason: HOSPADM

## 2021-05-26 RX ORDER — CLOZAPINE 100 MG/1
150 TABLET ORAL NIGHTLY
Status: ON HOLD | COMMUNITY
Start: 2019-08-21 | End: 2021-07-15 | Stop reason: HOSPADM

## 2021-05-26 RX ORDER — BENZTROPINE MESYLATE 1 MG/1
TABLET ORAL
Status: ON HOLD | COMMUNITY
Start: 2021-04-29 | End: 2021-07-15 | Stop reason: HOSPADM

## 2021-05-26 ASSESSMENT — ENCOUNTER SYMPTOMS
VOICE CHANGE: 0
SORE THROAT: 0
CONSTIPATION: 0
DIARRHEA: 0
SINUS PRESSURE: 0
NAUSEA: 0
ABDOMINAL PAIN: 0
VOMITING: 0
CHEST TIGHTNESS: 0
COLOR CHANGE: 0
SINUS PAIN: 0
COUGH: 0
FACIAL SWELLING: 0
WHEEZING: 0
TROUBLE SWALLOWING: 1
RHINORRHEA: 0
BACK PAIN: 0
SHORTNESS OF BREATH: 0

## 2021-05-26 NOTE — PROGRESS NOTES
OFFICE PROGRESS NOTE  [address]  [address] [facility] [address]  Dept: [phone]   Chief Complaint   Patient presents with    Urinary Tract Infection     pt has beening choking here lastly even if earing small bites     Health Maintenance     AWV, Dtap, Pneum vacc due, HIV vacc due       ASSESSMENT/PLAN   1. Dysuria  Assessment & Plan:  New Patient unable to give urine specimen at this time. Sterile container given and when collects urine to bring to the office for urinalysis and culture if needed  Orders:  -     POCT Urinalysis no Micro  2. Other schizoaffective disorders ([address]  Assessment & Plan:   Stable treated by psychiatrist    3. Esophageal dysphagia  Assessment & Plan:  New Unclear control, referral for GI for EGD and barium swallow ordered. Slow down when eating and drinking. Orders:  -     Amb External Referral To Gastroenterology  -     FL MODIFIED BARIUM SWALLOW W VIDEO; Future       Reviewed labs:  CBCD, valproic acid, ammonia      Discussed weight loss, Discussed exercising 30 minutes daily and Discussed taking medications as directed and adverse effects    Return in about 1 month (around 6/26/2021) for medicare well visit. HPI:   He hasn't been seen since 2019     Urinary Tract Infection  Patient complains of foul smelling urine, burning with urination, constipation He has had symptoms for 1 week. Patient also complains of no other symptoms. Patient denies back pain, congestion, cough, fever, headache, rhinitis, sorethroat and stomach ache. Patient does not have a history of recurrent UTI. Patient does not have a history of pyelonephritis. Schizoaffective disorder is stable and follows with psych    Per mom he is choking when he eats or drinks for a couple years. He will eat and sometimes have to cough to bring it back up. Occurs all the time whether he eats fast or slow.  Denies any pain or GERD    Current Outpatient Today, I reviewed the images of Sarahi's most recent ultrasound (Nov 2019).  The left kidney is unchanged in length compared to December 2018 (approx 10.9 cm). The thickness of the renal parenchyma appears to have increased, and the amount of dilation has decreased to grade 2.  The left ureter is still dilated but less so - max 1.2 cm compared to 1.4 cm last year.    I emailed these results to Sarahi's mom and invited them to weigh in on how long we should continue to get further imaging studies.  Given the improvement that has been seen over the past year, I think a reasonable case could be made to forego any further imaging studies unless she has recurrent symptoms vs. getting another ultrasound in 1-2 years for peace of mind.   Medications:     clonazePAM (KLONOPIN) 0.5 MG tablet, Take 1 tablet by mouth 4 times daily as needed for Anxiety. , Disp: , Rfl:     cloZAPine (CLOZARIL) 50 MG tablet, Take 1 tablet by mouth 2 times daily, Disp: , Rfl:     cloZAPine (CLOZARIL) 100 MG tablet, Take 150 mg by mouth nightly, Disp: , Rfl:     traZODone (DESYREL) 150 MG tablet, Take 150 mg by mouth nightly, Disp: , Rfl:     loratadine (CLARITIN) 10 MG tablet, TAKE 1 TABLET BY MOUTH DAILY, Disp: 30 tablet, Rfl: 11    docusate sodium (COLACE) 100 MG capsule, Take 1 capsule by mouth 2 times daily (Patient taking differently: Take 100 mg by mouth 2 times daily as needed ), Disp: 60 capsule, Rfl: 1    benztropine (COGENTIN) 1 MG tablet, , Disp: , Rfl:     haloperidol (HALDOL) 5 MG tablet, Take 2.5 mg by mouth 2 times daily, Disp: , Rfl:   Social History     Socioeconomic History    Marital status: Single     Spouse name: None    Number of children: 0    Years of education: None    Highest education level: None   Occupational History     Employer: UNEMPLOYED     Comment: SSDI   Tobacco Use    Smoking status: Never Smoker    Smokeless tobacco: Never Used   Vaping Use    Vaping Use: Never used   Substance and Sexual Activity    Alcohol use: No    Drug use: No    Sexual activity: Never   Other Topics Concern    None   Social History Narrative    None     Social Determinants of Health     Financial Resource Strain:     Difficulty of Paying Living Expenses:    Food Insecurity:     Worried About Running Out of Food in the Last Year:     Ran Out of Food in the Last Year:    Transportation Needs:     Lack of Transportation (Medical):      Lack of Transportation (Non-Medical):    Physical Activity:     Days of Exercise per Week:     Minutes of Exercise per Session:    Stress:     Feeling of Stress :    Social Connections:     Frequency of Communication with Friends and Family:     Frequency of Social Gatherings with Friends and Family:     Attends Evangelical Services:     Active Member of Clubs or Organizations:     Attends Club or Organization Meetings:     Marital Status:    Intimate Partner Violence:     Fear of Current or Ex-Partner:     Emotionally Abused:     Physically Abused:     Sexually Abused:        I have reviewed See's allergies, medications, problem list, medical, social and family history and have updated as needed in the electronic medical record    Review of Systems   Constitutional: Negative for activity change, appetite change, chills, diaphoresis, fatigue, fever and unexpected weight change. HENT: Positive for trouble swallowing. Negative for congestion, dental problem, drooling, ear discharge, ear pain, facial swelling, hearing loss, mouth sores, nosebleeds, postnasal drip, rhinorrhea, sinus pressure, sinus pain, sneezing, sore throat, tinnitus and voice change. Eyes: Negative for visual disturbance. Respiratory: Negative for cough, chest tightness, shortness of breath and wheezing. Cardiovascular: Negative for chest pain, palpitations and leg swelling. Gastrointestinal: Negative for abdominal pain, constipation, diarrhea, nausea and vomiting. Endocrine: Negative for cold intolerance, heat intolerance, polydipsia, polyphagia and polyuria. Genitourinary: Positive for dysuria. Negative for difficulty urinating, frequency and urgency. Urinary smell see HPI   Musculoskeletal: Negative for arthralgias, back pain, gait problem, joint swelling, myalgias, neck pain and neck stiffness. Skin: Negative for color change, pallor, rash and wound. Allergic/Immunologic: Negative for environmental allergies, food allergies and immunocompromised state. Neurological: Negative for dizziness, tremors, seizures, syncope, facial asymmetry, speech difficulty, weakness, light-headedness, numbness and headaches. Hematological: Negative for adenopathy. Does not bruise/bleed easily.    Psychiatric/Behavioral: Negative for agitation, behavioral problems, confusion, decreased concentration, dysphoric mood, hallucinations, self-injury, sleep disturbance and suicidal ideas. The patient is not nervous/anxious and is not hyperactive. OBJECTIVE:     VS:  Wt Readings from Last 3 Encounters:   05/26/21 229 lb (103.9 kg)   09/18/19 231 lb (104.8 kg)   06/07/19 238 lb 7 oz (108.2 kg)                       Vitals:    05/26/21 1308   BP: 134/85   Site: Left Upper Arm   Position: Sitting   Cuff Size: Medium Adult   Pulse: 72   Resp: 16   Temp: 96.6 °F (35.9 °C)   TempSrc: Temporal   SpO2: 99%   Weight: 229 lb (103.9 kg)   Height: 5' 9\" (1.753 m)       General: Alert and oriented to person, place, and time, Autistic, with mild mental retardation, well developed and well nourished, in no acute distress  SKIN: Warm and dry, intact without any rash, masses or lesions  HEAD: normocephalic, atraumatic  Eyes: sclera/conjunctiva clear, PERRLA, EOMI's intact  ENT: tympanic membranes, external ear and ear canal normal bilaterally, normal hearing, Nose without deformity, nasal mucosa and turbinates normal without polyps   Throat: clear, tongue midline, tonsils 1+, no  drainage, no masses or lesions noted, good dentition  Neck: supple and non-tender without mass, trachea midline, no cervical lymphadenopathy, no bruit, no thyromegaly or nodules  Cardiovascular: regular rate and regular rhythm, normal S1 and S2,  no murmurs, rubs, clicks, or gallop. Distal pulses intact, no carotid bruits. No edema  Pulmonary/Chest: clear to auscultation bilaterally, no wheezes, rales or rhonchi, normal air movement, no respiratory distress  Abdomen: soft, non-tender, non-distended, normal bowel sounds, no masses or hepatosplenomegaly, no CVA tenderness no suprapubic tenderness  Musculoskeletal: Normal ROM, no joint swelling, deformity or tenderness   Neurologic:  gait, coordination and speech normal  Extremities: no clubbing, cyanosis, or edema.    Psychiatric: Good eye contact, normal mood and affect, answers questions appropriately    I have reviewed my findings and recommendations with Carla Corrigan.     LUCINA Reddy - CNP, NP-C, FNP-BC

## 2021-05-26 NOTE — ASSESSMENT & PLAN NOTE
New Unclear control, referral for GI for EGD and barium swallow ordered. Slow down when eating and drinking.

## 2021-05-27 ENCOUNTER — HOSPITAL ENCOUNTER (OUTPATIENT)
Dept: GENERAL RADIOLOGY | Age: 41
Discharge: HOME | End: 2021-05-29
Payer: MEDICARE

## 2021-05-27 ENCOUNTER — TELEPHONE (OUTPATIENT)
Dept: FAMILY MEDICINE CLINIC | Age: 41
End: 2021-05-27

## 2021-05-27 DIAGNOSIS — R30.0 DYSURIA: ICD-10-CM

## 2021-05-27 DIAGNOSIS — R30.0 DYSURIA: Primary | ICD-10-CM

## 2021-05-27 DIAGNOSIS — R13.19 ESOPHAGEAL DYSPHAGIA: ICD-10-CM

## 2021-05-27 LAB
BILIRUBIN, POC: NORMAL
BLOOD URINE, POC: NORMAL
CLARITY, POC: NORMAL
COLOR, POC: NORMAL
GLUCOSE URINE, POC: NORMAL
KETONES, POC: NORMAL
LEUKOCYTE EST, POC: NORMAL
NITRITE, POC: NORMAL
PH, POC: 7
PROTEIN, POC: NORMAL
SPECIFIC GRAVITY, POC: 1.02
UROBILINOGEN, POC: NORMAL

## 2021-05-27 PROCEDURE — 92526 ORAL FUNCTION THERAPY: CPT | Performed by: SPEECH-LANGUAGE PATHOLOGIST

## 2021-05-27 PROCEDURE — 92611 MOTION FLUOROSCOPY/SWALLOW: CPT | Performed by: SPEECH-LANGUAGE PATHOLOGIST

## 2021-05-27 PROCEDURE — 74230 X-RAY XM SWLNG FUNCJ C+: CPT

## 2021-05-27 PROCEDURE — 2500000003 HC RX 250 WO HCPCS: Performed by: RADIOLOGY

## 2021-05-27 RX ADMIN — BARIUM SULFATE 45 ML: 400 SUSPENSION ORAL at 10:20

## 2021-05-27 RX ADMIN — BARIUM SULFATE 45 G: 0.81 POWDER, FOR SUSPENSION ORAL at 10:19

## 2021-05-27 RX ADMIN — BARIUM SULFATE 45 G: 0.6 CREAM ORAL at 10:19

## 2021-05-27 NOTE — PROGRESS NOTES
Speech Language Pathology      NAME:  Tere Barlow  :  1980  DATE: 2021  ROOM:  Room/bed info not found    Patient seen for swallow therapy 1 minutes. Reviewed current solid/liquid consistency diet recommendation for   Dysphagia 3, Soft/advanced (Soft & Bite-sized) solids with  thin liquidsand discussed compensatory strategies to ensure safe PO intake. Reviewed aspiration precautions. Discussed use of compensatory strategies (very moist foods cut up into small pieces and slower rate of intake) to decrease risk of aspiration. Patient was not able to return demonstration of compensatory strategies during session however mother was able to verbalize the strategies needed and rationale as to why they were needed. Patient will require ongoing education regarding dysphagia secondary to decreased ability to restate strategies during session.       Patient Active Problem List   Diagnosis    Mental retardation    Schizoaffective disorder, bipolar type ([address]    Mild intellectual disability    Bipolar affective disorder, mixed, severe, with psychotic behavior ([address]    Encounter for lithium monitoring    Taking multiple medications for chronic disease    Other idiopathic scoliosis, thoracolumbar region    Chronic seasonal allergic rhinitis due to pollen    Bulging of lumbar intervertebral disc without myelopathy    Psychosis ([address]    Excessive dietary caloric intake    Drug-induced constipation    Schizoaffective disorder ([address]    Enuresis, nocturnal only    Autism spectrum disorder    Akathisia    Esophageal dysphagia    Dysuria       [id number]  dysphagia tx    Luci Max MSCCC/SLP  Speech Language Pathologist  [id number]

## 2021-05-27 NOTE — PROGRESS NOTES
SPEECH/LANGUAGE PATHOLOGY  VIDEOFLUOROSCOPIC STUDY OF SWALLOWING (MBS)   and PLAN OF CARE    PATIENT NAME:  Ken Louis  (male)     MRN:  [id number]    :  1980  (47 y.o.)  STATUS:  Outpatient    TODAY'S DATE:  2021  REFERRING PROVIDER:   Donn BRASHER  SPECIFIC PROVIDER ORDER: FL modified barium swallow with video  Date of order:  2021   REASON FOR REFERRAL: office visit note 21--Per mom he is choking when he eats or drinks for a couple years. He will eat and sometimes have to cough to bring it back up. Occurs all the time whether he eats fast or slow.       EVALUATING THERAPIST: GALDINO Saravia      RESULTS:      DYSPHAGIA DIAGNOSIS:  mild-moderate pharyngeal phase dysphagia      DIET RECOMMENDATIONS:  Soft and bite size consistency solids (dysphagia 3) VERY MOIST FOODS ONLY with  thin liquids    FEEDING RECOMMENDATIONS:    Assistance level:  Set-up is required for all oral intake, Supervision is needed during all oral intake     Compensatory strategies recommended: Double swallow, Small bites/sips and Alternate solids and liquids     Discussed recommendations with nursing and/or faxed report to referring provider: Yes    SPEECH THERAPY  PLAN OF CARE   The dysphagia POC is established based on physician order and dysphagia diagnosis    Outpatient OR Home Care Skilled SLP intervention for dysphagia management is recommended 1-2 times per week to address the established treatment plan      Conditions Requiring Skilled Therapeutic Intervention for dysphagia:    Reduced pharyngeal clearing of the bolus    SPECIFIC DYSPHAGIA INTERVENTIONS TO INCLUDE:     Compensatory strategy training primarily with caregivers due to known learning difficulties      Specific instructions for next treatment:  initiate instruction of compensatory strategies  Treatment Goals:    Short Term Goals:  Caregivers will implement compensatory strategies 100% of the time     Long Term Goals:   Pt will improve oropharyngeal swallow function to ensure airway protection during PO intake to maintain adequate nutrition/hydration and decrease signs/symptoms of aspiration to less than 1 x/day. Patient/family Goal:    Mother's would like to decrease choking episodes during intake     Plan of care discussed with Patient and Family   The Patient did not demonstrate complete understanding of the diagnosis, prognosis and plan of care and Family understand(s) the diagnosis, prognosis and plan of care     Rehabilitation Potential/Prognosis: fair                      ADMITTING DIAGNOSIS: Esophageal dysphagia [R13.10]     VISIT DIAGNOSIS:  Esophageal dysphagia [R13.10]     PATIENT REPORT/COMPLAINT: mother reports choking with intake     PRIOR LEVEL OF SWALLOW FUNCTION:    Past History of Dysphagia?:  none reported    Home diet: Regular consistency solids with  thin liquids    PROCEDURE:  Consistencies Administered During the Evaluation   Liquids: thin liquid and nectar thick liquid   Solids:  pureed foods and solid foods      Method of Intake:   cup, spoon  Self fed, Fed by clinician      Position:   Seated, upright, Lateral plane    CLINICAL ASSESSMENT:      MBSImP Results:   Lip closure for intraoral bolus containment resulted in no labial escape. Tongue control during bolus hold maintained a cohesive bolus held between tongue to palate seal. Bolus preparation and mastication resulted in timely and efficient chewing and mashing with complete bolus recollection. Bolus transport/lingual motion was with brisk tongue motion. Oral residue was not observed. Initiation of the pharyngeal swallow occurred as the bolus head reached the posterior angle of the mandibular ramus. Soft palate elevation resulted in no bolus between the soft palate and the pharyngeal wall. Laryngeal elevation demonstrated complete superior movement of the thyroid cartilage with complete approximation of the arytenoids to the epiglottic petiole.  Anterior hyoid excursion demonstrated complete anterior movement. Epiglottic movement resulted in partial inversion at times secondary to cervical spurring at C3-5. Laryngeal vestibule closure was complete, as indicated by no air or contrast within the laryngeal vestibule at the height of the swallow. Pharyngeal stripping wave was present and complete. Pharyngeal contraction could not be determined due to logistical reasons not related to physiologic impairment. Pharyngoesophageal segment opening was completely distended for complete duration with no obstruction of bolus flow. Tongue base retraction allowed no contrast between the retracted tongue base and the posterior pharyngeal wall with all items except the cookie. The cookie resulted in moderate amount of food residue which needed a liquid chaser to clear however then shallow penetration was present with the thin. There is cervical spurring at the level of C3-5 per radiologist which did hinder his swallow with the very thick items. Pharyngeal residue was not present. Esophageal clearance in the upright position could not be assessed due to logistical reasons not related to physiologic impairment.      PENETRATION-ASPIRATION SCALE (PAS):  THIN 2 = Material enters the airway, remains above vocal folds, and is ejected from the airway see above for details   MILDLY THICK 1 = Material does not enter the airway  MODERATELY THICK item not administered  PUREE 1 = Material does not enter the airway  HARD SOLID 1 = Material does not enter the airway       COMPENSATORY STRATEGIES    Compensatory strategies that were beneficial included Small bites/sips and Alternate solids and liquids        CERVICAL ESOPHAGEAL STAGE :     Cervical osteophytes present per Radiologist          ___________    Cognition:   Did not follow commands    Oral Peripheral Examination   Adequate lingual/labial strength     Current Respiratory Status   room air     Parameters of Speech Production  Respiration:  Adequate for speech production  Quality:   Within functional limits  Intensity: Within functional limits    Pain: No pain reported. EDUCATION:   The Speech Language Pathologist (SLP) completed education regarding results of evaluation and that intervention is warranted at this time. Learner: Family  Education: Reviewed results and recommendations of this evaluation  Evaluation of Education:  Verbalizes understanding    This plan may be re-evaluated and revised as warranted. Evaluation Time includes thorough review of current medical information, gathering information on past medical history/social history and prior level of function, completion of standardized testing/informal observation of tasks, assessment of data and education on plan of care and goals. [x]The admitting diagnosis and active problem list, have been reviewed prior to initiation of this evaluation.     CPT Code: 50306  dysphagia study    ACTIVE PROBLEM LIST:   Patient Active Problem List   Diagnosis    Mental retardation    Schizoaffective disorder, bipolar type ([address]    Mild intellectual disability    Bipolar affective disorder, mixed, severe, with psychotic behavior ([address]    Encounter for lithium monitoring    Taking multiple medications for chronic disease    Other idiopathic scoliosis, thoracolumbar region    Chronic seasonal allergic rhinitis due to pollen    Bulging of lumbar intervertebral disc without myelopathy    Psychosis ([address]    Excessive dietary caloric intake    Drug-induced constipation    Schizoaffective disorder ([address]    Enuresis, nocturnal only    Autism spectrum disorder    Akathisia    Esophageal dysphagia    Dysuria       Becky Garcia MSCCC/SLP  Speech Language Pathologist  [id number]

## 2021-05-29 LAB — URINE CULTURE, ROUTINE: NORMAL

## 2021-06-03 ENCOUNTER — TELEPHONE (OUTPATIENT)
Dept: FAMILY MEDICINE CLINIC | Age: 41
End: 2021-06-03

## 2021-06-03 NOTE — TELEPHONE ENCOUNTER
Left message for Mom regarding speech therapy to be done at home or at [place] as his swallowing study indicates he needs some therapy.

## 2021-06-04 DIAGNOSIS — R13.19 ESOPHAGEAL DYSPHAGIA: Primary | ICD-10-CM

## 2021-06-16 ENCOUNTER — TELEPHONE (OUTPATIENT)
Dept: FAMILY MEDICINE CLINIC | Age: 41
End: 2021-06-16

## 2021-06-16 NOTE — TELEPHONE ENCOUNTER
Enedelia called stated that Minerva Pratt is not eligible for speech in the home because he works. She stated she would rather have home speech if possible. Asking for your advise. Per Justina call home speech tell them it was recommended . That he has this done in the home environment. Justina signed order twice. Called [facility] no answer left message for return call.

## 2021-06-21 ENCOUNTER — TELEPHONE (OUTPATIENT)
Dept: FAMILY MEDICINE CLINIC | Age: 41
End: 2021-06-21

## 2021-06-21 DIAGNOSIS — R13.19 ESOPHAGEAL DYSPHAGIA: Primary | ICD-10-CM

## 2021-06-21 NOTE — TELEPHONE ENCOUNTER
Per [facility] home health patient does not qualify for Home speak do to the fact that he works at [employer], in order for him to receive these services he would have to stay home for 60 days or the duration of the therapy per medicare guidelines.

## 2021-06-25 ENCOUNTER — TELEPHONE (OUTPATIENT)
Dept: FAMILY MEDICINE CLINIC | Age: 41
End: 2021-06-25

## 2021-06-25 NOTE — TELEPHONE ENCOUNTER
I called and left message for mom to call and schedule appointment for speech therapy that they have called and she hasn't called back.  He also needs appointment for me for annual wellness exam

## 2021-06-25 NOTE — TELEPHONE ENCOUNTER
----- Message from Eliud Graham sent at 6/25/2021  7:04 AM EDT -----  Regarding: RE: speech referral  No.  I called and left a vm to get him scheduled for speech here. I haven't heard back so I will call him again. Thanks! !    ----- Message -----  From: LUCINA Lance CNP  Sent: 6/21/2021   1:47 PM EDT  To: Eliud Graham  Subject: RE: speech referral                              Sure no problem. Does this mean he has to go to [facility]? Justina  ----- Message -----  From: Eliud Graham  Sent: 6/21/2021   1:34 PM EDT  To: LUCINA Lance CNP  Subject: speech referral                                  Delfin Horn. I got a referral for [facility] for PT. Can you switch it to speech and I will call and get him scheduled.       Thanks you!!

## 2021-06-30 DIAGNOSIS — J30.1 CHRONIC SEASONAL ALLERGIC RHINITIS DUE TO POLLEN: ICD-10-CM

## 2021-06-30 RX ORDER — LORATADINE 10 MG/1
10 TABLET ORAL DAILY
Qty: 30 TABLET | Refills: 0 | Status: ON HOLD
Start: 2021-06-30 | End: 2021-07-15 | Stop reason: HOSPADM

## 2021-07-05 ENCOUNTER — HOSPITAL ENCOUNTER (INPATIENT)
Age: 41
LOS: 10 days | Discharge: HOME | DRG: 885 | End: 2021-07-16
Attending: EMERGENCY MEDICINE | Admitting: PSYCHIATRY & NEUROLOGY
Payer: MEDICARE

## 2021-07-05 DIAGNOSIS — F25.0 SCHIZOAFFECTIVE DISORDER, BIPOLAR TYPE (HCC): Primary | ICD-10-CM

## 2021-07-05 DIAGNOSIS — F23 ACUTE PSYCHOSIS (HCC): ICD-10-CM

## 2021-07-05 LAB
ACETAMINOPHEN LEVEL: <5 MCG/ML (ref 10–30)
ALBUMIN SERPL-MCNC: 4.2 G/DL (ref 3.5–5.2)
ALP BLD-CCNC: 76 U/L (ref 40–129)
ALT SERPL-CCNC: 14 U/L (ref 0–40)
AMPHETAMINE SCREEN, URINE: NOT DETECTED
ANION GAP SERPL CALCULATED.3IONS-SCNC: 12 MMOL/L (ref 7–16)
AST SERPL-CCNC: 14 U/L (ref 0–39)
BARBITURATE SCREEN URINE: NOT DETECTED
BASOPHILS ABSOLUTE: 0.03 E9/L (ref 0–0.2)
BASOPHILS RELATIVE PERCENT: 0.4 % (ref 0–2)
BENZODIAZEPINE SCREEN, URINE: NOT DETECTED
BILIRUB SERPL-MCNC: 0.4 MG/DL (ref 0–1.2)
BILIRUBIN URINE: NEGATIVE
BLOOD, URINE: NEGATIVE
BUN BLDV-MCNC: 11 MG/DL (ref 6–20)
CALCIUM SERPL-MCNC: 9.4 MG/DL (ref 8.6–10.2)
CANNABINOID SCREEN URINE: NOT DETECTED
CHLORIDE BLD-SCNC: 108 MMOL/L (ref 98–107)
CLARITY: CLEAR
CO2: 22 MMOL/L (ref 22–29)
COCAINE METABOLITE SCREEN URINE: NOT DETECTED
COLOR: YELLOW
CREAT SERPL-MCNC: 0.8 MG/DL (ref 0.7–1.2)
EOSINOPHILS ABSOLUTE: 0.1 E9/L (ref 0.05–0.5)
EOSINOPHILS RELATIVE PERCENT: 1.4 % (ref 0–6)
ETHANOL: <10 MG/DL (ref 0–0.08)
FENTANYL SCREEN, URINE: NOT DETECTED
GFR AFRICAN AMERICAN: >60
GFR NON-AFRICAN AMERICAN: >60 ML/MIN/1.73
GLUCOSE BLD-MCNC: 98 MG/DL (ref 74–99)
GLUCOSE URINE: NEGATIVE MG/DL
HCT VFR BLD CALC: 43 % (ref 37–54)
HEMOGLOBIN: 14.7 G/DL (ref 12.5–16.5)
IMMATURE GRANULOCYTES #: 0.02 E9/L
IMMATURE GRANULOCYTES %: 0.3 % (ref 0–5)
INFLUENZA A: NOT DETECTED
INFLUENZA B: NOT DETECTED
KETONES, URINE: NEGATIVE MG/DL
LEUKOCYTE ESTERASE, URINE: NEGATIVE
LYMPHOCYTES ABSOLUTE: 2.11 E9/L (ref 1.5–4)
LYMPHOCYTES RELATIVE PERCENT: 30.6 % (ref 20–42)
Lab: NORMAL
MCH RBC QN AUTO: 31.1 PG (ref 26–35)
MCHC RBC AUTO-ENTMCNC: 34.2 % (ref 32–34.5)
MCV RBC AUTO: 90.9 FL (ref 80–99.9)
METHADONE SCREEN, URINE: NOT DETECTED
MONOCYTES ABSOLUTE: 0.43 E9/L (ref 0.1–0.95)
MONOCYTES RELATIVE PERCENT: 6.2 % (ref 2–12)
NEUTROPHILS ABSOLUTE: 4.21 E9/L (ref 1.8–7.3)
NEUTROPHILS RELATIVE PERCENT: 61.1 % (ref 43–80)
NITRITE, URINE: NEGATIVE
OPIATE SCREEN URINE: NOT DETECTED
OXYCODONE URINE: NOT DETECTED
PDW BLD-RTO: 11.5 FL (ref 11.5–15)
PH UA: 7 (ref 5–9)
PHENCYCLIDINE SCREEN URINE: NOT DETECTED
PLATELET # BLD: 205 E9/L (ref 130–450)
PMV BLD AUTO: 10.5 FL (ref 7–12)
POTASSIUM SERPL-SCNC: 3.9 MMOL/L (ref 3.5–5)
PROTEIN UA: NEGATIVE MG/DL
RBC # BLD: 4.73 E12/L (ref 3.8–5.8)
SALICYLATE, SERUM: <0.3 MG/DL (ref 0–30)
SARS-COV-2 RNA, RT PCR: NOT DETECTED
SODIUM BLD-SCNC: 142 MMOL/L (ref 132–146)
SPECIFIC GRAVITY UA: 1.01 (ref 1–1.03)
T4 TOTAL: 7.7 MCG/DL (ref 4.5–11.7)
TOTAL PROTEIN: 6.6 G/DL (ref 6.4–8.3)
TRICYCLIC ANTIDEPRESSANTS SCREEN SERUM: NEGATIVE NG/ML
TSH SERPL DL<=0.05 MIU/L-ACNC: 0.69 UIU/ML (ref 0.27–4.2)
UROBILINOGEN, URINE: 4 E.U./DL
WBC # BLD: 6.9 E9/L (ref 4.5–11.5)

## 2021-07-05 PROCEDURE — 85025 COMPLETE CBC W/AUTO DIFF WBC: CPT

## 2021-07-05 PROCEDURE — 96372 THER/PROPH/DIAG INJ SC/IM: CPT

## 2021-07-05 PROCEDURE — 80179 DRUG ASSAY SALICYLATE: CPT

## 2021-07-05 PROCEDURE — 80307 DRUG TEST PRSMV CHEM ANLYZR: CPT

## 2021-07-05 PROCEDURE — 6370000000 HC RX 637 (ALT 250 FOR IP): Performed by: EMERGENCY MEDICINE

## 2021-07-05 PROCEDURE — 82077 ASSAY SPEC XCP UR&BREATH IA: CPT

## 2021-07-05 PROCEDURE — 6360000002 HC RX W HCPCS: Performed by: EMERGENCY MEDICINE

## 2021-07-05 PROCEDURE — 84443 ASSAY THYROID STIM HORMONE: CPT

## 2021-07-05 PROCEDURE — 84436 ASSAY OF TOTAL THYROXINE: CPT

## 2021-07-05 PROCEDURE — 93005 ELECTROCARDIOGRAM TRACING: CPT | Performed by: EMERGENCY MEDICINE

## 2021-07-05 PROCEDURE — 80143 DRUG ASSAY ACETAMINOPHEN: CPT

## 2021-07-05 PROCEDURE — 87636 SARSCOV2 & INF A&B AMP PRB: CPT

## 2021-07-05 PROCEDURE — 80053 COMPREHEN METABOLIC PANEL: CPT

## 2021-07-05 PROCEDURE — 99285 EMERGENCY DEPT VISIT HI MDM: CPT

## 2021-07-05 PROCEDURE — 81003 URINALYSIS AUTO W/O SCOPE: CPT

## 2021-07-05 PROCEDURE — 36415 COLL VENOUS BLD VENIPUNCTURE: CPT

## 2021-07-05 RX ORDER — BENZTROPINE MESYLATE 0.5 MG/1
1 TABLET ORAL ONCE
Status: COMPLETED | OUTPATIENT
Start: 2021-07-05 | End: 2021-07-05

## 2021-07-05 RX ORDER — CLOZAPINE 100 MG/1
100 TABLET ORAL ONCE
Status: COMPLETED | OUTPATIENT
Start: 2021-07-05 | End: 2021-07-05

## 2021-07-05 RX ORDER — CLONAZEPAM 0.5 MG/1
0.5 TABLET ORAL NIGHTLY
Status: DISCONTINUED | OUTPATIENT
Start: 2021-07-05 | End: 2021-07-16 | Stop reason: HOSPADM

## 2021-07-05 RX ORDER — TRAZODONE HYDROCHLORIDE 150 MG/1
150 TABLET ORAL NIGHTLY
Status: DISCONTINUED | OUTPATIENT
Start: 2021-07-05 | End: 2021-07-16 | Stop reason: HOSPADM

## 2021-07-05 RX ORDER — DROPERIDOL 2.5 MG/ML
2.5 INJECTION, SOLUTION INTRAMUSCULAR; INTRAVENOUS ONCE
Status: COMPLETED | OUTPATIENT
Start: 2021-07-05 | End: 2021-07-05

## 2021-07-05 RX ADMIN — BENZTROPINE MESYLATE 1 MG: 0.5 TABLET ORAL at 19:43

## 2021-07-05 RX ADMIN — TRAZODONE HYDROCHLORIDE 150 MG: 150 TABLET ORAL at 20:02

## 2021-07-05 RX ADMIN — DROPERIDOL 2.5 MG: 2.5 INJECTION, SOLUTION INTRAMUSCULAR; INTRAVENOUS at 16:05

## 2021-07-05 RX ADMIN — CLONAZEPAM 0.5 MG: 0.5 TABLET ORAL at 19:43

## 2021-07-05 RX ADMIN — CLOZAPINE 100 MG: 100 TABLET ORAL at 20:39
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
## 2021-07-05 NOTE — ED NOTES
Pt very agitated and hollering out.   Pt medicated with night time medicine      Kaycee Malagon, BARBARA  07/05/21 1945

## 2021-07-05 NOTE — ED PROVIDER NOTES
ED PROVIDER NOTE    Chief Complaint   Patient presents with    Psychiatric Evaluation     per mother , pt has been hallucinating and saying people are in the walls. pt has been hitting self and hitting sister. hx of bipolar. HPI:  7/5/21,   Time: 4:03 PM EDT       López Turpin is a 47 y.o. male presenting to the ED for psych evaluation. History provided by mom as patient is unable to provide history due to psychiatric illness. Mom states patient has been exhibiting more bizarre behavior since being taken off depakote 1m ago. Intermittently agitated, hitting his self and his sister. No drug/etoh use. No recent illness or injury.     Chart review: hx of intellectual disability, schizoaffective disorder, autism spectrum disorder    Review of Systems:     Review of Systems   Unable to perform ROS: Psychiatric disorder         --------------------------------------------- PAST HISTORY ---------------------------------------------  Past Medical History:   Past Medical History:   Diagnosis Date    Acute psychosis ([address] 4/9/2019    ADHD (attention deficit hyperactivity disorder)     Autism     PATIENT IS COOPERATIVE AND VERBAL PER MOM    Autism     Bipolar 1 disorder ([address]     Drug-induced neutropenia ([address] 10/14/2020    Drug-induced parkinsonism ([address] 10/14/2020    Eruption due to drug 6/28/2019    HE (hepatic encephalopathy) ([address] 5/26/2021    Mental retardation     Positive occult stool blood test 10/23/2018    Rectal bleeding 10/23/2018    Schizoaffective disorder ([address]     Schizoaffective disorder, chronic condition with acute exacerbation ([address] 5/2/2019    Viral upper respiratory illness 4/10/2019       Past Surgical History:   Past Surgical History:   Procedure Laterality Date    OTHER SURGICAL HISTORY      FASCIITIS AND HEEL SPUR    WISDOM TOOTH EXTRACTION  2006       Social History:   Social History     Socioeconomic History    Marital status: Single     Spouse name: Not on file   [facility] Number of children: 0    Years of education: Not on file    Highest education level: Not on file   Occupational History     Employer: UNEMPLOYED     Comment: SSDI   Tobacco Use    Smoking status: Never Smoker    Smokeless tobacco: Never Used   Vaping Use    Vaping Use: Never used   Substance and Sexual Activity    Alcohol use: No    Drug use: No    Sexual activity: Never   Other Topics Concern    Not on file   Social History Narrative    Not on file     Social Determinants of Health     Financial Resource Strain:     Difficulty of Paying Living Expenses:    Food Insecurity:     Worried About Running Out of Food in the Last Year:     [address] in the Last Year:    Transportation Needs:     Lack of Transportation (Medical):  Lack of Transportation (Non-Medical):    Physical Activity:     Days of Exercise per Week:     Minutes of Exercise per Session:    Stress:     Feeling of Stress :    Social Connections:     Frequency of Communication with Friends and Family:     Frequency of Social Gatherings with Friends and Family:     Attends Jainism Services:     Active Member of Clubs or Organizations:     Attends Club or Organization Meetings:     Marital Status:    Intimate Partner Violence:     Fear of Current or Ex-Partner:     Emotionally Abused:     Physically Abused:     Sexually Abused:        Family History:   Family History   Problem Relation Age of Onset    High Blood Pressure Mother     Diabetes Mother        The patients home medications have been reviewed. Allergies: Allergies   Allergen Reactions    Seasonal            ---------------------------------------------------PHYSICAL EXAM--------------------------------------    /70   Pulse 88   Temp 97.5 °F (36.4 °C)   Resp 20   SpO2 97%     Physical Exam  Vitals and nursing note reviewed. Constitutional:       General: He is not in acute distress. Appearance: He is not toxic-appearing.    HENT: Head: Normocephalic and atraumatic. Mouth/Throat:      Mouth: Mucous membranes are moist.   Eyes:      General: No scleral icterus. Extraocular Movements: Extraocular movements intact. Pupils: Pupils are equal, round, and reactive to light. Cardiovascular:      Rate and Rhythm: Regular rhythm. Tachycardia present. Pulses: Normal pulses. Heart sounds: Normal heart sounds. No murmur heard. Pulmonary:      Effort: Pulmonary effort is normal. No respiratory distress. Breath sounds: Normal breath sounds. No wheezing or rales. Abdominal:      General: There is no distension. Palpations: Abdomen is soft. Tenderness: There is no abdominal tenderness. There is no guarding or rebound. Musculoskeletal:         General: No swelling or tenderness. Normal range of motion. Cervical back: Normal range of motion and neck supple. No rigidity or tenderness. No muscular tenderness. Comments: Radial, DP, and PT pulses 2+ bilaterally. Skin:     General: Skin is warm and dry. Neurological:      Mental Status: He is alert. Comments: Moves all extremities with appropriate strength. Sensation grossly intact in all extremities. Psychiatric:      Comments: Labile affect, no SI, +HI yelling he will kill people, no AVH.            -------------------------------------------------- RESULTS -------------------------------------------------  I have personally reviewed all laboratory and imaging results for this patient. Results are listed below.      LABS:  Labs Reviewed   COMPREHENSIVE METABOLIC PANEL - Abnormal; Notable for the following components:       Result Value    Chloride 108 (*)     All other components within normal limits   SERUM DRUG SCREEN - Abnormal; Notable for the following components:    Acetaminophen Level <5.0 (*)     All other components within normal limits   URINALYSIS - Abnormal; Notable for the following components:    Urobilinogen, Urine 4.0 (*) All other components within normal limits   CBC WITH AUTO DIFFERENTIAL   URINE DRUG SCREEN   TSH WITHOUT REFLEX   T4     EKG: This EKG is signed and interpreted by the EP. Normal sinus rhythm, vent rate 99bpm, normal axis and intervals, no acute injury pattern, no clinically significant change compared w/ prior EKG        ------------------------- NURSING NOTES AND VITALS REVIEWED ---------------------------   The nursing notes within the ED encounter and vital signs as below have been reviewed by myself. /70   Pulse 88   Temp 97.5 °F (36.4 °C)   Resp 20   SpO2 97%   Oxygen Saturation Interpretation: Normal    The patients available past medical records and past encounters were reviewed. ------------------------------ ED COURSE/MEDICAL DECISION MAKING----------------------  Medications   droperidol (INAPSINE) injection 2.5 mg (2.5 mg Intramuscular Given 21 1605)       Consultations:             Social work    Counseling: The emergency provider has spoken with the patient and mother and discussed todays results, in addition to providing specific details for the plan of care and counseling regarding the diagnosis and prognosis. Questions are answered at this time and they are agreeable with the plan. ED Course/Medical Decision Makin y.o. male here with acute psychosis and agitation. Agitated and combative on arrival, making verbal and physical threats to staff, improved after IM droperidol given. Non-toxic appearing, afebrile, hemodynamically stable, and in no acute distress. Tachycardic on arrival improved on reevaluation. Breathing comfortably on room air without respiratory distress. Neurovascularly intact throughout. Medically cleared for inpatient psych evaluation.        --------------------------------- IMPRESSION AND DISPOSITION ---------------------------------    IMPRESSION  1.  Acute psychosis ([facility]        DISPOSITION  Disposition: Admit to mental health unit -

## 2021-07-05 NOTE — ED NOTES
Bed: 10  Expected date:   Expected time:   Means of arrival:   Comments:  AALIYAH Winkler RN  07/05/21 7191

## 2021-07-06 PROBLEM — F23 ACUTE PSYCHOSIS (HCC): Status: ACTIVE | Noted: 2021-07-06

## 2021-07-06 LAB
EKG ATRIAL RATE: 99 BPM
EKG P AXIS: 53 DEGREES
EKG P-R INTERVAL: 130 MS
EKG Q-T INTERVAL: 350 MS
EKG QRS DURATION: 90 MS
EKG QTC CALCULATION (BAZETT): 449 MS
EKG R AXIS: 12 DEGREES
EKG T AXIS: 39 DEGREES
EKG VENTRICULAR RATE: 99 BPM

## 2021-07-06 PROCEDURE — 96372 THER/PROPH/DIAG INJ SC/IM: CPT

## 2021-07-06 PROCEDURE — 93010 ELECTROCARDIOGRAM REPORT: CPT | Performed by: INTERNAL MEDICINE

## 2021-07-06 PROCEDURE — 6360000002 HC RX W HCPCS: Performed by: EMERGENCY MEDICINE

## 2021-07-06 PROCEDURE — 6370000000 HC RX 637 (ALT 250 FOR IP): Performed by: EMERGENCY MEDICINE

## 2021-07-06 PROCEDURE — 1240000000 HC EMOTIONAL WELLNESS R&B

## 2021-07-06 RX ORDER — HALOPERIDOL 5 MG/ML
5 INJECTION INTRAMUSCULAR EVERY 6 HOURS PRN
Status: DISCONTINUED | OUTPATIENT
Start: 2021-07-06 | End: 2021-07-16 | Stop reason: HOSPADM

## 2021-07-06 RX ORDER — DIPHENHYDRAMINE HYDROCHLORIDE 50 MG/ML
50 INJECTION INTRAMUSCULAR; INTRAVENOUS EVERY 6 HOURS PRN
Status: DISCONTINUED | OUTPATIENT
Start: 2021-07-06 | End: 2021-07-16 | Stop reason: HOSPADM

## 2021-07-06 RX ORDER — ACETAMINOPHEN 325 MG/1
650 TABLET ORAL EVERY 6 HOURS PRN
Status: DISCONTINUED | OUTPATIENT
Start: 2021-07-06 | End: 2021-07-16 | Stop reason: HOSPADM

## 2021-07-06 RX ORDER — DIPHENHYDRAMINE HCL 25 MG
50 TABLET ORAL EVERY 6 HOURS PRN
Status: DISCONTINUED | OUTPATIENT
Start: 2021-07-06 | End: 2021-07-16 | Stop reason: HOSPADM

## 2021-07-06 RX ORDER — HALOPERIDOL 5 MG
5 TABLET ORAL EVERY 6 HOURS PRN
Status: DISCONTINUED | OUTPATIENT
Start: 2021-07-06 | End: 2021-07-16 | Stop reason: HOSPADM

## 2021-07-06 RX ORDER — LORAZEPAM 2 MG/ML
2 INJECTION INTRAMUSCULAR EVERY 6 HOURS PRN
Status: DISCONTINUED | OUTPATIENT
Start: 2021-07-06 | End: 2021-07-16 | Stop reason: HOSPADM

## 2021-07-06 RX ORDER — HYDROXYZINE PAMOATE 50 MG/1
50 CAPSULE ORAL 3 TIMES DAILY PRN
Status: DISCONTINUED | OUTPATIENT
Start: 2021-07-06 | End: 2021-07-16 | Stop reason: HOSPADM

## 2021-07-06 RX ORDER — LORAZEPAM 1 MG/1
2 TABLET ORAL EVERY 6 HOURS PRN
Status: DISCONTINUED | OUTPATIENT
Start: 2021-07-06 | End: 2021-07-16 | Stop reason: HOSPADM

## 2021-07-06 RX ORDER — MAGNESIUM HYDROXIDE/ALUMINUM HYDROXICE/SIMETHICONE 120; 1200; 1200 MG/30ML; MG/30ML; MG/30ML
30 SUSPENSION ORAL PRN
Status: DISCONTINUED | OUTPATIENT
Start: 2021-07-06 | End: 2021-07-16 | Stop reason: HOSPADM

## 2021-07-06 RX ORDER — ZIPRASIDONE MESYLATE 20 MG/ML
10 INJECTION, POWDER, LYOPHILIZED, FOR SOLUTION INTRAMUSCULAR EVERY 12 HOURS PRN
Status: DISCONTINUED | OUTPATIENT
Start: 2021-07-06 | End: 2021-07-16 | Stop reason: HOSPADM

## 2021-07-06 RX ORDER — DROPERIDOL 2.5 MG/ML
5 INJECTION, SOLUTION INTRAMUSCULAR; INTRAVENOUS ONCE
Status: COMPLETED | OUTPATIENT
Start: 2021-07-06 | End: 2021-07-06

## 2021-07-06 RX ADMIN — CLONAZEPAM 0.5 MG: 0.5 TABLET ORAL at 21:29

## 2021-07-06 RX ADMIN — DROPERIDOL 5 MG: 2.5 INJECTION, SOLUTION INTRAMUSCULAR; INTRAVENOUS at 10:15

## 2021-07-06 ASSESSMENT — PAIN SCALES - GENERAL: PAINLEVEL_OUTOF10: 0

## 2021-07-06 ASSESSMENT — SLEEP AND FATIGUE QUESTIONNAIRES: DO YOU HAVE DIFFICULTY SLEEPING: COMMENT

## 2021-07-06 ASSESSMENT — LIFESTYLE VARIABLES: HISTORY_ALCOHOL_USE: COMMENT

## 2021-07-06 ASSESSMENT — PAIN - FUNCTIONAL ASSESSMENT: PAIN_FUNCTIONAL_ASSESSMENT: 0-10

## 2021-07-06 NOTE — ED NOTES
Pt sleeping quietly      Ayanna Mai, [facility]  07/05/21 1338

## 2021-07-06 NOTE — ED NOTES
Pt cont to cry at moments stands ast nures tation and needs constant redirection back to room pt has unintelligable speech at times and is hard to understand pt at times impulsively will hit self in chest.     Radha Jimenes RN  07/06/21 [address], RN  07/06/21 3085

## 2021-07-06 NOTE — ED NOTES
Melida Fierro with [facility] mendoza @ [phone] given update on pt  stated now discuss case with dr, will call back and let this rn know decision     Wendy Diaz RN  07/06/21 [address], RN  07/06/21 9226

## 2021-07-06 NOTE — ED NOTES
Assigned [id number] to Emanuel Barron in admitting     Benson Norris, [facility]  07/06/21 [address] [place] [id number] [facility], [facility]  07/06/21 2117

## 2021-07-06 NOTE — ED NOTES
Mom/guardian is aware that pt is being admitted to [facility]    Pt has a waiver and is with [facility] agency.      MANDO Vega  07/06/21 1400

## 2021-07-06 NOTE — ED NOTES
CALLED MOM TO INQUIRE IF PT HAS AN SSA OR IN HOME PROVIDERS AS HE DID IN THE PAST. IN 2019 PT WAS TREATING WITH DR. Heraclio Gao AT [facility]. NOW HE IS TREATING AT [facility]. AWAITING CALL BACK FROM MOM.          MANDO Massey  07/06/21 2477

## 2021-07-06 NOTE — ED NOTES
Mom called in to say that the PT has to have all foods cut into small pieces.     Suella Bolus, T  07/06/2021     Suella Bolus  07/06/21 4254

## 2021-07-06 NOTE — ED NOTES
Pt medicated d/t pt wakened by noise in [place] began punching self and crying pt unable to be redirected [facility] officers on site     Bekah Locke [facility]  07/06/21 9788

## 2021-07-07 ENCOUNTER — TELEPHONE (OUTPATIENT)
Dept: SPEECH THERAPY | Age: 41
End: 2021-07-07

## 2021-07-07 PROCEDURE — 6370000000 HC RX 637 (ALT 250 FOR IP): Performed by: NURSE PRACTITIONER

## 2021-07-07 PROCEDURE — 1240000000 HC EMOTIONAL WELLNESS R&B

## 2021-07-07 PROCEDURE — 99221 1ST HOSP IP/OBS SF/LOW 40: CPT | Performed by: NURSE PRACTITIONER

## 2021-07-07 PROCEDURE — 6370000000 HC RX 637 (ALT 250 FOR IP): Performed by: EMERGENCY MEDICINE

## 2021-07-07 RX ORDER — CLOZAPINE 100 MG/1
50 TABLET ORAL DAILY
Status: DISCONTINUED | OUTPATIENT
Start: 2021-07-07 | End: 2021-07-16 | Stop reason: HOSPADM

## 2021-07-07 RX ORDER — OXCARBAZEPINE 300 MG/1
300 TABLET, FILM COATED ORAL 2 TIMES DAILY
Status: DISCONTINUED | OUTPATIENT
Start: 2021-07-07 | End: 2021-07-16 | Stop reason: HOSPADM

## 2021-07-07 RX ORDER — CLOZAPINE 100 MG/1
150 TABLET ORAL NIGHTLY
Status: DISCONTINUED | OUTPATIENT
Start: 2021-07-07 | End: 2021-07-08

## 2021-07-07 RX ORDER — BENZTROPINE MESYLATE 0.5 MG/1
0.5 TABLET ORAL 2 TIMES DAILY
Status: DISCONTINUED | OUTPATIENT
Start: 2021-07-07 | End: 2021-07-16 | Stop reason: HOSPADM

## 2021-07-07 RX ORDER — CLOZAPINE 100 MG/1
50 TABLET ORAL 2 TIMES DAILY
Status: DISCONTINUED | OUTPATIENT
Start: 2021-07-07 | End: 2021-07-07

## 2021-07-07 RX ADMIN — OXCARBAZEPINE 300 MG: 300 TABLET, FILM COATED ORAL at 21:10

## 2021-07-07 RX ADMIN — CLOZAPINE 50 MG: 25 TABLET ORAL at 14:30

## 2021-07-07 RX ADMIN — BENZTROPINE MESYLATE 0.5 MG: 0.5 TABLET ORAL at 21:10

## 2021-07-07 RX ADMIN — BENZTROPINE MESYLATE 0.5 MG: 0.5 TABLET ORAL at 14:30

## 2021-07-07 RX ADMIN — CLONAZEPAM 0.5 MG: 0.5 TABLET ORAL at 21:10

## 2021-07-07 RX ADMIN — OXCARBAZEPINE 300 MG: 300 TABLET, FILM COATED ORAL at 14:30

## 2021-07-07 RX ADMIN — TRAZODONE HYDROCHLORIDE 150 MG: 150 TABLET ORAL at 21:10

## 2021-07-07 RX ADMIN — CLOZAPINE 150 MG: 100 TABLET ORAL at 21:10

## 2021-07-07 ASSESSMENT — SLEEP AND FATIGUE QUESTIONNAIRES
AVERAGE NUMBER OF SLEEP HOURS: 5
DO YOU USE A SLEEP AID: YES
DO YOU HAVE DIFFICULTY SLEEPING: NO
SLEEP PATTERN: UTA

## 2021-07-07 ASSESSMENT — PAIN SCALES - GENERAL
PAINLEVEL_OUTOF10: 0

## 2021-07-07 ASSESSMENT — LIFESTYLE VARIABLES: HISTORY_ALCOHOL_USE: NO

## 2021-07-07 NOTE — GROUP NOTE
Group Therapy Note    Date: 7/7/2021    Group Start Time: 1500  Group End Time: 0415  Group Topic: Cognitive Skills    [facility] AC[facility]    ROMA [place], hospitals        Group Therapy Note    Attendees: 14         Patient's Goal: pt will be able to verbalize and understand what goals they have overall and the habits needed to achieve their goals     Notes:  Pt participated in group and made connections. Status After Intervention:  Unchanged    Participation Level:  Active Listener    Participation Quality: Appropriate and Attentive      Speech:  slurred      Thought Process/Content: Logical      Affective Functioning: Congruent      Mood: depressed      Level of consciousness:  Alert and Oriented x4      Response to Learning: Able to verbalize current knowledge/experience, Able to verbalize/acknowledge new learning and Able to retain information      Endings: None Reported    Modes of Intervention: Education, Support, Socialization, Exploration and Clarifying      Discipline Responsible: /Counselor      Signature:  ROMA [place]
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
## 2021-07-07 NOTE — PLAN OF CARE
[facility]  Initial Interdisciplinary Treatment Plan NOTE    Review Date & Time: 7/7/2021    10:49 AM      Patient was not in treatment team    Admission Type:   Admission Type: Involuntary    Reason for admission:  Reason for Admission: Pt has been hallucinating, becoming more labile, flight of ideas, rambling speech.       Estimated Length of Stay Update:   7 days  Estimated Discharge Date Update:  7/14/21    EDUCATION:   Learner Progress Toward Treatment Goals: Reviewed results and recommendations of this team    Method: Individual    Outcome: Verbalized understanding    PATIENT GOALS: \"make good choices\"    PLAN/TREATMENT RECOMMENDATIONS UPDATE: encourage daily goals and group    GOALS UPDATE:   Time frame for Short-Term Goals:  By discharge    Quang Wang RN

## 2021-07-07 NOTE — PLAN OF CARE
Problem: Anger Management/Homicidal Ideation:  Goal: Ability to verbalize frustrations and anger appropriately will improve  Description: Ability to verbalize frustrations and anger appropriately will improve  Outcome: Ongoing  Goal: Absence of angry outbursts  Description: Absence of angry outbursts  Outcome: Ongoing           Patient with minimal verbalization. Avoidant and poor eye contact. Does appear internally stimulated. Would not answer any of the suicidal and homicidal questions but has voiced none. Will continue to observe and support.

## 2021-07-07 NOTE — GROUP NOTE
Group Therapy Note    Date: 7/7/2021    Group Start Time: 1010  Group End Time: 1050  Group Topic: Psychoeducation    [facility]    [place], [place]        Group Therapy Note                                                                        Group Therapy Note    Date: 7/7/2021  Type of Group: Psychoeducation    Wellness Binder Information  Group Topic: Id of effects of stress and daily/life events. Patient's Goal: patient will be able to id daily and life events one has experienced and physical effects to stress. Notes:  pleasant and sharing in group. Status After Intervention:  Improved    Participation Level:  Active Listener and Interactive    Participation Quality: Appropriate, Attentive, Sharing, and Supportive      Speech: normal     Thought Process/Content: Logical      Affective Functioning: Congruent      Mood: euthymic      Level of consciousness:  Alert, Oriented x4, and Attentive      Response to Learning: Able to verbalize/acknowledge new learning, Able to retain information, and Progressing to goal      Endings: None Reported    Modes of Intervention: Education, Support, Socialization, Exploration, and Problem-solving      Discipline Responsible: Psychoeducational Specialist      Signature:  Marlee Doherty

## 2021-07-07 NOTE — PROGRESS NOTES
Attended morning community meeting. Updated on staffing a daily expectations. Shared goal for the day as to make good choices.

## 2021-07-07 NOTE — CARE COORDINATION
SW has attempted to assess pt multiple times today. Pt was in group when SW attempted to assess first. Pt was eating both other attempts. SW will re-attempt when pt is done eating lunch.

## 2021-07-07 NOTE — TELEPHONE ENCOUNTER
Patient's mother called to cancel patient's speech evaluation appointment due to hospitalization. Homar Flood.  Jim Machuca MA/CCC-SLP  [phone]

## 2021-07-07 NOTE — PROGRESS NOTES
Patient is resting quietly in bed with eyes closed at this time. No signs of distress or discomfort noted. No PRN medications given thus far. Safety needs met. No unit problems reported. Purposeful rounding continued. Siliq Counseling:  I discussed with the patient the risks of Siliq including but not limited to new or worsening depression, suicidal thoughts and behavior, immunosuppression, malignancy, posterior leukoencephalopathy syndrome, and serious infections.  The patient understands that monitoring is required including a PPD at baseline and must alert us or the primary physician if symptoms of infection or other concerning signs are noted. There is also a special program designed to monitor depression which is required with Siliq.

## 2021-07-07 NOTE — PROGRESS NOTES
Patient out on unit. Patient denies thoughts of harm to self or others with nodding head no. Nod head no to hearing voices or seeing things not there. Patient has rambling speech, flight of ideas, internally stimulated. Observed eating ordered food and attending groups. No observations of aggressive behavior toward self or others. Compliant with medications and attends groups. Will continue to observe.

## 2021-07-08 LAB
CHOLESTEROL, TOTAL: 167 MG/DL (ref 0–199)
HBA1C MFR BLD: 4.8 % (ref 4–5.6)
HDLC SERPL-MCNC: 29 MG/DL
LDL CHOLESTEROL CALCULATED: 112 MG/DL (ref 0–99)
TRIGL SERPL-MCNC: 129 MG/DL (ref 0–149)
VLDLC SERPL CALC-MCNC: 26 MG/DL

## 2021-07-08 PROCEDURE — 99232 SBSQ HOSP IP/OBS MODERATE 35: CPT | Performed by: NURSE PRACTITIONER

## 2021-07-08 PROCEDURE — 36415 COLL VENOUS BLD VENIPUNCTURE: CPT

## 2021-07-08 PROCEDURE — 83036 HEMOGLOBIN GLYCOSYLATED A1C: CPT

## 2021-07-08 PROCEDURE — 6370000000 HC RX 637 (ALT 250 FOR IP): Performed by: EMERGENCY MEDICINE

## 2021-07-08 PROCEDURE — 6370000000 HC RX 637 (ALT 250 FOR IP): Performed by: NURSE PRACTITIONER

## 2021-07-08 PROCEDURE — 80061 LIPID PANEL: CPT

## 2021-07-08 PROCEDURE — 1240000000 HC EMOTIONAL WELLNESS R&B

## 2021-07-08 RX ADMIN — OXCARBAZEPINE 300 MG: 300 TABLET, FILM COATED ORAL at 09:28

## 2021-07-08 RX ADMIN — CLONAZEPAM 0.5 MG: 0.5 TABLET ORAL at 20:49

## 2021-07-08 RX ADMIN — TRAZODONE HYDROCHLORIDE 150 MG: 150 TABLET ORAL at 20:44

## 2021-07-08 RX ADMIN — OXCARBAZEPINE 300 MG: 300 TABLET, FILM COATED ORAL at 20:44

## 2021-07-08 RX ADMIN — BENZTROPINE MESYLATE 0.5 MG: 0.5 TABLET ORAL at 09:28

## 2021-07-08 RX ADMIN — CLOZAPINE 175 MG: 100 TABLET ORAL at 20:44

## 2021-07-08 RX ADMIN — BENZTROPINE MESYLATE 0.5 MG: 0.5 TABLET ORAL at 20:45

## 2021-07-08 RX ADMIN — CLOZAPINE 50 MG: 25 TABLET ORAL at 09:28

## 2021-07-08 ASSESSMENT — PAIN SCALES - GENERAL
PAINLEVEL_OUTOF10: 0
PAINLEVEL_OUTOF10: 0

## 2021-07-08 NOTE — GROUP NOTE
Group Therapy Note    Date: 7/8/2021    Group Start Time: 1400  Group End Time: 1430  Group Topic: Cognitive Skills    [facility]    FREDIS Burt        Group Therapy Note    Attendees: 10         Patient's Goal: Pt will be able to identify 6 protective factors, acknowledge 2 factors she would like to improve, and discuss steps to improve these factors. Notes:  Pt came to class but appeared to be thought blocking and unable to participate. When sw called upon him to acknowledge what he wanted to work on pt unable to answer and did not appear to understand what we were talking about.      Status After Intervention:  Unchanged    Participation Level: Minimal    Participation Quality: Inappropriate      Speech:  hesitant      Thought Process/Content: Linear, disorganized      Affective Functioning: Blunted      Mood: anxious and depressed      Level of consciousness:  Alert, Oriented x4 and Attentive      Response to Learning: Able to verbalize current knowledge/experience,  and Progressing to goal      Endings: None Reported    Modes of Intervention: Education, Support, Socialization and Exploration      Discipline Responsible: /Counselor      Signature:  FREDIS Burt

## 2021-07-08 NOTE — GROUP NOTE
Group Therapy Note    Date: 7/8/2021    Group Start Time: 1000  Group End Time: 6404  Group Topic: Psychoeducation    [facility]    Diana Hi, CTRS        Group Therapy Note      Number of participants: 15  Type of group: Psychoeducation  Mode of intervention: Education, Support, Socialization, Exploration, Clarifying, and Problem-solving  Topic: Self Management Skills  Objective: Pt will identify 1 self management skill to utilize in recovery. Notes:  Pt was interactive during group sharing 1 self management skill to utilize in recovery. Pt gave support and feedback to others. Status After Intervention:  Improved    Participation Level:  Active Listener and Interactive    Participation Quality: Appropriate, Attentive, Sharing and Supportive      Speech:  normal      Thought Process/Content: Logical      Affective Functioning: Congruent      Mood: euthymic      Level of consciousness:  Alert, Oriented x4 and Attentive      Response to Learning: Able to verbalize current knowledge/experience, Able to verbalize/acknowledge new learning, Able to retain information, Capable of insight, Able to change behavior and Progressing to goal      Endings: None Reported    Modes of Intervention: Education, Support, Socialization, Exploration, Clarifying and Problem-solving

## 2021-07-08 NOTE — PROGRESS NOTES
BEHAVIORAL HEALTH FOLLOW-UP NOTE     7/8/2021     Patient was seen and examined in person, Chart reviewed   Patient's case discussed with staff/team    Chief Complaint: Patient does not answer any questions    Interim History: Patient is on the unit socializing with peers visible in the milieu. He does not answer any questions upon assessment.   Per staff he is offering minimal conversation with garbled speech very difficult to understand he does appear to be internally stimulated but for staff denies auditory visualizations or SI/HI      Appetite:   [x] Normal/Unchanged  [] Increased  [] Decreased      Sleep:       [x] Normal/Unchanged  [] Fair       [] Poor              Energy:    [x] Normal/Unchanged  [] Increased  [] Decreased        SI [] Present  [x] Absent    HI  []Present  [x] Absent     Aggression:  [] yes  [x] no    Patient is [x] able  [] unable to CONTRACT FOR SAFETY     PAST MEDICAL/PSYCHIATRIC HISTORY:   Past Medical History:   Diagnosis Date    Acute psychosis ([facility] 4/9/2019    ADHD (attention deficit hyperactivity disorder)     Autism     PATIENT IS COOPERATIVE AND VERBAL PER MOM    Autism     Bipolar 1 disorder ([facility]     Drug-induced neutropenia ([address] 10/14/2020    Drug-induced parkinsonism ([address] 10/14/2020    Eruption due to drug 6/28/2019    HE (hepatic encephalopathy) ([address] 5/26/2021    Mental retardation     Positive occult stool blood test 10/23/2018    Rectal bleeding 10/23/2018    Schizoaffective disorder ([address]     Schizoaffective disorder, chronic condition with acute exacerbation ([facility] 5/2/2019    Viral upper respiratory illness 4/10/2019       FAMILY/SOCIAL HISTORY:  Family History   Problem Relation Age of Onset    High Blood Pressure Mother     Diabetes Mother      Social History     Socioeconomic History    Marital status: Single     Spouse name: Not on file    Number of children: 0    Years of education: Not on file    Highest education level: Not on file Occupational History     Employer: UNEMPLOYED     Comment: SSDI   Tobacco Use    Smoking status: Never Smoker    Smokeless tobacco: Never Used   Vaping Use    Vaping Use: Never used   Substance and Sexual Activity    Alcohol use: No    Drug use: No    Sexual activity: Never   Other Topics Concern    Not on file   Social History Narrative    Not on file     Social Determinants of Health     Financial Resource Strain:     Difficulty of Paying Living Expenses:    Food Insecurity:     Worried About Running Out of Food in the Last Year:     [address] in the Last Year:    Transportation Needs:     Lack of Transportation (Medical):  Lack of Transportation (Non-Medical):    Physical Activity:     Days of Exercise per Week:     Minutes of Exercise per Session:    Stress:     Feeling of Stress :    Social Connections:     Frequency of Communication with Friends and Family:     Frequency of Social Gatherings with Friends and Family:     Attends Buddhism Services:     Active Member of Clubs or Organizations:     Attends Club or Organization Meetings:     Marital Status:    Intimate Partner Violence:     Fear of Current or Ex-Partner:     Emotionally Abused:     Physically Abused:     Sexually Abused:            ROS:  [x] All negative/unchanged except if checked.  Explain positive(checked items) below:  [] Constitutional  [] Eyes  [] Ear/Nose/Mouth/Throat  [] Respiratory  [] CV  [] GI  []   [] Musculoskeletal  [] Skin/Breast  [] Neurological  [] Endocrine  [] Heme/Lymph  [] Allergic/Immunologic    Explanation:     MEDICATIONS:    Current Facility-Administered Medications:     cloZAPine (CLOZARIL) tablet 150 mg, 150 mg, Oral, Nightly, LUCINA Gil - CNP, 031 mg at 07/07/21 2110    benztropine (COGENTIN) tablet 0.5 mg, 0.5 mg, Oral, BID, LUCINA Staley - CNP, 0.5 mg at 07/08/21 4477    cloZAPine (CLOZARIL) tablet 50 mg, 50 mg, Oral, Daily, Carissa ANSLEY Adame APRN - CNP, 50 mg at 07/08/21 0928    OXcarbazepine (TRILEPTAL) tablet 300 mg, 300 mg, Oral, BID, Lorren Lida Barbosak, APRN - CNP, 026 mg at 07/08/21 4309    acetaminophen (TYLENOL) tablet 650 mg, 650 mg, Oral, Q6H PRN, Justine Winkler MD    magnesium hydroxide (MILK OF MAGNESIA) 400 MG/5ML suspension 30 mL, 30 mL, Oral, Daily PRN, Justine Winkler MD    aluminum & magnesium hydroxide-simethicone (MAALOX) 200-200-20 MG/5ML suspension 30 mL, 30 mL, Oral, PRN, Justine Winkler MD    hydrOXYzine (VISTARIL) capsule 50 mg, 50 mg, Oral, TID PRN, Justine Winkler MD    haloperidol (HALDOL) tablet 5 mg, 5 mg, Oral, Q6H PRN **OR** haloperidol lactate (HALDOL) injection 5 mg, 5 mg, Intramuscular, Q6H PRN, Justine Winkler MD    LORazepam (ATIVAN) tablet 2 mg, 2 mg, Oral, Q6H PRN, Justine Winkler MD    LORazepam (ATIVAN) injection 2 mg, 2 mg, Intramuscular, Q6H PRN, Justine Winkler MD    diphenhydrAMINE (BENADRYL) injection 50 mg, 50 mg, Intramuscular, Q6H PRN, Justine Winkler MD    diphenhydrAMINE (BENADRYL) tablet 50 mg, 50 mg, Oral, Q6H PRN, Justine Winkler MD    ziprasidone (GEODON) injection 10 mg, 10 mg, Intramuscular, Q12H PRN **AND** sterile water injection 1.2 mL, 1.2 mL, Intramuscular, Q12H PRN, Justine Winkler MD    traZODone (DESYREL) tablet 150 mg, 150 mg, Oral, Nightly, Jaguar Zazueta MD, 150 mg at 07/07/21 2110    clonazePAM (KLONOPIN) tablet 0.5 mg, 0.5 mg, Oral, Nightly, Jaguar Zazueta MD, 0.5 mg at 07/07/21 2110      Examination:  BP (!) 111/58   Pulse 87   Temp 97.6 °F (36.4 °C) (Temporal)   Resp 16   Ht 5' 10\" (1.778 m)   Wt 200 lb (90.7 kg)   SpO2 98%   BMI 28.70 kg/m²   Gait - steady  Medication side effects(SE): Denies    Mental Status Examination:    Unable to perform any type of mental status examination this time as patient is not answering any questions.   He is out visible in the milieu no behavioral disturbances per nurses he does appear to be internally stimulated          ASSESSMENT:   Patient symptoms are:  [] Well controlled  [] Improving  [] Worsening  [x] No change      Diagnosis:   Principal Problem:    Schizoaffective disorder, bipolar type ([facility]  Active Problems:    Mental retardation  Resolved Problems:    * No resolved hospital problems. *      LABS:    Recent Labs     07/05/21  1717   WBC 6.9   HGB 14.7        Recent Labs     07/05/21  1717      K 3.9   *   CO2 22   BUN 11   CREATININE 0.8   GLUCOSE 98     Recent Labs     07/05/21  1717   BILITOT 0.4   ALKPHOS 76   AST 14   ALT 14     Lab Results   Component Value Date    LABAMPH NOT DETECTED 07/05/2021    BARBSCNU NOT DETECTED 07/05/2021    LABBENZ NOT DETECTED 07/05/2021    LABMETH NOT DETECTED 07/05/2021    OPIATESCREENURINE NOT DETECTED 07/05/2021    PHENCYCLIDINESCREENURINE NOT DETECTED 07/05/2021    ETOH <10 07/05/2021     Lab Results   Component Value Date    TSH 0.693 07/05/2021     Lab Results   Component Value Date    LITHIUM 0.92 02/02/2019     Lab Results   Component Value Date    VALPROATE 87 03/08/2021    CBMZ 4.7 04/14/2019           Treatment Plan:  Reviewed current Medications with the patient. Risks, benefits, side effects, drug-to-drug interactions and alternatives to treatment were discussed. Collateral information:   CD evaluation  Encourage patient to attend group and other milieu activities. Discharge planning discussed with the patient and treatment team    Continue Clozaril 50 mg daily and 175 mg at bedtime for psychosis  Continue Klonopin 0.5 mg at bedtime  Continue Trileptal 300 mg twice daily for mood  Continue Cogentin 0.5 mg twice daily for side effects.     PSYCHOTHERAPY/COUNSELING:  [x] Therapeutic interview  [x] Supportive  [] CBT  [] Ongoing  [] Other    [x] Patient continues to need, on a daily basis, active treatment furnished directly by or requiring the supervision of inpatient psychiatric personnel      Anticipated Length of stay: 3 to 7 days based on stability            Electronically signed by LUCINA Salazar CNP on 8/8/3487 at 3:05 PM

## 2021-07-08 NOTE — PLAN OF CARE
Problem: Anger Management/Homicidal Ideation:  Goal: Able to display appropriate communication and problem solving  Description: Able to display appropriate communication and problem solving  Outcome: Ongoing     Problem: Altered Mood, Deterioration in Function:  Goal: Able to verbalize reality based thinking  Description: Able to verbalize reality based thinking  Outcome: Ongoing     Patient has been out on the unit watching tv. Keeps to self. Presents with poor focus and impaired concentration. Has garbled speech and is difficult to understand. Follows directions without difficulty. No reports of suicidal or homicidal ideations. Presents internally stimulated. Remaining in control. Purposeful rounding continued.

## 2021-07-08 NOTE — PLAN OF CARE
Problem: Anger Management/Homicidal Ideation:  Goal: Able to display appropriate communication and problem solving  Description: Able to display appropriate communication and problem solving  7/8/2021 1001 by January Evans RN  Outcome: Ongoing  7/7/2021 2151 by Deonna Beltran RN  Outcome: Ongoing  Goal: Ability to verbalize frustrations and anger appropriately will improve  Description: Ability to verbalize frustrations and anger appropriately will improve  Outcome: Ongoing           Patient minimal conversation. Speech garbled and difficult to understand. Patient appears internally stimulated and pre occupied but denies hallucinations. Rocking . Denies suicidal and homicidal thoughts.

## 2021-07-08 NOTE — PROGRESS NOTES
Attended morning community meeting. Updated on staffing and daily routine. Shared goal for the day as to talk today.

## 2021-07-09 PROCEDURE — 1240000000 HC EMOTIONAL WELLNESS R&B

## 2021-07-09 PROCEDURE — 99232 SBSQ HOSP IP/OBS MODERATE 35: CPT | Performed by: NURSE PRACTITIONER

## 2021-07-09 PROCEDURE — 6370000000 HC RX 637 (ALT 250 FOR IP): Performed by: NURSE PRACTITIONER

## 2021-07-09 PROCEDURE — 6370000000 HC RX 637 (ALT 250 FOR IP): Performed by: EMERGENCY MEDICINE

## 2021-07-09 RX ADMIN — OXCARBAZEPINE 300 MG: 300 TABLET, FILM COATED ORAL at 20:48

## 2021-07-09 RX ADMIN — BENZTROPINE MESYLATE 0.5 MG: 0.5 TABLET ORAL at 08:50

## 2021-07-09 RX ADMIN — TRAZODONE HYDROCHLORIDE 150 MG: 150 TABLET ORAL at 20:48

## 2021-07-09 RX ADMIN — CLOZAPINE 175 MG: 100 TABLET ORAL at 20:48

## 2021-07-09 RX ADMIN — CLOZAPINE 50 MG: 25 TABLET ORAL at 08:50

## 2021-07-09 RX ADMIN — BENZTROPINE MESYLATE 0.5 MG: 0.5 TABLET ORAL at 20:48

## 2021-07-09 RX ADMIN — CLONAZEPAM 0.5 MG: 0.5 TABLET ORAL at 20:48

## 2021-07-09 RX ADMIN — OXCARBAZEPINE 300 MG: 300 TABLET, FILM COATED ORAL at 08:50

## 2021-07-09 ASSESSMENT — PAIN SCALES - GENERAL
PAINLEVEL_OUTOF10: 0
PAINLEVEL_OUTOF10: 0

## 2021-07-09 NOTE — BH NOTE
[id number] [facility]  Day 3 Interdisciplinary Treatment Plan NOTE    Review Date & Time: 07/09/2021 1013     Patient was in treatment team    Estimated Length of Stay Update:  5-7 days   Estimated Discharge Date Update: 07/13/2021    EDUCATION:   Learner Progress Toward Treatment Goals: Reviewed group plan and strategies    Method: Small group    Outcome: Needs reinforcement    PATIENT GOALS: \"no\"    PLAN/TREATMENT RECOMMENDATIONS UPDATE:07/13/2021    GOALS UPDATE:   Time frame for Short-Term Goals: 1-3 days       Marvin Soler RN

## 2021-07-09 NOTE — PROGRESS NOTES
BEHAVIORAL HEALTH FOLLOW-UP NOTE     7/9/2021     Patient was seen and examined in person, Chart reviewed   Patient's case discussed with staff/team    Chief Complaint: \" I am good\"    Interim History: Patient seen during treatment team he continues a very garbled speech very difficult to understand. He states \"I am good. \"  He denies SI/HI intent or plan he denies any auditory visualizations is not able to answer assessment questions with any relevance. Speech is very garbled difficult to understand.   No behavioral disturbances on the unit      Appetite:   [x] Normal/Unchanged  [] Increased  [] Decreased      Sleep:       [x] Normal/Unchanged  [] Fair       [] Poor              Energy:    [x] Normal/Unchanged  [] Increased  [] Decreased        SI [] Present  [x] Absent    HI  []Present  [x] Absent     Aggression:  [] yes  [x] no    Patient is [x] able  [] unable to CONTRACT FOR SAFETY     PAST MEDICAL/PSYCHIATRIC HISTORY:   Past Medical History:   Diagnosis Date    Acute psychosis ([facility] [address] 4/9/2019    ADHD (attention deficit hyperactivity disorder)     Autism     PATIENT IS COOPERATIVE AND VERBAL PER MOM    Autism     Bipolar 1 disorder ([facility] [address]     Drug-induced neutropenia ([address] 10/14/2020    Drug-induced parkinsonism ([address] 10/14/2020    Eruption due to drug 6/28/2019    HE (hepatic encephalopathy) ([facility] [address] 5/26/2021    Mental retardation     Positive occult stool blood test 10/23/2018    Rectal bleeding 10/23/2018    Schizoaffective disorder ([address]     Schizoaffective disorder, chronic condition with acute exacerbation ([facility] [address] 5/2/2019    Viral upper respiratory illness 4/10/2019       FAMILY/SOCIAL HISTORY:  Family History   Problem Relation Age of Onset    High Blood Pressure Mother     Diabetes Mother      Social History     Socioeconomic History    Marital status: Single     Spouse name: Not on file    Number of children: 0    Years of education: Not on file    Highest education level: Not on file 07/09/21 0850    OXcarbazepine (TRILEPTAL) tablet 300 mg, 300 mg, Oral, BID, Burton Adame, APRN - CNP, 157 mg at 07/09/21 0850    acetaminophen (TYLENOL) tablet 650 mg, 650 mg, Oral, Q6H PRN, Jacklyn Barrientos MD    magnesium hydroxide (MILK OF MAGNESIA) 400 MG/5ML suspension 30 mL, 30 mL, Oral, Daily PRN, Jacklyn Barrientos MD    aluminum & magnesium hydroxide-simethicone (MAALOX) 200-200-20 MG/5ML suspension 30 mL, 30 mL, Oral, PRN, Jacklyn Barrientos MD    hydrOXYzine (VISTARIL) capsule 50 mg, 50 mg, Oral, TID PRN, Jacklyn Barrientos MD    haloperidol (HALDOL) tablet 5 mg, 5 mg, Oral, Q6H PRN **OR** haloperidol lactate (HALDOL) injection 5 mg, 5 mg, Intramuscular, Q6H PRN, Jacklyn Barrientos MD    LORazepam (ATIVAN) tablet 2 mg, 2 mg, Oral, Q6H PRN, Jacklyn Barrientos MD    LORazepam (ATIVAN) injection 2 mg, 2 mg, Intramuscular, Q6H PRN, Jacklyn Barrientos MD    diphenhydrAMINE (BENADRYL) injection 50 mg, 50 mg, Intramuscular, Q6H PRN, Jacklyn Barrientos MD    diphenhydrAMINE (BENADRYL) tablet 50 mg, 50 mg, Oral, Q6H PRN, Jacklyn Barrientos MD    ziprasidone (GEODON) injection 10 mg, 10 mg, Intramuscular, Q12H PRN **AND** sterile water injection 1.2 mL, 1.2 mL, Intramuscular, Q12H PRN, Jacklyn Barrientos MD    traZODone (DESYREL) tablet 150 mg, 150 mg, Oral, Nightly, Julianna Brown MD, 150 mg at 07/08/21 2044    clonazePAM (KLONOPIN) tablet 0.5 mg, 0.5 mg, Oral, Nightly, Julianna Brown MD, 0.5 mg at 07/08/21 2049      Examination:  /61   Pulse 110   Temp 97.7 °F (36.5 °C) (Oral)   Resp 16   Ht 5' 10\" (1.778 m)   Wt 200 lb (90.7 kg)   SpO2 98%   BMI 28.70 kg/m²   Gait - steady  Medication side effects(SE): Denies    Mental Status Examination:    Unable to perform any type of mental status examination this time as patient is not answering any questions.   He is out visible in the milieu no behavioral disturbances per nurses he does appear to be internally stimulated          ASSESSMENT:   Patient symptoms are:  [] Well controlled  [] Improving  [] Worsening  [x] No change      Diagnosis:   Principal Problem:    Schizoaffective disorder, bipolar type ([facility] [address]  Active Problems:    Mental retardation  Resolved Problems:    * No resolved hospital problems. *      LABS:    No results for input(s): WBC, HGB, PLT in the last 72 hours. No results for input(s): NA, K, CL, CO2, BUN, CREATININE, GLUCOSE in the last 72 hours. No results for input(s): BILITOT, ALKPHOS, AST, ALT in the last 72 hours. Lab Results   Component Value Date    LABAMPH NOT DETECTED 07/05/2021    BARBSCNU NOT DETECTED 07/05/2021    LABBENZ NOT DETECTED 07/05/2021    LABMETH NOT DETECTED 07/05/2021    OPIATESCREENURINE NOT DETECTED 07/05/2021    PHENCYCLIDINESCREENURINE NOT DETECTED 07/05/2021    ETOH <10 07/05/2021     Lab Results   Component Value Date    TSH 0.693 07/05/2021     Lab Results   Component Value Date    LITHIUM 0.92 02/02/2019     Lab Results   Component Value Date    VALPROATE 87 03/08/2021    CBMZ 4.7 04/14/2019           Treatment Plan:  Reviewed current Medications with the patient. Risks, benefits, side effects, drug-to-drug interactions and alternatives to treatment were discussed. Collateral information:   CD evaluation  Encourage patient to attend group and other milieu activities. Discharge planning discussed with the patient and treatment team    Continue Clozaril 50 mg daily and 175 mg at bedtime for psychosis  Continue Klonopin 0.5 mg at bedtime  Continue Trileptal 300 mg twice daily for mood  Continue Cogentin 0.5 mg twice daily for side effects.     PSYCHOTHERAPY/COUNSELING:  [x] Therapeutic interview  [x] Supportive  [] CBT  [] Ongoing  [] Other    [x] Patient continues to need, on a daily basis, active treatment furnished directly by or requiring the supervision of inpatient psychiatric personnel      Anticipated Length of stay: 3 to 7 days based on stability            Electronically signed by Bertram James APRN - CNP on 7/9/2021 at 4:28 PM

## 2021-07-09 NOTE — PLAN OF CARE
Problem: Anger Management/Homicidal Ideation:  Goal: Able to display appropriate communication and problem solving  Description: Able to display appropriate communication and problem solving  Outcome: Ongoing  Goal: Ability to verbalize frustrations and anger appropriately will improve  Description: Ability to verbalize frustrations and anger appropriately will improve  7/9/2021 0909 by Alexis Salvador RN  Outcome: Ongoing  7/8/2021 2100 by Nancy Graham RN  Outcome: Ongoing   pt denies si/hi and hallucinations. Pt out on the unit but keeps to self. Pt is preoccupied and mumbles when asked questions. Pt takes med's and attends groups.

## 2021-07-09 NOTE — PLAN OF CARE
Problem: Anger Management/Homicidal Ideation:  Goal: Ability to verbalize frustrations and anger appropriately will improve  Description: Ability to verbalize frustrations and anger appropriately will improve  7/8/2021 2100 by Naila Wynn RN  Outcome: Ongoing     Problem: Altered Mood, Manic Behavior:  Goal: Able to verbalize decrease in frequency and intensity of racing thoughts  Description: Able to verbalize decrease in frequency and intensity of racing thoughts  Outcome: Ongoing     Patient has been out on the unit watching tv. Keeps to self. Ate snack. Patients speech is garbled, but responds appropriately with yes or no responses. Denies suicidal/homicidal ideations and hallucinations. Needed assistance finding room, but knew to ask staff. Purposeful rounding continued.

## 2021-07-10 PROCEDURE — 6370000000 HC RX 637 (ALT 250 FOR IP): Performed by: EMERGENCY MEDICINE

## 2021-07-10 PROCEDURE — 1240000000 HC EMOTIONAL WELLNESS R&B

## 2021-07-10 PROCEDURE — 99232 SBSQ HOSP IP/OBS MODERATE 35: CPT | Performed by: NURSE PRACTITIONER

## 2021-07-10 PROCEDURE — 6370000000 HC RX 637 (ALT 250 FOR IP): Performed by: NURSE PRACTITIONER

## 2021-07-10 RX ORDER — CLOZAPINE 100 MG/1
200 TABLET ORAL NIGHTLY
Status: DISCONTINUED | OUTPATIENT
Start: 2021-07-10 | End: 2021-07-16 | Stop reason: HOSPADM

## 2021-07-10 RX ADMIN — CLONAZEPAM 0.5 MG: 0.5 TABLET ORAL at 21:09

## 2021-07-10 RX ADMIN — CLOZAPINE 50 MG: 25 TABLET ORAL at 10:13

## 2021-07-10 RX ADMIN — CLOZAPINE 200 MG: 100 TABLET ORAL at 21:09

## 2021-07-10 RX ADMIN — BENZTROPINE MESYLATE 0.5 MG: 0.5 TABLET ORAL at 21:11

## 2021-07-10 RX ADMIN — BENZTROPINE MESYLATE 0.5 MG: 0.5 TABLET ORAL at 10:13

## 2021-07-10 RX ADMIN — OXCARBAZEPINE 300 MG: 300 TABLET, FILM COATED ORAL at 10:13

## 2021-07-10 RX ADMIN — TRAZODONE HYDROCHLORIDE 150 MG: 150 TABLET ORAL at 21:09

## 2021-07-10 RX ADMIN — OXCARBAZEPINE 300 MG: 300 TABLET, FILM COATED ORAL at 21:09

## 2021-07-10 ASSESSMENT — PAIN - FUNCTIONAL ASSESSMENT: PAIN_FUNCTIONAL_ASSESSMENT: 0-10

## 2021-07-10 NOTE — PLAN OF CARE
Up and about the unit. Very talkative, speech is garbled and difficult to follow. Does attempt to socialize. Denies suicidal thoughts or intent to harm himself or others. Denies hallucinations.

## 2021-07-10 NOTE — CARE COORDINATION
Mother called and there are a change in plans. The patent is no longer welcomed at her home. Mother is currently exploring a group home for him.       Electronically signed by ORMA Garcia, MANDO on 7/10/2021 at 1:21 PM

## 2021-07-10 NOTE — PLAN OF CARE
Pt is without distress. Pt denies suicidal or homicidal ideations. Pt denies hallucinations. Pt is cooperative. Pt is difficult to understand at times because pt usually mumbles his speech, this is not an acute observation. Will follow and monitor.

## 2021-07-10 NOTE — PROGRESS NOTES
BEHAVIORAL HEALTH FOLLOW-UP NOTE     7/10/2021     Patient was seen and examined in person, Chart reviewed   Patient's case discussed with staff/team    Chief Complaint: \" I am good\"    Interim History: Patient seen in his room his speech is much more clear today. He does not offer much conversation however he is able to be understood. He denies auditory visualizations he denies SI/HI intent or plan no behavioral disturbances on the unit. He is medication compliant.     Appetite:   [x] Normal/Unchanged  [] Increased  [] Decreased      Sleep:       [x] Normal/Unchanged  [] Fair       [] Poor              Energy:    [x] Normal/Unchanged  [] Increased  [] Decreased        SI [] Present  [x] Absent    HI  []Present  [x] Absent     Aggression:  [] yes  [x] no    Patient is [x] able  [] unable to CONTRACT FOR SAFETY     PAST MEDICAL/PSYCHIATRIC HISTORY:   Past Medical History:   Diagnosis Date    Acute psychosis ([facility] [address] 4/9/2019    ADHD (attention deficit hyperactivity disorder)     Autism     PATIENT IS COOPERATIVE AND VERBAL PER MOM    Autism     Bipolar 1 disorder ([facility] [address]     Drug-induced neutropenia ([address] 10/14/2020    Drug-induced parkinsonism ([address] 10/14/2020    Eruption due to drug 6/28/2019    HE (hepatic encephalopathy) ([address] 5/26/2021    Mental retardation     Positive occult stool blood test 10/23/2018    Rectal bleeding 10/23/2018    Schizoaffective disorder ([address]     Schizoaffective disorder, chronic condition with acute exacerbation ([facility] [address] 5/2/2019    Viral upper respiratory illness 4/10/2019       FAMILY/SOCIAL HISTORY:  Family History   Problem Relation Age of Onset    High Blood Pressure Mother     Diabetes Mother      Social History     Socioeconomic History    Marital status: Single     Spouse name: Not on file    Number of children: 0    Years of education: Not on file    Highest education level: Not on file   Occupational History     Employer: UNEMPLOYED     Comment: SSDI   Tobacco Use    Smoking status: Never Smoker    Smokeless tobacco: Never Used   Vaping Use    Vaping Use: Never used   Substance and Sexual Activity    Alcohol use: No    Drug use: No    Sexual activity: Never   Other Topics Concern    Not on file   Social History Narrative    Not on file     Social Determinants of Health     Financial Resource Strain:     Difficulty of Paying Living Expenses:    Food Insecurity:     Worried About Running Out of Food in the Last Year:     [address] in the Last Year:    Transportation Needs:     Lack of Transportation (Medical):  Lack of Transportation (Non-Medical):    Physical Activity:     Days of Exercise per Week:     Minutes of Exercise per Session:    Stress:     Feeling of Stress :    Social Connections:     Frequency of Communication with Friends and Family:     Frequency of Social Gatherings with Friends and Family:     Attends Taoist Services:     Active Member of Clubs or Organizations:     Attends Club or Organization Meetings:     Marital Status:    Intimate Partner Violence:     Fear of Current or Ex-Partner:     Emotionally Abused:     Physically Abused:     Sexually Abused:            ROS:  [x] All negative/unchanged except if checked.  Explain positive(checked items) below:  [] Constitutional  [] Eyes  [] Ear/Nose/Mouth/Throat  [] Respiratory  [] CV  [] GI  []   [] Musculoskeletal  [] Skin/Breast  [] Neurological  [] Endocrine  [] Heme/Lymph  [] Allergic/Immunologic    Explanation:     MEDICATIONS:    Current Facility-Administered Medications:     cloZAPine (CLOZARIL) tablet 175 mg, 175 mg, Oral, Nightly, LUCINA Gil - CNP, 855 mg at 07/09/21 2048    benztropine (COGENTIN) tablet 0.5 mg, 0.5 mg, Oral, BID, LUCINA Ledezma - CNP, 0.5 mg at 07/10/21 1013    cloZAPine (CLOZARIL) tablet 50 mg, 50 mg, Oral, Daily, LUCINA Gil - CNP, 50 mg at 07/10/21 1013    OXcarbazepine (TRILEPTAL) tablet 300 mg, 300 mg, Oral, change      Diagnosis:   Principal Problem:    Schizoaffective disorder, bipolar type ([facility] [address]  Active Problems:    Mental retardation  Resolved Problems:    * No resolved hospital problems. *      LABS:    No results for input(s): WBC, HGB, PLT in the last 72 hours. No results for input(s): NA, K, CL, CO2, BUN, CREATININE, GLUCOSE in the last 72 hours. No results for input(s): BILITOT, ALKPHOS, AST, ALT in the last 72 hours. Lab Results   Component Value Date    LABAMPH NOT DETECTED 07/05/2021    BARBSCNU NOT DETECTED 07/05/2021    LABBENZ NOT DETECTED 07/05/2021    LABMETH NOT DETECTED 07/05/2021    OPIATESCREENURINE NOT DETECTED 07/05/2021    PHENCYCLIDINESCREENURINE NOT DETECTED 07/05/2021    ETOH <10 07/05/2021     Lab Results   Component Value Date    TSH 0.693 07/05/2021     Lab Results   Component Value Date    LITHIUM 0.92 02/02/2019     Lab Results   Component Value Date    VALPROATE 87 03/08/2021    CBMZ 4.7 04/14/2019           Treatment Plan:  Reviewed current Medications with the patient. Risks, benefits, side effects, drug-to-drug interactions and alternatives to treatment were discussed. Collateral information:   CD evaluation  Encourage patient to attend group and other milieu activities. Discharge planning discussed with the patient and treatment team    Increase Clozaril 50 mg daily and 200 mg at bedtime for psychosis  Continue Klonopin 0.5 mg at bedtime  Continue Trileptal 300 mg twice daily for mood  Continue Cogentin 0.5 mg twice daily for side effects.     PSYCHOTHERAPY/COUNSELING:  [x] Therapeutic interview  [x] Supportive  [] CBT  [] Ongoing  [] Other    [x] Patient continues to need, on a daily basis, active treatment furnished directly by or requiring the supervision of inpatient psychiatric personnel      Anticipated Length of stay: 3 to 7 days based on stability            Electronically signed by LUCINA Coy CNP on 2/76/9697 at 11:31 AM

## 2021-07-11 PROCEDURE — 99232 SBSQ HOSP IP/OBS MODERATE 35: CPT | Performed by: NURSE PRACTITIONER

## 2021-07-11 PROCEDURE — 6370000000 HC RX 637 (ALT 250 FOR IP): Performed by: EMERGENCY MEDICINE

## 2021-07-11 PROCEDURE — 1240000000 HC EMOTIONAL WELLNESS R&B

## 2021-07-11 PROCEDURE — 6370000000 HC RX 637 (ALT 250 FOR IP): Performed by: NURSE PRACTITIONER

## 2021-07-11 RX ADMIN — CLOZAPINE 200 MG: 100 TABLET ORAL at 20:52

## 2021-07-11 RX ADMIN — BENZTROPINE MESYLATE 0.5 MG: 0.5 TABLET ORAL at 20:52

## 2021-07-11 RX ADMIN — BENZTROPINE MESYLATE 0.5 MG: 0.5 TABLET ORAL at 08:47

## 2021-07-11 RX ADMIN — CLONAZEPAM 0.5 MG: 0.5 TABLET ORAL at 20:52

## 2021-07-11 RX ADMIN — OXCARBAZEPINE 300 MG: 300 TABLET, FILM COATED ORAL at 08:47

## 2021-07-11 RX ADMIN — OXCARBAZEPINE 300 MG: 300 TABLET, FILM COATED ORAL at 20:52

## 2021-07-11 RX ADMIN — CLOZAPINE 50 MG: 25 TABLET ORAL at 08:47

## 2021-07-11 RX ADMIN — TRAZODONE HYDROCHLORIDE 150 MG: 150 TABLET ORAL at 20:52

## 2021-07-11 ASSESSMENT — PAIN - FUNCTIONAL ASSESSMENT: PAIN_FUNCTIONAL_ASSESSMENT: 0-10

## 2021-07-11 ASSESSMENT — PAIN SCALES - GENERAL: PAINLEVEL_OUTOF10: 0

## 2021-07-11 NOTE — PLAN OF CARE
Pt is stable and without distress. Pt denies suicidal or homicidal ideations. Pt denies hallucinations. Pt does not report a goal during first morning assessment. No other complications at this time. Will follow and monitor.

## 2021-07-11 NOTE — PROGRESS NOTES
BEHAVIORAL HEALTH FOLLOW-UP NOTE     7/11/2021     Patient was seen and examined in person, Chart reviewed   Patient's case discussed with staff/team    Chief Complaint: \" I am good\"    Interim History: Patient up on the unit still mumbling but speech is more clear. He offers little conversation and he denies SI/HI intent or plan denies any auditory or visual hallucinations no behavioral disturbances on the unit. He is out visible in the milieu because mostly to himself.   Eating well sleeping well no neurovegetative signs or symptoms of depression    Appetite:   [x] Normal/Unchanged  [] Increased  [] Decreased      Sleep:       [x] Normal/Unchanged  [] Fair       [] Poor              Energy:    [x] Normal/Unchanged  [] Increased  [] Decreased        SI [] Present  [x] Absent    HI  []Present  [x] Absent     Aggression:  [] yes  [x] no    Patient is [x] able  [] unable to CONTRACT FOR SAFETY     PAST MEDICAL/PSYCHIATRIC HISTORY:   Past Medical History:   Diagnosis Date    Acute psychosis ([facility] [address] 4/9/2019    ADHD (attention deficit hyperactivity disorder)     Autism     PATIENT IS COOPERATIVE AND VERBAL PER MOM    Autism     Bipolar 1 disorder ([address]     Drug-induced neutropenia ([address] 10/14/2020    Drug-induced parkinsonism ([address] 10/14/2020    Eruption due to drug 6/28/2019    HE (hepatic encephalopathy) ([address] 5/26/2021    Mental retardation     Positive occult stool blood test 10/23/2018    Rectal bleeding 10/23/2018    Schizoaffective disorder ([address]     Schizoaffective disorder, chronic condition with acute exacerbation ([facility] [address] 5/2/2019    Viral upper respiratory illness 4/10/2019       FAMILY/SOCIAL HISTORY:  Family History   Problem Relation Age of Onset    High Blood Pressure Mother     Diabetes Mother      Social History     Socioeconomic History    Marital status: Single     Spouse name: Not on file    Number of children: 0    Years of education: Not on file    Highest education level: Not on file   Occupational History     Employer: UNEMPLOYED     Comment: SSDI   Tobacco Use    Smoking status: Never Smoker    Smokeless tobacco: Never Used   Vaping Use    Vaping Use: Never used   Substance and Sexual Activity    Alcohol use: No    Drug use: No    Sexual activity: Never   Other Topics Concern    Not on file   Social History Narrative    Not on file     Social Determinants of Health     Financial Resource Strain:     Difficulty of Paying Living Expenses:    Food Insecurity:     Worried About Running Out of Food in the Last Year:     [address] in the Last Year:    Transportation Needs:     Lack of Transportation (Medical):  Lack of Transportation (Non-Medical):    Physical Activity:     Days of Exercise per Week:     Minutes of Exercise per Session:    Stress:     Feeling of Stress :    Social Connections:     Frequency of Communication with Friends and Family:     Frequency of Social Gatherings with Friends and Family:     Attends Nondenominational Services:     Active Member of Clubs or Organizations:     Attends Club or Organization Meetings:     Marital Status:    Intimate Partner Violence:     Fear of Current or Ex-Partner:     Emotionally Abused:     Physically Abused:     Sexually Abused:            ROS:  [x] All negative/unchanged except if checked.  Explain positive(checked items) below:  [] Constitutional  [] Eyes  [] Ear/Nose/Mouth/Throat  [] Respiratory  [] CV  [] GI  []   [] Musculoskeletal  [] Skin/Breast  [] Neurological  [] Endocrine  [] Heme/Lymph  [] Allergic/Immunologic    Explanation:     MEDICATIONS:    Current Facility-Administered Medications:     cloZAPine (CLOZARIL) tablet 200 mg, 200 mg, Oral, Nightly, LUCINA Gil - CNP, 624 mg at 07/10/21 2109    benztropine (COGENTIN) tablet 0.5 mg, 0.5 mg, Oral, BID, LUCINA Erickson - CNP, 0.5 mg at 07/11/21 0847    cloZAPine (CLOZARIL) tablet 50 mg, 50 mg, Oral, Daily, LUCINA Erickson - CNP, 50

## 2021-07-11 NOTE — GROUP NOTE
Group Therapy Note    Date: 7/11/2021    Group Start Time: 1250  Group End Time: 1330  Group Topic: Cognitive Skills    [facility]    ROMA Carreno LSW        Group Therapy Note    Attendees: 7         Patient's Goal:  Pt will be able to verbalize understanding regarding the importance of self-care    Notes:  Pt made connections and participated in group    Status After Intervention:  Unchanged    Participation Level: Monopolizing    Participation Quality: Appropriate, Attentive, Sharing and Supportive      Speech:  pressured      Thought Process/Content: Flight of ideas      Affective Functioning: Exaggerated      Mood: anxious      Level of consciousness:  Preoccupied      Response to Learning: Able to verbalize current knowledge/experience      Endings: None Reported    Modes of Intervention: Education, Support, Socialization and Exploration      Discipline Responsible: /Counselor      Signature:  ROMA Carreno LSW

## 2021-07-11 NOTE — BH NOTE
Pt is stable and without distress presently. Pt denies suicidal or homicidal ideations. Pt denies hallucinations. No other behavioral concerns presently. Will follow and monitor.

## 2021-07-12 PROCEDURE — 99232 SBSQ HOSP IP/OBS MODERATE 35: CPT | Performed by: NURSE PRACTITIONER

## 2021-07-12 PROCEDURE — 6370000000 HC RX 637 (ALT 250 FOR IP): Performed by: EMERGENCY MEDICINE

## 2021-07-12 PROCEDURE — 1240000000 HC EMOTIONAL WELLNESS R&B

## 2021-07-12 PROCEDURE — 6370000000 HC RX 637 (ALT 250 FOR IP): Performed by: NURSE PRACTITIONER

## 2021-07-12 RX ADMIN — CLOZAPINE 200 MG: 100 TABLET ORAL at 20:37

## 2021-07-12 RX ADMIN — OXCARBAZEPINE 300 MG: 300 TABLET, FILM COATED ORAL at 20:37

## 2021-07-12 RX ADMIN — TRAZODONE HYDROCHLORIDE 150 MG: 150 TABLET ORAL at 20:36

## 2021-07-12 RX ADMIN — BENZTROPINE MESYLATE 0.5 MG: 0.5 TABLET ORAL at 10:37

## 2021-07-12 RX ADMIN — CLONAZEPAM 0.5 MG: 0.5 TABLET ORAL at 20:37

## 2021-07-12 RX ADMIN — OXCARBAZEPINE 300 MG: 300 TABLET, FILM COATED ORAL at 10:36

## 2021-07-12 RX ADMIN — BENZTROPINE MESYLATE 0.5 MG: 0.5 TABLET ORAL at 20:37

## 2021-07-12 RX ADMIN — CLOZAPINE 50 MG: 25 TABLET ORAL at 10:37

## 2021-07-12 ASSESSMENT — PAIN SCALES - GENERAL: PAINLEVEL_OUTOF10: 0

## 2021-07-12 ASSESSMENT — PAIN - FUNCTIONAL ASSESSMENT: PAIN_FUNCTIONAL_ASSESSMENT: 0-10

## 2021-07-12 NOTE — PROGRESS NOTES
Attended morning community meeting. Updated on staffing and daily expectations. Shared goal for the day as to want to go home.

## 2021-07-12 NOTE — PLAN OF CARE
Pt is stable and without other distress. Pt is cooperative, speech appears to be more clear, understandable. Pt denies suicidal or homicidal ideations. Pt denies hallucinations. Will follow and monitor.

## 2021-07-12 NOTE — PROGRESS NOTES
BEHAVIORAL HEALTH FOLLOW-UP NOTE     7/12/2021     Patient was seen and examined in person, Chart reviewed   Patient's case discussed with staff/team    Chief Complaint: \" I am [place] how about that? \"    Interim History: Patient up on the unit he continues to say over and over \" I am [place] how about that\" he denies SI/HI intent or plan denies any auditory or visual hallucinations no behavioral disturbances on the unit. He is out visible in the milieu because mostly to himself.   Eating well sleeping well no neurovegetative signs or symptoms of depression    Appetite:   [x] Normal/Unchanged  [] Increased  [] Decreased      Sleep:       [x] Normal/Unchanged  [] Fair       [] Poor              Energy:    [x] Normal/Unchanged  [] Increased  [] Decreased        SI [] Present  [x] Absent    HI  []Present  [x] Absent     Aggression:  [] yes  [x] no    Patient is [x] able  [] unable to CONTRACT FOR SAFETY     PAST MEDICAL/PSYCHIATRIC HISTORY:   Past Medical History:   Diagnosis Date    Acute psychosis ([address] 4/9/2019    ADHD (attention deficit hyperactivity disorder)     Autism     PATIENT IS COOPERATIVE AND VERBAL PER MOM    Autism     Bipolar 1 disorder ([address]     Drug-induced neutropenia ([address] 10/14/2020    Drug-induced parkinsonism ([address] 10/14/2020    Eruption due to drug 6/28/2019    HE (hepatic encephalopathy) ([address] 5/26/2021    Mental retardation     Positive occult stool blood test 10/23/2018    Rectal bleeding 10/23/2018    Schizoaffective disorder ([address]     Schizoaffective disorder, chronic condition with acute exacerbation ([address] 5/2/2019    Viral upper respiratory illness 4/10/2019       FAMILY/SOCIAL HISTORY:  Family History   Problem Relation Age of Onset    High Blood Pressure Mother     Diabetes Mother      Social History     Socioeconomic History    Marital status: Single     Spouse name: Not on file    Number of children: 0    Years of education: Not on file    Highest education level: Not on file   Occupational History     Employer: UNEMPLOYED     Comment: SSDI   Tobacco Use    Smoking status: Never Smoker    Smokeless tobacco: Never Used   Vaping Use    Vaping Use: Never used   Substance and Sexual Activity    Alcohol use: No    Drug use: No    Sexual activity: Never   Other Topics Concern    Not on file   Social History Narrative    Not on file     Social Determinants of Health     Financial Resource Strain:     Difficulty of Paying Living Expenses:    Food Insecurity:     Worried About Running Out of Food in the Last Year:     [address] in the Last Year:    Transportation Needs:     Lack of Transportation (Medical):  Lack of Transportation (Non-Medical):    Physical Activity:     Days of Exercise per Week:     Minutes of Exercise per Session:    Stress:     Feeling of Stress :    Social Connections:     Frequency of Communication with Friends and Family:     Frequency of Social Gatherings with Friends and Family:     Attends [employer] Services:     Active Member of Clubs or Organizations:     Attends Club or Organization Meetings:     Marital Status:    Intimate Partner Violence:     Fear of Current or Ex-Partner:     Emotionally Abused:     Physically Abused:     Sexually Abused:            ROS:  [x] All negative/unchanged except if checked.  Explain positive(checked items) below:  [] Constitutional  [] Eyes  [] Ear/Nose/Mouth/Throat  [] Respiratory  [] CV  [] GI  []   [] Musculoskeletal  [] Skin/Breast  [] Neurological  [] Endocrine  [] Heme/Lymph  [] Allergic/Immunologic    Explanation:     MEDICATIONS:    Current Facility-Administered Medications:     cloZAPine (CLOZARIL) tablet 200 mg, 200 mg, Oral, Nightly, LUCINA Gil - CNP, 216 mg at 07/11/21 2052    benztropine (COGENTIN) tablet 0.5 mg, 0.5 mg, Oral, BID, LUCINA Luciano - CNP, 0.5 mg at 07/11/21 2052    cloZAPine (CLOZARIL) tablet 50 mg, 50 mg, Oral, Daily, LUCINA Scott - CNP, 50 mg at 07/11/21 0847    OXcarbazepine (TRILEPTAL) tablet 300 mg, 300 mg, Oral, BID, Ala [place] Dellick, APRN - CNP, 378 mg at 07/11/21 2052    acetaminophen (TYLENOL) tablet 650 mg, 650 mg, Oral, Q6H PRN, Torri De La Paz MD    magnesium hydroxide (MILK OF MAGNESIA) 400 MG/5ML suspension 30 mL, 30 mL, Oral, Daily PRN, Trori De La Paz MD    aluminum & magnesium hydroxide-simethicone (MAALOX) 200-200-20 MG/5ML suspension 30 mL, 30 mL, Oral, PRN, Torri De La Paz MD    hydrOXYzine (VISTARIL) capsule 50 mg, 50 mg, Oral, TID PRN, Torri De La Paz MD    haloperidol (HALDOL) tablet 5 mg, 5 mg, Oral, Q6H PRN **OR** haloperidol lactate (HALDOL) injection 5 mg, 5 mg, Intramuscular, Q6H PRN, Torri De La Paz MD    LORazepam (ATIVAN) tablet 2 mg, 2 mg, Oral, Q6H PRN, Torri De La Paz MD    LORazepam (ATIVAN) injection 2 mg, 2 mg, Intramuscular, Q6H PRN, Torri De La Paz MD    diphenhydrAMINE (BENADRYL) injection 50 mg, 50 mg, Intramuscular, Q6H PRN, Torri De La Paz MD    diphenhydrAMINE (BENADRYL) tablet 50 mg, 50 mg, Oral, Q6H PRN, Torri De La Paz MD    ziprasidone (GEODON) injection 10 mg, 10 mg, Intramuscular, Q12H PRN **AND** sterile water injection 1.2 mL, 1.2 mL, Intramuscular, Q12H PRN, Torri De La Paz MD    traZODone (DESYREL) tablet 150 mg, 150 mg, Oral, Nightly, Vida Arias MD, 150 mg at 07/11/21 2052    clonazePAM (KLONOPIN) tablet 0.5 mg, 0.5 mg, Oral, Nightly, Vida Arias MD, 0.5 mg at 07/11/21 2052      Examination:  /71   Pulse 95   Temp 98.3 °F (36.8 °C)   Resp 16   Ht 5' 10\" (1.778 m)   Wt 200 lb (90.7 kg)   SpO2 97%   BMI 28.70 kg/m²   Gait - steady  Medication side effects(SE): Denies    Mental Status Examination:    Unable to perform any type of mental status examination this time as patient is not answering any questions.   He is out visible in the milieu no behavioral disturbances per nurses he does appear to be internally stimulated          ASSESSMENT:   Patient symptoms are:  [] Well controlled  [] Improving  [] Worsening  [x] No change      Diagnosis:   Principal Problem:    Schizoaffective disorder, bipolar type ([facility] [facility]  Active Problems:    Mental retardation  Resolved Problems:    * No resolved hospital problems. *      LABS:    No results for input(s): WBC, HGB, PLT in the last 72 hours. No results for input(s): NA, K, CL, CO2, BUN, CREATININE, GLUCOSE in the last 72 hours. No results for input(s): BILITOT, ALKPHOS, AST, ALT in the last 72 hours. Lab Results   Component Value Date    LABAMPH NOT DETECTED 07/05/2021    BARBSCNU NOT DETECTED 07/05/2021    LABBENZ NOT DETECTED 07/05/2021    LABMETH NOT DETECTED 07/05/2021    OPIATESCREENURINE NOT DETECTED 07/05/2021    PHENCYCLIDINESCREENURINE NOT DETECTED 07/05/2021    ETOH <10 07/05/2021     Lab Results   Component Value Date    TSH 0.693 07/05/2021     Lab Results   Component Value Date    LITHIUM 0.92 02/02/2019     Lab Results   Component Value Date    VALPROATE 87 03/08/2021    CBMZ 4.7 04/14/2019           Treatment Plan:  Reviewed current Medications with the patient. Risks, benefits, side effects, drug-to-drug interactions and alternatives to treatment were discussed. Collateral information:   CD evaluation  Encourage patient to attend group and other milieu activities. Discharge planning discussed with the patient and treatment team    Continue Clozaril 50 mg daily and 175 mg at bedtime for psychosis  Continue Klonopin 0.5 mg at bedtime  Continue Trileptal 300 mg twice daily for mood  Continue Cogentin 0.5 mg twice daily for side effects.     PSYCHOTHERAPY/COUNSELING:  [x] Therapeutic interview  [x] Supportive  [] CBT  [] Ongoing  [] Other    [x] Patient continues to need, on a daily basis, active treatment furnished directly by or requiring the supervision of inpatient psychiatric personnel      Anticipated Length of stay: 3 to 7 days based on stability            Electronically signed by Tano Villa, APRN - CNP on 7/12/2021 at 9:59 AM

## 2021-07-12 NOTE — GROUP NOTE
Group Therapy Note    Date: 7/12/2021    Group Start Time: 1000  Group End Time: 1993  Group Topic: Psychoeducation    [facility] [address]        Group Therapy Note    Attendees: 14       Notes:  Minimally engaged during discussion on the process of making changes. Having difficulty sitting still for more than 5 minutes, getting up moving around constantly. Expresses no understanding of content when encouraged to share change necessary.       Status After Intervention:  Unchanged    Participation Level: Minimal    Participation Quality: Inappropriate and Intrusive      Speech:  pressured      Thought Process/Content: Perseverating      Affective Functioning: Constricted/Restricted      Mood: anxious and elevated      Level of consciousness:  Preoccupied and Inattentive      Response to Learning: Progressing to goal      Endings: None Reported    Modes of Intervention: Education, Support, Socialization and Exploration      Discipline Responsible: Psychoeducational Specialist      Signature:  Carlton Miller, [address]

## 2021-07-12 NOTE — CARE COORDINATION
PEGGY received vml from [facility], [facility]. [facility] provided a new number for SW to contact him [phone]. PEGGY contacted [facility] who reported he is working on placements for pt at this time. He stated he has received 4-5 responses from placements interested in pt this morning. [facility] reported he spoke with mom this morning who reported not knowing if she wants pt to come home first. [facility] stated he will update Sw as soon as he has any updates. [facility] also reported pt mom is not his legal guardian, pt is his own guardian. [facility] requesting to speak with NP and Dr. Lynette Llamas contacted pt mom to discuss this information and to inquire if pt can reside at her home temporarily until [facility] can find placement. Tate Garcia reported pt cannot come home as it is not in his best interest. She reported pt has no one else to stay with temporarily until pt gets placed. Tate Garcia reported pt did sound better when she spoke with him today. She also stated they have an interview tomorrow for potential placement. Sw following.

## 2021-07-12 NOTE — PROGRESS NOTES
Pt alert, calm, and cooperative. Bright and talkative. Denies SI, HI, and AVH. Pt out on the unit and making attempts at socializing with peers. No behaviors this evening. Pt ate snack and is med compliant. Will continue to monitor.

## 2021-07-12 NOTE — CARE COORDINATION
Lisset attempted to contact pt [facility] Lenn[facility] Coupe  in regards to pt discharge planning. LISSET was advised during treatment team to discuss moms wishes for pt to go to a group home at time of discharge with Satish Reis. No answer, voicemail was left.

## 2021-07-12 NOTE — GROUP NOTE
Group Therapy Note    Date: 7/12/2021    Group Start Time: 1120  Group End Time: 1140  Group Topic: Cognitive Skills    [facility]    ROMA Ballesteros, MANDO        Group Therapy Note    Attendees: 12         Patient's Goal:  To participate in the group discussion on positive psychology prompt cards. To be able to describe positive events and thoughts that have occurred either today or in the past week. Notes:  Pt was an active participant in group discussion. Status After Intervention:  Unchanged    Participation Level:  Active Listener and Interactive    Participation Quality: Appropriate, Attentive and Sharing      Speech:  normal      Thought Process/Content: Linear      Affective Functioning: Congruent      Mood: anxious      Level of consciousness:  Alert and Oriented x4      Response to Learning: Able to verbalize current knowledge/experience, Able to verbalize/acknowledge new learning and Able to retain information      Endings: None Reported    Modes of Intervention: Education, Support, Socialization, Exploration, Clarifying and Problem-solving      Discipline Responsible: /Counselor      Signature:  ROMA López LSW

## 2021-07-13 PROCEDURE — 99232 SBSQ HOSP IP/OBS MODERATE 35: CPT | Performed by: NURSE PRACTITIONER

## 2021-07-13 PROCEDURE — 6370000000 HC RX 637 (ALT 250 FOR IP): Performed by: EMERGENCY MEDICINE

## 2021-07-13 PROCEDURE — 1240000000 HC EMOTIONAL WELLNESS R&B

## 2021-07-13 PROCEDURE — 6370000000 HC RX 637 (ALT 250 FOR IP): Performed by: NURSE PRACTITIONER

## 2021-07-13 RX ADMIN — TRAZODONE HYDROCHLORIDE 150 MG: 150 TABLET ORAL at 20:14

## 2021-07-13 RX ADMIN — CLOZAPINE 50 MG: 25 TABLET ORAL at 09:09

## 2021-07-13 RX ADMIN — BENZTROPINE MESYLATE 0.5 MG: 0.5 TABLET ORAL at 09:09

## 2021-07-13 RX ADMIN — OXCARBAZEPINE 300 MG: 300 TABLET, FILM COATED ORAL at 20:15

## 2021-07-13 RX ADMIN — BENZTROPINE MESYLATE 0.5 MG: 0.5 TABLET ORAL at 20:15

## 2021-07-13 RX ADMIN — CLONAZEPAM 0.5 MG: 0.5 TABLET ORAL at 20:20

## 2021-07-13 RX ADMIN — CLOZAPINE 200 MG: 100 TABLET ORAL at 20:15

## 2021-07-13 RX ADMIN — OXCARBAZEPINE 300 MG: 300 TABLET, FILM COATED ORAL at 09:09

## 2021-07-13 ASSESSMENT — PAIN SCALES - GENERAL
PAINLEVEL_OUTOF10: 0

## 2021-07-13 ASSESSMENT — PAIN - FUNCTIONAL ASSESSMENT: PAIN_FUNCTIONAL_ASSESSMENT: 0-10

## 2021-07-13 NOTE — PLAN OF CARE
Pt denies suicidal or homicidal ideations. Pt denies hallucinations. Pt remains confused at times to recent events / time / date. Pt is talkative at times, expresses loose associations and confabulatory thought processes. Pt does not express a goal during first morning assessment. Will follow and monitor.

## 2021-07-13 NOTE — PROGRESS NOTES
PT. REMAINS WITH PRESSURED SPEECH, FLIGHT OF IDEAS, UNABLE TO UNDERSTAND AT TIMES. FOCUSED ON ROSI CAMARENA AND \"LIZET\". PT. WALKS ABOUT UNIT FREQUENTLY, APPROACHES VARIOUS STAFF MEMBERS WITH PERSEVERATIVE PATTERNED TANGENTIAL THOUGHTS/SPEECH. PT. REMAINS MEDICATION COMPLIANT AND ATTEMPTS TO FOLLOW DIRECTION, LIMIT SETTING, ATTENDS SELECT GROUPS AND FOLLOW BASIC UNIT ROUTINE. DISHEVELED. EATS MEALS. IN CONTROL.

## 2021-07-13 NOTE — GROUP NOTE
Group Therapy Note    Date: 7/13/2021    Group Start Time: 1000  Group End Time: 8494  Group Topic: Psychoeducation    [facility]    Diana Hi, CTRS        Group Therapy Note      Number of participants: 13  Type of group: Psychoeducation  Mode of intervention: Education, Support, Socialization, Exploration, Clarifying, and Problem-solving  Topic: Stress Management Tips  Objective: Pt will identify 1 way to manage stress better in recovery. Patient's Goal: \"Be happy\"     Notes:  Pt offered minimal interaction during group but did share when prompted. Accepting of handout and support from peers. Status After Intervention:  Unchanged    Participation Level:  Active Listener and Minimal    Participation Quality: Appropriate, Attentive and Sharing      Speech:  normal      Thought Process/Content: Logical      Affective Functioning: Congruent      Mood: euthymic      Level of consciousness:  Alert, Oriented x4 and Attentive      Response to Learning: Able to verbalize current knowledge/experience, Able to verbalize/acknowledge new learning, Able to retain information, Capable of insight, Able to change behavior and Progressing to goal      Endings: None Reported    Modes of Intervention: Education, Support, Socialization, Exploration, Clarifying and Problem-solving

## 2021-07-13 NOTE — CARE COORDINATION
Lisset contacted pt Jessenia Moore for an update on placements and to determine where he would like pt to reside temporarily if he cannot be placed immediately as mom denied pt coming home temporarily. No answer, voicemail was left.

## 2021-07-13 NOTE — PROGRESS NOTES
BEHAVIORAL HEALTH FOLLOW-UP NOTE     7/13/2021     Patient was seen and examined in person, Chart reviewed   Patient's case discussed with staff/team    Chief Complaint: \" I am going to the [employer] and get my haircut? \"    Interim History: Patient up on the unit he continues to say over and over \" I am going to go to the [employer] and get my haircut\" he denies SI/HI intent or plan denies any auditory or visual hallucinations no behavioral disturbances on the unit. He is out visible in the milieu keeping mostly to himself. Eating well sleeping well no neurovegetative signs or symptoms of depression he is medication compliant.       Appetite:   [x] Normal/Unchanged  [] Increased  [] Decreased      Sleep:       [x] Normal/Unchanged  [] Fair       [] Poor              Energy:    [x] Normal/Unchanged  [] Increased  [] Decreased        SI [] Present  [x] Absent    HI  []Present  [x] Absent     Aggression:  [] yes  [x] no    Patient is [x] able  [] unable to CONTRACT FOR SAFETY     PAST MEDICAL/PSYCHIATRIC HISTORY:   Past Medical History:   Diagnosis Date    Acute psychosis ([address] 4/9/2019    ADHD (attention deficit hyperactivity disorder)     Autism     PATIENT IS COOPERATIVE AND VERBAL PER MOM    Autism     Bipolar 1 disorder ([address]     Drug-induced neutropenia ([address] 10/14/2020    Drug-induced parkinsonism ([address] 10/14/2020    Eruption due to drug 6/28/2019    HE (hepatic encephalopathy) ([address] 5/26/2021    Mental retardation     Positive occult stool blood test 10/23/2018    Rectal bleeding 10/23/2018    Schizoaffective disorder ([address]     Schizoaffective disorder, chronic condition with acute exacerbation ([address] 5/2/2019    Viral upper respiratory illness 4/10/2019       FAMILY/SOCIAL HISTORY:  Family History   Problem Relation Age of Onset    High Blood Pressure Mother     Diabetes Mother      Social History     Socioeconomic History    Marital status: Single     Spouse name: Not on file   Uma Her Number of children: 0    Years of education: Not on file    Highest education level: Not on file   Occupational History     Employer: UNEMPLOYED     Comment: SSDI   Tobacco Use    Smoking status: Never Smoker    Smokeless tobacco: Never Used   Vaping Use    Vaping Use: Never used   Substance and Sexual Activity    Alcohol use: No    Drug use: No    Sexual activity: Never   Other Topics Concern    Not on file   Social History Narrative    Not on file     Social Determinants of Health     Financial Resource Strain:     Difficulty of Paying Living Expenses:    Food Insecurity:     Worried About Running Out of Food in the Last Year:     [address] in the Last Year:    Transportation Needs:     Lack of Transportation (Medical):  Lack of Transportation (Non-Medical):    Physical Activity:     Days of Exercise per Week:     Minutes of Exercise per Session:    Stress:     Feeling of Stress :    Social Connections:     Frequency of Communication with Friends and Family:     Frequency of Social Gatherings with Friends and Family:     Attends Anglican Services:     Active Member of Clubs or Organizations:     Attends Club or Organization Meetings:     Marital Status:    Intimate Partner Violence:     Fear of Current or Ex-Partner:     Emotionally Abused:     Physically Abused:     Sexually Abused:            ROS:  [x] All negative/unchanged except if checked.  Explain positive(checked items) below:  [] Constitutional  [] Eyes  [] Ear/Nose/Mouth/Throat  [] Respiratory  [] CV  [] GI  []   [] Musculoskeletal  [] Skin/Breast  [] Neurological  [] Endocrine  [] Heme/Lymph  [] Allergic/Immunologic    Explanation:     MEDICATIONS:    Current Facility-Administered Medications:     cloZAPine (CLOZARIL) tablet 200 mg, 200 mg, Oral, Nightly, LUCINA Gil - CNP, 433 mg at 07/12/21 2037    benztropine (COGENTIN) tablet 0.5 mg, 0.5 mg, Oral, BID, LUCINA Pickard - CNP, 0.5 mg at 07/13/21 0909    cloZAPine (CLOZARIL) tablet 50 mg, 50 mg, Oral, Daily, Luis Adame, APRN - CNP, 50 mg at 07/13/21 0909    OXcarbazepine (TRILEPTAL) tablet 300 mg, 300 mg, Oral, BID, Luis Adame, APRN - CNP, 882 mg at 07/13/21 0909    acetaminophen (TYLENOL) tablet 650 mg, 650 mg, Oral, Q6H PRN, Israel Greenberg MD    magnesium hydroxide (MILK OF MAGNESIA) 400 MG/5ML suspension 30 mL, 30 mL, Oral, Daily PRN, Israel Greenberg MD    aluminum & magnesium hydroxide-simethicone (MAALOX) 200-200-20 MG/5ML suspension 30 mL, 30 mL, Oral, PRN, Israel Greenberg MD    hydrOXYzine (VISTARIL) capsule 50 mg, 50 mg, Oral, TID PRN, Israel Greenberg MD    haloperidol (HALDOL) tablet 5 mg, 5 mg, Oral, Q6H PRN **OR** haloperidol lactate (HALDOL) injection 5 mg, 5 mg, Intramuscular, Q6H PRN, Israel Greenberg MD    LORazepam (ATIVAN) tablet 2 mg, 2 mg, Oral, Q6H PRN, Israel Greenberg MD    LORazepam (ATIVAN) injection 2 mg, 2 mg, Intramuscular, Q6H PRN, Israel Greenberg MD    diphenhydrAMINE (BENADRYL) injection 50 mg, 50 mg, Intramuscular, Q6H PRN, Israel Greenberg MD    diphenhydrAMINE (BENADRYL) tablet 50 mg, 50 mg, Oral, Q6H PRN, Israel Greenberg MD    ziprasidone (GEODON) injection 10 mg, 10 mg, Intramuscular, Q12H PRN **AND** sterile water injection 1.2 mL, 1.2 mL, Intramuscular, Q12H PRN, Israel Greenberg MD    traZODone (DESYREL) tablet 150 mg, 150 mg, Oral, Nightly, Yael Mcdonnell MD, 150 mg at 07/12/21 2036    clonazePAM (KLONOPIN) tablet 0.5 mg, 0.5 mg, Oral, Nightly, Yael Mcdonnell MD, 0.5 mg at 07/12/21 2037      Examination:  BP (!) 131/90   Pulse 110   Temp 98.5 °F (36.9 °C)   Resp 15   Ht 5' 10\" (1.778 m)   Wt 200 lb (90.7 kg)   SpO2 97%   BMI 28.70 kg/m²   Gait - steady  Medication side effects(SE): Denies    Mental Status Examination:    Unable to perform any type of mental status examination this time as patient is not answering any questions.   He is out visible in the milieu no behavioral disturbances per nurses he does appear to be internally stimulated          ASSESSMENT:   Patient symptoms are:  [] Well controlled  [] Improving  [] Worsening  [x] No change      Diagnosis:   Principal Problem:    Schizoaffective disorder, bipolar type ([facility] [address]  Active Problems:    Mental retardation  Resolved Problems:    * No resolved hospital problems. *      LABS:    No results for input(s): WBC, HGB, PLT in the last 72 hours. No results for input(s): NA, K, CL, CO2, BUN, CREATININE, GLUCOSE in the last 72 hours. No results for input(s): BILITOT, ALKPHOS, AST, ALT in the last 72 hours. Lab Results   Component Value Date    LABAMPH NOT DETECTED 07/05/2021    BARBSCNU NOT DETECTED 07/05/2021    LABBENZ NOT DETECTED 07/05/2021    LABMETH NOT DETECTED 07/05/2021    OPIATESCREENURINE NOT DETECTED 07/05/2021    PHENCYCLIDINESCREENURINE NOT DETECTED 07/05/2021    ETOH <10 07/05/2021     Lab Results   Component Value Date    TSH 0.693 07/05/2021     Lab Results   Component Value Date    LITHIUM 0.92 02/02/2019     Lab Results   Component Value Date    VALPROATE 87 03/08/2021    CBMZ 4.7 04/14/2019           Treatment Plan:  Reviewed current Medications with the patient. Risks, benefits, side effects, drug-to-drug interactions and alternatives to treatment were discussed. Collateral information:   CD evaluation  Encourage patient to attend group and other milieu activities. Discharge planning discussed with the patient and treatment team    Continue Clozaril 50 mg daily and 200 mg at bedtime for psychosis  Continue Klonopin 0.5 mg at bedtime  Continue Trileptal 300 mg twice daily for mood  Continue Cogentin 0.5 mg twice daily for side effects.     PSYCHOTHERAPY/COUNSELING:  [x] Therapeutic interview  [x] Supportive  [] CBT  [] Ongoing  [] Other    [x] Patient continues to need, on a daily basis, active treatment furnished directly by or requiring the supervision of inpatient psychiatric personnel      Anticipated Length of stay: 3 to 7 days based on stability           Behavioral Services  Medicare Certification Upon Admission    I certify that this patient's inpatient psychiatric hospital admission is medically necessary for:    [x] (1) Treatment which could reasonably be expected to improve this patient's condition,       [] (2) Or for diagnostic study;     AND     [x](2) The inpatient psychiatric services are provided while the individual is under the care of a physician and are included in the individualized plan of care.     Estimated length of stay/service 3 to 7 days based on stability    Plan for post-hospital care patient psychiatric and counseling services    Electronically signed by LUCINA Martin CNP on 7/23/8294 at 2:20 PM           Electronically signed by LUCINA Martin CNP on 6/52/9962 at 10:12 AM

## 2021-07-13 NOTE — PROGRESS NOTES
Patient attended afternoon meet and greet. Updated on staffing and evening expectations. Participated in afternoon activity of patients choice.

## 2021-07-13 NOTE — PLAN OF CARE
Problem: Anger Management/Homicidal Ideation:  Goal: Able to display appropriate communication and problem solving  Description: Able to display appropriate communication and problem solving  7/12/2021 2031 by Celso Hill RN  Outcome: Ongoing  7/12/2021 0926 by Duane Holts, RN  Outcome: Ongoing  Goal: Ability to verbalize frustrations and anger appropriately will improve  Description: Ability to verbalize frustrations and anger appropriately will improve  Outcome: Met This Shift     Problem: Altered Mood, Deterioration in Function:  Goal: Ability to perform activities of daily living will improve  Description: Ability to perform activities of daily living will improve  7/12/2021 2031 by Celso Hill RN  Outcome: Met This Shift  7/12/2021 0926 by Duane Holts, RN  Outcome: Ongoing      Pt is hyperverbal with pressured speech. Pt spoke with mom on the phone today. Pt stated he is ready to go home. Pt has loose associations. In control. Mostly at Nurse's station to speak to staff. Medication compliant. Appetite appropriate. Attending groups. Will continue to monitor.

## 2021-07-14 PROCEDURE — 99232 SBSQ HOSP IP/OBS MODERATE 35: CPT | Performed by: NURSE PRACTITIONER

## 2021-07-14 PROCEDURE — 1240000000 HC EMOTIONAL WELLNESS R&B

## 2021-07-14 PROCEDURE — 6370000000 HC RX 637 (ALT 250 FOR IP): Performed by: NURSE PRACTITIONER

## 2021-07-14 PROCEDURE — 6370000000 HC RX 637 (ALT 250 FOR IP): Performed by: EMERGENCY MEDICINE

## 2021-07-14 RX ADMIN — TRAZODONE HYDROCHLORIDE 150 MG: 150 TABLET ORAL at 20:28

## 2021-07-14 RX ADMIN — CLOZAPINE 200 MG: 100 TABLET ORAL at 20:28

## 2021-07-14 RX ADMIN — OXCARBAZEPINE 300 MG: 300 TABLET, FILM COATED ORAL at 20:28

## 2021-07-14 RX ADMIN — BENZTROPINE MESYLATE 0.5 MG: 0.5 TABLET ORAL at 20:28

## 2021-07-14 RX ADMIN — BENZTROPINE MESYLATE 0.5 MG: 0.5 TABLET ORAL at 08:46

## 2021-07-14 RX ADMIN — OXCARBAZEPINE 300 MG: 300 TABLET, FILM COATED ORAL at 08:46

## 2021-07-14 RX ADMIN — CLOZAPINE 50 MG: 25 TABLET ORAL at 08:53

## 2021-07-14 RX ADMIN — CLONAZEPAM 0.5 MG: 0.5 TABLET ORAL at 20:28

## 2021-07-14 ASSESSMENT — PAIN SCALES - GENERAL: PAINLEVEL_OUTOF10: 0

## 2021-07-14 NOTE — CARE COORDINATION
Lisset received a call from Katia Holbrook at [address] . Katia Holbrook reported they are very excited for the pt to continue with their services. Katia Holbrook reported they are working as fast as they can to get everything ready for pt, requesting for pt to stay until Friday rather than discharging tomorrow to allow them one more day to have pt set up. Lisset spoke with NP about this who is okay with pt discharging Friday, Katia Holbrook made aware. Katia Yusef reports they will be picking pt up from this facility and transporting him home. She requested to speak with pt nurse about his meds and is also requesting for pts scripts to [facility]. Lisset transferred Katia Yusef to [facility].

## 2021-07-14 NOTE — CARE COORDINATION
Lisset contacted [facility] in [facility] [address] [facility] [employer] again to schedule pt apts. Lisset was informed that their first available apt is not until Sept 8th. Sw informed them that pt would need to be seen within 7 days and that he also will only be sent home with 30 days worth of meds. Sw was informed that they do not have anything available at all and that pt mom can call them and request more meds when pt is close to running out as his apt will be after his 30 days worth of meds. They reported pt will be put on the Dr cancellation list that way if something opens sooner they can get pt in and will give mom a call. Lisset asked if pt could be set up with a counselor or therapist and was informed they do not have any available. Lisset contacted pt PCP Dr Jennifer Christian to schedule a follow up apt as pt cannot be seen within 7 or 30 days with his psychiatrist.     Howard Gregory also to refer pt to [facility] for counseling apts.

## 2021-07-14 NOTE — CARE COORDINATION
Sw contacted BRIANA Hermosillo in regards to any updates on pt discharge plan and or placements. No answer, voicemail was left. Sw contacted pt mom Charisma Keller to determine if there are any updates on pt discharge plan and or placements. Charisma Keller reported pt will be staying at home with 24 hour staff from [address] [place] [address]. She is unsure at this time of when that will be set up, reported she is waiting to hear back from their supervisor. SW asked if pt would be able to come home prior to the services being set up. Mom denied reporting she is still waiting to get a bed for him. Sw following    Lisset contacted [facility] in [facility] to set up pt apts. No answer, voicemail was left. Sw to try again later.

## 2021-07-14 NOTE — PROGRESS NOTES
BEHAVIORAL HEALTH FOLLOW-UP NOTE     7/14/2021     Patient was seen and examined in person, Chart reviewed   Patient's case discussed with staff/team    Chief Complaint: Talking about her friend    Interim History: Seen during treatment team.  He is talking about a friend of his. His speech is more clear. No behavioral disturbances on the unit. He denies SI/HI intent or plan denies any auditory or visual hallucinations. He is out visible in the milieu keeping mostly to himself. Eating well sleeping well no neurovegetative signs or symptoms of depression he is medication compliant.       Appetite:   [x] Normal/Unchanged  [] Increased  [] Decreased      Sleep:       [x] Normal/Unchanged  [] Fair       [] Poor              Energy:    [x] Normal/Unchanged  [] Increased  [] Decreased        SI [] Present  [x] Absent    HI  []Present  [x] Absent     Aggression:  [] yes  [x] no    Patient is [x] able  [] unable to CONTRACT FOR SAFETY     PAST MEDICAL/PSYCHIATRIC HISTORY:   Past Medical History:   Diagnosis Date    Acute psychosis ([facility] [address] 4/9/2019    ADHD (attention deficit hyperactivity disorder)     Autism     PATIENT IS COOPERATIVE AND VERBAL PER MOM    Autism     Bipolar 1 disorder ([facility]     Drug-induced neutropenia ([address] 10/14/2020    Drug-induced parkinsonism ([address] 10/14/2020    Eruption due to drug 6/28/2019    HE (hepatic encephalopathy) ([address] 5/26/2021    Mental retardation     Positive occult stool blood test 10/23/2018    Rectal bleeding 10/23/2018    Schizoaffective disorder ([address]     Schizoaffective disorder, chronic condition with acute exacerbation ([facility] [address] 5/2/2019    Viral upper respiratory illness 4/10/2019       FAMILY/SOCIAL HISTORY:  Family History   Problem Relation Age of Onset    High Blood Pressure Mother     Diabetes Mother      Social History     Socioeconomic History    Marital status: Single     Spouse name: Not on file    Number of children: 0    Years of education: Not on file    Highest education level: Not on file   Occupational History     Employer: UNEMPLOYED     Comment: SSDI   Tobacco Use    Smoking status: Never Smoker    Smokeless tobacco: Never Used   Vaping Use    Vaping Use: Never used   Substance and Sexual Activity    Alcohol use: No    Drug use: No    Sexual activity: Never   Other Topics Concern    Not on file   Social History Narrative    Not on file     Social Determinants of Health     Financial Resource Strain:     Difficulty of Paying Living Expenses:    Food Insecurity:     Worried About Running Out of Food in the Last Year:     [address] in the Last Year:    Transportation Needs:     Lack of Transportation (Medical):  Lack of Transportation (Non-Medical):    Physical Activity:     Days of Exercise per Week:     Minutes of Exercise per Session:    Stress:     Feeling of Stress :    Social Connections:     Frequency of Communication with Friends and Family:     Frequency of Social Gatherings with Friends and Family:     Attends Orthodox Services:     Active Member of Clubs or Organizations:     Attends Club or Organization Meetings:     Marital Status:    Intimate Partner Violence:     Fear of Current or Ex-Partner:     Emotionally Abused:     Physically Abused:     Sexually Abused:            ROS:  [x] All negative/unchanged except if checked.  Explain positive(checked items) below:  [] Constitutional  [] Eyes  [] Ear/Nose/Mouth/Throat  [] Respiratory  [] CV  [] GI  []   [] Musculoskeletal  [] Skin/Breast  [] Neurological  [] Endocrine  [] Heme/Lymph  [] Allergic/Immunologic    Explanation:     MEDICATIONS:    Current Facility-Administered Medications:     cloZAPine (CLOZARIL) tablet 200 mg, 200 mg, Oral, Nightly, LUCINA Gil - CNP, 973 mg at 07/13/21 2015    benztropine (COGENTIN) tablet 0.5 mg, 0.5 mg, Oral, BID, LUCINA Rivas - CNP, 0.5 mg at 07/14/21 0846    cloZAPine (CLOZARIL) tablet 50 mg, 50 mg, Oral, Daily, Eh ThomasonLUCINA - CNP, 50 mg at 07/14/21 0853    OXcarbazepine (TRILEPTAL) tablet 300 mg, 300 mg, Oral, BID, Luis Adame, LUCINA - CNP, 833 mg at 07/14/21 0846    acetaminophen (TYLENOL) tablet 650 mg, 650 mg, Oral, Q6H PRN, Israel Greenberg MD    magnesium hydroxide (MILK OF MAGNESIA) 400 MG/5ML suspension 30 mL, 30 mL, Oral, Daily PRN, Israel Greenberg MD    aluminum & magnesium hydroxide-simethicone (MAALOX) 200-200-20 MG/5ML suspension 30 mL, 30 mL, Oral, PRN, Israel Greenberg MD    hydrOXYzine (VISTARIL) capsule 50 mg, 50 mg, Oral, TID PRN, Israel Greenberg MD    haloperidol (HALDOL) tablet 5 mg, 5 mg, Oral, Q6H PRN **OR** haloperidol lactate (HALDOL) injection 5 mg, 5 mg, Intramuscular, Q6H PRN, Israel Greenberg MD    LORazepam (ATIVAN) tablet 2 mg, 2 mg, Oral, Q6H PRN, Israel Greenberg MD    LORazepam (ATIVAN) injection 2 mg, 2 mg, Intramuscular, Q6H PRN, Israel Greenberg MD    diphenhydrAMINE (BENADRYL) injection 50 mg, 50 mg, Intramuscular, Q6H PRN, Israel Greenberg MD    diphenhydrAMINE (BENADRYL) tablet 50 mg, 50 mg, Oral, Q6H PRN, Israel Greenberg MD    ziprasidone (GEODON) injection 10 mg, 10 mg, Intramuscular, Q12H PRN **AND** sterile water injection 1.2 mL, 1.2 mL, Intramuscular, Q12H PRN, Israel Greenberg MD    traZODone (DESYREL) tablet 150 mg, 150 mg, Oral, Nightly, Yael Mcdonnell MD, 150 mg at 07/13/21 2014    clonazePAM (KLONOPIN) tablet 0.5 mg, 0.5 mg, Oral, Nightly, Yael Mcdonnell MD, 0.5 mg at 07/13/21 2020      Examination:  BP (!) 122/57   Pulse 111   Temp 98.2 °F (36.8 °C) (Temporal)   Resp 16   Ht 5' 10\" (1.778 m)   Wt 200 lb (90.7 kg)   SpO2 97%   BMI 28.70 kg/m²   Gait - steady  Medication side effects(SE): Denies    Mental Status Examination:    Unable to perform any type of mental status examination this time as patient is not answering any questions.   He is out visible in the milieu no behavioral disturbances per nurses he does appear to be internally stimulated          ASSESSMENT:   Patient symptoms are:  [] Well controlled  [] Improving  [] Worsening  [x] No change      Diagnosis:   Principal Problem:    Schizoaffective disorder, bipolar type ([facility] [address] [facility]  Active Problems:    Mental retardation  Resolved Problems:    * No resolved hospital problems. *      LABS:    No results for input(s): WBC, HGB, PLT in the last 72 hours. No results for input(s): NA, K, CL, CO2, BUN, CREATININE, GLUCOSE in the last 72 hours. No results for input(s): BILITOT, ALKPHOS, AST, ALT in the last 72 hours. Lab Results   Component Value Date    LABAMPH NOT DETECTED 07/05/2021    BARBSCNU NOT DETECTED 07/05/2021    LABBENZ NOT DETECTED 07/05/2021    LABMETH NOT DETECTED 07/05/2021    OPIATESCREENURINE NOT DETECTED 07/05/2021    PHENCYCLIDINESCREENURINE NOT DETECTED 07/05/2021    ETOH <10 07/05/2021     Lab Results   Component Value Date    TSH 0.693 07/05/2021     Lab Results   Component Value Date    LITHIUM 0.92 02/02/2019     Lab Results   Component Value Date    VALPROATE 87 03/08/2021    CBMZ 4.7 04/14/2019           Treatment Plan:  Reviewed current Medications with the patient. Risks, benefits, side effects, drug-to-drug interactions and alternatives to treatment were discussed. Collateral information:   CD evaluation  Encourage patient to attend group and other milieu activities. Discharge planning discussed with the patient and treatment team    Continue Clozaril 50 mg daily and 200 mg at bedtime for psychosis  Continue Klonopin 0.5 mg at bedtime  Continue Trileptal 300 mg twice daily for mood  Continue Cogentin 0.5 mg twice daily for side effects.     PSYCHOTHERAPY/COUNSELING:  [x] Therapeutic interview  [x] Supportive  [] CBT  [] Ongoing  [] Other    [x] Patient continues to need, on a daily basis, active treatment furnished directly by or requiring the supervision of inpatient psychiatric personnel      Anticipated Length of stay: 3 to 7 days based on stability           Behavioral Services  Medicare Certification Upon Admission    I certify that this patient's inpatient psychiatric hospital admission is medically necessary for:    [x] (1) Treatment which could reasonably be expected to improve this patient's condition,       [] (2) Or for diagnostic study;     AND     [x](2) The inpatient psychiatric services are provided while the individual is under the care of a physician and are included in the individualized plan of care.     Estimated length of stay/service 3 to 7 days based on stability    Plan for post-hospital care patient psychiatric and counseling services    Electronically signed by LUCINA Real CNP on 5/08/6017 at 9:24 AM           Electronically signed by LUCINA Real CNP on 1/04/1993 at 9:24 AM

## 2021-07-14 NOTE — PROGRESS NOTES
Attended afternoon meet and greet. Updated on staffing and evening expectations. Participated in summertime trivia.

## 2021-07-14 NOTE — PROGRESS NOTES
Attended morning community meeting. Updated on staffing and daily expectations. Shared goal for the day as to go home.

## 2021-07-14 NOTE — PLAN OF CARE
Problem: Anger Management/Homicidal Ideation:  Goal: Able to display appropriate communication and problem solving  Description: Able to display appropriate communication and problem solving  7/13/2021 1959 by Marko Parker RN  Outcome: Ongoing  7/13/2021 0859 by Sita Thompson RN  Outcome: Ongoing  Goal: Ability to verbalize frustrations and anger appropriately will improve  Description: Ability to verbalize frustrations and anger appropriately will improve  Outcome: Ongoing  Goal: Absence of angry outbursts  Description: Absence of angry outbursts  Outcome: Met This Shift  Goal: Absence of homicidal ideation  Description: Absence of homicidal ideation  Outcome: Met This Shift     Problem: Altered Mood, Deterioration in Function:  Goal: Ability to perform activities of daily living will improve  Description: Ability to perform activities of daily living will improve  7/13/2021 1959 by Marko Parker RN  Outcome: Ongoing  7/13/2021 0859 by Sita Thompson RN  Outcome: Ongoing     Pt denies suicidal ideations, homicidal ideations and hallucinations. Pt out on the unit. Intrusive at the nurses station. Tangential. Rapid pressured speech. Pt is cooperative. Must be redirected multiple times. Appetite appropriate. Medication compliant. Attending groups. Will continue to monitor.

## 2021-07-14 NOTE — GROUP NOTE
Group Therapy Note    Date: 7/14/2021    Group Start Time: 1000  Group End Time: 6725  Group Topic: Psychoeducation    [facility]    Jerri Chantell, [address]                                                                        Group Therapy Note    Date: 7/14/2021  Type of Group: Psychoeducation    Wellness Binder Information  Module Name:id of stressors     Patient's Goal:  patient will be able to id daily and life events one is currently experiencing. Notes: pleasant and sharing in group. Able to participate appropriately. Status After Intervention:  Improved    Participation Level:  Active Listener and Interactive    Participation Quality: Appropriate, Attentive, Sharing, and Supportive      Speech:  normal       Thought Process/Content: Logical      Affective Functioning: Congruent      Mood: euthymic      Level of consciousness:  Alert, Oriented x4, and Attentive      Response to Learning: Able to verbalize/acknowledge new learning, Able to retain information, and Progressing to goal      Endings: None Reported    Modes of Intervention: Education, Support, Socialization, Exploration, and Problem-solving      Discipline Responsible: Psychoeducational Specialist      Signature:  Angela Machuca

## 2021-07-14 NOTE — PLAN OF CARE
Problem: Anger Management/Homicidal Ideation:  Goal: Able to display appropriate communication and problem solving  Description: Able to display appropriate communication and problem solving  Outcome: Ongoing  Goal: Ability to verbalize frustrations and anger appropriately will improve  Description: Ability to verbalize frustrations and anger appropriately will improve  Outcome: Ongoing  Goal: Absence of angry outbursts  Description: Absence of angry outbursts  Outcome: Ongoing  Goal: Absence of homicidal ideation  Description: Absence of homicidal ideation  Outcome: Met This Shift     Problem: Altered Mood, Deterioration in Function:  Goal: Ability to perform activities of daily living will improve  Description: Ability to perform activities of daily living will improve  Outcome: Ongoing  Goal: Able to verbalize reality based thinking  Description: Able to verbalize reality based thinking  Outcome: Ongoing     Problem: Altered Mood, Manic Behavior:  Goal: Able to sleep  Description: Able to sleep  Outcome: Ongoing  Goal: Able to verbalize decrease in frequency and intensity of racing thoughts  Description: Able to verbalize decrease in frequency and intensity of racing thoughts  Outcome: Ongoing     Problem: Altered Mood, Manic Behavior:  Goal: Able to sleep  Description: Able to sleep  Outcome: Ongoing  Goal: Able to verbalize decrease in frequency and intensity of racing thoughts  Description: Able to verbalize decrease in frequency and intensity of racing thoughts  Outcome: Ongoing

## 2021-07-14 NOTE — PROGRESS NOTES
[facility] Interdisciplinary Treatment Plan Note     Review Date & Time: 7/14/21 0900    Patient was in treatment team.    Estimated Length of Stay Update:  3-5 days   Estimated Discharge Date Update: 3-5 days    EDUCATION:   Learner Progress Toward Treatment Goals: Reviewed results and recommendations of this team    Method: Small group    Outcome: Verbalized understanding    PATIENT GOALS: go home    PLAN/TREATMENT RECOMMENDATIONS UPDATE: continue current treatment plan    GOALS UPDATE:  Time frame for Short-Term Goals: 3-5 days      Belgica Alcantar RN

## 2021-07-14 NOTE — CARE COORDINATION
Sw spoke with Jaguar Sandy pt [facility]  who reported he was informed by [facility] that pt would be set up with services immediately. He stated he is going to contact them and have them call this Sw with more information/details on when pt will be set up with services as we are looking at discharging pt tomorrow.

## 2021-07-14 NOTE — GROUP NOTE
Group Therapy Note    Date: 7/14/2021    Group Start Time: 1100  Group End Time: 1130  Group Topic: Cognitive Skills    [facility]    ROMA Ballesteros LSW        Group Therapy Note    Attendees: 13         Patient's Goal:  To participate in the group discussion on how to apologize    Notes:  Pt was an active listener in group discussion. Status After Intervention:  Improved    Participation Level:  Active Listener    Participation Quality: Appropriate and Attentive      Speech:  normal      Thought Process/Content: Logical      Affective Functioning: Congruent      Mood: anxious      Level of consciousness:  Alert and Oriented x4      Response to Learning: Able to verbalize current knowledge/experience, Able to verbalize/acknowledge new learning and Able to retain information      Endings: None Reported    Modes of Intervention: Education, Support, Socialization, Exploration, Clarifying and Problem-solving      Discipline Responsible: /Counselor      Signature:  ROMA So LSW

## 2021-07-15 PROCEDURE — 99232 SBSQ HOSP IP/OBS MODERATE 35: CPT | Performed by: NURSE PRACTITIONER

## 2021-07-15 PROCEDURE — 6370000000 HC RX 637 (ALT 250 FOR IP): Performed by: EMERGENCY MEDICINE

## 2021-07-15 PROCEDURE — 1240000000 HC EMOTIONAL WELLNESS R&B

## 2021-07-15 PROCEDURE — 6370000000 HC RX 637 (ALT 250 FOR IP): Performed by: NURSE PRACTITIONER

## 2021-07-15 RX ORDER — BENZTROPINE MESYLATE 0.5 MG/1
0.5 TABLET ORAL 2 TIMES DAILY
Qty: 60 TABLET | Refills: 3 | Status: SHIPPED | OUTPATIENT
Start: 2021-07-15

## 2021-07-15 RX ORDER — CLONAZEPAM 0.5 MG/1
0.5 TABLET ORAL NIGHTLY
Qty: 60 TABLET | Refills: 3
Start: 2021-07-15 | End: 2021-07-21

## 2021-07-15 RX ORDER — CLOZAPINE 200 MG/1
200 TABLET ORAL NIGHTLY
Qty: 30 TABLET | Refills: 0 | Status: SHIPPED | OUTPATIENT
Start: 2021-07-15 | End: 2022-09-21

## 2021-07-15 RX ORDER — OXCARBAZEPINE 300 MG/1
300 TABLET, FILM COATED ORAL 2 TIMES DAILY
Qty: 60 TABLET | Refills: 0 | Status: SHIPPED | OUTPATIENT
Start: 2021-07-15 | End: 2022-03-21

## 2021-07-15 RX ORDER — CLOZAPINE 50 MG/1
50 TABLET ORAL DAILY
Qty: 30 TABLET | Refills: 0 | Status: SHIPPED | OUTPATIENT
Start: 2021-07-16 | End: 2022-09-21

## 2021-07-15 RX ADMIN — BENZTROPINE MESYLATE 0.5 MG: 0.5 TABLET ORAL at 20:51

## 2021-07-15 RX ADMIN — BENZTROPINE MESYLATE 0.5 MG: 0.5 TABLET ORAL at 09:06

## 2021-07-15 RX ADMIN — CLOZAPINE 50 MG: 25 TABLET ORAL at 09:04

## 2021-07-15 RX ADMIN — OXCARBAZEPINE 300 MG: 300 TABLET, FILM COATED ORAL at 09:04

## 2021-07-15 RX ADMIN — OXCARBAZEPINE 300 MG: 300 TABLET, FILM COATED ORAL at 20:51

## 2021-07-15 RX ADMIN — TRAZODONE HYDROCHLORIDE 150 MG: 150 TABLET ORAL at 20:51

## 2021-07-15 RX ADMIN — CLONAZEPAM 0.5 MG: 0.5 TABLET ORAL at 20:51

## 2021-07-15 RX ADMIN — CLOZAPINE 200 MG: 100 TABLET ORAL at 20:51

## 2021-07-15 ASSESSMENT — PAIN SCALES - GENERAL
PAINLEVEL_OUTOF10: 0

## 2021-07-15 ASSESSMENT — PAIN - FUNCTIONAL ASSESSMENT
PAIN_FUNCTIONAL_ASSESSMENT: 0-10
PAIN_FUNCTIONAL_ASSESSMENT: 0-10

## 2021-07-15 NOTE — BH NOTE
Pt denies suicidal / homicidal ideations. Pt denies hallucinations. Pt is cooperative. No other distress at this time. Will follow and monitor.

## 2021-07-15 NOTE — GROUP NOTE
Group Therapy Note    Date: 7/15/2021    Group Start Time: 1110  Group End Time: 0982  Group Topic: Cognitive Skills    [facility] 12 ROMA Gaytan LSW        Group Therapy Note    Attendees: 12         Patient's Goal:  To discuss core beliefs about self, others, and the world. Notes:  Pt engaged in group discussion appropriately. Pt made connections with other group members. Pt participated in group activity. Status After Intervention:  Unchanged    Participation Level:  Active Listener    Participation Quality: Attentive      Speech:  slurred      Thought Process/Content: Logical      Affective Functioning: Congruent      Mood: anxious      Level of consciousness:  Alert      Response to Learning: Able to verbalize current knowledge/experience      Endings: None Reported    Modes of Intervention: Education, Support, Socialization, Exploration, Clarifying, Problem-solving and Activity      Discipline Responsible: /Counselor      Signature:  ROMA White LSW

## 2021-07-15 NOTE — GROUP NOTE
Group Therapy Note    Date: 7/15/2021    Group Start Time: 1000  Group End Time: 6767  Group Topic: Psychoeducation    [facility]    Diana Hi, CTRS        Group Therapy Note    Number of participants: 12  Type of group: Psychoeducation  Mode of intervention: Education, Support, Socialization, Exploration, Clarifying, and Problem-solving  Topic: Time Management Tips  Objective: Pt will identify 1 way to improve time management skills in recovery. Patient's Goal:  \"Play guitar\"     Notes:  Pt offered minimal interaction during group but was an active listener throughout group. Accepting of handout and support from peers. Status After Intervention:  Unchanged    Participation Level:  Active Listener and Minimal    Participation Quality: Appropriate and Attentive      Speech:  normal      Thought Process/Content: Perseverating      Affective Functioning: Flat      Mood: depressed      Level of consciousness:  Alert, Oriented x4 and Attentive      Response to Learning: Progressing to goal      Endings: None Reported    Modes of Intervention: Education, Support, Socialization, Exploration, Clarifying and Problem-solving

## 2021-07-15 NOTE — CARE COORDINATION
Received a call from Davina Harris at [address] who reported she is able to pick pt up tomorrow at 11 am. She is aware to contact the nurses desk when she arrives. A work excuse was printed and placed in pt folder.

## 2021-07-15 NOTE — PROGRESS NOTES
BEHAVIORAL HEALTH FOLLOW-UP NOTE     7/15/2021     Patient was seen and examined in person, Chart reviewed   Patient's case discussed with staff/team    Chief Complaint:\" I am good. \"    Interim History: Patient up on the unit very pleasant affect no behavioral disturbances noted. He vehemently denies SI/HI intent or plan denies any auditory visualizations he voices readiness for discharge tomorrow.     Appetite:   [x] Normal/Unchanged  [] Increased  [] Decreased      Sleep:       [x] Normal/Unchanged  [] Fair       [] Poor              Energy:    [x] Normal/Unchanged  [] Increased  [] Decreased        SI [] Present  [x] Absent    HI  []Present  [x] Absent     Aggression:  [] yes  [x] no    Patient is [x] able  [] unable to CONTRACT FOR SAFETY     PAST MEDICAL/PSYCHIATRIC HISTORY:   Past Medical History:   Diagnosis Date    Acute psychosis ([address] 4/9/2019    ADHD (attention deficit hyperactivity disorder)     Autism     PATIENT IS COOPERATIVE AND VERBAL PER MOM    Autism     Bipolar 1 disorder ([address]     Drug-induced neutropenia ([address] 10/14/2020    Drug-induced parkinsonism ([address] 10/14/2020    Eruption due to drug 6/28/2019    HE (hepatic encephalopathy) ([address] 5/26/2021    Mental retardation     Positive occult stool blood test 10/23/2018    Rectal bleeding 10/23/2018    Schizoaffective disorder ([address]     Schizoaffective disorder, chronic condition with acute exacerbation ([address] 5/2/2019    Viral upper respiratory illness 4/10/2019       FAMILY/SOCIAL HISTORY:  Family History   Problem Relation Age of Onset    High Blood Pressure Mother     Diabetes Mother      Social History     Socioeconomic History    Marital status: Single     Spouse name: Not on file    Number of children: 0    Years of education: Not on file    Highest education level: Not on file   Occupational History     Employer: UNEMPLOYED     Comment: SSDI   Tobacco Use    Smoking status: Never Smoker    Smokeless tobacco: Never Used Vaping Use    Vaping Use: Never used   Substance and Sexual Activity    Alcohol use: No    Drug use: No    Sexual activity: Never   Other Topics Concern    Not on file   Social History Narrative    Not on file     Social Determinants of Health     Financial Resource Strain:     Difficulty of Paying Living Expenses:    Food Insecurity:     Worried About Running Out of Food in the Last Year:     [address] in the Last Year:    Transportation Needs:     Lack of Transportation (Medical):  Lack of Transportation (Non-Medical):    Physical Activity:     Days of Exercise per Week:     Minutes of Exercise per Session:    Stress:     Feeling of Stress :    Social Connections:     Frequency of Communication with Friends and Family:     Frequency of Social Gatherings with Friends and Family:     Attends Worship Services:     Active Member of Clubs or Organizations:     Attends Club or Organization Meetings:     Marital Status:    Intimate Partner Violence:     Fear of Current or Ex-Partner:     Emotionally Abused:     Physically Abused:     Sexually Abused:            ROS:  [x] All negative/unchanged except if checked.  Explain positive(checked items) below:  [] Constitutional  [] Eyes  [] Ear/Nose/Mouth/Throat  [] Respiratory  [] CV  [] GI  []   [] Musculoskeletal  [] Skin/Breast  [] Neurological  [] Endocrine  [] Heme/Lymph  [] Allergic/Immunologic    Explanation:     MEDICATIONS:    Current Facility-Administered Medications:     cloZAPine (CLOZARIL) tablet 200 mg, 200 mg, Oral, Nightly, LUCINA Gil - CNP, 391 mg at 07/14/21 2028    benztropine (COGENTIN) tablet 0.5 mg, 0.5 mg, Oral, BID, LUCINA Rivas - CNP, 0.5 mg at 07/15/21 0906    cloZAPine (CLOZARIL) tablet 50 mg, 50 mg, Oral, Daily, LUCINA Rivas - CNP, 50 mg at 07/15/21 0904    OXcarbazepine (TRILEPTAL) tablet 300 mg, 300 mg, Oral, BID, LUCINA Rivas - CNP, 937 mg at 07/15/21 0904   acetaminophen (TYLENOL) tablet 650 mg, 650 mg, Oral, Q6H PRN, Torri De La Paz MD    magnesium hydroxide (MILK OF MAGNESIA) 400 MG/5ML suspension 30 mL, 30 mL, Oral, Daily PRN, Torri De La Paz MD    aluminum & magnesium hydroxide-simethicone (MAALOX) 200-200-20 MG/5ML suspension 30 mL, 30 mL, Oral, PRN, Torri De La Paz MD    hydrOXYzine (VISTARIL) capsule 50 mg, 50 mg, Oral, TID PRN, Torri De La Paz MD    haloperidol (HALDOL) tablet 5 mg, 5 mg, Oral, Q6H PRN **OR** haloperidol lactate (HALDOL) injection 5 mg, 5 mg, Intramuscular, Q6H PRN, Torri De La Paz MD    LORazepam (ATIVAN) tablet 2 mg, 2 mg, Oral, Q6H PRN, Torri De La Paz MD    LORazepam (ATIVAN) injection 2 mg, 2 mg, Intramuscular, Q6H PRN, Torri De La Paz MD    diphenhydrAMINE (BENADRYL) injection 50 mg, 50 mg, Intramuscular, Q6H PRN, Torri De La Paz MD    diphenhydrAMINE (BENADRYL) tablet 50 mg, 50 mg, Oral, Q6H PRN, Torri De La Paz MD    ziprasidone (GEODON) injection 10 mg, 10 mg, Intramuscular, Q12H PRN **AND** sterile water injection 1.2 mL, 1.2 mL, Intramuscular, Q12H PRN, Torri De La Paz MD    traZODone (DESYREL) tablet 150 mg, 150 mg, Oral, Nightly, Vida Arias MD, 150 mg at 07/14/21 2028    clonazePAM (KLONOPIN) tablet 0.5 mg, 0.5 mg, Oral, Nightly, Vida Arias MD, 0.5 mg at 07/14/21 2028      Examination:  BP 97/65   Pulse 89   Temp 98.9 °F (37.2 °C) (Oral)   Resp 15   Ht 5' 10\" (1.778 m)   Wt 200 lb (90.7 kg)   SpO2 97%   BMI 28.70 kg/m²   Gait - steady  Medication side effects(SE): Denies    Mental Status Examination:    Unable to perform any type of mental status examination this time as patient is not answering any questions.   He is out visible in the milieu no behavioral disturbances per nurses he does appear to be internally stimulated          ASSESSMENT:   Patient symptoms are:  [] Well controlled  [] Improving  [] Worsening  [x] No change      Diagnosis:   Principal Problem:    Schizoaffective disorder,

## 2021-07-15 NOTE — PROGRESS NOTES
Spiritual Support Group Note    Number of Participants in Group:     9                   Time:   2    Goal: Relief from isolation and loneliness             Lalitha Sharing             Self-understanding and gain insight              Acceptance and belonging            Recognize they are not alone                Socialization             Empowerment       Encouragement    Topic:  [] Spiritual Wellness and Self Care                  [x] Hope                     [] Connecting with Divine/Others        [] Thankfulness and Gratitude               []  Meaningfulness and Purpose               [] Forgiveness               [x] Peace               [] Connect to [facility]      [] Other    Participation Level:   [x] Active Listener   [] Minimal   [] Monopolizing   [] Interactive   [] No Participation   []  Other:     Attention:   [x] Alert   [] Distractible   [] Drowsy   [] Poor   [] Other:    Manner:   [x] Cooperative   [] Suspicious   [] Withdrawn   [] Guarded   [] Irritable   [] Inhospitable   [] Other:     Others Comments from Group:

## 2021-07-15 NOTE — PLAN OF CARE
Pt is stable and without distress. Pt denies suicidal / homicidal ideations. Pt denies hallucinations. Pt does not report a goal presently at this time. Will follow and monitor.

## 2021-07-16 VITALS
OXYGEN SATURATION: 97 % | DIASTOLIC BLOOD PRESSURE: 75 MMHG | RESPIRATION RATE: 16 BRPM | HEART RATE: 101 BPM | BODY MASS INDEX: 28.63 KG/M2 | SYSTOLIC BLOOD PRESSURE: 125 MMHG | HEIGHT: 70 IN | TEMPERATURE: 98 F | WEIGHT: 200 LBS

## 2021-07-16 PROCEDURE — 99239 HOSP IP/OBS DSCHRG MGMT >30: CPT | Performed by: NURSE PRACTITIONER

## 2021-07-16 PROCEDURE — 6370000000 HC RX 637 (ALT 250 FOR IP): Performed by: NURSE PRACTITIONER

## 2021-07-16 RX ADMIN — CLOZAPINE 50 MG: 25 TABLET ORAL at 09:05

## 2021-07-16 RX ADMIN — OXCARBAZEPINE 300 MG: 300 TABLET, FILM COATED ORAL at 09:05

## 2021-07-16 RX ADMIN — BENZTROPINE MESYLATE 0.5 MG: 0.5 TABLET ORAL at 09:05

## 2021-07-16 ASSESSMENT — PAIN SCALES - GENERAL: PAINLEVEL_OUTOF10: 0

## 2021-07-16 ASSESSMENT — PAIN - FUNCTIONAL ASSESSMENT: PAIN_FUNCTIONAL_ASSESSMENT: 0-10

## 2021-07-16 NOTE — PLAN OF CARE
Patient denies suicidal ideation, homicidal ideations and AVH. Patient denies anxiety and depression. Patient is anxious, exaggerated and appears to be internally stimulated. Presents calm and cooperative during assessment. Patient is out on the unit and is social with select peers. Medications taken without issue. No complaints or concerns verbalized at this time. No unit problems reported. Will continue to observe and support.     Problem: Anger Management/Homicidal Ideation:  Goal: Able to display appropriate communication and problem solving  Description: Able to display appropriate communication and problem solving  7/15/2021 2056 by Lloyd Huerta RN  Outcome: Ongoing     Problem: Anger Management/Homicidal Ideation:  Goal: Ability to verbalize frustrations and anger appropriately will improve  Description: Ability to verbalize frustrations and anger appropriately will improve  Outcome: Ongoing     Problem: Anger Management/Homicidal Ideation:  Goal: Absence of angry outbursts  Description: Absence of angry outbursts  Outcome: Ongoing     Problem: Anger Management/Homicidal Ideation:  Goal: Absence of homicidal ideation  Description: Absence of homicidal ideation  Outcome: Ongoing     Problem: Altered Mood, Deterioration in Function:  Goal: Ability to perform activities of daily living will improve  Description: Ability to perform activities of daily living will improve  7/15/2021 2056 by Lloyd Huerta RN  Outcome: Ongoing     Problem: Altered Mood, Deterioration in Function:  Goal: Able to verbalize reality based thinking  Description: Able to verbalize reality based thinking  Outcome: Ongoing

## 2021-07-16 NOTE — GROUP NOTE
Group Therapy Note    Date: 7/16/2021    Group Start Time: 1110  Group End Time: 6402  Group Topic: Cognitive Skills    [facility]    ROMA Ballesteros LSW        Group Therapy Note    Attendees: 12         Patient's Goal:  To participate in group discussion of what we value and why. Notes:  Pt was an active participant in group. Status After Intervention:  Improved    Participation Level:  Active Listener    Participation Quality: Appropriate, Attentive and Sharing      Speech:  normal      Thought Process/Content: Logical      Affective Functioning: Congruent      Mood: euthymic      Level of consciousness:  Alert and Oriented x4      Response to Learning: Able to verbalize current knowledge/experience, Able to verbalize/acknowledge new learning and Able to retain information      Endings: None Reported    Modes of Intervention: Education, Support, Socialization, Exploration, Clarifying and Problem-solving      Discipline Responsible: /Counselor      Signature:  ROMA Albright, MANDO

## 2021-07-16 NOTE — CARE COORDINATION
In order to ensure appropriate transition and discharge planning is in place, the following documents have been transmitted to [facility] and [facility], as the new outpatient provider:     The d/c diagnosis was transmitted to the next care provider   The reason for hospitalization was transmitted to the next care provider   The d/c medications (dosage and indication) were transmitted to the next care provider    The continuing care plan was transmitted to the next care provider

## 2021-07-16 NOTE — DISCHARGE SUMMARY
DISCHARGE SUMMARY      Patient ID:  Ruiz Childress  [id number]  47 y.o.  1980    Admit date: 7/5/2021    Discharge date and time: 7/16/2021    Admitting Physician: Cherelle Gates MD     Discharge Physician: Dr David Chavez MD    Discharge Diagnoses:   Patient Active Problem List   Diagnosis    Mental retardation    Schizoaffective disorder, bipolar type ([address]    Mild intellectual disability    Bipolar affective disorder, mixed, severe, with psychotic behavior ([address]    Encounter for lithium monitoring    Taking multiple medications for chronic disease    Other idiopathic scoliosis, thoracolumbar region    Chronic seasonal allergic rhinitis due to pollen    Bulging of lumbar intervertebral disc without myelopathy    Psychosis ([address]    Excessive dietary caloric intake    Drug-induced constipation    Schizoaffective disorder ([address]    Enuresis, nocturnal only    Autism spectrum disorder    Akathisia    Esophageal dysphagia    Dysuria       Admission Condition: poor    Discharged Condition: stable    Admission Circumstance: presented to the ED for increased symptoms of psychosis      PAST MEDICAL/PSYCHIATRIC HISTORY:   Past Medical History:   Diagnosis Date    Acute psychosis ([address] 4/9/2019    ADHD (attention deficit hyperactivity disorder)     Autism     PATIENT IS COOPERATIVE AND VERBAL PER MOM    Autism     Bipolar 1 disorder ([address]     Drug-induced neutropenia ([address] 10/14/2020    Drug-induced parkinsonism ([address] 10/14/2020    Eruption due to drug 6/28/2019    HE (hepatic encephalopathy) ([address] 5/26/2021    Mental retardation     Positive occult stool blood test 10/23/2018    Rectal bleeding 10/23/2018    Schizoaffective disorder ([address]     Schizoaffective disorder, chronic condition with acute exacerbation ([address] 5/2/2019    Viral upper respiratory illness 4/10/2019       FAMILY/SOCIAL HISTORY:  Family History   Problem Relation Age of Onset    High Blood Pressure Mother     Diabetes Mother      Social History     Socioeconomic History    Marital status: Single     Spouse name: Not on file    Number of children: 0    Years of education: Not on file    Highest education level: Not on file   Occupational History     Employer: UNEMPLOYED     Comment: SSDI   Tobacco Use    Smoking status: Never Smoker    Smokeless tobacco: Never Used   Vaping Use    Vaping Use: Never used   Substance and Sexual Activity    Alcohol use: No    Drug use: No    Sexual activity: Never   Other Topics Concern    Not on file   Social History Narrative    Not on file     Social Determinants of Health     Financial Resource Strain:     Difficulty of Paying Living Expenses:    Food Insecurity:     Worried About Running Out of Food in the Last Year:     [address] in the Last Year:    Transportation Needs:     Lack of Transportation (Medical):      Lack of Transportation (Non-Medical):    Physical Activity:     Days of Exercise per Week:     Minutes of Exercise per Session:    Stress:     Feeling of Stress :    Social Connections:     Frequency of Communication with Friends and Family:     Frequency of Social Gatherings with Friends and Family:     Attends [employer] Services:     Active Member of Clubs or Organizations:     Attends Club or Organization Meetings:     Marital Status:    Intimate Partner Violence:     Fear of Current or Ex-Partner:     Emotionally Abused:     Physically Abused:     Sexually Abused:        MEDICATIONS:    Current Facility-Administered Medications:     cloZAPine (CLOZARIL) tablet 200 mg, 200 mg, Oral, Nightly, Carissa Adame, APRN - CNP, 672 mg at 07/15/21 2051    benztropine (COGENTIN) tablet 0.5 mg, 0.5 mg, Oral, BID, Luis Adame, APRN - CNP, 0.5 mg at 07/16/21 0905    cloZAPine (CLOZARIL) tablet 50 mg, 50 mg, Oral, Daily, Luis Adame APRN - CNP, 50 mg at 07/16/21 0905    OXcarbazepine (TRILEPTAL) tablet 300 mg, 300 mg, Oral, BID, LUCINA Hendricks - CNP, 300 mg at 07/16/21 0905    acetaminophen (TYLENOL) tablet 650 mg, 650 mg, Oral, Q6H PRN, Jacklyn Barrientos MD    magnesium hydroxide (MILK OF MAGNESIA) 400 MG/5ML suspension 30 mL, 30 mL, Oral, Daily PRN, Jacklyn Barrientos MD    aluminum & magnesium hydroxide-simethicone (MAALOX) 200-200-20 MG/5ML suspension 30 mL, 30 mL, Oral, PRN, Jacklyn Barrientos MD    hydrOXYzine (VISTARIL) capsule 50 mg, 50 mg, Oral, TID PRN, Jacklyn Barrientos MD    haloperidol (HALDOL) tablet 5 mg, 5 mg, Oral, Q6H PRN **OR** haloperidol lactate (HALDOL) injection 5 mg, 5 mg, Intramuscular, Q6H PRN, Jacklyn Barrientos MD    LORazepam (ATIVAN) tablet 2 mg, 2 mg, Oral, Q6H PRN, Jacklyn Barrientos MD    LORazepam (ATIVAN) injection 2 mg, 2 mg, Intramuscular, Q6H PRN, Jacklyn Barrientos MD    diphenhydrAMINE (BENADRYL) injection 50 mg, 50 mg, Intramuscular, Q6H PRN, Jacklyn Barrientos MD    diphenhydrAMINE (BENADRYL) tablet 50 mg, 50 mg, Oral, Q6H PRN, Jacklyn Barrientos MD    ziprasidone (GEODON) injection 10 mg, 10 mg, Intramuscular, Q12H PRN **AND** sterile water injection 1.2 mL, 1.2 mL, Intramuscular, Q12H PRN, Jacklyn Barrientos MD    traZODone (DESYREL) tablet 150 mg, 150 mg, Oral, Nightly, Julianna Brown MD, 150 mg at 07/15/21 2051    clonazePAM (KLONOPIN) tablet 0.5 mg, 0.5 mg, Oral, Nightly, Julianna Brown MD, 0.5 mg at 07/15/21 2051    Current Outpatient Medications:     benztropine (COGENTIN) 0.5 MG tablet, Take 1 tablet by mouth 2 times daily, Disp: 60 tablet, Rfl: 3    cloZAPine (CLOZARIL) 50 MG tablet, Take 1 tablet by mouth daily, Disp: 30 tablet, Rfl: 0    cloZAPine (CLOZARIL) 200 MG tablet, Take 1 tablet by mouth nightly, Disp: 30 tablet, Rfl: 0    clonazePAM (KLONOPIN) 0.5 MG tablet, Take 1 tablet by mouth nightly for 30 days. , Disp: 60 tablet, Rfl: 3    OXcarbazepine (TRILEPTAL) 300 MG tablet, Take 1 tablet by mouth 2 times daily, Disp: 60 tablet, Rfl: 0    Examination:  /75   Pulse 101   Temp 98 °F (36.7 °C) (Oral)   Resp 16   Ht 5' 10\" (1.778 m)   Wt 200 lb (90.7 kg)   SpO2 97%   BMI 28.70 kg/m²   Gait - steady    HOSPITAL COURSE[de-identified]     Patient was admitted to the unit on 7/5/2021 was closely monitored for psychosis. He was evaluated was treated with Clozaril which is optimized up to 50 mg daily and 200 mg at bedtime, Klonopin 0.5 mg at bedtime and Trileptal 300 mg twice daily. His Klonopin was cut back to just 0.5 mg at bedtime as patient only takes it 3 times daily on an outpatient basis with continued to prevent any withdrawal symptoms however do recommend caution with use of benzodiazepines with clozapine medical events were insignificant and patient continued to improve on the floor. He start coming out of his room he is attending groups to socializing with peers. He never made any suicidal statements or any suicidal gestures while in the unit. Social workers obtain collateral information from patient's mother as well as patient's SSA reported that they were getting home aids for patient set up and they were able to voicing concerns that they had. They reported no safety concerns no access to any weapons. Treatment team felt the patient obtain the maximum benefit from his hospitalization he was set up with an outpatient mental health agency for outpatient follow-up services. At the time of discharge patient did not show any impulsive behavior. He was up on the unit he was attending groups and socializing with peers. He vehemently denied any suicidal homicidal ideations intent or plan. He was eating well and sleeping well there are no neurovegetative signs or symptoms of depression he denied any auditory or visual hallucinations. There are no overt or covert signs of psychosis. He was appreciative of the help that he received here. This patient no longer meets criteria for inpatient hospitalization.         No AVH or paranoid thoughts  No hopeless or worthless feeling  No active SI/HI  Appetite: [x] Normal  [] Increased  [] Decreased    Sleep:       [x] Normal  [] Fair       [] Poor            Energy:    [x] Normal  [] Increased  [] Decreased     SI [] Present  [x] Absent  HI  []Present  [x] Absent   Aggression:  [] yes  [x] no  Patient is [x] able  [] unable to CONTRACT FOR SAFETY   Medication side effects(SE):  [x] None(Psych. Meds.) [] Other      Mental Status Examination on discharge:    Level of consciousness:  within normal limits   Appearance:  well-appearing  Behavior/Motor:  no abnormalities noted  Attitude toward examiner:  attentive and good eye contact  Speech:  spontaneous, normal rate and normal volume   Mood: \" I am good. \"  Affect: Appropriate and pleasant  Thought processes: Linear without flight of ideas loose associations  Thought content: Devoid of any auditory visualizations delusions or other perceptual abnormalities. Denies SI/HI intent or plan  Cognition:  oriented to person, place, and time   Concentration intact  Memory intact  Insight good   Judgement fair   Fund of Knowledge adequate      ASSESSMENT:  Patient symptoms are:  [x] Well controlled  [x] Improving  [] Worsening  [] No change    Reason for more than one antipsychotic:  [x] N/A  [] 3 Failed Monotherapy attempts (Drugs tried:)  [] Crossover to a new antipsychotic  [] Taper to Monotherapy from Polypharmacy  [] Augmentation of clozapine therapy due to treatment resistance to single therapy    Diagnosis:  Principal Problem:    Schizoaffective disorder, bipolar type [facility]  Active Problems:    Mental retardation  Resolved Problems:    * No resolved hospital problems. *      LABS:    No results for input(s): WBC, HGB, PLT in the last 72 hours. No results for input(s): NA, K, CL, CO2, BUN, CREATININE, GLUCOSE in the last 72 hours. No results for input(s): BILITOT, ALKPHOS, AST, ALT in the last 72 hours.   Lab Results   Component Value Date    LABAMPH NOT DETECTED 07/05/2021    BARBSCNU NOT DETECTED 07/05/2021    LABBENZ NOT DETECTED 07/05/2021    LABMETH NOT DETECTED 07/05/2021    OPIATESCREENURINE NOT DETECTED 07/05/2021    PHENCYCLIDINESCREENURINE NOT DETECTED 07/05/2021    ETOH <10 07/05/2021     Lab Results   Component Value Date    TSH 0.693 07/05/2021     Lab Results   Component Value Date    LITHIUM 0.92 02/02/2019     Lab Results   Component Value Date    VALPROATE 87 03/08/2021    CBMZ 4.7 04/14/2019       RISK ASSESSMENT AT DISCHARGE: Low risk for suicide and homicide. Treatment Plan:  Reviewed current Medications with the patient. Education provided on the complaince with treatment. Risks, benefits, side effects, drug-to-drug interactions and alternatives to treatment were discussed. Encourage patient to attend outpatient follow up appointment and therapy. Patient was advised to call the outpatient provider, visit the nearest ED or call 911 if symptoms are not manageable. Patient's family member was contacted prior to the discharge. Medication List      START taking these medications    OXcarbazepine 300 MG tablet  Commonly known as: TRILEPTAL  Take 1 tablet by mouth 2 times daily        CHANGE how you take these medications    benztropine 0.5 MG tablet  Commonly known as: COGENTIN  Take 1 tablet by mouth 2 times daily  What changed:   · medication strength  · how much to take  · how to take this  · when to take this     clonazePAM 0.5 MG tablet  Commonly known as: KLONOPIN  Take 1 tablet by mouth nightly for 30 days. What changed:   · when to take this  · reasons to take this     * cloZAPine 200 MG tablet  Commonly known as: CLOZARIL  Take 1 tablet by mouth nightly  What changed:   · medication strength  · how much to take     * cloZAPine 50 MG tablet  Commonly known as: CLOZARIL  Take 1 tablet by mouth daily  What changed: when to take this         * This list has 2 medication(s) that are the same as other medications prescribed for you.  Read the directions carefully, and ask your doctor or other care provider to review them with you.             STOP taking these medications    docusate sodium 100 MG capsule  Commonly known as: COLACE     haloperidol 5 MG tablet  Commonly known as: HALDOL     loratadine 10 MG tablet  Commonly known as: CLARITIN     traZODone 150 MG tablet  Commonly known as: DESYREL           Where to Get Your Medications      These medications [facility], [address] [address] [address]    Phone: [phone]   · benztropine 0.5 MG tablet  · cloZAPine 200 MG tablet  · cloZAPine 50 MG tablet  · OXcarbazepine 300 MG tablet     Information about where to get these medications is not yet available    Ask your nurse or doctor about these medications  · clonazePAM 0.5 MG tablet       Patient is counseled most been compliant with all medications outpatient follow-up appointments    Patient is discharged home in stable condition    TIME SPEND - 35 MINUTES TO COMPLETE THE EVALUATION, DISCHARGE SUMMARY, MEDICATION RECONCILIATION AND FOLLOW UP CARE     Signed:  LUCINA Mason CNP  7/08/2522  2:37 PM

## 2021-07-16 NOTE — BH NOTE
[facility]  Discharge Note    Pt discharged with followings belongings:   Dentures: None  Vision - Corrective Lenses: Glasses  Hearing Aid: None  Jewelry: None  Body Piercings Removed: N/A  Clothing: Footwear, Jacket / coat, Pants, Shirt  Were All Patient Medications Collected?: Not Applicable  Other Valuables: None    Patient education on aftercare instructions: yes. Patient verbalize understanding of AVS: yes.     Status EXAM upon discharge:  Status and Exam  Normal: Yes  Facial Expression: Exaggerated  Affect: Congruent  Level of Consciousness: Alert  Mood:Normal: Yes  Mood: Anxious  Motor Activity:Normal: Yes  Motor Activity: Increased  Interview Behavior: Cooperative  Preception: Damar to Person, Layvonne Maninder to Place, Damar to Situation  Attention:Normal: No  Attention: Distractible  Thought Processes: Circumstantial, Flt.of Ideas  Thought Content:Normal: No  Thought Content: Obsessions  Hallucinations: None  Delusions: No  Delusions: Obsessions  Memory:Normal: No  Memory: Confabulation  Insight and Judgment: No  Insight and Judgment: Poor Judgment, Poor Insight  Present Suicidal Ideation: No  Present Homicidal Ideation: No      Metabolic Screening:    Lab Results   Component Value Date    LABA1C 4.8 07/08/2021       Lab Results   Component Value Date    CHOL 167 07/08/2021    CHOL 152 04/13/2019    CHOL 215 (H) 04/18/2017    CHOL 165 10/07/2013    CHOL 153 02/26/2012     Lab Results   Component Value Date    TRIG 129 07/08/2021    TRIG 92 04/13/2019    TRIG 103 04/18/2017    TRIG 155 (H) 10/07/2013    TRIG 154 (H) 02/26/2012     Lab Results   Component Value Date    HDL 29 07/08/2021    HDL 32 04/13/2019    HDL 48 04/23/2018    HDL 50 04/18/2017    HDL 35.0 (A) 10/07/2013    HDL 26.0 (A) 02/26/2012     No components found for: [facility] EVALUATION AND TREATMENT [place]  Lab Results   Component Value Date    LABVLDL 26 07/08/2021    LABVLDL 18 04/13/2019    LABVLDL 16 04/23/2018    LABVLDL 21 04/18/2017       Page Tillman RN

## 2021-07-20 ENCOUNTER — TELEPHONE (OUTPATIENT)
Dept: FAMILY MEDICINE CLINIC | Age: 41
End: 2021-07-20

## 2021-07-21 ENCOUNTER — OFFICE VISIT (OUTPATIENT)
Dept: FAMILY MEDICINE CLINIC | Age: 41
End: 2021-07-21
Payer: MEDICARE

## 2021-07-21 VITALS
TEMPERATURE: 97.7 F | RESPIRATION RATE: 18 BRPM | OXYGEN SATURATION: 96 % | WEIGHT: 219.3 LBS | HEIGHT: 69 IN | SYSTOLIC BLOOD PRESSURE: 120 MMHG | DIASTOLIC BLOOD PRESSURE: 74 MMHG | BODY MASS INDEX: 32.48 KG/M2 | HEART RATE: 78 BPM

## 2021-07-21 DIAGNOSIS — F25.0 SCHIZOAFFECTIVE DISORDER, BIPOLAR TYPE (HCC): ICD-10-CM

## 2021-07-21 DIAGNOSIS — F31.64 BIPOLAR AFFECTIVE DISORDER, MIXED, SEVERE, WITH PSYCHOTIC BEHAVIOR (HCC): ICD-10-CM

## 2021-07-21 DIAGNOSIS — Z09 HOSPITAL DISCHARGE FOLLOW-UP: Primary | ICD-10-CM

## 2021-07-21 DIAGNOSIS — M72.2 PLANTAR FASCIITIS OF LEFT FOOT: ICD-10-CM

## 2021-07-21 DIAGNOSIS — F84.0 AUTISM SPECTRUM DISORDER: ICD-10-CM

## 2021-07-21 DIAGNOSIS — J30.1 CHRONIC SEASONAL ALLERGIC RHINITIS DUE TO POLLEN: ICD-10-CM

## 2021-07-21 DIAGNOSIS — K59.01 SLOW TRANSIT CONSTIPATION: ICD-10-CM

## 2021-07-21 PROBLEM — R30.0 DYSURIA: Status: RESOLVED | Noted: 2021-05-26 | Resolved: 2021-07-21

## 2021-07-21 PROBLEM — N39.44 ENURESIS, NOCTURNAL ONLY: Status: RESOLVED | Noted: 2019-09-18 | Resolved: 2021-07-21

## 2021-07-21 PROCEDURE — 1111F DSCHRG MED/CURRENT MED MERGE: CPT | Performed by: NURSE PRACTITIONER

## 2021-07-21 PROCEDURE — 99214 OFFICE O/P EST MOD 30 MIN: CPT | Performed by: NURSE PRACTITIONER

## 2021-07-21 PROCEDURE — G8427 DOCREV CUR MEDS BY ELIG CLIN: HCPCS | Performed by: NURSE PRACTITIONER

## 2021-07-21 PROCEDURE — G8417 CALC BMI ABV UP PARAM F/U: HCPCS | Performed by: NURSE PRACTITIONER

## 2021-07-21 PROCEDURE — 1036F TOBACCO NON-USER: CPT | Performed by: NURSE PRACTITIONER

## 2021-07-21 RX ORDER — ACETAMINOPHEN 325 MG/1
650 TABLET ORAL EVERY 4 HOURS PRN
Qty: 40 TABLET | Refills: 1 | Status: SHIPPED | OUTPATIENT
Start: 2021-07-21

## 2021-07-21 RX ORDER — LORATADINE 10 MG/1
10 TABLET ORAL DAILY
Qty: 30 TABLET | Refills: 5 | Status: SHIPPED
Start: 2021-07-21 | End: 2022-01-13 | Stop reason: SDUPTHER

## 2021-07-21 ASSESSMENT — ENCOUNTER SYMPTOMS
WHEEZING: 0
CHEST TIGHTNESS: 0
SINUS PAIN: 0
CONSTIPATION: 1
VOICE CHANGE: 0
RHINORRHEA: 0
COUGH: 0
DIARRHEA: 0
ABDOMINAL PAIN: 0
FACIAL SWELLING: 0
NAUSEA: 0
BACK PAIN: 0
SINUS PRESSURE: 0
SORE THROAT: 0
COLOR CHANGE: 0
TROUBLE SWALLOWING: 0
SHORTNESS OF BREATH: 0
VOMITING: 0

## 2021-07-21 NOTE — PATIENT INSTRUCTIONS
The medication list included in this document is our record of what you are currently taking, including any changes that were made at today's visit.  If you find any differences when compared to your medications at home, or have any questions that were not answered at your visit, please contact the office. Patient Education        Constipation: Care Instructions  Your Care Instructions     Constipation means that you have a hard time passing stools (bowel movements). People pass stools from 3 times a day to once every 3 days. What is normal for you may be different. Constipation may occur with pain in the rectum and cramping. The pain may get worse when you try to pass stools. Sometimes there are small amounts of bright red blood on toilet paper or the surface of stools. This is because of enlarged veins near the rectum (hemorrhoids). A few changes in your diet and lifestyle may help you avoid ongoing constipation. Your doctor may also prescribe medicine to help loosen your stool. Some medicines can cause constipation. These include pain medicines and antidepressants. Tell your doctor about all the medicines you take. Your doctor may want to make a medicine change to ease your symptoms. Follow-up care is a key part of your treatment and safety. Be sure to make and go to all appointments, and call your doctor if you are having problems. It's also a good idea to know your test results and keep a list of the medicines you take. How can you care for yourself at home? · Drink plenty of fluids. If you have kidney, heart, or liver disease and have to limit fluids, talk with your doctor before you increase the amount of fluids you drink. · Include high-fiber foods in your diet each day. These include fruits, vegetables, beans, and whole grains. · Get at least 30 minutes of exercise on most days of the week. Walking is a good choice.  You also may want to do other activities, such as running, swimming, cycling, or playing tennis or team sports. · Take a fiber supplement, such as Citrucel or Metamucil, every day. Read and follow all instructions on the label. · Schedule time each day for a bowel movement. A daily routine may help. Take your time having your bowel movement. · Support your feet with a small step stool when you sit on the toilet. This helps flex your hips and places your pelvis in a squatting position. · Your doctor may recommend an over-the-counter laxative to relieve your constipation. Examples are Milk of Magnesia and MiraLax. Read and follow all instructions on the label. Do not use laxatives on a long-term basis. When should you call for help? Call your doctor now or seek immediate medical care if:    · You have new or worse belly pain.     · You have new or worse nausea or vomiting.     · You have blood in your stools. Watch closely for changes in your health, and be sure to contact your doctor if:    · Your constipation is getting worse.     · You do not get better as expected. Where can you learn more? Go to https://NudgeRx.Power-One. org and sign in to your Soma account. Enter [phone] in the [facility] box to learn more about \"Constipation: Care Instructions. \"     If you do not have an account, please click on the \"Sign Up Now\" link. Current as of: October 19, 2020               Content Version: 12.9  © 2373-3969 [employer]. Care instructions adapted under license by [facility] ([facility]). If you have questions about a medical condition or this instruction, always ask your healthcare professional. [place] [address] any warranty or liability for your use of this information. Patient Education        Learning About the Diet for Swallowing Problems  What are swallowing problems? Difficulty swallowing is also called dysphagia (say \"qed-VDO-nvn-uh\"). It is most often a sign of a problem with your throat or esophagus.  This is the tube that flow through a straw. ? Mildly thick liquids. These can be sipped from a cup but take some effort to drink with a straw. ? Moderately thick liquids. These liquids are thick enough to drink from a cup or from a spoon. But they are hard to drink through a wide straw. ? Extremely thick liquids. These are thick enough to hold their shape on a spoon. They are too thick to drink from a cup or suck through a straw. Your speech therapist will help you learn exercises to train your muscles to work together so you can swallow. You may also need to learn how to position your body or how to put food in your mouth to be able to swallow better. Follow-up care is a key part of your treatment and safety. Be sure to make and go to all appointments, and call your doctor if you are having problems. It's also a good idea to know your test results and keep a list of the medicines you take. Where can you learn more? Go to https://Rexahn [employer]aceuticals.Hydrobee. org and sign in to your Axela account. Enter P981 in the S[employer]i box to learn more about \"Learning About the Diet for Swallowing Problems. \"     If you do not have an account, please click on the \"Sign Up Now\" link. Current as of: October 19, 2020               Content Version: 12.9  © 2006-2021 Healthwise, Incorporated. Care instructions adapted under license by [facility] ([facility]). If you have questions about a medical condition or this instruction, always ask your healthcare professional. [place] [address] any warranty or liability for your use of this information. Patient Education        Plantar Fasciitis: Exercises  Introduction  Here are some examples of exercises for you to try. The exercises may be suggested for a condition or for rehabilitation. Start each exercise slowly. Ease off the exercises if you start to have pain. You will be told when to start these exercises and which ones will work best for you.   How to do the exercises  Towel stretch   1. Sit with your legs extended and knees straight. 2. Place a towel around your foot just under the toes. 3. Hold each end of the towel in each hand, with your hands above your knees. 4. Pull back with the towel so that your foot stretches toward you. 5. Hold the position for at least 15 to 30 seconds. 6. Repeat 2 to 4 times a session, up to 5 sessions a day. Calf stretch   This exercise stretches the muscles at the back of the lower leg (the calf) and the Achilles tendon. Do this exercise 3 or 4 times a day, 5 days a week. 1. Stand facing a wall with your hands on the wall at about eye level. Put the leg you want to stretch about a step behind your other leg. 2. Keeping your back heel on the floor, bend your front knee until you feel a stretch in the back leg. 3. Hold the stretch for 15 to 30 seconds. Repeat 2 to 4 times. Plantar fascia and calf stretch   Stretching the plantar fascia and calf muscles can increase flexibility and decrease heel pain. You can do this exercise several times each day and before and after activity. 1. Stand on a step as shown above. Be sure to hold on to the banister. 2. Slowly let your heels down over the edge of the step as you relax your calf muscles. You should feel a gentle stretch across the bottom of your foot and up the back of your leg to your knee. 3. Hold the stretch about 15 to 30 seconds, and then tighten your calf muscle a little to bring your heel back up to the level of the step. Repeat 2 to 4 times. Towel curls   Make this exercise more challenging by placing a weighted object, such as a soup can, on the other end of the towel. 1. While sitting, place your foot on a towel on the floor and scrunch the towel toward you with your toes. 2. Then, also using your toes, push the towel away from you. [place] pickups   1.  Put marbles on the floor next to a cup.  2. Using your toes, try to lift the marbles up from the floor and put them in the cup. Follow-up care is a key part of your treatment and safety. Be sure to make and go to all appointments, and call your doctor if you are having problems. It's also a good idea to know your test results and keep a list of the medicines you take. Where can you learn more? Go to https://chpepiceweb.[email or web address] org and sign in to your [employer] account. Enter T139 in the [employer] box to learn more about \"Plantar Fasciitis: Exercises. \"     If you do not have an account, please click on the \"Sign Up Now\" link. Current as of: November 16, 2020               Content Version: 12.9  © 2006-2021 Healthwise, Incorporated. Care instructions adapted under license by [facility] ([facility]). If you have questions about a medical condition or this instruction, always ask your healthcare professional. [address] any warranty or liability for your use of this information.

## 2021-07-21 NOTE — PROGRESS NOTES
Post-Discharge Transitional Care Management Services or Hospital Follow Up      López Turpin   YOB: 1980    Date of Office Visit:  7/21/2021  Date of Hospital Admission: 7/5/21  Date of Hospital Discharge: 7/16/21  Readmission Risk Score(high >=14%.  Medium >=10%):Readmission Risk Score: 10      Care management risk score Rising risk (score 2-5) and Complex Care (Scores >=6): 4     Non face to face  following discharge, date last encounter closed (first attempt may have been earlier): 7/20/2021  8:37 AM 7/20/2021  8:37 AM    Call initiated 2 business days of discharge: Yes     Patient Active Problem List   Diagnosis    Mental retardation    Schizoaffective disorder, bipolar type ([address]    Mild intellectual disability    Bipolar affective disorder, mixed, severe, with psychotic behavior ([address]    Encounter for lithium monitoring    Taking multiple medications for chronic disease    Other idiopathic scoliosis, thoracolumbar region    Chronic seasonal allergic rhinitis due to pollen    Bulging of lumbar intervertebral disc without myelopathy    Psychosis ([address]    Excessive dietary caloric intake    Drug-induced constipation    Enuresis, nocturnal only    Autism spectrum disorder    Akathisia    Esophageal dysphagia    Dysuria    Slow transit constipation    Plantar fasciitis of left foot       Allergies   Allergen Reactions    Seasonal        Medications listed as ordered at the time of discharge from hospital   Elias Poster   Home Medication Instructions REGAN:    Printed on:07/21/21 3544   Medication Information                      acetaminophen (AMINOFEN) 325 MG tablet  Take 2 tablets by mouth every 4 hours as needed for Pain or Fever             benztropine (COGENTIN) 0.5 MG tablet  Take 1 tablet by mouth 2 times daily             cloZAPine (CLOZARIL) 200 MG tablet  Take 1 tablet by mouth nightly             cloZAPine (CLOZARIL) 50 MG tablet  Take 1 tablet by mouth daily OXcarbazepine (TRILEPTAL) 300 MG tablet  Take 1 tablet by mouth 2 times daily                   Medications marked \"taking\" at this time  Outpatient Medications Marked as Taking for the 7/21/21 encounter (Office Visit) with Jeronimo Friend, APRARIEL - CNP   Medication Sig Dispense Refill    acetaminophen (AMINOFEN) 325 MG tablet Take 2 tablets by mouth every 4 hours as needed for Pain or Fever 40 tablet 1    benztropine (COGENTIN) 0.5 MG tablet Take 1 tablet by mouth 2 times daily 60 tablet 3    cloZAPine (CLOZARIL) 50 MG tablet Take 1 tablet by mouth daily 30 tablet 0    cloZAPine (CLOZARIL) 200 MG tablet Take 1 tablet by mouth nightly 30 tablet 0    OXcarbazepine (TRILEPTAL) 300 MG tablet Take 1 tablet by mouth 2 times daily 60 tablet 0        Medications patient taking as of now reconciled against medications ordered at time of hospital discharge: Yes    Chief Complaint   Patient presents with    [facility]-Up [facility] follow up for bipolar and schizoaffective disorder he was admitted to [facility] psych de [facility]. Per Mom and Katia Holbrook his care aid he was excessively talking, garbled speech and he felt his medications were causing the problems. He wasn't sleeping well [facility] a stranger. He has been obsessed with touching all his items at home and getting organized. He has been having problems eating again and did have swallowing study done and was to have PT but was hospitalized. He has different staff at his home now and is getting used to the people who are caring for him. Katia Holbrook feels he is getting calmer and be doing better. He will be getting his own place in a couple of weeks and eventually may have a room mate. He will have to see the therapist for his swallowing therapy here as he goes out of the home to work and so they wouldn't do home therapy. He is having pain in the left foot at the heel and into the arch.  He does see Dr Tami Servin podiatry and has upcoming appointment. He also has some problems with constipation off and on, he did use prune juice which did work. Per Regina Ran he is eating well and taking fresh fruits and vegetable. Inpatient course: Discharge summary reviewed- see chart. Interval history/Current status: stable    Review of Systems   Constitutional: Negative for activity change, appetite change, chills, diaphoresis, fatigue, fever and unexpected weight change. HENT: Negative for congestion, dental problem, drooling, ear discharge, ear pain, facial swelling, hearing loss, mouth sores, nosebleeds, postnasal drip, rhinorrhea, sinus pressure, sinus pain, sneezing, sore throat, tinnitus, trouble swallowing and voice change. Eyes: Negative for visual disturbance. Respiratory: Negative for cough, chest tightness, shortness of breath and wheezing. Cardiovascular: Negative for chest pain, palpitations and leg swelling. Gastrointestinal: Positive for constipation. Negative for abdominal pain, diarrhea, nausea and vomiting. Endocrine: Negative for cold intolerance, heat intolerance, polydipsia, polyphagia and polyuria. Genitourinary: Negative for difficulty urinating, frequency and urgency. Musculoskeletal: Positive for arthralgias ( left foot pain) and gait problem. Negative for back pain, joint swelling, myalgias, neck pain and neck stiffness. Skin: Negative for color change, pallor, rash and wound. Allergic/Immunologic: Negative for environmental allergies, food allergies and immunocompromised state. Neurological: Negative for dizziness, tremors, seizures, syncope, facial asymmetry, speech difficulty, weakness, light-headedness, numbness and headaches. Hematological: Negative for adenopathy. Does not bruise/bleed easily.    Psychiatric/Behavioral: Negative for agitation, behavioral problems, confusion, decreased concentration, dysphoric mood, hallucinations, self-injury, sleep disturbance and suicidal ideas. The patient is not nervous/anxious and is not hyperactive. Vitals:    07/21/21 1044   BP: 120/74   Pulse: 78   Resp: 18   Temp: 97.7 °F (36.5 °C)   SpO2: 96%   Weight: 219 lb 4.8 oz (99.5 kg)   Height: 5' 9\" (1.753 m)     Body mass index is 32.38 kg/m². Wt Readings from Last 3 Encounters:   07/21/21 219 lb 4.8 oz (99.5 kg)   05/26/21 229 lb (103.9 kg)   09/18/19 231 lb (104.8 kg)     BP Readings from Last 3 Encounters:   07/21/21 120/74   05/26/21 134/85   12/14/20 (!) 140/79       Physical Exam  Constitutional:       Appearance: Normal appearance. He is obese. HENT:      Head: Normocephalic and atraumatic. Right Ear: Tympanic membrane, ear canal and external ear normal.      Left Ear: Tympanic membrane, ear canal and external ear normal.      Nose: Nose normal.      Mouth/Throat:      Mouth: Mucous membranes are moist.      Pharynx: Oropharynx is clear. Eyes:      Extraocular Movements: Extraocular movements intact. Conjunctiva/sclera: Conjunctivae normal.      Pupils: Pupils are equal, round, and reactive to light. Cardiovascular:      Rate and Rhythm: Normal rate and regular rhythm. Pulses: Normal pulses. Heart sounds: Normal heart sounds. Pulmonary:      Effort: Pulmonary effort is normal.      Breath sounds: Normal breath sounds. Abdominal:      General: Abdomen is flat. Bowel sounds are normal.      Palpations: Abdomen is soft. Musculoskeletal:         General: Normal range of motion. Cervical back: Normal range of motion and neck supple. Skin:     General: Skin is warm and dry. Capillary Refill: Capillary refill takes less than 2 seconds. Comments: Both heels have very thick skin and long fissure on both heels deep. Has appt upcoming with podiatrist   Neurological:      General: No focal deficit present. Mental Status: He is alert and oriented to person, place, and time. Mental status is at baseline.    Psychiatric:         Mood and Affect: Mood normal.         Behavior: Behavior normal.             Assessment/Plan:  1. Hospital discharge follow-up    - TX DISCHARGE MEDS RECONCILED W/ CURRENT OUTPATIENT MED LIST    2. Autism spectrum disorder  stable  Managed by Psychiatrist  Continue current medications by psychiatry. 3. Bipolar affective disorder, mixed, severe, with psychotic behavior ([address]  improving  Managed by Psychiatrist  Continue current medications by psychiatry. 4. Schizoaffective disorder, bipolar type ([facility]  Stable  Managed by Psychiatrist  Continue current medications by psychiatry. 5. Slow transit constipation  Stable  Discussed if the prune juice works to use this as the constipation has been good with medication changes. Make appointment with GI    6. Plantar fasciitis of left foot  New Uncontrolled  Has upcoming appointment with podiatrist  Exercises given  Recommend pumice heels nightly after shower      Paper work completed for [facility] [address] reviewed Lipid panel , A1c 7/8/21, and  TSH, T4, CBCD and CMP 7/5/21 ordered by Psychiatry.      Medical Decision Making: moderate complexity

## 2021-08-03 ENCOUNTER — HOSPITAL ENCOUNTER (OUTPATIENT)
Age: 41
Discharge: HOME | End: 2021-08-03
Payer: MEDICARE

## 2021-08-03 LAB
BASOPHILS ABSOLUTE: 0.04 E9/L (ref 0–0.2)
BASOPHILS RELATIVE PERCENT: 0.5 % (ref 0–2)
EOSINOPHILS ABSOLUTE: 0.24 E9/L (ref 0.05–0.5)
EOSINOPHILS RELATIVE PERCENT: 3.2 % (ref 0–6)
HCT VFR BLD CALC: 42.6 % (ref 37–54)
HEMOGLOBIN: 14.6 G/DL (ref 12.5–16.5)
IMMATURE GRANULOCYTES #: 0.04 E9/L
IMMATURE GRANULOCYTES %: 0.5 % (ref 0–5)
LYMPHOCYTES ABSOLUTE: 1.94 E9/L (ref 1.5–4)
LYMPHOCYTES RELATIVE PERCENT: 26.2 % (ref 20–42)
MCH RBC QN AUTO: 31.1 PG (ref 26–35)
MCHC RBC AUTO-ENTMCNC: 34.3 % (ref 32–34.5)
MCV RBC AUTO: 90.8 FL (ref 80–99.9)
MONOCYTES ABSOLUTE: 0.4 E9/L (ref 0.1–0.95)
MONOCYTES RELATIVE PERCENT: 5.4 % (ref 2–12)
NEUTROPHILS ABSOLUTE: 4.75 E9/L (ref 1.8–7.3)
NEUTROPHILS RELATIVE PERCENT: 64.2 % (ref 43–80)
PDW BLD-RTO: 11.9 FL (ref 11.5–15)
PLATELET # BLD: 226 E9/L (ref 130–450)
PMV BLD AUTO: 9.7 FL (ref 7–12)
RBC # BLD: 4.69 E12/L (ref 3.8–5.8)
WBC # BLD: 7.4 E9/L (ref 4.5–11.5)

## 2021-08-03 PROCEDURE — 85025 COMPLETE CBC W/AUTO DIFF WBC: CPT

## 2021-08-03 PROCEDURE — 36415 COLL VENOUS BLD VENIPUNCTURE: CPT

## 2021-08-03 RX ORDER — OXCARBAZEPINE 300 MG/1
300 TABLET, FILM COATED ORAL 2 TIMES DAILY
Qty: 60 TABLET | Refills: 0 | OUTPATIENT
Start: 2021-08-03 | End: 2021-09-02

## 2021-08-03 NOTE — TELEPHONE ENCOUNTER
Karan/Suresh Allen LMM requesting refill.     Last seen 7/21/2021  Next appt 9/20/2021  [facility]

## 2021-08-24 NOTE — ED PROVIDER NOTES
Urine Negative Negative    Ketones, Urine Negative Negative mg/dL    Specific Gravity, UA 1.020 1.005 - 1.030    Blood, Urine Negative Negative    pH, UA 6.5 5.0 - 9.0    Protein, UA Negative Negative mg/dL    Urobilinogen, Urine 0.2 <2.0 E.U./dL    Nitrite, Urine Negative Negative    Leukocyte Esterase, Urine Negative Negative   Serum Drug Screen   Result Value Ref Range    Ethanol Lvl <10 mg/dL    Acetaminophen Level <5.0 (L) 10.0 - 59.0 mcg/mL    Salicylate, Serum <0.5 0.0 - 30.0 mg/dL    TCA Scrn NEGATIVE Cutoff:300 ng/mL   Urine Drug Screen   Result Value Ref Range    Amphetamine Screen, Urine NOT DETECTED Negative <1000 ng/mL    Barbiturate Screen, Ur NOT DETECTED Negative < 200 ng/mL    Benzodiazepine Screen, Urine POSITIVE (A) Negative < 200 ng/mL    Cannabinoid Scrn, Ur NOT DETECTED Negative < 50ng/mL    Cocaine Metabolite Screen, Urine NOT DETECTED Negative < 300 ng/mL    Opiate Scrn, Ur NOT DETECTED Negative < 300ng/mL    PCP Screen, Urine NOT DETECTED Negative < 25 ng/mL    Methadone Screen, Urine NOT DETECTED Negative <300 ng/mL    Propoxyphene Scrn, Ur NOT DETECTED Negative <300 ng/mL   CBC Auto Differential   Result Value Ref Range    WBC 7.7 4.5 - 11.5 E9/L    RBC 4.35 3.80 - 5.80 E12/L    Hemoglobin 14.0 12.5 - 16.5 g/dL    Hematocrit 41.1 37.0 - 54.0 %    MCV 94.5 80.0 - 99.9 fL    MCH 32.2 26.0 - 35.0 pg    MCHC 34.1 32.0 - 34.5 %    RDW 11.8 11.5 - 15.0 fL    Platelets 991 481 - 535 E9/L    MPV 9.8 7.0 - 12.0 fL    Neutrophils % 58.2 43.0 - 80.0 %    Immature Granulocytes % 0.4 0.0 - 5.0 %    Lymphocytes % 31.3 20.0 - 42.0 %    Monocytes % 5.8 2.0 - 12.0 %    Eosinophils % 3.8 0.0 - 6.0 %    Basophils % 0.5 0.0 - 2.0 %    Neutrophils # 4.49 1.80 - 7.30 E9/L    Immature Granulocytes # 0.03 E9/L    Lymphocytes # 2.42 1.50 - 4.00 E9/L    Monocytes # 0.45 0.10 - 0.95 E9/L    Eosinophils # 0.29 0.05 - 0.50 E9/L    Basophils # 0.04 0.00 - 0.20 E9/L   Comprehensive Metabolic Panel   Result Value Ref EKG is signed and interpreted by the EP. Time: 22:01  Rate: 75bpm  Rhythm: Sinus  Interpretation: no acute disease   Comparison: no previous EKG available    ------------------------- NURSING NOTES AND VITALS REVIEWED ---------------------------   The nursing notes within the ED encounter and vital signs as below have been reviewed. BP (!) 86/43   Pulse 55   Temp 98.1 °F (36.7 °C) (Oral)   Resp 20   Ht 5' 11\" (1.803 m)   Wt 230 lb (104.3 kg)   SpO2 96%   BMI 32.08 kg/m²   Oxygen Saturation Interpretation: Normal      ---------------------------------------------------PHYSICAL EXAM--------------------------------------    Constitutional/General: Alert and oriented x3, well appearing, non toxic in NAD  Head: Normocephalic, atraumatic  Eyes: PERRL, EOMI  Mouth: Oropharynx clear, handling secretions, no trismus  Neck: Supple, full ROM, non tender to palpation in the midline, no stridor, no crepitus, no meningeal signs  Pulmonary: Lungs clear to auscultation bilaterally, no wheezes, rales, or rhonchi. Not in respiratory distress  Cardiovascular:  Regular rate and regular rhythm, no murmurs, gallops, or rubs. 2+ distal pulses  Abdomen: Soft, non tender, non distended, +BS, no rebound, guarding, or rigidity. No pulsatile masses appreciated  Extremities: Moves all extremities x 4. Warm and well perfused, no clubbing, cyanosis, or edema. Capillary refill <3 seconds  Skin: warm and dry without rash  Neurologic: CN 2-12 grossly intact, no focal deficits, symmetric strength 5/5 in the upper and lower extremities bilaterally  Psych: Agitated Affect    ------------------------------------------ PROGRESS NOTES ------------------------------------------     Medical decision making:     Pt family brought him in for help, state pt is acting abnormally and psychotically. Pt is MRDD with hx of Bipolar disorder 1, schizoaffective disorder. Negative Suicidal ideation. Negative Homicidal ideation.   Labs will be ordered to Yes

## 2021-09-07 LAB
BASOPHILS ABSOLUTE: 0.04 E9/L (ref 0–0.2)
BASOPHILS RELATIVE PERCENT: 0.6 % (ref 0–2)
EOSINOPHILS ABSOLUTE: 0.22 E9/L (ref 0.05–0.5)
EOSINOPHILS RELATIVE PERCENT: 3.2 % (ref 0–6)
HCT VFR BLD CALC: 41.1 % (ref 37–54)
HEMOGLOBIN: 14.1 G/DL (ref 12.5–16.5)
IMMATURE GRANULOCYTES #: 0.04 E9/L
IMMATURE GRANULOCYTES %: 0.6 % (ref 0–5)
LYMPHOCYTES ABSOLUTE: 2.16 E9/L (ref 1.5–4)
LYMPHOCYTES RELATIVE PERCENT: 31.3 % (ref 20–42)
MCH RBC QN AUTO: 31.3 PG (ref 26–35)
MCHC RBC AUTO-ENTMCNC: 34.3 % (ref 32–34.5)
MCV RBC AUTO: 91.1 FL (ref 80–99.9)
MONOCYTES ABSOLUTE: 0.42 E9/L (ref 0.1–0.95)
MONOCYTES RELATIVE PERCENT: 6.1 % (ref 2–12)
NEUTROPHILS ABSOLUTE: 4.01 E9/L (ref 1.8–7.3)
NEUTROPHILS RELATIVE PERCENT: 58.2 % (ref 43–80)
PDW BLD-RTO: 12 FL (ref 11.5–15)
PLATELET # BLD: 206 E9/L (ref 130–450)
PMV BLD AUTO: 10.6 FL (ref 7–12)
RBC # BLD: 4.51 E12/L (ref 3.8–5.8)
WBC # BLD: 6.9 E9/L (ref 4.5–11.5)

## 2021-09-20 ENCOUNTER — OFFICE VISIT (OUTPATIENT)
Dept: FAMILY MEDICINE CLINIC | Age: 41
End: 2021-09-20
Payer: MEDICARE

## 2021-09-20 VITALS
BODY MASS INDEX: 32.14 KG/M2 | SYSTOLIC BLOOD PRESSURE: 102 MMHG | OXYGEN SATURATION: 100 % | WEIGHT: 217 LBS | HEART RATE: 97 BPM | TEMPERATURE: 98.2 F | RESPIRATION RATE: 18 BRPM | DIASTOLIC BLOOD PRESSURE: 62 MMHG | HEIGHT: 69 IN

## 2021-09-20 DIAGNOSIS — Z23 NEED FOR TETANUS, DIPHTHERIA, AND ACELLULAR PERTUSSIS (TDAP) VACCINE: ICD-10-CM

## 2021-09-20 DIAGNOSIS — Z00.00 ROUTINE GENERAL MEDICAL EXAMINATION AT A HEALTH CARE FACILITY: Primary | ICD-10-CM

## 2021-09-20 DIAGNOSIS — Z23 FLU VACCINE NEED: ICD-10-CM

## 2021-09-20 PROCEDURE — 90674 CCIIV4 VAC NO PRSV 0.5 ML IM: CPT | Performed by: NURSE PRACTITIONER

## 2021-09-20 PROCEDURE — G0008 ADMIN INFLUENZA VIRUS VAC: HCPCS | Performed by: NURSE PRACTITIONER

## 2021-09-20 PROCEDURE — G0439 PPPS, SUBSEQ VISIT: HCPCS | Performed by: NURSE PRACTITIONER

## 2021-09-20 RX ORDER — CLONIDINE HYDROCHLORIDE 0.1 MG/1
0.1 TABLET ORAL 3 TIMES DAILY
COMMUNITY
Start: 2021-09-08 | End: 2022-03-21 | Stop reason: ALTCHOICE

## 2021-09-20 RX ORDER — UREA 40 %
CREAM (GRAM) TOPICAL
COMMUNITY
Start: 2021-09-13

## 2021-09-20 SDOH — ECONOMIC STABILITY: FOOD INSECURITY: WITHIN THE PAST 12 MONTHS, THE FOOD YOU BOUGHT JUST DIDN'T LAST AND YOU DIDN'T HAVE MONEY TO GET MORE.: NEVER TRUE

## 2021-09-20 SDOH — ECONOMIC STABILITY: TRANSPORTATION INSECURITY
IN THE PAST 12 MONTHS, HAS LACK OF TRANSPORTATION KEPT YOU FROM MEETINGS, WORK, OR FROM GETTING THINGS NEEDED FOR DAILY LIVING?: NO

## 2021-09-20 SDOH — ECONOMIC STABILITY: INCOME INSECURITY: IN THE LAST 12 MONTHS, WAS THERE A TIME WHEN YOU WERE NOT ABLE TO PAY THE MORTGAGE OR RENT ON TIME?: NO

## 2021-09-20 SDOH — ECONOMIC STABILITY: FOOD INSECURITY: WITHIN THE PAST 12 MONTHS, YOU WORRIED THAT YOUR FOOD WOULD RUN OUT BEFORE YOU GOT MONEY TO BUY MORE.: NEVER TRUE

## 2021-09-20 SDOH — ECONOMIC STABILITY: TRANSPORTATION INSECURITY
IN THE PAST 12 MONTHS, HAS THE LACK OF TRANSPORTATION KEPT YOU FROM MEDICAL APPOINTMENTS OR FROM GETTING MEDICATIONS?: NO

## 2021-09-20 SDOH — ECONOMIC STABILITY: HOUSING INSECURITY
IN THE LAST 12 MONTHS, WAS THERE A TIME WHEN YOU DID NOT HAVE A STEADY PLACE TO SLEEP OR SLEPT IN A SHELTER (INCLUDING NOW)?: NO

## 2021-09-20 ASSESSMENT — LIFESTYLE VARIABLES
HOW OFTEN DO YOU HAVE A DRINK CONTAINING ALCOHOL: NEVER
HOW OFTEN DO YOU HAVE A DRINK CONTAINING ALCOHOL: 0
AUDIT-C TOTAL SCORE: INCOMPLETE
HOW OFTEN DO YOU HAVE A DRINK CONTAINING ALCOHOL: NEVER
AUDIT TOTAL SCORE: INCOMPLETE

## 2021-09-20 ASSESSMENT — PATIENT HEALTH QUESTIONNAIRE - PHQ9
SUM OF ALL RESPONSES TO PHQ QUESTIONS 1-9: 0
SUM OF ALL RESPONSES TO PHQ QUESTIONS 1-9: 0
1. LITTLE INTEREST OR PLEASURE IN DOING THINGS: 0
SUM OF ALL RESPONSES TO PHQ QUESTIONS 1-9: 0
2. FEELING DOWN, DEPRESSED OR HOPELESS: 0
SUM OF ALL RESPONSES TO PHQ9 QUESTIONS 1 & 2: 0

## 2021-09-20 ASSESSMENT — SOCIAL DETERMINANTS OF HEALTH (SDOH): HOW HARD IS IT FOR YOU TO PAY FOR THE VERY BASICS LIKE FOOD, HOUSING, MEDICAL CARE, AND HEATING?: NOT HARD AT ALL

## 2021-09-20 NOTE — PATIENT INSTRUCTIONS
vegetables. · Dairy products, such as low-fat milk, yogurt, and cheese. · Lean proteins, such as all types of fish, chicken without the skin, and beans. Don't have too much or too little of one thing. All foods, if eaten in moderation, can be part of healthy eating. Even sweets can be okay. If your favorite foods are high in fat, salt, sugar, or calories, limit how often you eat them. Eat smaller servings, or look for healthy substitutes. Do watch what you eat  Many people eat more than their bodies need. Part of staying at a healthy weight means learning how much food you really need from day to day and not eating more than that. Even with healthy foods, eating too much can make you gain weight. Having a well-balanced diet means that you eat enough, but not too much, and that your food gives you the nutrients you need to stay healthy. So listen to your body. Eat when you're hungry. Stop when you feel satisfied. It's a good idea to have healthy snacks ready for when you get hungry. Keep healthy snacks with you at work, in your car, and at home. If you have a healthy snack easily available, you'll be less likely to pick a candy bar or bag of chips from a vending machine instead. Some healthy snacks you might want to keep on hand are fruit, low-fat yogurt, string cheese, low-fat microwave popcorn, raisins and other dried fruit, nuts, whole wheat crackers, pretzels, carrots, celery sticks, and broccoli. Do some physical activity  A big part of reaching and staying at a healthy weight is being active. When you're active, you burn calories. This makes it easier to reach and stay at a healthy weight. When you're active on a regular basis, your body burns more calories, even when you're at rest. Being active helps you lose fat and build lean muscle. Try to be active for at least 1 hour every day. This may sound like a lot, but it's okay to be active in smaller blocks of time that add up to 1 hour a day.  Any activity that makes your heart beat faster and keeps it there for a while counts. A brisk walk, run, or swim will get your heart beating faster. So will climbing stairs, shooting baskets, or cycling. Even some household chores like vacuuming and mowing the lawn will get your heart rate up. Pick activities that you enjoyones that make your heart beat faster, your muscles stronger, and your muscles and joints more flexible. If you find more than one thing you like doing, do them all. You don't have to do the same thing every day. Don't diet  Diets don't work. Diets are temporary. Because you give up so much when you diet, you may be hungry and think about food all the time. And after you stop dieting, you also may overeat to make up for what you missed. Most people who diet end up gaining back the pounds they lostand more. Remember that healthy bodies come in lots of shapes and sizes. Everyone can get healthier by eating better and being more active. Where can you learn more? Go to https://[employer]. org and sign in to your [employer] account. Enter 498 [id number] in the Or[employer]in box to learn more about \"Learning About Healthy Weight. \"     If you do not have an account, please click on the \"Sign Up Now\" link. Current as of: March 17, 2021               Content Version: 12.9  © 9236-2982 [employer], [employer]. Care instructions adapted under license by [facility] [place] ([facility]). If you have questions about a medical condition or this instruction, always ask your healthcare professional. [place] [address] any warranty or liability for your use of this information. Personalized Preventive Plan for Safia Kern - 9/20/2021  Medicare offers a range of preventive health benefits. Some of the tests and screenings are paid in full while other may be subject to a deductible, co-insurance, and/or copay.     Some of these benefits include a comprehensive review of your medical history including lifestyle, illnesses that may run in your family, and various assessments and screenings as appropriate. After reviewing your medical record and screening and assessments performed today your provider may have ordered immunizations, labs, imaging, and/or referrals for you. A list of these orders (if applicable) as well as your Preventive Care list are included within your After Visit Summary for your review. Other Preventive Recommendations:    · A preventive eye exam performed by an eye specialist is recommended every 1-2 years to screen for glaucoma; cataracts, macular degeneration, and other eye disorders. · A preventive dental visit is recommended every 6 months. · Try to get at least 150 minutes of exercise per week or 10,000 steps per day on a pedometer . · Order or download the FREE \"Exercise & Physical Activity: Your Everyday Guide\" from [employer]. Call 6-510.888.8938 or search [employer] online. · You need 2773-5317 mg of calcium and 5258-5255 IU of vitamin D per day. It is possible to meet your calcium requirement with diet alone, but a vitamin D supplement is usually necessary to meet this goal.  · When exposed to the sun, use a sunscreen that protects against both UVA and UVB radiation with an SPF of 30 or greater. Reapply every 2 to 3 hours or after sweating, drying off with a towel, or swimming. · Always wear a seat belt when traveling in a car. Always wear a helmet when riding a bicycle or motorcycle. Heart-Healthy Diet   Sodium, Fat, and Cholesterol Controlled Diet       What Is a Heart Healthy Diet? A heart-healthy diet is one that limits sodium , certain types of fat , and cholesterol .  This type of diet is recommended for:   People with any form of cardiovascular disease (eg, coronary heart disease , peripheral vascular disease , previous heart attack , previous stroke )   People with risk factors for cardiovascular disease, such as high blood pressure , high cholesterol , or diabetes   Anyone who wants to lower their risk of developing cardiovascular disease   Sodium    Sodium is a mineral found in many foods. In general, most people consume much more sodium than they need. Diets high in sodium can increase blood pressure and lead to edema (water retention). On a heart-healthy diet, you should consume no more than 2,300 mg (milligrams) of sodium per dayabout the amount in one teaspoon of table salt. The foods highest in sodium include table salt (about 50% sodium), processed foods, convenience foods, and preserved foods. Cholesterol    Cholesterol is a fat-like, waxy substance in your blood. Our bodies make some cholesterol. It is also found in animal products, with the highest amounts in fatty meat, egg yolks, whole milk, cheese, shellfish, and organ meats. On a heart-healthy diet, you should limit your cholesterol intake to less than 200 mg per day. It is normal and important to have some cholesterol in your bloodstream. But too much cholesterol can cause plaque to build up within your arteries, which can eventually lead to a heart attack or stroke. The two types of cholesterol that are most commonly referred to are:   Low-density lipoprotein (LDL) cholesterol  Also known as bad cholesterol, this is the cholesterol that tends to build up along your arteries. Bad cholesterol levels are increased by eating fats that are saturated or hydrogenated. Optimal level of this cholesterol is less than 100. Over 130 starts to get risky for heart disease. High-density lipoprotein (HDL) cholesterol  Also known as good cholesterol, this type of cholesterol actually carries cholesterol away from your arteries and may, therefore, help lower your risk of having a heart attack. You want this level to be high (ideally greater than 60). It is a risk to have a level less than 40.  You can raise this good cholesterol by eating olive oil, canola oil, avocados, or nuts. Exercise raises this level, too. Fat    Fat is calorie dense and packs a lot of calories into a small amount of food. Even though fats should be limited due to their high calorie content, not all fats are bad. In fact, some fats are quite healthful. Fat can be broken down into four main types. The good-for-you fats are:   Monounsaturated fat  found in oils such as olive and canola, avocados, and nuts and natural nut butters; can decrease cholesterol levels, while keeping levels of HDL cholesterol high   Polyunsaturated fat  found in oils such as safflower, sunflower, soybean, corn, and sesame; can decrease total cholesterol and LDL cholesterol   Omega-3 fatty acids  particularly those found in fatty fish (such as salmon, trout, tuna, mackerel, herring, and sardines); can decrease risk of arrhythmias, decrease triglyceride levels, and slightly lower blood pressure   The fats that you want to limit are:   Saturated fat  found in animal products, many fast foods, and a few vegetables; increases total blood cholesterol, including LDL levels   Animal fats that are saturated include: butter, lard, whole-milk dairy products, meat fat, and poultry skin   Vegetable fats that are saturated include: hydrogenated shortening, palm oil, coconut oil, cocoa butter   Hydrogenated or trans fat  found in margarine and vegetable shortening, most shelf stable snack foods, and fried foods; increases LDL and decreases HDL     It is generally recommended that you limit your total fat for the day to less than 30% of your total calories. If you follow an 1800-calorie heart healthy diet, for example, this would mean 60 grams of fat or less per day. Saturated fat and trans fat in your diet raises your blood cholesterol the most, much more than dietary cholesterol does. For this reason, on a heart-healthy diet, less than 7% of your calories should come from saturated fat and ideally 0% from trans fat.  On an 1800-calorie diet, this translates into less than 14 grams of saturated fat per day, leaving 46 grams of fat to come from mono- and polyunsaturated fats.    Food Choices on a Heart Healthy Diet   Food Category   Foods Recommended   Foods to Avoid   Grains   Breads and rolls without salted tops Most dry and cooked cereals Unsalted crackers and breadsticks Low-sodium or homemade breadcrumbs or stuffing All rice and pastas   Breads, rolls, and crackers with salted tops High-fat baked goods (eg, muffins, donuts, pastries) Quick breads, self-rising flour, and biscuit mixes Regular bread crumbs Instant hot cereals Commercially prepared rice, pasta, or stuffing mixes   Vegetables   Most fresh, frozen, and low-sodium canned vegetables Low-sodium and salt-free vegetable juices Canned vegetables if unsalted or rinsed   Regular canned vegetables and juices, including sauerkraut and pickled vegetables Frozen vegetables with sauces Commercially prepared potato and vegetable mixes   Fruits   Most fresh, frozen, and canned fruits All fruit juices   Fruits processed with salt or sodium   Milk   Nonfat or low-fat (1%) milk Nonfat or low-fat yogurt Cottage cheese, low-fat ricotta, cheeses labeled as low-fat and low-sodium   Whole milk Reduced-fat (2%) milk Malted and chocolate milk Full fat yogurt Most cheeses (unless low-fat and low salt) Buttermilk (no more than 1 cup per week)   Meats and Beans   Lean cuts of fresh or frozen beef, veal, lamb, or pork (look for the word loin) Fresh or frozen poultry without the skin Fresh or frozen fish and some shellfish Egg whites and egg substitutes (Limit whole eggs to three per week) Tofu Nuts or seeds (unsalted, dry-roasted), low-sodium peanut butter Dried peas, beans, and lentils   Any smoked, cured, salted, or canned meat, fish, or poultry (including herron, chipped beef, cold cuts, hot dogs, sausages, sardines, and anchovies) Poultry skins Breaded and/or fried fish or meats Canned peas, beans, and lentils Salted nuts   Fats and Oils   Olive oil and canola oil Low-sodium, low-fat salad dressings and mayonnaise   Butter, margarine, coconut and palm oils, herron fat   Snacks, Sweets, and Condiments   Low-sodium or unsalted versions of broths, soups, soy sauce, and condiments Pepper, herbs, and spices; vinegar, lemon, or lime juice Low-fat frozen desserts (yogurt, sherbet, fruit bars) Sugar, cocoa powder, honey, syrup, jam, and preserves Low-fat, trans-fat free cookies, cakes, and pies Terence and animal crackers, fig bars, gen snaps   High-fat desserts Broth, soups, gravies, and sauces, made from instant mixes or other high-sodium ingredients Salted snack foods Canned olives Meat tenderizers, seasoning salt, and most flavored vinegars   Beverages   Low-sodium carbonated beverages Tea and coffee in moderation Soy milk   Commercially softened water   Suggestions   Make whole grains, fruits, and vegetables the base of your diet. Choose heart-healthy fats such as canola, olive, and flaxseed oil, and foods high in heart-healthy fats, such as nuts, seeds, soybeans, tofu, and fish. Eat fish at least twice per week; the fish highest in omega-3 fatty acids and lowest in mercury include salmon, herring, mackerel, sardines, and canned chunk light tuna. If you eat fish less than twice per week or have high triglycerides, talk to your doctor about taking fish oil supplements. Read food labels. For products low in fat and cholesterol, look for fat free, low-fat, cholesterol free, saturated fat free, and trans fat freeAlso scan the Nutrition Facts Label, which lists saturated fat, trans fat, and cholesterol amounts. For products low in sodium, look for sodium free, very low sodium, low sodium, no added salt, and unsalted   Skip the salt when cooking or at the table; if food needs more flavor, get creative and try out different herbs and spices. Garlic and onion also add substantial flavor to foods.

## 2021-09-20 NOTE — ASSESSMENT & PLAN NOTE
Flu vaccine given today. · Wash your hands regularly, and keep your hands away from your face. · Cover your mouth when you cough or sneeze. · Rest, plenty of fluids, Tylenol for body aches, fever. · Stay home from school, work, and other public places until you are feeling better and your fever has been gone for at least 24 hours. The fever needs to have gone away on its own without the help of medicine. · Ask people living with you to talk to their doctors about preventing the flu. They may get antiviral medicine to keep from getting the flu from you. · To prevent the flu in the future, get a flu vaccine every fall. Encourage people living with you to get the vaccine.     ·

## 2021-09-20 NOTE — PROGRESS NOTES
Medicare Annual Wellness Visit  Name: Kathleen Roblero Date: 2021   MRN: <[id number]> Sex: Male   Age: 47 y.o. Ethnicity: Non- / Non    : 1980 Race: White (non-)      Eleanor Juarez is here for Medicare AWV    Screenings for behavioral, psychosocial and functional/safety risks, and cognitive dysfunction are all negative except as indicated below. These results, as well as other patient data from the [address] form, are documented in Flowsheets linked to this Encounter. Allergies   Allergen Reactions    Seasonal          Prior to Visit Medications    Medication Sig Taking?  Authorizing Provider   cloNIDine (CATAPRES) 0.1 MG tablet Take 0.1 mg by mouth three times daily Yes Historical Provider, MD   ciclopirox (PENLAC) 8 % solution  Yes Historical Provider, MD   Urea (CARMOL) 40 % cream apply to affected area twice a day ([address]) Yes Historical Provider, MD   Tdap (ADACEL) 5-2-15.5 LF-MCG/0.5 injection Inject 0.5 mLs into the muscle once for 1 dose Yes LUCINA Salazar CNP   acetaminophen (AMINOFEN) 325 MG tablet Take 2 tablets by mouth every 4 hours as needed for Pain or Fever Yes LUCINA Salazar CNP   loratadine (CLARITIN) 10 MG tablet Take 1 tablet by mouth daily Yes LUCINA Salazar CNP   benztropine (COGENTIN) 0.5 MG tablet Take 1 tablet by mouth 2 times daily Yes LUCINA Salazar CNP   cloZAPine (CLOZARIL) 50 MG tablet Take 1 tablet by mouth daily Yes LUCINA Salazar CNP   cloZAPine (CLOZARIL) 200 MG tablet Take 1 tablet by mouth nightly Yes LUCINA Rivas CNP   OXcarbazepine (TRILEPTAL) 300 MG tablet Take 1 tablet by mouth 2 times daily Yes LUCINA Salazar CNP         Past Medical History:   Diagnosis Date    Acute psychosis ([facility] 2019    ADHD (attention deficit hyperactivity disorder)     Autism     PATIENT IS COOPERATIVE AND VERBAL PER MOM    Autism     [facility] ([facility]     Drug-induced neutropenia ([facility] 10/14/2020    Drug-induced parkinsonism ([facility] 10/14/2020    Dysuria 5/26/2021    Encounter for lithium monitoring 6/9/2016    Enuresis, nocturnal only 9/18/2019    Eruption due to drug 6/28/2019    HE (hepatic encephalopathy) ([facility] 5/26/2021    Mental retardation     Positive occult stool blood test 10/23/2018    Rectal bleeding 10/23/2018    Schizoaffective disorder ([facility]     Schizoaffective disorder, chronic condition with acute exacerbation ([facility] 5/2/2019    Viral upper respiratory illness 4/10/2019       Past Surgical History:   Procedure Laterality Date    OTHER SURGICAL HISTORY      FASCIITIS AND HEEL SPUR    WISDOM TOOTH EXTRACTION  2006         Family History   Problem Relation Age of Onset    High Blood Pressure Mother     Diabetes Mother        CareTeam (Including outside providers/suppliers regularly involved in providing care):   Patient Care Team:  LUCINA Edwards CNP as PCP - General (Nurse Practitioner)  LUCINA Edwards CNP as PCP - [facility] Empaneled Provider    Wt Readings from Last 3 Encounters:   09/20/21 217 lb (98.4 kg)   07/21/21 219 lb 4.8 oz (99.5 kg)   05/26/21 229 lb (103.9 kg)     Vitals:    09/20/21 1531   BP: 102/62   Pulse: 97   Resp: 18   Temp: 98.2 °F (36.8 °C)   SpO2: 100%   Weight: 217 lb (98.4 kg)   Height: 5' 9\" (1.753 m)     Body mass index is 32.05 kg/m². Based upon direct observation of the patient, evaluation of cognition reveals patient has schizophrenia and bipolar affective, autism spectrum disorder.      General Appearance: alert and oriented to person, place and time, well developed and well- nourished, obese,  in no acute distress  Skin: warm and dry, no rash or erythema  Head: normocephalic and atraumatic  Eyes: pupils equal, round, and reactive to light, extraocular eye movements unable to follow directions, conjunctivae injected  ENT: tympanic membrane, external ear and ear canal normal bilaterally, nose without deformity, nasal mucosa and turbinates normal without polyps  Neck: supple and non-tender without mass, no thyromegaly or thyroid nodules, no cervical lymphadenopathy  Pulmonary/Chest: clear to auscultation bilaterally- no wheezes, rales or rhonchi, normal air movement, no respiratory distress  Cardiovascular: normal rate, regular rhythm, normal S1 and S2, no murmurs, rubs, clicks, or gallops, distal pulses intact, no carotid bruits  Abdomen: soft, non-tender, non-distended, normal bowel sounds, no masses or organomegaly  Extremities: no cyanosis, clubbing or edema  Musculoskeletal: normal range of motion, no joint swelling, deformity or tenderness  Neurologic: reflexes normal and symmetric, no cranial nerve deficit, gait, coordination and speech normal    Patient's complete Health Risk Assessment and screening values have been reviewed and are found in Flowsheets. The following problems were reviewed today and where indicated follow up appointments were made and/or referrals ordered. Positive Risk Factor Screenings with Interventions:      Cognitive: Words recalled: 2 Words Recalled  Clock Drawing Test (CDT) Score:  (pt can not draw a clock,)  Total Score Interpretation: Positive Mini-Cog  Did the patient refuse to take the cognition test?: No  Cognitive Impairment Interventions:  · patient sees psychiatrist autism, schizoaffective and bipolar         General Health and ACP:  General  In general, how would you say your health is?: Good  In the past 7 days, have you experienced any of the following?  New or Increased Pain, New or Increased Fatigue, Loneliness, Social Isolation, Stress or Anger?: None of These  Do you get the social and emotional support that you need?: Yes  Do you have a Living Will?: (!) No  Advance Directives     Power of  Living Will ACP-Advance Directive ACP-Power of     Not on File Filed on 10/28/14 Filed Not on File      General Health Risk Interventions:  · No Living Will: Advance Care Planning addressed with patient today    Health Habits/Nutrition:  Health Habits/Nutrition  Do you exercise for at least 20 minutes 2-3 times per week?: Yes  Have you lost any weight without trying in the past 3 months?: (!) Yes  Do you eat only one meal per day?: No  Have you seen the dentist within the past year?: Appointment is scheduled  Body mass index: (!) 32.04  Health Habits/Nutrition Interventions:  · Medication adjusted by psychiatrist and has lost weight    Hearing/Vision:  No exam data present  Hearing/Vision  Do you or your family notice any trouble with your hearing that hasn't been managed with hearing aids?: No  Do you have difficulty driving, watching TV, or doing any of your daily activities because of your eyesight?: No  Have you had an eye exam within the past year?: (!) No  Hearing/Vision Interventions:  · Vision concerns:  patient encouraged to make appointment with his/her eye specialist     ADL:  ADLs  In the past 7 days, did you need help from others to perform any of the following everyday activities? Eating, dressing, grooming, bathing, toileting, or walking/balance?: (!) Dressing, Grooming, Bathing  In the past 7 days, did you need help from others to take care of any of the following? Laundry, housekeeping, banking/finances, shopping, telephone use, food preparation, transportation, or taking medications?: [employer], Housekeeping, Banking/Finances, Shopping, Telephone Use, Food Preparation, Transportation, Taking Medications  ADL Interventions:  · Patient lives in a supported living home and has help with his ADL's ect.      Personalized Preventive Plan   Current Health Maintenance Status  Immunization History   Administered Date(s) Administered    COVID-19, Moderna, PF, 100mcg/0.5mL 02/03/2021, 03/03/2021    Influenza Vaccine, unspecified formulation 02/02/2017    Influenza Virus Vaccine 02/02/2017    Influenza, Intradermal, Preservative free 12/09/2015    Influenza, MDCK Quadv, IM, PF (Flucelvax 2 yrs and older) 09/20/2021    Influenza, Kristen Hurt, 6 mo and older, IM (Fluzone, Flulaval) 10/23/2018    Influenza, Quadv, IM, (6 mo and older Fluzone, Flulaval, Fluarix and 3 yrs and older Afluria) 10/23/2018    Influenza, Quadv, IM, PF (6 mo and older Fluzone, Flulaval, Fluarix, and 3 yrs and older Afluria) 11/27/2017, 01/30/2020        Health Maintenance   Topic Date Due    Varicella vaccine (1 of 2 - 2-dose childhood series) Never done    DTaP/Tdap/Td vaccine (1 - Tdap) Never done   [employer] Visit (AWV)  Never done    Flu vaccine  Completed    COVID-19 Vaccine  Completed    Hepatitis C screen  Completed    Hepatitis A vaccine  Aged Out    Hepatitis B vaccine  Aged Out    Hib vaccine  Aged Out    Meningococcal (ACWY) vaccine  Aged Out    Pneumococcal 0-64 years Vaccine  Aged Out    HIV screen  Discontinued     Recommendations for DP7 Digital Due: see orders and patient instructions/AVS.  . Recommended screening schedule for the next 5-10 years is provided to the patient in written form: see Patient Instructions/AVS.    Clarice Olivera was seen today for medicare awv. Diagnoses and all orders for this visit:    Routine general medical examination at a health care facility    Flu vaccine need  -     INFLUENZA, MDCK QUADV, 2 YRS AND OLDER, IM, PF, PREFILL SYR OR SDV, 0.5ML (FLUCELVAX QUADV, PF)  Flu vaccine given today. · Wash your hands regularly, and keep your hands away from your face. · Cover your mouth when you cough or sneeze. · Rest, plenty of fluids, Tylenol for body aches, fever. · Stay home from school, work, and other public places until you are feeling better and your fever has been gone for at least 24 hours. The fever needs to have gone away on its own without the help of medicine. · Ask people living with you to talk to their doctors about preventing the flu. They may get antiviral medicine to keep from getting the flu from you.   · To prevent the flu in the future, get a flu vaccine every fall. Encourage people living with you to get the vaccine. ·       Need for tetanus, diphtheria, and acellular pertussis (Tdap) vaccine  -     Tdap (ADACEL) 5-2-15.5 LF-MCG/0.5 injection; Inject 0.5 mLs into the muscle once for 1 dose  Wait 2 weeks then go to the pharmacy and get Tdap. Common side effects of Td vaccination include soreness in the arm where you got the shot and a mild fever. These usually occur within 3 days of the shot and last a short time                 Advance Care Planning   Advanced Care Planning: Discussed the patients choices for care and treatment in case of a health event that adversely affects decision-making abilities. Also discussed the patients long-term treatment options. Reviewed with the patient the [address] [place] [address] Declaration forms  Reviewed the process of designating a competent adult as an Agent (or -in-fact) that could take make health care decisions for the patient if incompetent. Patient was asked to complete the declaration forms, either acknowledge the forms by a public notary or an eligible witness and provide a signed copy to the practice office. Time spent (minutes): 10    Obesity Counseling: Assessed behavioral health risks and factors affecting choice of behavior. Suggested weight control approaches, including dietary changes behavioral modification and follow up plan. Provided educational and support documentation. Time spent (minutes): 5  Cardiovascular Disease Risk Counseling: Assessed the patient's risk to develop cardiovascular disease and reviewed main risk factors.    Reviewed steps to reduce disease risk including:   · Quitting tobacco use, reducing amount smoked, or not starting the habit  · Making healthy food choices  · Being physically active and gradualy increasing activity levels   · Reduce weight and determine a healthy BMI goal  · Monitor blood pressure and treat if higher than 140/90 mmHg  · Maintain blood total cholesterol levels under 5 mmol/l or 190 mg/dl  · Maintain LDL cholesterol levels under 3.0 mmol/l or 115 mg/dl   · Control blood glucose levels    Provided a follow up plan.   Time spent (minutes): 5

## 2021-09-20 NOTE — ASSESSMENT & PLAN NOTE
Wait 2 weeks then go to the pharmacy and get Tdap. Common side effects of Td vaccination include soreness in the arm where you got the shot and a mild fever.  These usually occur within 3 days of the shot and last a short time

## 2021-10-11 LAB
BASOPHILS ABSOLUTE: 0.03 E9/L (ref 0–0.2)
BASOPHILS RELATIVE PERCENT: 0.5 % (ref 0–2)
EOSINOPHILS ABSOLUTE: 0.24 E9/L (ref 0.05–0.5)
EOSINOPHILS RELATIVE PERCENT: 3.7 % (ref 0–6)
HCT VFR BLD CALC: 44.6 % (ref 37–54)
HEMOGLOBIN: 15 G/DL (ref 12.5–16.5)
IMMATURE GRANULOCYTES #: 0.03 E9/L
IMMATURE GRANULOCYTES %: 0.5 % (ref 0–5)
LYMPHOCYTES ABSOLUTE: 2.19 E9/L (ref 1.5–4)
LYMPHOCYTES RELATIVE PERCENT: 33.9 % (ref 20–42)
MCH RBC QN AUTO: 31 PG (ref 26–35)
MCHC RBC AUTO-ENTMCNC: 33.6 % (ref 32–34.5)
MCV RBC AUTO: 92.1 FL (ref 80–99.9)
MONOCYTES ABSOLUTE: 0.41 E9/L (ref 0.1–0.95)
MONOCYTES RELATIVE PERCENT: 6.3 % (ref 2–12)
NEUTROPHILS ABSOLUTE: 3.56 E9/L (ref 1.8–7.3)
NEUTROPHILS RELATIVE PERCENT: 55.1 % (ref 43–80)
PDW BLD-RTO: 12.3 FL (ref 11.5–15)
PLATELET # BLD: 205 E9/L (ref 130–450)
PMV BLD AUTO: 10.9 FL (ref 7–12)
RBC # BLD: 4.84 E12/L (ref 3.8–5.8)
WBC # BLD: 6.5 E9/L (ref 4.5–11.5)

## 2021-10-27 ENCOUNTER — OFFICE VISIT (OUTPATIENT)
Dept: FAMILY MEDICINE CLINIC | Age: 41
End: 2021-10-27
Payer: MEDICARE

## 2021-10-27 ENCOUNTER — TELEPHONE (OUTPATIENT)
Dept: FAMILY MEDICINE CLINIC | Age: 41
End: 2021-10-27

## 2021-10-27 VITALS
HEIGHT: 69 IN | OXYGEN SATURATION: 97 % | BODY MASS INDEX: 32.14 KG/M2 | RESPIRATION RATE: 17 BRPM | WEIGHT: 217 LBS | TEMPERATURE: 97.2 F | HEART RATE: 113 BPM | DIASTOLIC BLOOD PRESSURE: 79 MMHG | SYSTOLIC BLOOD PRESSURE: 114 MMHG

## 2021-10-27 DIAGNOSIS — J32.9 SINOBRONCHITIS: Primary | ICD-10-CM

## 2021-10-27 DIAGNOSIS — J40 SINOBRONCHITIS: Primary | ICD-10-CM

## 2021-10-27 LAB
Lab: NORMAL
PERFORMING INSTRUMENT: NORMAL
QC PASS/FAIL: NORMAL
S PYO AG THROAT QL: NORMAL
SARS-COV-2, POC: NORMAL

## 2021-10-27 PROCEDURE — 87880 STREP A ASSAY W/OPTIC: CPT | Performed by: NURSE PRACTITIONER

## 2021-10-27 PROCEDURE — 87426 SARSCOV CORONAVIRUS AG IA: CPT | Performed by: NURSE PRACTITIONER

## 2021-10-27 PROCEDURE — G8417 CALC BMI ABV UP PARAM F/U: HCPCS | Performed by: NURSE PRACTITIONER

## 2021-10-27 PROCEDURE — G8482 FLU IMMUNIZE ORDER/ADMIN: HCPCS | Performed by: NURSE PRACTITIONER

## 2021-10-27 PROCEDURE — 99213 OFFICE O/P EST LOW 20 MIN: CPT | Performed by: NURSE PRACTITIONER

## 2021-10-27 PROCEDURE — G8427 DOCREV CUR MEDS BY ELIG CLIN: HCPCS | Performed by: NURSE PRACTITIONER

## 2021-10-27 PROCEDURE — 1036F TOBACCO NON-USER: CPT | Performed by: NURSE PRACTITIONER

## 2021-10-27 RX ORDER — BROMPHENIRAMINE MALEATE, PSEUDOEPHEDRINE HYDROCHLORIDE, AND DEXTROMETHORPHAN HYDROBROMIDE 2; 30; 10 MG/5ML; MG/5ML; MG/5ML
10 SYRUP ORAL 4 TIMES DAILY PRN
Qty: 120 ML | Refills: 0 | Status: SHIPPED
Start: 2021-10-27 | End: 2022-03-21 | Stop reason: ALTCHOICE

## 2021-10-27 RX ORDER — AZITHROMYCIN 250 MG/1
250 TABLET, FILM COATED ORAL SEE ADMIN INSTRUCTIONS
Qty: 6 TABLET | Refills: 0 | Status: SHIPPED | OUTPATIENT
Start: 2021-10-27 | End: 2021-11-01

## 2021-10-27 RX ORDER — OMEPRAZOLE 20 MG/1
20 CAPSULE, DELAYED RELEASE ORAL DAILY
COMMUNITY
Start: 2021-10-22

## 2021-10-27 NOTE — TELEPHONE ENCOUNTER
Called patient and notified him that with his symptoms he would have to go to [address] [place] [address]

## 2021-10-27 NOTE — PROGRESS NOTES
that he does not drink alcohol and does not use drugs. Family History: family history includes Diabetes in his mother; High Blood Pressure in his mother. Allergies: Seasonal    Physical Exam         VS:  /79 (Site: Left Upper Arm, Position: Sitting, Cuff Size: Large Adult)   Pulse 113   Temp 97.2 °F (36.2 °C) (Temporal)   Resp 17   Ht 5' 9\" (1.753 m)   Wt 217 lb (98.4 kg)   SpO2 97%   BMI 32.05 kg/m²    Oxygen Saturation Interpretation: Normal.    Constitutional:  Alert, development consistent with age. NAD. Head:  NC/NT. Airway patent. Mouth: Posterior pharynx with moderate erythema and clear postnasal drip. No tonsillar hypertrophy with exudate. Neck:  Normal ROM. Supple. No anterior cervical adenopathy noted. Lungs: CTAB without wheezes, rales, or rhonchi. CV:  Regular rate and rhythm, normal heart sounds, without pathological murmurs, ectopy, gallops, or rubs. Skin:  Normal turgor. Warm, dry, without visible rash. Lymphatic: No lymphangitis or adenopathy noted. Neurological:  Oriented. Motor functions intact. Lab / Imaging Results   (All laboratory and radiology results have been personally reviewed by myself)  Labs:  No results found for this visit on 10/27/21. Imaging: All Radiology results interpreted by Radiologist unless otherwise noted. Assessment / Plan     Impression(s):  DARELL Palacios was seen today for pharyngitis. Diagnoses and all orders for this visit:    Sinobronchitis  -     POCT rapid strep A (-) in office today, however he has exudate & moderate erythrema  -     POCT COVID-19, Antigen (-) in office today  -     azithromycin (ZITHROMAX) 250 MG tablet; Take 1 tablet by mouth See Admin Instructions for 5 days 500mg on day 1 followed by 250mg on days 2 - 5  -     brompheniramine-pseudoephedrine-DM (BROMFED DM) 2-30-10 MG/5ML syrup;  Take 10 mLs by mouth 4 times daily as needed for Congestion or Cough    - Red Flag items & conservative methods discussed  - F/u with PCP if symptoms persist  - Work/school letter provided in AVS if necessary    Disposition:  Disposition: Discharge to home. Rapid COVID-19 testing is (-) in office. Advised cautionary self-quarantine at home in the interim. Increase fluids and rest. Symptomatic relief discussed including Tylenol prn pain/fever. Schedule virtual f/u with PCP in 7-10 days if symptoms persist. ED sooner if symptoms worsen or change. ED immediately with high or refractory fever, progressive SOB, dyspnea, CP, calf pain/swelling, shaking chills, vomiting, abdominal pain, lethargy, flank pain, or decreased urinary output. Pt verbalizes understanding and is in agreement with plan of care. All questions answered. Griselda Olea, APRN - CNP    **This report was transcribed using voice recognition software. Every effort was made to ensure accuracy; however, inadvertent computerized transcription errors may be present.

## 2021-11-08 LAB
BASOPHILS ABSOLUTE: 0.05 E9/L (ref 0–0.2)
BASOPHILS RELATIVE PERCENT: 0.7 % (ref 0–2)
EOSINOPHILS ABSOLUTE: 0.15 E9/L (ref 0.05–0.5)
EOSINOPHILS RELATIVE PERCENT: 2.1 % (ref 0–6)
HCT VFR BLD CALC: 43.5 % (ref 37–54)
HEMOGLOBIN: 15 G/DL (ref 12.5–16.5)
IMMATURE GRANULOCYTES #: 0.01 E9/L
IMMATURE GRANULOCYTES %: 0.1 % (ref 0–5)
LYMPHOCYTES ABSOLUTE: 2.47 E9/L (ref 1.5–4)
LYMPHOCYTES RELATIVE PERCENT: 34.4 % (ref 20–42)
MCH RBC QN AUTO: 31.4 PG (ref 26–35)
MCHC RBC AUTO-ENTMCNC: 34.5 % (ref 32–34.5)
MCV RBC AUTO: 91.2 FL (ref 80–99.9)
MONOCYTES ABSOLUTE: 0.48 E9/L (ref 0.1–0.95)
MONOCYTES RELATIVE PERCENT: 6.7 % (ref 2–12)
NEUTROPHILS ABSOLUTE: 4.01 E9/L (ref 1.8–7.3)
NEUTROPHILS RELATIVE PERCENT: 56 % (ref 43–80)
PDW BLD-RTO: 11.8 FL (ref 11.5–15)
PLATELET # BLD: 233 E9/L (ref 130–450)
PMV BLD AUTO: 10.4 FL (ref 7–12)
RBC # BLD: 4.77 E12/L (ref 3.8–5.8)
WBC # BLD: 7.2 E9/L (ref 4.5–11.5)

## 2021-12-16 LAB
BASOPHILS ABSOLUTE: 0.06 E9/L (ref 0–0.2)
BASOPHILS RELATIVE PERCENT: 0.9 % (ref 0–2)
EOSINOPHILS ABSOLUTE: 0.21 E9/L (ref 0.05–0.5)
EOSINOPHILS RELATIVE PERCENT: 3.1 % (ref 0–6)
HCT VFR BLD CALC: 45 % (ref 37–54)
HEMOGLOBIN: 15.1 G/DL (ref 12.5–16.5)
IMMATURE GRANULOCYTES #: 0.02 E9/L
IMMATURE GRANULOCYTES %: 0.3 % (ref 0–5)
LYMPHOCYTES ABSOLUTE: 2.17 E9/L (ref 1.5–4)
LYMPHOCYTES RELATIVE PERCENT: 32 % (ref 20–42)
MCH RBC QN AUTO: 30.6 PG (ref 26–35)
MCHC RBC AUTO-ENTMCNC: 33.6 % (ref 32–34.5)
MCV RBC AUTO: 91.1 FL (ref 80–99.9)
MONOCYTES ABSOLUTE: 0.43 E9/L (ref 0.1–0.95)
MONOCYTES RELATIVE PERCENT: 6.3 % (ref 2–12)
NEUTROPHILS ABSOLUTE: 3.9 E9/L (ref 1.8–7.3)
NEUTROPHILS RELATIVE PERCENT: 57.4 % (ref 43–80)
PDW BLD-RTO: 11.9 FL (ref 11.5–15)
PLATELET # BLD: 213 E9/L (ref 130–450)
PMV BLD AUTO: 11 FL (ref 7–12)
RBC # BLD: 4.94 E12/L (ref 3.8–5.8)
WBC # BLD: 6.8 E9/L (ref 4.5–11.5)

## 2022-01-05 DIAGNOSIS — Z20.822 EXPOSURE TO COVID-19 VIRUS: Primary | ICD-10-CM

## 2022-01-13 DIAGNOSIS — J30.1 CHRONIC SEASONAL ALLERGIC RHINITIS DUE TO POLLEN: ICD-10-CM

## 2022-01-13 RX ORDER — LORATADINE 10 MG/1
10 TABLET ORAL DAILY
Qty: 30 TABLET | Refills: 5 | Status: SHIPPED
Start: 2022-01-13 | End: 2022-06-15

## 2022-01-13 NOTE — TELEPHONE ENCOUNTER
Last Appointment   9/20/2021  Next Appointment  3/21/2022    [facility] called stating that Linh Blakely only has 5 loratadine tablets left.

## 2022-01-14 LAB
BASOPHILS ABSOLUTE: 0.04 E9/L (ref 0–0.2)
BASOPHILS RELATIVE PERCENT: 0.6 % (ref 0–2)
EOSINOPHILS ABSOLUTE: 0.16 E9/L (ref 0.05–0.5)
EOSINOPHILS RELATIVE PERCENT: 2.5 % (ref 0–6)
HCT VFR BLD CALC: 44 % (ref 37–54)
HEMOGLOBIN: 14.6 G/DL (ref 12.5–16.5)
IMMATURE GRANULOCYTES #: 0.03 E9/L
IMMATURE GRANULOCYTES %: 0.5 % (ref 0–5)
LYMPHOCYTES ABSOLUTE: 1.98 E9/L (ref 1.5–4)
LYMPHOCYTES RELATIVE PERCENT: 30.7 % (ref 20–42)
MCH RBC QN AUTO: 31.3 PG (ref 26–35)
MCHC RBC AUTO-ENTMCNC: 33.2 % (ref 32–34.5)
MCV RBC AUTO: 94.2 FL (ref 80–99.9)
MONOCYTES ABSOLUTE: 0.42 E9/L (ref 0.1–0.95)
MONOCYTES RELATIVE PERCENT: 6.5 % (ref 2–12)
NEUTROPHILS ABSOLUTE: 3.82 E9/L (ref 1.8–7.3)
NEUTROPHILS RELATIVE PERCENT: 59.2 % (ref 43–80)
PDW BLD-RTO: 11.9 FL (ref 11.5–15)
PLATELET # BLD: 226 E9/L (ref 130–450)
PMV BLD AUTO: 10.5 FL (ref 7–12)
RBC # BLD: 4.67 E12/L (ref 3.8–5.8)
WBC # BLD: 6.5 E9/L (ref 4.5–11.5)

## 2022-01-18 LAB — OXCARBAZEPINE: 24 UG/ML (ref 3–35)

## 2022-02-09 LAB
BASOPHILS ABSOLUTE: 0.04 E9/L (ref 0–0.2)
BASOPHILS RELATIVE PERCENT: 0.7 % (ref 0–2)
EOSINOPHILS ABSOLUTE: 0.17 E9/L (ref 0.05–0.5)
EOSINOPHILS RELATIVE PERCENT: 3.1 % (ref 0–6)
HCT VFR BLD CALC: 46 % (ref 37–54)
HEMOGLOBIN: 15.6 G/DL (ref 12.5–16.5)
IMMATURE GRANULOCYTES #: 0.02 E9/L
IMMATURE GRANULOCYTES %: 0.4 % (ref 0–5)
LYMPHOCYTES ABSOLUTE: 1.37 E9/L (ref 1.5–4)
LYMPHOCYTES RELATIVE PERCENT: 24.7 % (ref 20–42)
MCH RBC QN AUTO: 31.6 PG (ref 26–35)
MCHC RBC AUTO-ENTMCNC: 33.9 % (ref 32–34.5)
MCV RBC AUTO: 93.3 FL (ref 80–99.9)
MONOCYTES ABSOLUTE: 0.3 E9/L (ref 0.1–0.95)
MONOCYTES RELATIVE PERCENT: 5.4 % (ref 2–12)
NEUTROPHILS ABSOLUTE: 3.64 E9/L (ref 1.8–7.3)
NEUTROPHILS RELATIVE PERCENT: 65.7 % (ref 43–80)
PDW BLD-RTO: 12.2 FL (ref 11.5–15)
PLATELET # BLD: 205 E9/L (ref 130–450)
PMV BLD AUTO: 10.3 FL (ref 7–12)
RBC # BLD: 4.93 E12/L (ref 3.8–5.8)
WBC # BLD: 5.5 E9/L (ref 4.5–11.5)

## 2022-03-11 LAB
BASOPHILS ABSOLUTE: 0.05 E9/L (ref 0–0.2)
BASOPHILS RELATIVE PERCENT: 0.8 % (ref 0–2)
EOSINOPHILS ABSOLUTE: 0.22 E9/L (ref 0.05–0.5)
EOSINOPHILS RELATIVE PERCENT: 3.5 % (ref 0–6)
HCT VFR BLD CALC: 41.6 % (ref 37–54)
HEMOGLOBIN: 14.3 G/DL (ref 12.5–16.5)
IMMATURE GRANULOCYTES #: 0.01 E9/L
IMMATURE GRANULOCYTES %: 0.2 % (ref 0–5)
LYMPHOCYTES ABSOLUTE: 1.74 E9/L (ref 1.5–4)
LYMPHOCYTES RELATIVE PERCENT: 27.9 % (ref 20–42)
MCH RBC QN AUTO: 31.8 PG (ref 26–35)
MCHC RBC AUTO-ENTMCNC: 34.4 % (ref 32–34.5)
MCV RBC AUTO: 92.4 FL (ref 80–99.9)
MONOCYTES ABSOLUTE: 0.42 E9/L (ref 0.1–0.95)
MONOCYTES RELATIVE PERCENT: 6.7 % (ref 2–12)
NEUTROPHILS ABSOLUTE: 3.79 E9/L (ref 1.8–7.3)
NEUTROPHILS RELATIVE PERCENT: 60.9 % (ref 43–80)
PDW BLD-RTO: 11.9 FL (ref 11.5–15)
PLATELET # BLD: 201 E9/L (ref 130–450)
PMV BLD AUTO: 10.3 FL (ref 7–12)
RBC # BLD: 4.5 E12/L (ref 3.8–5.8)
WBC # BLD: 6.2 E9/L (ref 4.5–11.5)

## 2022-03-21 ENCOUNTER — OFFICE VISIT (OUTPATIENT)
Dept: FAMILY MEDICINE CLINIC | Age: 42
End: 2022-03-21
Payer: MEDICARE

## 2022-03-21 VITALS
TEMPERATURE: 98.5 F | SYSTOLIC BLOOD PRESSURE: 116 MMHG | OXYGEN SATURATION: 96 % | HEART RATE: 82 BPM | HEIGHT: 68 IN | BODY MASS INDEX: 31.98 KG/M2 | WEIGHT: 211 LBS | DIASTOLIC BLOOD PRESSURE: 72 MMHG

## 2022-03-21 DIAGNOSIS — J30.1 CHRONIC SEASONAL ALLERGIC RHINITIS DUE TO POLLEN: Primary | ICD-10-CM

## 2022-03-21 DIAGNOSIS — F25.0 SCHIZOAFFECTIVE DISORDER, BIPOLAR TYPE (HCC): ICD-10-CM

## 2022-03-21 PROCEDURE — G8417 CALC BMI ABV UP PARAM F/U: HCPCS | Performed by: NURSE PRACTITIONER

## 2022-03-21 PROCEDURE — G8427 DOCREV CUR MEDS BY ELIG CLIN: HCPCS | Performed by: NURSE PRACTITIONER

## 2022-03-21 PROCEDURE — 99213 OFFICE O/P EST LOW 20 MIN: CPT | Performed by: NURSE PRACTITIONER

## 2022-03-21 PROCEDURE — G8482 FLU IMMUNIZE ORDER/ADMIN: HCPCS | Performed by: NURSE PRACTITIONER

## 2022-03-21 PROCEDURE — 1036F TOBACCO NON-USER: CPT | Performed by: NURSE PRACTITIONER

## 2022-03-21 RX ORDER — GUAIFENESIN 600 MG/1
600 TABLET, EXTENDED RELEASE ORAL 2 TIMES DAILY PRN
Qty: 60 TABLET | Refills: 6 | Status: SHIPPED | OUTPATIENT
Start: 2022-03-21

## 2022-03-21 RX ORDER — METHYLPHENIDATE HYDROCHLORIDE 5 MG/1
TABLET ORAL 2 TIMES DAILY
COMMUNITY
Start: 2022-02-23 | End: 2022-09-21 | Stop reason: ALTCHOICE

## 2022-03-21 RX ORDER — CLONIDINE HYDROCHLORIDE 0.2 MG/1
0.2 TABLET ORAL DAILY
COMMUNITY

## 2022-03-21 RX ORDER — CHLORHEXIDINE GLUCONATE 4 %
12 LIQUID (ML) TOPICAL NIGHTLY
COMMUNITY
Start: 2021-10-27

## 2022-03-21 RX ORDER — CLONIDINE HYDROCHLORIDE 0.3 MG/1
0.3 TABLET ORAL DAILY
COMMUNITY
Start: 2022-02-22

## 2022-03-21 ASSESSMENT — ENCOUNTER SYMPTOMS
COLOR CHANGE: 0
TROUBLE SWALLOWING: 0
SHORTNESS OF BREATH: 0
SINUS PRESSURE: 0
WHEEZING: 0
SINUS PAIN: 0
VOICE CHANGE: 0
SORE THROAT: 0
COUGH: 0
RHINORRHEA: 0
FACIAL SWELLING: 0

## 2022-03-21 NOTE — ASSESSMENT & PLAN NOTE
Uncontrolled continue Claritin and Rx mucinex 600 mg BID as needed for post nasal drip. Try to avoid triggers.

## 2022-03-21 NOTE — PATIENT INSTRUCTIONS
The medication list included in this document is our record of what you are currently taking, including any changes that were made at today's visit.  If you find any differences when compared to your medications at home, or have any questions that were not answered at your visit, please contact the office.     Patient Education

## 2022-03-21 NOTE — PROGRESS NOTES
OFFICE PROGRESS NOTE  [address] [place] [address] Rd  [address] [id number]  Dept: [phone]   Chief Complaint   Patient presents with   [address] Maintenance     due for tdap,        ASSESSMENT/PLAN   1. Chronic seasonal allergic rhinitis due to pollen  Assessment & Plan:   Uncontrolled continue Claritin and Rx mucinex 600 mg BID as needed for post nasal drip. Try to avoid triggers. Orders:  -     guaiFENesin (MUCINEX) 600 MG extended release tablet; Take 1 tablet by mouth 2 times daily as needed for Congestion, Disp-60 tablet, R-6Normal  2. Schizoaffective disorder, bipolar type [facility])  Assessment & Plan:   Managed by psychiatrist continue current medication. Discussed weight loss, Discussed exercising 30 minutes daily and Discussed taking medications as directed and adverse effects    Return in about 6 months (around 9/21/2022) for allergies. HPI:   Accompanied by Jumana Guillermo     Here today for follow up on allergic rhinitis the Claritin helps but at times he has really thick drainage and his mom has given him mucinex which does help. He has a lot of drainage down the back of the throat. Occasional sneezing but no other symptoms. He is seeing the psychiatrist for his schizoaffective disorder, bipolar type which he is currently trial of Ritalin 5 mg BID and at times he has very calm quiet moments. He is very restless today. Current Outpatient Medications:     cloNIDine (CATAPRES) 0.2 MG tablet, , Disp: , Rfl:     methylphenidate (RITALIN) 5 MG tablet, Take by mouth 2 times daily.  1 at 8am, and the 2nd at 2pm, Disp: , Rfl:     Melatonin 10 MG TABS, Take by mouth, Disp: , Rfl:     cloNIDine (CATAPRES) 0.1 MG tablet, Take 0.1 mg by mouth 2 times daily One at 8 am and 2 pm, Disp: , Rfl:     guaiFENesin (MUCINEX) 600 MG extended release tablet, Take 1 tablet by mouth 2 times daily as needed for Congestion, Disp: 60 tablet, Rfl: 6    loratadine (CLARITIN) 10 MG tablet, Take 1 tablet by mouth daily, Disp: 30 tablet, Rfl: 5    omeprazole (PRILOSEC) 20 MG delayed release capsule, , Disp: , Rfl:     ciclopirox (PENLAC) 8 % solution, , Disp: , Rfl:     Urea (CARMOL) 40 % cream, apply to affected area twice a day (MORNING AND NIGHT), Disp: , Rfl:     acetaminophen (AMINOFEN) 325 MG tablet, Take 2 tablets by mouth every 4 hours as needed for Pain or Fever, Disp: 40 tablet, Rfl: 1    benztropine (COGENTIN) 0.5 MG tablet, Take 1 tablet by mouth 2 times daily, Disp: 60 tablet, Rfl: 3    cloZAPine (CLOZARIL) 50 MG tablet, Take 1 tablet by mouth daily, Disp: 30 tablet, Rfl: 0    cloZAPine (CLOZARIL) 200 MG tablet, Take 1 tablet by mouth nightly, Disp: 30 tablet, Rfl: 0    OXcarbazepine (TRILEPTAL) 300 MG tablet, Take 1 tablet by mouth 2 times daily, Disp: 60 tablet, Rfl: 0      Surgical History:  has a past surgical history that includes [place] tooth extraction (2006) and other surgical history. Social History:  reports that he has never smoked. He has never used smokeless tobacco. He reports that he does not drink alcohol and does not use drugs. Family History: family history includes Diabetes in his mother; High Blood Pressure in his mother. I have reviewed See's allergies, medications, problem list, medical, social and family history and have updated as needed in the electronic medical record    Review of Systems   Constitutional: Negative for activity change, appetite change, chills, diaphoresis, fatigue, fever and unexpected weight change. HENT: Positive for congestion, postnasal drip and sneezing. Negative for dental problem, drooling, ear discharge, ear pain, facial swelling, hearing loss, mouth sores, nosebleeds, rhinorrhea, sinus pressure, sinus pain, sore throat, tinnitus, trouble swallowing and voice change. Respiratory: Negative for cough, shortness of breath and wheezing.     Cardiovascular: Negative for chest pain, palpitations and leg swelling. Skin: Negative for color change, pallor, rash and wound. Allergic/Immunologic: Positive for environmental allergies. OBJECTIVE:     VS:  Wt Readings from Last 3 Encounters:   03/21/22 211 lb (95.7 kg)   10/27/21 217 lb (98.4 kg)   09/20/21 217 lb (98.4 kg)                       Vitals:    03/21/22 1508   BP: 116/72   Pulse: 82   Temp: 98.5 °F (36.9 °C)   SpO2: 96%   Weight: 211 lb (95.7 kg)   Height: 5' 8\" (1.727 m)       General: Alert and oriented to person, place, and time, well developed and well nourished, in no acute distress  SKIN: Warm and dry, intact without any rash, masses or lesions  HEAD: normocephalic, atraumatic  Eyes: sclera/conjunctiva clear, PERRLA, EOMI's intact  ENT: tympanic membranes, external ear and ear canal normal bilaterally, normal hearing, Nose without deformity, nasal mucosa and turbinates normal without polyps   Throat: clear, tongue midline, tonsils 1+, cobble stone  In posterior pharynx drainage, no masses or lesions noted, good dentition  Neck: supple and non-tender without mass, trachea midline, no cervical lymphadenopathy, no bruit, no thyromegaly or nodules  Cardiovascular: regular rate and regular rhythm, normal S1 and S2,  no murmurs, rubs, clicks, or gallop. Distal pulses intact, no carotid bruits. No edema  Pulmonary/Chest: clear to auscultation bilaterally, no wheezes, rales or rhonchi, normal air movement, no respiratory distress  Abdomen: soft, non-tender, non-distended, normal bowel sounds, no masses or hepatosplenomegaly  Neurologic:  gait, coordination and speech normal  Extremities: no clubbing, cyanosis, or edema. Psychiatric: Good eye contact, normal mood and affect, answers questions appropriately    I have reviewed my findings and recommendations with Adam Pereyra.     Jalene Schilder, APRN - CNP, NP-C, FNP-BC

## 2022-04-13 LAB
BASOPHILS ABSOLUTE: 0.06 E9/L (ref 0–0.2)
BASOPHILS RELATIVE PERCENT: 0.9 % (ref 0–2)
EOSINOPHILS ABSOLUTE: 0.33 E9/L (ref 0.05–0.5)
EOSINOPHILS RELATIVE PERCENT: 4.9 % (ref 0–6)
HCT VFR BLD CALC: 46.9 % (ref 37–54)
HEMOGLOBIN: 15.3 G/DL (ref 12.5–16.5)
IMMATURE GRANULOCYTES #: 0.03 E9/L
IMMATURE GRANULOCYTES %: 0.4 % (ref 0–5)
LYMPHOCYTES ABSOLUTE: 1.83 E9/L (ref 1.5–4)
LYMPHOCYTES RELATIVE PERCENT: 27.3 % (ref 20–42)
MCH RBC QN AUTO: 31.8 PG (ref 26–35)
MCHC RBC AUTO-ENTMCNC: 32.6 % (ref 32–34.5)
MCV RBC AUTO: 97.5 FL (ref 80–99.9)
MONOCYTES ABSOLUTE: 0.55 E9/L (ref 0.1–0.95)
MONOCYTES RELATIVE PERCENT: 8.2 % (ref 2–12)
NEUTROPHILS ABSOLUTE: 3.91 E9/L (ref 1.8–7.3)
NEUTROPHILS RELATIVE PERCENT: 58.3 % (ref 43–80)
PDW BLD-RTO: 12.2 FL (ref 11.5–15)
PLATELET # BLD: 211 E9/L (ref 130–450)
PMV BLD AUTO: 10.5 FL (ref 7–12)
RBC # BLD: 4.81 E12/L (ref 3.8–5.8)
WBC # BLD: 6.7 E9/L (ref 4.5–11.5)

## 2022-06-07 NOTE — PROGRESS NOTES
DATE OF SERVICE:     5/14/2019    Debra Stewart seen today for the purpose of continuation of care. Nursing, social work reports, laboratory studies and vital signs are reviewed. Patient chief complaint today is:             [] Depression      [x] Anxiety        [] Psychosis         [] Suicidal/Homicidal                         [x] Delusions           [] Aggression          Subjective: Today patient states that he is sleeping well. Patient is out on unit and is more social. Patient takes medications. Sleep:  [] Good [x] Fair  [] Poor  Appetite:  [] Good [x] Fair  [] Poor    Depression:  [] Mild [] Moderate [] Severe                [] Constant [] Sporadic     Anxiety: [] Mild [] Moderate [x] Severe    [x] Constant [] Sporadic     Delusions: [] Mild [x] Moderate [] Severe     [] Constant [x] Sporadic     [x] Paranoid [] Somatic [] Grandiose     Hallucinations: [] Mild [] Moderate [] Severe     [] Constant [] Sporadic    [] Auditory  [] Visual [] Tactile       Suicidal: [] Constant [] Sporadic  Homicidal: [] Constant [] Sporadic    Unscheduled Medications     [] Patient Receiving Emergency Medications \" Chemical Restraint\"   [] Requesting PRN medications for anxiety    Medical Review of Systems:     All other than marked systmes have been reviewed and are all negative.     Constitutional Symptoms: []  fever []  Chills  Skin Symptoms: [] rash []  Pruritus   Eye Symptoms: [] Vision unchanged []  recent vision problems[] blurred vision   Respiratory Symptoms:[] shortness of breath [] cough  Cardiovascular Symptoms:  [] chest pain   [] palpitations   Gastrointestinal Symptoms: []  abdominal pain []  nausea []  vomiting []  diarrhea  Genitourinary Symptoms: []  dysuria  []  hematuria   Musculoskeletal Symptoms: []  back pain []  muscle pain []  joint pain  Neurologic Symptoms: []  headache []  dizziness  Hematolymphoid Symptoms: [] Adenopathy [] Bruises   [] Schimosis       Psychiatric Review of Social work is at bedside.    Heightened    [] Exaggerated       Thought Process and Association:  [] Logical [x] Illogical                                        [] Linear and Goal Directed  [] Tangential  [x] Circumstantial      Thought Content:  [] Denies [] Endorses [] Suicidal [] Homicidal  [x] Delusional                                       [x] Paranoid  [] Somatic  [] Grandiose     Perception: [x]  None  [] Auditory   [] Visual  [] tactile   [] olfactory  [] Illusions          Insight: [] Intact  [] Fair  [x] Limited    Judgement:  [] Intact  [] Fair  [x] Limited       Assessment/Plan:        Patient Active Problem List   Diagnosis Code    Mental retardation F79    Schizoaffective disorder, bipolar type ([facility]) F25.0    Mild intellectual disability F70    Bipolar affective disorder, mixed, severe, with psychotic behavior ([facility] [address] F31.64    Encounter for lithium monitoring Z51.81, Z79.899    Taking multiple medications for chronic disease R69    Other idiopathic scoliosis, thoracolumbar region M41.25    Chronic seasonal allergic rhinitis due to pollen J30.1    Bulging of lumbar intervertebral disc without myelopathy M51.26    Bipolar 1 disorder ([facility]) F31.9    Psychosis ([address] F29    Rectal bleeding K62.5    Excessive dietary caloric intake R63.2    Drug-induced constipation K59.03    Positive occult stool blood test R19.5    Acute psychosis ([address] F23    Viral upper respiratory illness J06.9    Schizoaffective disorder ([facility]) F25.9    Schizoaffective disorder, chronic condition with acute exacerbation ([facility] [address] F25.8         Plan:    []  Patient is refusing medications  [x] Improving as expected   [] Not improving as expected   [] Worsening    []  At Baseline     Depakote level tomorrow    Reason for more than one antipsychotic:  [x] N/A  [] 3 failed monotherapy(drugs tried):  [] Cross over to a new antipsychotic  [] Taper to monotherapy from polypharmacy  [] Augmentation of Clozapine therapy due to treatment resistance to single therapy      Signed:  Steph Dhillon  5/14/2019  1:48 PM

## 2022-06-08 LAB
BASOPHILS ABSOLUTE: 0.05 E9/L (ref 0–0.2)
BASOPHILS RELATIVE PERCENT: 0.6 % (ref 0–2)
EOSINOPHILS ABSOLUTE: 0.39 E9/L (ref 0.05–0.5)
EOSINOPHILS RELATIVE PERCENT: 4.9 % (ref 0–6)
HCT VFR BLD CALC: 45.6 % (ref 37–54)
HEMOGLOBIN: 14.5 G/DL (ref 12.5–16.5)
IMMATURE GRANULOCYTES #: 0.03 E9/L
IMMATURE GRANULOCYTES %: 0.4 % (ref 0–5)
LYMPHOCYTES ABSOLUTE: 1.41 E9/L (ref 1.5–4)
LYMPHOCYTES RELATIVE PERCENT: 17.7 % (ref 20–42)
MCH RBC QN AUTO: 31.7 PG (ref 26–35)
MCHC RBC AUTO-ENTMCNC: 31.8 % (ref 32–34.5)
MCV RBC AUTO: 99.6 FL (ref 80–99.9)
MONOCYTES ABSOLUTE: 0.42 E9/L (ref 0.1–0.95)
MONOCYTES RELATIVE PERCENT: 5.3 % (ref 2–12)
NEUTROPHILS ABSOLUTE: 5.67 E9/L (ref 1.8–7.3)
NEUTROPHILS RELATIVE PERCENT: 71.1 % (ref 43–80)
PDW BLD-RTO: 12.7 FL (ref 11.5–15)
PLATELET # BLD: 210 E9/L (ref 130–450)
PMV BLD AUTO: 10 FL (ref 7–12)
RBC # BLD: 4.58 E12/L (ref 3.8–5.8)
WBC # BLD: 8 E9/L (ref 4.5–11.5)

## 2022-06-12 LAB — OXCARBAZEPINE: 33 UG/ML (ref 3–35)

## 2022-06-13 LAB
CLOZAPINE QUANT: 367 NG/ML
CLOZAPINE-N-OXIDE: <100 NG/ML
NORCLOZAPINE QUANT: 172 NG/ML
TOTAL CLOZAPINE: 539 NG/ML

## 2022-06-15 DIAGNOSIS — J30.1 CHRONIC SEASONAL ALLERGIC RHINITIS DUE TO POLLEN: ICD-10-CM

## 2022-06-15 RX ORDER — LORATADINE 10 MG/1
10 TABLET ORAL DAILY
Qty: 93 TABLET | Refills: 4 | Status: SHIPPED | OUTPATIENT
Start: 2022-06-15

## 2022-07-27 ENCOUNTER — HOSPITAL ENCOUNTER (OUTPATIENT)
Age: 42
Discharge: HOME | End: 2022-07-27
Payer: MEDICARE

## 2022-07-27 LAB
BASOPHILS ABSOLUTE: 0.06 E9/L (ref 0–0.2)
BASOPHILS RELATIVE PERCENT: 0.9 % (ref 0–2)
EOSINOPHILS ABSOLUTE: 0.27 E9/L (ref 0.05–0.5)
EOSINOPHILS RELATIVE PERCENT: 4.2 % (ref 0–6)
HCT VFR BLD CALC: 42.5 % (ref 37–54)
HEMOGLOBIN: 14.5 G/DL (ref 12.5–16.5)
IMMATURE GRANULOCYTES #: 0.03 E9/L
IMMATURE GRANULOCYTES %: 0.5 % (ref 0–5)
LYMPHOCYTES ABSOLUTE: 1.63 E9/L (ref 1.5–4)
LYMPHOCYTES RELATIVE PERCENT: 25.5 % (ref 20–42)
MCH RBC QN AUTO: 31.9 PG (ref 26–35)
MCHC RBC AUTO-ENTMCNC: 34.1 % (ref 32–34.5)
MCV RBC AUTO: 93.4 FL (ref 80–99.9)
MONOCYTES ABSOLUTE: 0.42 E9/L (ref 0.1–0.95)
MONOCYTES RELATIVE PERCENT: 6.6 % (ref 2–12)
NEUTROPHILS ABSOLUTE: 3.98 E9/L (ref 1.8–7.3)
NEUTROPHILS RELATIVE PERCENT: 62.3 % (ref 43–80)
PDW BLD-RTO: 11.8 FL (ref 11.5–15)
PLATELET # BLD: 189 E9/L (ref 130–450)
PMV BLD AUTO: 9.2 FL (ref 7–12)
RBC # BLD: 4.55 E12/L (ref 3.8–5.8)
WBC # BLD: 6.4 E9/L (ref 4.5–11.5)

## 2022-07-27 PROCEDURE — 36415 COLL VENOUS BLD VENIPUNCTURE: CPT

## 2022-07-27 PROCEDURE — 85025 COMPLETE CBC W/AUTO DIFF WBC: CPT

## 2022-08-30 ENCOUNTER — HOSPITAL ENCOUNTER (OUTPATIENT)
Age: 42
Discharge: HOME | End: 2022-08-30
Payer: MEDICARE

## 2022-08-30 LAB
BASOPHILS ABSOLUTE: 0.06 E9/L (ref 0–0.2)
BASOPHILS RELATIVE PERCENT: 0.9 % (ref 0–2)
EOSINOPHILS ABSOLUTE: 0.28 E9/L (ref 0.05–0.5)
EOSINOPHILS RELATIVE PERCENT: 4.1 % (ref 0–6)
HCT VFR BLD CALC: 41.5 % (ref 37–54)
HEMOGLOBIN: 14.6 G/DL (ref 12.5–16.5)
IMMATURE GRANULOCYTES #: 0.03 E9/L
IMMATURE GRANULOCYTES %: 0.4 % (ref 0–5)
LYMPHOCYTES ABSOLUTE: 1.95 E9/L (ref 1.5–4)
LYMPHOCYTES RELATIVE PERCENT: 28.8 % (ref 20–42)
MCH RBC QN AUTO: 32.2 PG (ref 26–35)
MCHC RBC AUTO-ENTMCNC: 35.2 % (ref 32–34.5)
MCV RBC AUTO: 91.4 FL (ref 80–99.9)
MONOCYTES ABSOLUTE: 0.37 E9/L (ref 0.1–0.95)
MONOCYTES RELATIVE PERCENT: 5.5 % (ref 2–12)
NEUTROPHILS ABSOLUTE: 4.09 E9/L (ref 1.8–7.3)
NEUTROPHILS RELATIVE PERCENT: 60.3 % (ref 43–80)
PDW BLD-RTO: 11.5 FL (ref 11.5–15)
PLATELET # BLD: 193 E9/L (ref 130–450)
PMV BLD AUTO: 9.1 FL (ref 7–12)
RBC # BLD: 4.54 E12/L (ref 3.8–5.8)
WBC # BLD: 6.8 E9/L (ref 4.5–11.5)

## 2022-08-30 PROCEDURE — 80307 DRUG TEST PRSMV CHEM ANLYZR: CPT

## 2022-08-30 PROCEDURE — 85025 COMPLETE CBC W/AUTO DIFF WBC: CPT

## 2022-08-30 PROCEDURE — 36415 COLL VENOUS BLD VENIPUNCTURE: CPT

## 2022-08-31 LAB
AMPHETAMINE SCREEN, URINE: NOT DETECTED
BARBITURATE SCREEN URINE: NOT DETECTED
BENZODIAZEPINE SCREEN, URINE: NOT DETECTED
CANNABINOID SCREEN URINE: NOT DETECTED
COCAINE METABOLITE SCREEN URINE: NOT DETECTED
FENTANYL SCREEN, URINE: NOT DETECTED
Lab: NORMAL
METHADONE SCREEN, URINE: NOT DETECTED
OPIATE SCREEN URINE: NOT DETECTED
OXYCODONE URINE: NOT DETECTED
PHENCYCLIDINE SCREEN URINE: NOT DETECTED

## 2022-09-21 ENCOUNTER — OFFICE VISIT (OUTPATIENT)
Dept: FAMILY MEDICINE CLINIC | Age: 42
End: 2022-09-21
Payer: MEDICARE

## 2022-09-21 VITALS
BODY MASS INDEX: 34.56 KG/M2 | HEART RATE: 96 BPM | RESPIRATION RATE: 16 BRPM | HEIGHT: 68 IN | TEMPERATURE: 98.8 F | OXYGEN SATURATION: 99 % | WEIGHT: 228 LBS | SYSTOLIC BLOOD PRESSURE: 128 MMHG | DIASTOLIC BLOOD PRESSURE: 78 MMHG

## 2022-09-21 DIAGNOSIS — J30.1 CHRONIC SEASONAL ALLERGIC RHINITIS DUE TO POLLEN: Primary | ICD-10-CM

## 2022-09-21 DIAGNOSIS — K59.03 DRUG-INDUCED CONSTIPATION: ICD-10-CM

## 2022-09-21 DIAGNOSIS — F25.0 SCHIZOAFFECTIVE DISORDER, BIPOLAR TYPE (HCC): ICD-10-CM

## 2022-09-21 DIAGNOSIS — Z23 NEED FOR PROPHYLACTIC VACCINATION AGAINST DIPHTHERIA AND TETANUS: ICD-10-CM

## 2022-09-21 DIAGNOSIS — F31.64 BIPOLAR AFFECTIVE DISORDER, MIXED, SEVERE, WITH PSYCHOTIC BEHAVIOR (HCC): ICD-10-CM

## 2022-09-21 PROBLEM — K59.04 CHRONIC IDIOPATHIC CONSTIPATION: Status: ACTIVE | Noted: 2022-09-21

## 2022-09-21 PROCEDURE — 99214 OFFICE O/P EST MOD 30 MIN: CPT | Performed by: NURSE PRACTITIONER

## 2022-09-21 PROCEDURE — G8427 DOCREV CUR MEDS BY ELIG CLIN: HCPCS | Performed by: NURSE PRACTITIONER

## 2022-09-21 PROCEDURE — G8417 CALC BMI ABV UP PARAM F/U: HCPCS | Performed by: NURSE PRACTITIONER

## 2022-09-21 PROCEDURE — 1036F TOBACCO NON-USER: CPT | Performed by: NURSE PRACTITIONER

## 2022-09-21 RX ORDER — LORAZEPAM 1 MG/1
1 TABLET ORAL
COMMUNITY
Start: 2022-05-20

## 2022-09-21 RX ORDER — ASENAPINE 5 MG/1
5 TABLET SUBLINGUAL NIGHTLY
COMMUNITY
Start: 2022-05-11

## 2022-09-21 RX ORDER — LORAZEPAM 0.5 MG/1
0.5 TABLET ORAL EVERY 12 HOURS
COMMUNITY
Start: 2022-05-11

## 2022-09-21 RX ORDER — CLOZAPINE 100 MG/1
100 TABLET ORAL 2 TIMES DAILY
COMMUNITY

## 2022-09-21 SDOH — ECONOMIC STABILITY: FOOD INSECURITY: WITHIN THE PAST 12 MONTHS, YOU WORRIED THAT YOUR FOOD WOULD RUN OUT BEFORE YOU GOT MONEY TO BUY MORE.: NEVER TRUE

## 2022-09-21 SDOH — ECONOMIC STABILITY: FOOD INSECURITY: WITHIN THE PAST 12 MONTHS, THE FOOD YOU BOUGHT JUST DIDN'T LAST AND YOU DIDN'T HAVE MONEY TO GET MORE.: NEVER TRUE

## 2022-09-21 SDOH — ECONOMIC STABILITY: INCOME INSECURITY: IN THE LAST 12 MONTHS, WAS THERE A TIME WHEN YOU WERE NOT ABLE TO PAY THE MORTGAGE OR RENT ON TIME?: NO

## 2022-09-21 ASSESSMENT — ENCOUNTER SYMPTOMS
COLOR CHANGE: 0
VOMITING: 0
CONSTIPATION: 1
BACK PAIN: 0
COUGH: 0
SHORTNESS OF BREATH: 0
FACIAL SWELLING: 0
CHEST TIGHTNESS: 0
SINUS PRESSURE: 0
NAUSEA: 0
VOICE CHANGE: 0
ABDOMINAL PAIN: 0
RHINORRHEA: 0
SORE THROAT: 0
TROUBLE SWALLOWING: 0
SINUS PAIN: 0
WHEEZING: 0
DIARRHEA: 0

## 2022-09-21 ASSESSMENT — SOCIAL DETERMINANTS OF HEALTH (SDOH): HOW HARD IS IT FOR YOU TO PAY FOR THE VERY BASICS LIKE FOOD, HOUSING, MEDICAL CARE, AND HEATING?: NOT HARD AT ALL

## 2022-09-21 ASSESSMENT — LIFESTYLE VARIABLES
HOW OFTEN DO YOU HAVE A DRINK CONTAINING ALCOHOL: NEVER
HOW MANY STANDARD DRINKS CONTAINING ALCOHOL DO YOU HAVE ON A TYPICAL DAY: PATIENT DOES NOT DRINK

## 2022-09-21 ASSESSMENT — PATIENT HEALTH QUESTIONNAIRE - PHQ9
SUM OF ALL RESPONSES TO PHQ QUESTIONS 1-9: 0
1. LITTLE INTEREST OR PLEASURE IN DOING THINGS: 0
9. THOUGHTS THAT YOU WOULD BE BETTER OFF DEAD, OR OF HURTING YOURSELF: 0
7. TROUBLE CONCENTRATING ON THINGS, SUCH AS READING THE NEWSPAPER OR WATCHING TELEVISION: 0
SUM OF ALL RESPONSES TO PHQ9 QUESTIONS 1 & 2: 0
5. POOR APPETITE OR OVEREATING: 0
4. FEELING TIRED OR HAVING LITTLE ENERGY: 0
2. FEELING DOWN, DEPRESSED OR HOPELESS: 0
3. TROUBLE FALLING OR STAYING ASLEEP: 0
8. MOVING OR SPEAKING SO SLOWLY THAT OTHER PEOPLE COULD HAVE NOTICED. OR THE OPPOSITE, BEING SO FIGETY OR RESTLESS THAT YOU HAVE BEEN MOVING AROUND A LOT MORE THAN USUAL: 0
SUM OF ALL RESPONSES TO PHQ QUESTIONS 1-9: 0
10. IF YOU CHECKED OFF ANY PROBLEMS, HOW DIFFICULT HAVE THESE PROBLEMS MADE IT FOR YOU TO DO YOUR WORK, TAKE CARE OF THINGS AT HOME, OR GET ALONG WITH OTHER PEOPLE: 0
6. FEELING BAD ABOUT YOURSELF - OR THAT YOU ARE A FAILURE OR HAVE LET YOURSELF OR YOUR FAMILY DOWN: 0

## 2022-09-21 NOTE — ASSESSMENT & PLAN NOTE
Borderline controlled, continue prune juice, colace and try MOM 30 cc twice a week.  Call if any problems, has seen GI in the past no abnormalities on colonoscopy reviewed today

## 2022-09-21 NOTE — PROGRESS NOTES
OFFICE PROGRESS NOTE  [address] [place] [address] Rd  [address] [id number]  Dept: [phone]   Chief Complaint   Patient presents with    Allergies       ASSESSMENT/PLAN   1. Chronic seasonal allergic rhinitis due to pollen  Assessment & Plan:   Well controlled for the most part. Continue Claritin 10 mg daily  2. Drug-induced constipation  Assessment & Plan:   Borderline controlled, continue prune juice, colace and try MOM 30 cc twice a week. Call if any problems, has seen GI in the past no abnormalities on colonoscopy reviewed today  Orders:  -     magnesium hydroxide (COATES MILK OF MAGNESIA) 400 MG/5ML suspension; Take 30 mLs by mouth as needed for Constipation (no more than 2 times a week), Disp-473 mL, R-0OTC  3. Need for prophylactic vaccination against diphtheria and tetanus  -     Tdap (ADACEL) 5-2-15.5 LF-MCG/0.5 injection; Inject 0.5 mLs into the muscle once for 1 dose, Disp-0.5 mL, R-0Print  4. Schizoaffective disorder, bipolar type [facility])  Assessment & Plan:    Monitored by specialist- no acute findings meriting change in the plan continue current plan    5. Bipolar affective disorder, mixed, severe, with psychotic behavior ([facility] [address]  Assessment & Plan:   Monitored by specialist- no acute findings meriting change in the plan continue current plan     Reviewed labs: CBCD,     Discussed weight loss, Discussed exercising 30 minutes daily, and Discussed taking medications as directed and adverse effects    Return in about 1 month (around 10/21/2022) for medicare wellness, flu vaccine. HPI:   Here today for follow up on allergies has been stable on the Claritin. He will let his caregiver know if it is worsening. His schizoaffective disorder, bipolar disorder recent hospitalization 8/30/22 under the care of his psychiatrist Dr Michel Erwin and recent adjustment of medications.      He is still having problems with constipation and recently they added colace, he has a lot of pressure in the rectum when he is constipated. Current Outpatient Medications:     cloZAPine (CLOZARIL) 100 MG tablet, Take 100 mg by mouth 2 times daily, Disp: , Rfl:     LORazepam (ATIVAN) 0.5 MG tablet, Take 0.5 mg by mouth in the morning and 0.5 mg in the evening., Disp: , Rfl:     LORazepam (ATIVAN) 1 MG tablet, Take 1 mg by mouth. Take 1 mg at 3 pm, Disp: , Rfl:     asenapine maleate (SAPHRIS) 5 MG SUBL sublingual tablet, Place 5 mg under the tongue nightly, Disp: , Rfl:     magnesium hydroxide (COATES MILK OF MAGNESIA) 400 MG/5ML suspension, Take 30 mLs by mouth as needed for Constipation (no more than 2 times a week), Disp: 473 mL, Rfl: 0    Tdap (ADACEL) 5-2-15.5 LF-MCG/0.5 injection, Inject 0.5 mLs into the muscle once for 1 dose, Disp: 0.5 mL, Rfl: 0    loratadine (CLARITIN) 10 MG tablet, TAKE 1 TABLET BY MOUTH DAILY. , Disp: 93 tablet, Rfl: 4    cloNIDine (CATAPRES) 0.3 MG tablet, 0.3 mg daily In am, Disp: , Rfl:     Melatonin 12 MG TABS, Take 12 mg by mouth nightly, Disp: , Rfl:     cloNIDine (CATAPRES) 0.2 MG tablet, Take 0.2 mg by mouth Daily One at 3 pm, Disp: , Rfl:     omeprazole (PRILOSEC) 20 MG delayed release capsule, Take 20 mg by mouth Daily, Disp: , Rfl:     Urea (CARMOL) 40 % cream, apply to affected area twice a day (MORNING AND NIGHT), Disp: , Rfl:     acetaminophen (AMINOFEN) 325 MG tablet, Take 2 tablets by mouth every 4 hours as needed for Pain or Fever, Disp: 40 tablet, Rfl: 1    benztropine (COGENTIN) 0.5 MG tablet, Take 1 tablet by mouth 2 times daily, Disp: 60 tablet, Rfl: 3    cloZAPine (CLOZARIL) 50 MG tablet, Take 1 tablet by mouth daily (Patient taking differently: Take 50 mg by mouth nightly Take with the 200 mg and 100 mg to equal 350 mg), Disp: 30 tablet, Rfl: 0    cloZAPine (CLOZARIL) 200 MG tablet, Take 1 tablet by mouth nightly, Disp: 30 tablet, Rfl: 0    guaiFENesin (MUCINEX) 600 MG extended release tablet, Take 1 tablet by mouth 2 times daily as needed for Congestion, Disp: 60 tablet, Rfl: 6    ciclopirox (PENLAC) 8 % solution, , Disp: , Rfl:     OXcarbazepine (TRILEPTAL) 300 MG tablet, Take 1 tablet by mouth 2 times daily (Patient taking differently: Take 900 mg by mouth 2 times daily), Disp: 60 tablet, Rfl: 0      Surgical History:  has a past surgical history that includes Darling tooth extraction (2006) and other surgical history. Social History:  reports that he has never smoked. He has never used smokeless tobacco. He reports that he does not drink alcohol and does not use drugs. Family History: family history includes Diabetes in his mother; High Blood Pressure in his mother. I have reviewed See's allergies, medications, problem list, medical, social and family history and have updated as needed in the electronic medical record    Review of Systems   Constitutional:  Negative for activity change, appetite change, chills, diaphoresis, fatigue, fever and unexpected weight change. HENT:  Negative for congestion, dental problem, drooling, ear discharge, ear pain, facial swelling, hearing loss, mouth sores, nosebleeds, postnasal drip, rhinorrhea, sinus pressure, sinus pain, sneezing, sore throat, tinnitus, trouble swallowing and voice change. Eyes:  Negative for visual disturbance. Respiratory:  Negative for cough, chest tightness, shortness of breath and wheezing. Cardiovascular:  Negative for chest pain, palpitations and leg swelling. Gastrointestinal:  Positive for constipation. Negative for abdominal pain, diarrhea, nausea and vomiting. Endocrine: Negative for cold intolerance, heat intolerance, polydipsia, polyphagia and polyuria. Genitourinary:  Negative for difficulty urinating, frequency and urgency. Musculoskeletal:  Negative for arthralgias, back pain, gait problem, joint swelling, myalgias, neck pain and neck stiffness. Skin:  Negative for color change, pallor, rash and wound.    Allergic/Immunologic: Negative for environmental allergies, food allergies and immunocompromised state. Neurological:  Negative for dizziness, tremors, seizures, syncope, facial asymmetry, speech difficulty, weakness, light-headedness, numbness and headaches. Hematological:  Negative for adenopathy. Does not bruise/bleed easily. Psychiatric/Behavioral:  Negative for agitation, behavioral problems, confusion, decreased concentration, dysphoric mood, hallucinations, self-injury, sleep disturbance and suicidal ideas. The patient is not nervous/anxious and is not hyperactive. OBJECTIVE:     VS:  Wt Readings from Last 3 Encounters:   09/21/22 228 lb (103.4 kg)   03/21/22 211 lb (95.7 kg)   10/27/21 217 lb (98.4 kg)                       Vitals:    09/21/22 1511   BP: 128/78   Pulse: 96   Resp: 16   Temp: 98.8 °F (37.1 °C)   SpO2: 99%   Weight: 228 lb (103.4 kg)   Height: 5' 8\" (1.727 m)       General: Alert and oriented to person, place, and time, well developed and well nourished, in no acute distress  SKIN: Warm and dry, intact without any rash, masses or lesions  HEAD: normocephalic, atraumatic  Eyes: sclera/conjunctiva clear, PERRLA, EOMI's intact  ENT: tympanic membranes, external ear and ear canal normal bilaterally, normal hearing, Nose without deformity, nasal mucosa and turbinates normal without polyps   Throat: clear, tongue midline, tonsils 1+, drainage, no masses or lesions noted, good dentition  Neck: supple and non-tender without mass, trachea midline, no cervical lymphadenopathy, no bruit, no thyromegaly or nodules  Cardiovascular: regular rate and regular rhythm, normal S1 and S2,  no murmurs, rubs, clicks, or gallop. Distal pulses intact, no carotid bruits.  No edema  Pulmonary/Chest: clear to auscultation bilaterally, no wheezes, rales or rhonchi, normal air movement, no respiratory distress  Abdomen: soft, non-tender, non-distended, normal bowel sounds, no masses or hepatosplenomegaly  Musculoskeletal: Normal ROM, no joint swelling, deformity or tenderness   Neurologic: reflexes normal and symmetric, no cranial nerve deficit, gait, coordination and speech normal  Extremities: no clubbing, cyanosis, or edema. Psychiatric: Good eye contact, normal mood and affect, answers questions appropriately    I have reviewed my findings and recommendations with Janee Berkeley.     Tim Sexton, LUCINA - CNP, NP-C, FNP-BC

## 2022-09-26 ENCOUNTER — HOSPITAL ENCOUNTER (OUTPATIENT)
Age: 42
Discharge: HOME | End: 2022-09-26
Payer: MEDICARE

## 2022-09-26 LAB
BASOPHILS ABSOLUTE: 0.05 E9/L (ref 0–0.2)
BASOPHILS RELATIVE PERCENT: 0.8 % (ref 0–2)
EOSINOPHILS ABSOLUTE: 0.26 E9/L (ref 0.05–0.5)
EOSINOPHILS RELATIVE PERCENT: 4 % (ref 0–6)
HCT VFR BLD CALC: 41.8 % (ref 37–54)
HEMOGLOBIN: 14.8 G/DL (ref 12.5–16.5)
IMMATURE GRANULOCYTES #: 0.02 E9/L
IMMATURE GRANULOCYTES %: 0.3 % (ref 0–5)
LYMPHOCYTES ABSOLUTE: 2.15 E9/L (ref 1.5–4)
LYMPHOCYTES RELATIVE PERCENT: 33.5 % (ref 20–42)
MCH RBC QN AUTO: 31.7 PG (ref 26–35)
MCHC RBC AUTO-ENTMCNC: 35.4 % (ref 32–34.5)
MCV RBC AUTO: 89.5 FL (ref 80–99.9)
MONOCYTES ABSOLUTE: 0.39 E9/L (ref 0.1–0.95)
MONOCYTES RELATIVE PERCENT: 6.1 % (ref 2–12)
NEUTROPHILS ABSOLUTE: 3.55 E9/L (ref 1.8–7.3)
NEUTROPHILS RELATIVE PERCENT: 55.3 % (ref 43–80)
PDW BLD-RTO: 11.7 FL (ref 11.5–15)
PLATELET # BLD: 187 E9/L (ref 130–450)
PMV BLD AUTO: 9.4 FL (ref 7–12)
RBC # BLD: 4.67 E12/L (ref 3.8–5.8)
WBC # BLD: 6.4 E9/L (ref 4.5–11.5)

## 2022-09-26 PROCEDURE — 36415 COLL VENOUS BLD VENIPUNCTURE: CPT

## 2022-09-26 PROCEDURE — 85025 COMPLETE CBC W/AUTO DIFF WBC: CPT

## 2022-10-24 ENCOUNTER — HOSPITAL ENCOUNTER (OUTPATIENT)
Age: 42
Discharge: HOME | End: 2022-10-24
Payer: MEDICARE

## 2022-10-24 LAB
BASOPHILS ABSOLUTE: 0.04 E9/L (ref 0–0.2)
BASOPHILS RELATIVE PERCENT: 0.6 % (ref 0–2)
EOSINOPHILS ABSOLUTE: 0.34 E9/L (ref 0.05–0.5)
EOSINOPHILS RELATIVE PERCENT: 4.7 % (ref 0–6)
HCT VFR BLD CALC: 40.4 % (ref 37–54)
HEMOGLOBIN: 14.3 G/DL (ref 12.5–16.5)
IMMATURE GRANULOCYTES #: 0.02 E9/L
IMMATURE GRANULOCYTES %: 0.3 % (ref 0–5)
LYMPHOCYTES ABSOLUTE: 2.32 E9/L (ref 1.5–4)
LYMPHOCYTES RELATIVE PERCENT: 32.4 % (ref 20–42)
MCH RBC QN AUTO: 31.8 PG (ref 26–35)
MCHC RBC AUTO-ENTMCNC: 35.4 % (ref 32–34.5)
MCV RBC AUTO: 89.8 FL (ref 80–99.9)
MONOCYTES ABSOLUTE: 0.47 E9/L (ref 0.1–0.95)
MONOCYTES RELATIVE PERCENT: 6.6 % (ref 2–12)
NEUTROPHILS ABSOLUTE: 3.97 E9/L (ref 1.8–7.3)
NEUTROPHILS RELATIVE PERCENT: 55.4 % (ref 43–80)
PDW BLD-RTO: 11.7 FL (ref 11.5–15)
PLATELET # BLD: 194 E9/L (ref 130–450)
PMV BLD AUTO: 9.2 FL (ref 7–12)
RBC # BLD: 4.5 E12/L (ref 3.8–5.8)
WBC # BLD: 7.2 E9/L (ref 4.5–11.5)

## 2022-10-24 PROCEDURE — 85025 COMPLETE CBC W/AUTO DIFF WBC: CPT

## 2022-10-24 PROCEDURE — 36415 COLL VENOUS BLD VENIPUNCTURE: CPT

## 2022-11-14 LAB
BASOPHILS ABSOLUTE: 0.04 E9/L (ref 0–0.2)
BASOPHILS RELATIVE PERCENT: 0.6 % (ref 0–2)
EOSINOPHILS ABSOLUTE: 0.31 E9/L (ref 0.05–0.5)
EOSINOPHILS RELATIVE PERCENT: 4.3 % (ref 0–6)
HCT VFR BLD CALC: 46.3 % (ref 37–54)
HEMOGLOBIN: 15.5 G/DL (ref 12.5–16.5)
IMMATURE GRANULOCYTES #: 0.08 E9/L
IMMATURE GRANULOCYTES %: 1.1 % (ref 0–5)
LYMPHOCYTES ABSOLUTE: 1.87 E9/L (ref 1.5–4)
LYMPHOCYTES RELATIVE PERCENT: 26.2 % (ref 20–42)
MCH RBC QN AUTO: 32.2 PG (ref 26–35)
MCHC RBC AUTO-ENTMCNC: 33.5 % (ref 32–34.5)
MCV RBC AUTO: 96.3 FL (ref 80–99.9)
MONOCYTES ABSOLUTE: 0.36 E9/L (ref 0.1–0.95)
MONOCYTES RELATIVE PERCENT: 5 % (ref 2–12)
NEUTROPHILS ABSOLUTE: 4.47 E9/L (ref 1.8–7.3)
NEUTROPHILS RELATIVE PERCENT: 62.8 % (ref 43–80)
PDW BLD-RTO: 11.8 FL (ref 11.5–15)
PLATELET # BLD: 234 E9/L (ref 130–450)
PMV BLD AUTO: 10.3 FL (ref 7–12)
RBC # BLD: 4.81 E12/L (ref 3.8–5.8)
WBC # BLD: 7.1 E9/L (ref 4.5–11.5)

## 2022-11-17 RX ORDER — OMEPRAZOLE 20 MG/1
CAPSULE, DELAYED RELEASE ORAL
Qty: 93 CAPSULE | Refills: 0 | Status: SHIPPED | OUTPATIENT
Start: 2022-11-17

## 2023-01-17 ENCOUNTER — OFFICE VISIT (OUTPATIENT)
Dept: FAMILY MEDICINE CLINIC | Age: 43
End: 2023-01-17
Payer: MEDICARE

## 2023-01-17 ENCOUNTER — TELEPHONE (OUTPATIENT)
Dept: FAMILY MEDICINE CLINIC | Age: 43
End: 2023-01-17

## 2023-01-17 VITALS
WEIGHT: 232 LBS | RESPIRATION RATE: 16 BRPM | OXYGEN SATURATION: 100 % | HEART RATE: 81 BPM | BODY MASS INDEX: 35.16 KG/M2 | HEIGHT: 68 IN | TEMPERATURE: 97.3 F | DIASTOLIC BLOOD PRESSURE: 72 MMHG | SYSTOLIC BLOOD PRESSURE: 114 MMHG

## 2023-01-17 DIAGNOSIS — F25.0 SCHIZOAFFECTIVE DISORDER, BIPOLAR TYPE (HCC): ICD-10-CM

## 2023-01-17 DIAGNOSIS — Z00.00 MEDICARE ANNUAL WELLNESS VISIT, SUBSEQUENT: Primary | ICD-10-CM

## 2023-01-17 LAB
BASOPHILS ABSOLUTE: 0.04 E9/L (ref 0–0.2)
BASOPHILS RELATIVE PERCENT: 0.7 % (ref 0–2)
EOSINOPHILS ABSOLUTE: 0.23 E9/L (ref 0.05–0.5)
EOSINOPHILS RELATIVE PERCENT: 3.7 % (ref 0–6)
HCT VFR BLD CALC: 44.3 % (ref 37–54)
HEMOGLOBIN: 15.2 G/DL (ref 12.5–16.5)
IMMATURE GRANULOCYTES #: 0.04 E9/L
IMMATURE GRANULOCYTES %: 0.7 % (ref 0–5)
LYMPHOCYTES ABSOLUTE: 1.85 E9/L (ref 1.5–4)
LYMPHOCYTES RELATIVE PERCENT: 30.1 % (ref 20–42)
MCH RBC QN AUTO: 32.2 PG (ref 26–35)
MCHC RBC AUTO-ENTMCNC: 34.3 % (ref 32–34.5)
MCV RBC AUTO: 93.9 FL (ref 80–99.9)
MONOCYTES ABSOLUTE: 0.36 E9/L (ref 0.1–0.95)
MONOCYTES RELATIVE PERCENT: 5.9 % (ref 2–12)
NEUTROPHILS ABSOLUTE: 3.62 E9/L (ref 1.8–7.3)
NEUTROPHILS RELATIVE PERCENT: 58.9 % (ref 43–80)
PDW BLD-RTO: 11.7 FL (ref 11.5–15)
PLATELET # BLD: 215 E9/L (ref 130–450)
PMV BLD AUTO: 10.2 FL (ref 7–12)
RBC # BLD: 4.72 E12/L (ref 3.8–5.8)
WBC # BLD: 6.1 E9/L (ref 4.5–11.5)

## 2023-01-17 PROCEDURE — G0439 PPPS, SUBSEQ VISIT: HCPCS | Performed by: NURSE PRACTITIONER

## 2023-01-17 PROCEDURE — G8484 FLU IMMUNIZE NO ADMIN: HCPCS | Performed by: NURSE PRACTITIONER

## 2023-01-17 ASSESSMENT — PATIENT HEALTH QUESTIONNAIRE - PHQ9
SUM OF ALL RESPONSES TO PHQ9 QUESTIONS 1 & 2: 0
8. MOVING OR SPEAKING SO SLOWLY THAT OTHER PEOPLE COULD HAVE NOTICED. OR THE OPPOSITE, BEING SO FIGETY OR RESTLESS THAT YOU HAVE BEEN MOVING AROUND A LOT MORE THAN USUAL: 0
SUM OF ALL RESPONSES TO PHQ QUESTIONS 1-9: 0
2. FEELING DOWN, DEPRESSED OR HOPELESS: 0
10. IF YOU CHECKED OFF ANY PROBLEMS, HOW DIFFICULT HAVE THESE PROBLEMS MADE IT FOR YOU TO DO YOUR WORK, TAKE CARE OF THINGS AT HOME, OR GET ALONG WITH OTHER PEOPLE: 0
SUM OF ALL RESPONSES TO PHQ QUESTIONS 1-9: 0
5. POOR APPETITE OR OVEREATING: 0
SUM OF ALL RESPONSES TO PHQ QUESTIONS 1-9: 0
3. TROUBLE FALLING OR STAYING ASLEEP: 0
6. FEELING BAD ABOUT YOURSELF - OR THAT YOU ARE A FAILURE OR HAVE LET YOURSELF OR YOUR FAMILY DOWN: 0
1. LITTLE INTEREST OR PLEASURE IN DOING THINGS: 0
9. THOUGHTS THAT YOU WOULD BE BETTER OFF DEAD, OR OF HURTING YOURSELF: 0
4. FEELING TIRED OR HAVING LITTLE ENERGY: 0
7. TROUBLE CONCENTRATING ON THINGS, SUCH AS READING THE NEWSPAPER OR WATCHING TELEVISION: 0
SUM OF ALL RESPONSES TO PHQ QUESTIONS 1-9: 0

## 2023-01-17 ASSESSMENT — LIFESTYLE VARIABLES
HOW MANY STANDARD DRINKS CONTAINING ALCOHOL DO YOU HAVE ON A TYPICAL DAY: PATIENT DOES NOT DRINK
HOW OFTEN DO YOU HAVE A DRINK CONTAINING ALCOHOL: NEVER

## 2023-01-17 NOTE — TELEPHONE ENCOUNTER
----- Message from Ankush Pacheco sent at 1/17/2023 12:52 PM EST -----  Subject: Message to Provider    QUESTIONS  Information for Provider? pt was calling for directions with apt   ---------------------------------------------------------------------------  --------------  [id number]  [phone]; OK to leave message on voicemail  ---------------------------------------------------------------------------  --------------  SCRIPT ANSWERS  Relationship to Patient? Third Party  Third Party Type? Other  Other Third Party Type? friend  Representative Name?  Asuncion Kenney

## 2023-01-17 NOTE — PROGRESS NOTES
Medicare Annual Wellness Visit    Liam Vega is here for Medicare AWV and Health Maintenance (Due for tdap, will have to call mom on varicella vaccine. )    Assessment & Plan   Medicare annual wellness visit, subsequent  Schizoaffective disorder, bipolar type ([address]  -     CBC with Auto Differential; Future    Recommendations for Preventive Services Due: see orders and patient instructions/AVS.  Recommended screening schedule for the next 5-10 years is provided to the patient in written form: see Patient Instructions/AVS.     Return in 6 months (on 7/17/2023) for check up and then Medicare Annual Wellness Visit in 1 year. Subjective   The following acute and/or chronic problems were also addressed today:  Schizophrenia has been stable he follows with his psychiatrist. Dr. Juvencio Da Silva at [facility]. Last labs done in October stable Will check CBCD today. Patient's complete Health Risk Assessment and screening values have been reviewed and are found in Flowsheets. The following problems were reviewed today and where indicated follow up appointments were made and/or referrals ordered. Positive Risk Factor Screenings with Interventions:                 Weight and Activity:  Physical Activity: Insufficiently Active    Days of Exercise per Week: 7 days    Minutes of Exercise per Session: 20 min     On average, how many days per week do you engage in moderate to strenuous exercise (like a brisk walk)?: 7 days  Have you lost any weight without trying in the past 3 months?: No  Body mass index: (!) 35.27  Obesity Interventions:  Patient comments: he has been exercising every day for 20 minutes he resides in a group home and states he has been watching his diet.    Patient declines any further evaluation or treatment        Dentist Screen:  Have you seen the dentist within the past year?: (!) No    Intervention:  Advised to schedule with their dentist     Vision Screen:  Do you have difficulty driving, watching TV, or doing any of your daily activities because of your eyesight?: No  Have you had an eye exam within the past year?: (!) No  No results found. Interventions:   Patient encouraged to make appointment with their eye specialist    Safety:  Do all of your stairways have a railing or banister?: (!) No  Interventions:  Patient comments: house he resides in is a single level. ADL's:   Patient reports needing help with:  Select all that apply: (!) Grooming, Bathing (just needs assistance.)  Select all that apply: [employer], Housekeeping, Banking/Finances, Shopping, Telephone Use, Food Preparation, Transportation, Taking Medications  Interventions:  Patient comments: lives in a group home and they do his laundry, transportation, banking etc.     Advanced Directives:  Do you have a Living Will?: (!) No    Intervention:  has NO advanced directive - not interested in additional information  He is accompanied by his caregiver will give the information and see if they can have it completed. Objective   Vitals:    01/17/23 1336   BP: 114/72   Pulse: 81   Resp: 16   Temp: 97.3 °F (36.3 °C)   SpO2: 100%   Weight: 232 lb (105.2 kg)   Height: 5' 8\" (1.727 m)      Body mass index is 35.28 kg/m².       General Appearance: alert and oriented to person, when asked about place and time, he doesn't answer,  well developed and well- nourished, in no acute distress  Skin: warm and dry, no rash or erythema  Head: normocephalic and atraumatic  Eyes: pupils equal, round, and reactive to light, extraocular eye movements intact, conjunctivae normal  ENT: tympanic membrane, external ear and ear canal normal bilaterally, nose without deformity, nasal mucosa and turbinates normal without polyps  Neck: supple and non-tender without mass, no thyromegaly or thyroid nodules, no cervical lymphadenopathy  Pulmonary/Chest: clear to auscultation bilaterally- no wheezes, rales or rhonchi, normal air movement, no respiratory distress  Cardiovascular: normal rate, regular rhythm, normal S1 and S2, no murmurs, rubs, clicks, or gallops, distal pulses intact, no carotid bruits  Abdomen: soft, non-tender, non-distended, normal bowel sounds, no masses or organomegaly  Extremities: no cyanosis, clubbing or edema  Musculoskeletal: normal range of motion, however the left shoulder is lower than the right,  no joint swelling, deformity or tenderness  Neurologic: reflexes normal and symmetric, no cranial nerve deficit, gait, coordination and speech normal       Allergies   Allergen Reactions    Seasonal      Prior to Visit Medications    Medication Sig Taking? Authorizing Provider   omeprazole (PRILOSEC) 20 MG delayed release capsule TAKE 1 CAPSULE BY MOUTH ONCE A DAY. ([id number] [facility]) Yes LUCINA Santillan CNP   cloZAPine (CLOZARIL) 100 MG tablet Take 100 mg by mouth 2 times daily Yes Historical Provider, MD   LORazepam (ATIVAN) 0.5 MG tablet Take 0.5 mg by mouth in the morning and 0.5 mg in the evening. Yes Historical Provider, MD   LORazepam (ATIVAN) 1 MG tablet Take 1 mg by mouth. Take 1 mg at 3 pm Yes Historical Provider, MD   asenapine maleate (SAPHRIS) 10 MG SUBL sublingual tablet Place 10 mg under the tongue nightly Yes Historical Provider, MD   magnesium hydroxide (Alfred MILK OF MAGNESIA) 400 MG/5ML suspension Take 30 mLs by mouth as needed for Constipation (no more than 2 times a week) Yes LUCINA Joseph CNP   loratadine (CLARITIN) 10 MG tablet TAKE 1 TABLET BY MOUTH DAILY.  Yes LUCINA Joseph CNP   cloNIDine (CATAPRES) 0.3 MG tablet 0.3 mg daily In am Yes Historical Provider, MD   Melatonin 12 MG TABS Take 12 mg by mouth nightly Yes Historical Provider, MD   cloNIDine (CATAPRES) 0.2 MG tablet Take 0.2 mg by mouth Daily One at 3 pm Yes Historical Provider, MD   guaiFENesin (MUCINEX) 600 MG extended release tablet Take 1 tablet by mouth 2 times daily as needed for Congestion Yes LUCINA Joseph CNP ciclopirox (PENLAC) 8 % solution  Yes Historical Provider, MD   Urea (CARMOL) 40 % cream apply to affected area twice a day (MORNING AND NIGHT) Yes Historical Provider, MD   benztropine (COGENTIN) 0.5 MG tablet Take 1 tablet by mouth 2 times daily Yes LUCINA Prabhakar CNP   cloZAPine (CLOZARIL) 50 MG tablet Take 1 tablet by mouth daily  Patient taking differently: Take 50 mg by mouth nightly Take with the 200 mg and 100 mg to equal 350 mg Yes LUCINA Gil CNP   cloZAPine (CLOZARIL) 200 MG tablet Take 1 tablet by mouth nightly Yes LUCINA Prabhakar CNP   OXcarbazepine (TRILEPTAL) 300 MG tablet Take 1 tablet by mouth 2 times daily  Patient taking differently: Take 900 mg by mouth 2 times daily Yes LUCINA Prabhakar CNP   acetaminophen (AMINOFEN) 325 MG tablet Take 2 tablets by mouth every 4 hours as needed for Pain or Fever  LUCINA Mann CNP       CareTeam (Including outside providers/suppliers regularly involved in providing care):   Patient Care Team:  LUCINA Sewell CNP as PCP - General (Nurse Practitioner)  LUCINA Sewell CNP as PCP - [facility] Empaneled Provider     Reviewed and updated this visit:  Tobacco  Allergies  Meds  Problems  Med Hx  Surg Hx  Soc Hx  Fam Hx

## 2023-01-17 NOTE — PATIENT INSTRUCTIONS
Learning About Vision Tests  What are vision tests? The four most common vision tests are visual acuity tests, refraction, visual field tests, and color vision tests. Visual acuity (sharpness) tests  These tests are used: To see if you need glasses or contact lenses. To monitor an eye problem. To check an eye injury. Visual acuity tests are done as part of routine exams. You may also have this test when you get your 's license or apply for some types of jobs. Visual field tests  These tests are used: To check for vision loss in any area of your range of vision. To screen for certain eye diseases. To look for nerve damage after a stroke, head injury, or other problem that could reduce blood flow to the brain. Refraction and color tests  A refraction test is done to find the right prescription for glasses and contact lenses. A color vision test is done to check for color blindness. Color vision is often tested as part of a routine exam. You may also have this test when you apply for a job where recognizing different colors is important, such as , electronics, or the [employer]. How are vision tests done? Visual acuity test   You cover one eye at a time. You read aloud from a wall chart across the room. You read aloud from a small card that you hold in your hand. Refraction   You look into a special device. The device puts lenses of different strengths in front of each eye to see how strong your glasses or contact lenses need to be. Visual field tests   Your doctor may have you look through special machines. Or your doctor may simply have you stare straight ahead while they move a finger into and out of your field of vision. Color vision test   You look at pieces of printed test patterns in various colors. You say what number or symbol you see. Your doctor may have you trace the number or symbol using a pointer. How do these tests feel?   There is very little chance of having a problem from this test. If dilating drops are used for a vision test, they may make the eyes sting and cause a medicine taste in the mouth. Follow-up care is a key part of your treatment and safety. Be sure to make and go to all appointments, and call your doctor if you are having problems. It's also a good idea to know your test results and keep a list of the medicines you take. Where can you learn more? Go to http://www.lion.com/ and enter G551 to learn more about \"Learning About Vision Tests. \"  Current as of: October 12, 2022               Content Version: 13.5  © 5342-8536 [employer]. Care instructions adapted under license by [facility] ([facility]). If you have questions about a medical condition or this instruction, always ask your healthcare professional. [address] any warranty or liability for your use of this information. Advance Directives: Care Instructions  Overview  An advance directive is a legal way to state your wishes at the end of your life. It tells your family and your doctor what to do if you can't say what you want. There are two main types of advance directives. You can change them any time your wishes change. Living will. This form tells your family and your doctor your wishes about life support and other treatment. The form is also called a declaration. Medical power of . This form lets you name a person to make treatment decisions for you when you can't speak for yourself. This person is called a health care agent (health care proxy, health care surrogate). The form is also called a durable power of  for health care. If you do not have an advance directive, decisions about your medical care may be made by a family member, or by a doctor or a  who doesn't know you. It may help to think of an advance directive as a gift to the people who care for you.  If you have one, they won't have to make tough decisions by themselves. For more information, including forms for your state, see the [address] website (www.caringinfo.org/planning/advance-directives/). Follow-up care is a key part of your treatment and safety. Be sure to make and go to all appointments, and call your doctor if you are having problems. It's also a good idea to know your test results and keep a list of the medicines you take. What should you include in an advance directive? Many states have a unique advance directive form. (It may ask you to address specific issues.) Or you might use a universal form that's approved by many states. If your form doesn't tell you what to address, it may be hard to know what to include in your advance directive. Use the questions below to help you get started. Who do you want to make decisions about your medical care if you are not able to? What life-support measures do you want if you have a serious illness that gets worse over time or can't be cured? What are you most afraid of that might happen? (Maybe you're afraid of having pain, losing your independence, or being kept alive by machines.)  Where would you prefer to die? (Your home? A hospital? A nursing home?)  Do you want to donate your organs when you die? Do you want certain Yarsani practices performed before you die? When should you call for help? Be sure to contact your doctor if you have any questions. Where can you learn more? Go to http://www.lion.com/ and enter R264 to learn more about \"Advance Directives: Care Instructions. \"  Current as of: June 16, 2022               Content Version: 13.5  © 8779-1614 [employer]. Care instructions adapted under license by [facility] ([facility]). If you have questions about a medical condition or this instruction, always ask your healthcare professional. [address] any warranty or liability for your use of this information.       Personalized Preventive Plan for Kandi Cespedes - 1/17/2023  Medicare offers a range of preventive health benefits. Some of the tests and screenings are paid in full while other may be subject to a deductible, co-insurance, and/or copay. Some of these benefits include a comprehensive review of your medical history including lifestyle, illnesses that may run in your family, and various assessments and screenings as appropriate. After reviewing your medical record and screening and assessments performed today your provider may have ordered immunizations, labs, imaging, and/or referrals for you. A list of these orders (if applicable) as well as your Preventive Care list are included within your After Visit Summary for your review. Other Preventive Recommendations:    A preventive eye exam performed by an eye specialist is recommended every 1-2 years to screen for glaucoma; cataracts, macular degeneration, and other eye disorders. A preventive dental visit is recommended every 6 months. Try to get at least 150 minutes of exercise per week or 10,000 steps per day on a pedometer . Order or download the FREE \"Exercise & Physical Activity: Your Everyday Guide\" from [employer]. Call 1-218.993.9892 or search [employer] online. You need 0928-8291 mg of calcium and 7237-4658 IU of vitamin D per day. It is possible to meet your calcium requirement with diet alone, but a vitamin D supplement is usually necessary to meet this goal.  When exposed to the sun, use a sunscreen that protects against both UVA and UVB radiation with an SPF of 30 or greater. Reapply every 2 to 3 hours or after sweating, drying off with a towel, or swimming. Always wear a seat belt when traveling in a car. Always wear a helmet when riding a bicycle or motorcycle.

## 2023-01-27 LAB
BASOPHILS ABSOLUTE: 0.04 E9/L (ref 0–0.2)
BASOPHILS RELATIVE PERCENT: 0.7 % (ref 0–2)
EOSINOPHILS ABSOLUTE: 0.2 E9/L (ref 0.05–0.5)
EOSINOPHILS RELATIVE PERCENT: 3.3 % (ref 0–6)
HCT VFR BLD CALC: 43.1 % (ref 37–54)
HEMOGLOBIN: 15.2 G/DL (ref 12.5–16.5)
IMMATURE GRANULOCYTES #: 0.03 E9/L
IMMATURE GRANULOCYTES %: 0.5 % (ref 0–5)
LYMPHOCYTES ABSOLUTE: 1.81 E9/L (ref 1.5–4)
LYMPHOCYTES RELATIVE PERCENT: 30.2 % (ref 20–42)
MCH RBC QN AUTO: 31.5 PG (ref 26–35)
MCHC RBC AUTO-ENTMCNC: 35.3 % (ref 32–34.5)
MCV RBC AUTO: 89.4 FL (ref 80–99.9)
MONOCYTES ABSOLUTE: 0.33 E9/L (ref 0.1–0.95)
MONOCYTES RELATIVE PERCENT: 5.5 % (ref 2–12)
NEUTROPHILS ABSOLUTE: 3.59 E9/L (ref 1.8–7.3)
NEUTROPHILS RELATIVE PERCENT: 59.8 % (ref 43–80)
PDW BLD-RTO: 11.8 FL (ref 11.5–15)
PLATELET # BLD: 223 E9/L (ref 130–450)
PMV BLD AUTO: 10.9 FL (ref 7–12)
RBC # BLD: 4.82 E12/L (ref 3.8–5.8)
WBC # BLD: 6 E9/L (ref 4.5–11.5)

## 2023-03-13 LAB
BASOPHILS ABSOLUTE: 0.05 E9/L (ref 0–0.2)
BASOPHILS RELATIVE PERCENT: 0.5 % (ref 0–2)
EOSINOPHILS ABSOLUTE: 0.37 E9/L (ref 0.05–0.5)
EOSINOPHILS RELATIVE PERCENT: 3.9 % (ref 0–6)
HCT VFR BLD CALC: 48.1 % (ref 37–54)
HEMOGLOBIN: 16.2 G/DL (ref 12.5–16.5)
IMMATURE GRANULOCYTES #: 0.03 E9/L
IMMATURE GRANULOCYTES %: 0.3 % (ref 0–5)
LYMPHOCYTES ABSOLUTE: 2.64 E9/L (ref 1.5–4)
LYMPHOCYTES RELATIVE PERCENT: 28.1 % (ref 20–42)
MCH RBC QN AUTO: 31.8 PG (ref 26–35)
MCHC RBC AUTO-ENTMCNC: 33.7 % (ref 32–34.5)
MCV RBC AUTO: 94.5 FL (ref 80–99.9)
MONOCYTES ABSOLUTE: 0.52 E9/L (ref 0.1–0.95)
MONOCYTES RELATIVE PERCENT: 5.5 % (ref 2–12)
NEUTROPHILS ABSOLUTE: 5.77 E9/L (ref 1.8–7.3)
NEUTROPHILS RELATIVE PERCENT: 61.7 % (ref 43–80)
PDW BLD-RTO: 11.8 FL (ref 11.5–15)
PLATELET # BLD: 203 E9/L (ref 130–450)
PMV BLD AUTO: 10.4 FL (ref 7–12)
RBC # BLD: 5.09 E12/L (ref 3.8–5.8)
WBC # BLD: 9.4 E9/L (ref 4.5–11.5)

## 2023-05-10 DIAGNOSIS — J30.1 CHRONIC SEASONAL ALLERGIC RHINITIS DUE TO POLLEN: ICD-10-CM

## 2023-05-11 RX ORDER — LORATADINE 10 MG/1
10 TABLET ORAL DAILY
Qty: 93 TABLET | Refills: 3 | Status: SHIPPED | OUTPATIENT
Start: 2023-05-11

## 2023-05-17 LAB
BASOPHILS # BLD: 0.04 E9/L (ref 0–0.2)
BASOPHILS NFR BLD: 0.7 % (ref 0–2)
EOSINOPHIL # BLD: 0.3 E9/L (ref 0.05–0.5)
EOSINOPHIL NFR BLD: 5.6 % (ref 0–6)
ERYTHROCYTE [DISTWIDTH] IN BLOOD BY AUTOMATED COUNT: 12.1 FL (ref 11.5–15)
HCT VFR BLD AUTO: 44.6 % (ref 37–54)
HGB BLD-MCNC: 14.8 G/DL (ref 12.5–16.5)
IMM GRANULOCYTES # BLD: 0.03 E9/L
IMM GRANULOCYTES NFR BLD: 0.6 % (ref 0–5)
LYMPHOCYTES # BLD: 1.75 E9/L (ref 1.5–4)
LYMPHOCYTES NFR BLD: 32.6 % (ref 20–42)
MCH RBC QN AUTO: 32 PG (ref 26–35)
MCHC RBC AUTO-ENTMCNC: 33.2 % (ref 32–34.5)
MCV RBC AUTO: 96.3 FL (ref 80–99.9)
MONOCYTES # BLD: 0.34 E9/L (ref 0.1–0.95)
MONOCYTES NFR BLD: 6.3 % (ref 2–12)
NEUTROPHILS # BLD: 2.91 E9/L (ref 1.8–7.3)
NEUTS SEG NFR BLD: 54.2 % (ref 43–80)
PLATELET # BLD AUTO: 198 E9/L (ref 130–450)
PMV BLD AUTO: 10.8 FL (ref 7–12)
RBC # BLD AUTO: 4.63 E12/L (ref 3.8–5.8)
WBC # BLD: 5.4 E9/L (ref 4.5–11.5)

## 2023-07-06 LAB
ALBUMIN SERPL-MCNC: 4.4 G/DL (ref 3.5–5.2)
ALP SERPL-CCNC: 113 U/L (ref 40–129)
ALT SERPL-CCNC: 24 U/L (ref 0–40)
ANION GAP SERPL CALCULATED.3IONS-SCNC: 11 MMOL/L (ref 7–16)
AST SERPL-CCNC: 21 U/L (ref 0–39)
BASOPHILS # BLD: 0.05 E9/L (ref 0–0.2)
BASOPHILS NFR BLD: 1 % (ref 0–2)
BILIRUB SERPL-MCNC: 0.4 MG/DL (ref 0–1.2)
BUN SERPL-MCNC: 18 MG/DL (ref 6–20)
CALCIUM SERPL-MCNC: 9.1 MG/DL (ref 8.6–10.2)
CHLORIDE SERPL-SCNC: 103 MMOL/L (ref 98–107)
CO2 SERPL-SCNC: 24 MMOL/L (ref 22–29)
CREAT SERPL-MCNC: 0.8 MG/DL (ref 0.7–1.2)
EOSINOPHIL # BLD: 0.23 E9/L (ref 0.05–0.5)
EOSINOPHIL NFR BLD: 4.4 % (ref 0–6)
ERYTHROCYTE [DISTWIDTH] IN BLOOD BY AUTOMATED COUNT: 12 FL (ref 11.5–15)
GLUCOSE SERPL-MCNC: 160 MG/DL (ref 74–99)
HCT VFR BLD AUTO: 45.7 % (ref 37–54)
HGB BLD-MCNC: 14.7 G/DL (ref 12.5–16.5)
IMM GRANULOCYTES # BLD: 0.03 E9/L
IMM GRANULOCYTES NFR BLD: 0.6 % (ref 0–5)
LYMPHOCYTES # BLD: 1.25 E9/L (ref 1.5–4)
LYMPHOCYTES NFR BLD: 23.8 % (ref 20–42)
MCH RBC QN AUTO: 31.6 PG (ref 26–35)
MCHC RBC AUTO-ENTMCNC: 32.2 % (ref 32–34.5)
MCV RBC AUTO: 98.3 FL (ref 80–99.9)
MONOCYTES # BLD: 0.34 E9/L (ref 0.1–0.95)
MONOCYTES NFR BLD: 6.5 % (ref 2–12)
NEUTROPHILS # BLD: 3.35 E9/L (ref 1.8–7.3)
NEUTS SEG NFR BLD: 63.7 % (ref 43–80)
PLATELET # BLD AUTO: 216 E9/L (ref 130–450)
PMV BLD AUTO: 10.3 FL (ref 7–12)
POTASSIUM SERPL-SCNC: 3.8 MMOL/L (ref 3.5–5)
PROT SERPL-MCNC: 7 G/DL (ref 6.4–8.3)
RBC # BLD AUTO: 4.65 E12/L (ref 3.8–5.8)
SODIUM SERPL-SCNC: 138 MMOL/L (ref 132–146)
WBC # BLD: 5.3 E9/L (ref 4.5–11.5)

## 2023-07-10 LAB — 10OH-CARBAZEPINE SERPL-MCNC: 31 UG/ML (ref 3–35)

## 2023-07-17 ENCOUNTER — OFFICE VISIT (OUTPATIENT)
Dept: FAMILY MEDICINE CLINIC | Age: 43
End: 2023-07-17
Payer: MEDICARE

## 2023-07-17 VITALS
SYSTOLIC BLOOD PRESSURE: 120 MMHG | RESPIRATION RATE: 16 BRPM | WEIGHT: 247 LBS | TEMPERATURE: 97.5 F | DIASTOLIC BLOOD PRESSURE: 68 MMHG | HEIGHT: 68 IN | BODY MASS INDEX: 37.44 KG/M2 | HEART RATE: 91 BPM | OXYGEN SATURATION: 98 %

## 2023-07-17 DIAGNOSIS — R63.5 ABNORMAL WEIGHT GAIN: Primary | ICD-10-CM

## 2023-07-17 DIAGNOSIS — Z13.220 SCREENING FOR HYPERLIPIDEMIA: ICD-10-CM

## 2023-07-17 DIAGNOSIS — R73.01 IMPAIRED FASTING BLOOD SUGAR: ICD-10-CM

## 2023-07-17 PROCEDURE — 99214 OFFICE O/P EST MOD 30 MIN: CPT | Performed by: NURSE PRACTITIONER

## 2023-07-17 RX ORDER — DOCUSATE SODIUM 100 MG
CAPSULE ORAL
COMMUNITY
Start: 2023-06-26

## 2023-07-17 ASSESSMENT — ENCOUNTER SYMPTOMS
SINUS PRESSURE: 0
CONSTIPATION: 1
DIARRHEA: 0
SORE THROAT: 0
TROUBLE SWALLOWING: 0
COUGH: 0
RHINORRHEA: 0
CHEST TIGHTNESS: 0
BACK PAIN: 0
COLOR CHANGE: 0
NAUSEA: 0
SHORTNESS OF BREATH: 0
ABDOMINAL PAIN: 0
WHEEZING: 0
VOMITING: 0
SINUS PAIN: 0
FACIAL SWELLING: 0
VOICE CHANGE: 0

## 2023-07-17 NOTE — ASSESSMENT & PLAN NOTE
Worsening check fasting labs to include thyroid as there is FH of thyroid disease. Work on limiting snacks, weight loss, exercise 30 minutes walking daily. Will call with lab results.  May need to be seen sooner if diabetes or thyroid disease

## 2023-08-04 DIAGNOSIS — R63.5 ABNORMAL WEIGHT GAIN: ICD-10-CM

## 2023-08-04 DIAGNOSIS — Z13.220 SCREENING FOR HYPERLIPIDEMIA: ICD-10-CM

## 2023-08-04 DIAGNOSIS — R73.01 IMPAIRED FASTING BLOOD SUGAR: ICD-10-CM

## 2023-08-04 LAB
ABSOLUTE IMMATURE GRANULOCYTE: 0.05 K/UL (ref 0–0.58)
BASOPHILS ABSOLUTE: 0.05 K/UL (ref 0–0.2)
BASOPHILS RELATIVE PERCENT: 1 % (ref 0–2)
EOSINOPHILS ABSOLUTE: 0.22 K/UL (ref 0.05–0.5)
EOSINOPHILS RELATIVE PERCENT: 2 % (ref 0–6)
HCT VFR BLD CALC: 45.2 % (ref 37–54)
HEMOGLOBIN: 14.9 G/DL (ref 12.5–16.5)
IMMATURE GRANULOCYTES: 1 % (ref 0–5)
LYMPHOCYTES ABSOLUTE: 1.34 K/UL (ref 1.5–4)
LYMPHOCYTES RELATIVE PERCENT: 13 % (ref 20–42)
MCH RBC QN AUTO: 31.6 PG (ref 26–35)
MCHC RBC AUTO-ENTMCNC: 33 G/DL (ref 32–34.5)
MCV RBC AUTO: 96 FL (ref 80–99.9)
MONOCYTES ABSOLUTE: 0.66 K/UL (ref 0.1–0.95)
MONOCYTES RELATIVE PERCENT: 6 % (ref 2–12)
NEUTROPHILS ABSOLUTE: 8.26 K/UL (ref 1.8–7.3)
NEUTROPHILS RELATIVE PERCENT: 78 % (ref 43–80)
PDW BLD-RTO: 12.2 % (ref 11.5–15)
PLATELET # BLD: 215 K/UL (ref 130–450)
PMV BLD AUTO: 10.6 FL (ref 7–12)
RBC # BLD: 4.71 M/UL (ref 3.8–5.8)
WBC # BLD: 10.6 K/UL (ref 4.5–11.5)

## 2023-08-04 RX ORDER — ACETAMINOPHEN 325 MG/1
TABLET ORAL
Qty: 40 TABLET | Refills: 0 | OUTPATIENT
Start: 2023-08-04

## 2023-08-05 LAB
ALBUMIN SERPL-MCNC: 4.6 G/DL (ref 3.5–5.2)
ALP BLD-CCNC: 121 U/L (ref 40–129)
ALT SERPL-CCNC: 17 U/L (ref 0–40)
ANION GAP SERPL CALCULATED.3IONS-SCNC: 13 MMOL/L (ref 7–16)
AST SERPL-CCNC: 19 U/L (ref 0–39)
BILIRUB SERPL-MCNC: 0.6 MG/DL (ref 0–1.2)
BUN BLDV-MCNC: 21 MG/DL (ref 6–20)
CALCIUM SERPL-MCNC: 9.1 MG/DL (ref 8.6–10.2)
CHLORIDE BLD-SCNC: 100 MMOL/L (ref 98–107)
CHOLESTEROL: 182 MG/DL
CO2: 24 MMOL/L (ref 22–29)
CREAT SERPL-MCNC: 0.9 MG/DL (ref 0.7–1.2)
GFR SERPL CREATININE-BSD FRML MDRD: >60 ML/MIN/1.73M2
GLUCOSE BLD-MCNC: 155 MG/DL (ref 74–99)
HDLC SERPL-MCNC: 41 MG/DL
LDL CHOLESTEROL: 122 MG/DL
POTASSIUM SERPL-SCNC: 3.6 MMOL/L (ref 3.5–5)
SODIUM BLD-SCNC: 137 MMOL/L (ref 132–146)
T4 FREE: 1 NG/DL (ref 0.9–1.7)
TOTAL PROTEIN: 7.1 G/DL (ref 6.4–8.3)
TRIGL SERPL-MCNC: 93 MG/DL
TSH SERPL DL<=0.05 MIU/L-ACNC: 1.2 UIU/ML (ref 0.27–4.2)
VLDLC SERPL CALC-MCNC: 19 MG/DL

## 2023-08-07 LAB — HBA1C MFR BLD: 5.2 % (ref 4–5.6)

## 2023-08-07 RX ORDER — ACETAMINOPHEN 325 MG/1
650 TABLET ORAL EVERY 4 HOURS PRN
Qty: 40 TABLET | Refills: 1 | Status: SHIPPED | OUTPATIENT
Start: 2023-08-07

## 2023-08-14 PROBLEM — Z79.899 HIGH RISK MEDICATION USE: Status: ACTIVE | Noted: 2023-08-14

## 2023-08-16 PROBLEM — Z13.220 SCREENING FOR HYPERLIPIDEMIA: Status: RESOLVED | Noted: 2023-07-17 | Resolved: 2023-08-16

## 2023-08-16 PROBLEM — E78.5 BORDERLINE HYPERLIPIDEMIA: Status: ACTIVE | Noted: 2023-08-16

## 2023-08-17 ENCOUNTER — TELEPHONE (OUTPATIENT)
Dept: INTERNAL MEDICINE | Age: 43
End: 2023-08-17

## 2023-08-17 NOTE — TELEPHONE ENCOUNTER
LSW received consult from [facility]) on 8.16.23 related to clarification of consent for dental OR under general anesthesia for Xrays, exam, cleaning, fillings as needed and possible extractions if needed. Pt unable to effectively cooperate in office setting. Verified this is a non life threatening procedure  Pt not in the office at time of call to LSW and was accompanied by staff to appt. Pt is a resident of [facility]; per dental chart and Epic chart, there is no listed guardian or health care POA. Phone call [phone] to , Micky Mcintyre to clarify information. [phone]  Received return call from Ms Matt Gibbs who confirms pt is his Dahlia Stage guardian\" and there is no health care POA  on record. Requested information re: family support and deferred to supervisor, Trevor Sanchez at [phone]. Message left for return call    [phone]  Received call from Pauline Alvares; she confirms above information and adds pt's mother is involved and aware of pt's dental needs and supportive; confirmed name as   Rahel Reina and number as [phone]. Pauline Alvares reports pt lived with mother until placement about 2 years ago.     LSW discussed with dental practice manager for further input

## 2023-08-21 ENCOUNTER — TELEPHONE (OUTPATIENT)
Dept: INTERNAL MEDICINE | Age: 43
End: 2023-08-21

## 2023-08-21 NOTE — TELEPHONE ENCOUNTER
Phone call to pt's mother as recommendation for meeting to further discuss needs. Mother was open, engaged and interested for pt to receive the dental care he needs. She confirms pt's father is  and that pt does not have a health care power of , living will or guardian. Mother also confirms pt has lived with her until placement at [facility]. Mother also states pt is evaluated by a psychiatrist ever 3 months ago by \"Dr. Ludivina Petersen \" at [facility] in [place] and states had recent visit. Mother very receptive of attending appt to further discuss plan re: dental needs. Advised dental staff of above.

## 2023-09-06 LAB
ABSOLUTE IMMATURE GRANULOCYTE: 0.04 K/UL (ref 0–0.58)
BASOPHILS ABSOLUTE: 0.05 K/UL (ref 0–0.2)
BASOPHILS RELATIVE PERCENT: 1 % (ref 0–2)
EOSINOPHILS ABSOLUTE: 0.31 K/UL (ref 0.05–0.5)
EOSINOPHILS RELATIVE PERCENT: 5 % (ref 0–6)
HCT VFR BLD CALC: 49.2 % (ref 37–54)
HEMOGLOBIN: 16.3 G/DL (ref 12.5–16.5)
IMMATURE GRANULOCYTES: 1 % (ref 0–5)
LYMPHOCYTES ABSOLUTE: 1.57 K/UL (ref 1.5–4)
LYMPHOCYTES RELATIVE PERCENT: 23 % (ref 20–42)
MCH RBC QN AUTO: 32.5 PG (ref 26–35)
MCHC RBC AUTO-ENTMCNC: 33.1 G/DL (ref 32–34.5)
MCV RBC AUTO: 98 FL (ref 80–99.9)
MONOCYTES ABSOLUTE: 0.31 K/UL (ref 0.1–0.95)
MONOCYTES RELATIVE PERCENT: 5 % (ref 2–12)
NEUTROPHILS ABSOLUTE: 4.52 K/UL (ref 1.8–7.3)
NEUTROPHILS RELATIVE PERCENT: 66 % (ref 43–80)
PDW BLD-RTO: 12.3 % (ref 11.5–15)
PLATELET # BLD: 216 K/UL (ref 130–450)
PMV BLD AUTO: 10.4 FL (ref 7–12)
RBC # BLD: 5.02 M/UL (ref 3.8–5.8)
WBC # BLD: 6.8 K/UL (ref 4.5–11.5)

## 2023-09-13 ENCOUNTER — CLINICAL DOCUMENTATION (OUTPATIENT)
Dept: SPIRITUAL SERVICES | Age: 43
End: 2023-09-13

## 2023-09-13 NOTE — ACP (ADVANCE CARE PLANNING)
and provided a teach-back method of understanding to what would be done. At this time, Anuja Pereyra and his mom are both in agreement to his procedure. Thank you for your concern for Anuja Pereyra and allowing me to assist in his care. [] 2nd -  Date:                 Intervention:  [] Spoke with Patient  [] Left Voice mail [] Email / Mail    [] Aqu[employer]ist  [] Other [id number]) : Outcomes:                [] 3rd -  Date:                Intervention:  [] Spoke with Patient   [] Left Voice mail [] Email / Mail    [] Yoyi Mediahart  [] Other [id number]) : Outcomes:           []  Additional Outreach -  Date:     (Specify Dates & special circumstances): Outcomes:          Thank you for this referral.

## 2023-09-30 DIAGNOSIS — F25.0 SCHIZOAFFECTIVE DISORDER, BIPOLAR TYPE (MULTI): ICD-10-CM

## 2023-09-30 PROBLEM — F70 MILD INTELLECTUAL DISABILITY: Status: ACTIVE | Noted: 2023-09-30

## 2023-09-30 PROBLEM — G21.19 DRUG-INDUCED PARKINSONISM (MULTI): Status: ACTIVE | Noted: 2023-09-30

## 2023-09-30 PROBLEM — K59.03 DRUG-INDUCED CONSTIPATION: Status: ACTIVE | Noted: 2023-09-30

## 2023-09-30 PROBLEM — F84.0 AUTISM SPECTRUM DISORDER (HHS-HCC): Status: ACTIVE | Noted: 2023-09-30

## 2023-09-30 PROBLEM — D70.2 DRUG-INDUCED NEUTROPENIA (CMS-HCC): Status: ACTIVE | Noted: 2023-09-30

## 2023-09-30 RX ORDER — CLOZAPINE 200 MG/1
400 TABLET ORAL NIGHTLY
COMMUNITY
Start: 2019-08-21 | End: 2023-11-29 | Stop reason: SDUPTHER

## 2023-09-30 RX ORDER — DOCUSATE SODIUM 100 MG/1
200 CAPSULE, LIQUID FILLED ORAL EVERY MORNING
COMMUNITY
Start: 2020-12-30 | End: 2023-11-29 | Stop reason: SDUPTHER

## 2023-09-30 RX ORDER — LORAZEPAM 1 MG/1
1 TABLET ORAL 2 TIMES DAILY
COMMUNITY
Start: 2022-05-20 | End: 2023-11-29 | Stop reason: SDUPTHER

## 2023-09-30 RX ORDER — CLOZAPINE 100 MG/1
100 TABLET ORAL EVERY MORNING
COMMUNITY
Start: 2021-04-28 | End: 2023-11-29 | Stop reason: SDUPTHER

## 2023-09-30 RX ORDER — CLONIDINE HYDROCHLORIDE 0.3 MG/1
0.3 TABLET ORAL EVERY MORNING
COMMUNITY
Start: 2022-05-20 | End: 2023-11-03 | Stop reason: DRUGHIGH

## 2023-09-30 RX ORDER — CLONIDINE HYDROCHLORIDE 0.2 MG/1
0.2 TABLET ORAL
COMMUNITY
Start: 2022-01-05 | End: 2023-11-03 | Stop reason: DRUGHIGH

## 2023-09-30 RX ORDER — OXCARBAZEPINE 600 MG/1
900 TABLET, FILM COATED ORAL 2 TIMES DAILY
COMMUNITY
Start: 2022-01-05 | End: 2023-11-29 | Stop reason: SDUPTHER

## 2023-09-30 RX ORDER — CHLORHEXIDINE GLUCONATE 4 %
1 LIQUID (ML) TOPICAL NIGHTLY
COMMUNITY
Start: 2021-10-27 | End: 2023-11-29 | Stop reason: SDUPTHER

## 2023-09-30 RX ORDER — BENZTROPINE MESYLATE 0.5 MG/1
0.5 TABLET ORAL 2 TIMES DAILY
COMMUNITY
Start: 2020-10-09 | End: 2023-11-29 | Stop reason: SDUPTHER

## 2023-09-30 RX ORDER — LORAZEPAM 2 MG/1
2 TABLET ORAL NIGHTLY
COMMUNITY
Start: 2023-09-22 | End: 2023-11-29 | Stop reason: SDUPTHER

## 2023-09-30 RX ORDER — ASENAPINE 10 MG/1
10 TABLET SUBLINGUAL NIGHTLY
COMMUNITY
Start: 2022-11-30 | End: 2023-11-29 | Stop reason: SDUPTHER

## 2023-09-30 SDOH — ECONOMIC STABILITY: FOOD INSECURITY: WITHIN THE PAST 12 MONTHS, YOU WORRIED THAT YOUR FOOD WOULD RUN OUT BEFORE YOU GOT MONEY TO BUY MORE.: NEVER TRUE

## 2023-09-30 SDOH — ECONOMIC STABILITY: FOOD INSECURITY: WITHIN THE PAST 12 MONTHS, THE FOOD YOU BOUGHT JUST DIDN'T LAST AND YOU DIDN'T HAVE MONEY TO GET MORE.: NEVER TRUE

## 2023-09-30 SDOH — ECONOMIC STABILITY: INCOME INSECURITY: IN THE LAST 12 MONTHS, WAS THERE A TIME WHEN YOU WERE NOT ABLE TO PAY THE MORTGAGE OR RENT ON TIME?: NO

## 2023-09-30 ASSESSMENT — SOCIAL DETERMINANTS OF HEALTH (SDOH): IN THE PAST 12 MONTHS, HAS THE ELECTRIC, GAS, OIL, OR WATER COMPANY THREATENED TO SHUT OFF SERVICE IN YOUR HOME?: NO

## 2023-11-03 ENCOUNTER — PATIENT MESSAGE (OUTPATIENT)
Dept: BEHAVIORAL HEALTH | Facility: CLINIC | Age: 43
End: 2023-11-03
Payer: MEDICARE

## 2023-11-03 DIAGNOSIS — F84.0 AUTISM SPECTRUM DISORDER (HHS-HCC): ICD-10-CM

## 2023-11-03 DIAGNOSIS — R41.82 ALTERED MENTAL STATUS, UNSPECIFIED ALTERED MENTAL STATUS TYPE: ICD-10-CM

## 2023-11-03 RX ORDER — CLONIDINE HYDROCHLORIDE 0.1 MG/1
TABLET ORAL
Qty: 62 TABLET | Refills: 1 | Status: SHIPPED | OUTPATIENT
Start: 2023-11-03 | End: 2023-11-29 | Stop reason: SDUPTHER

## 2023-11-03 NOTE — PATIENT COMMUNICATION
"Update received from Tracie at [facility]:  \"He seems to be sleeping more throughout the day.  Workshop stated that they were having a hard time getting him to be awake for lunch.  At home, he does get up to eat but tends to want to go right back to bed.  This is not usual; he needs to be calmed down or distracted, whichever works best at the moment.\"    > Check labs: oxcarbazepine level, clozapine level, CMP, ammonia  > Reduce clonidine to  0.1mg twice daily    "

## 2023-11-07 LAB
ABSOLUTE IMMATURE GRANULOCYTE: 0.05 K/UL (ref 0–0.58)
BASOPHILS ABSOLUTE: 0.05 K/UL (ref 0–0.2)
BASOPHILS RELATIVE PERCENT: 1 % (ref 0–2)
EOSINOPHILS ABSOLUTE: 0.31 K/UL (ref 0.05–0.5)
EOSINOPHILS RELATIVE PERCENT: 5 % (ref 0–6)
HCT VFR BLD CALC: 46.8 % (ref 37–54)
HEMOGLOBIN: 15.8 G/DL (ref 12.5–16.5)
IMMATURE GRANULOCYTES: 1 % (ref 0–5)
LYMPHOCYTES ABSOLUTE: 2.21 K/UL (ref 1.5–4)
LYMPHOCYTES RELATIVE PERCENT: 33 % (ref 20–42)
MCH RBC QN AUTO: 31.8 PG (ref 26–35)
MCHC RBC AUTO-ENTMCNC: 33.8 G/DL (ref 32–34.5)
MCV RBC AUTO: 94.2 FL (ref 80–99.9)
MONOCYTES ABSOLUTE: 0.44 K/UL (ref 0.1–0.95)
MONOCYTES RELATIVE PERCENT: 7 % (ref 2–12)
NEUTROPHILS ABSOLUTE: 3.65 K/UL (ref 1.8–7.3)
NEUTROPHILS RELATIVE PERCENT: 55 % (ref 43–80)
PDW BLD-RTO: 11.8 % (ref 11.5–15)
PLATELET # BLD: 206 K/UL (ref 130–450)
PMV BLD AUTO: 10.1 FL (ref 7–12)
RBC # BLD: 4.97 M/UL (ref 3.8–5.8)
WBC # BLD: 6.7 K/UL (ref 4.5–11.5)

## 2023-11-08 ENCOUNTER — PATIENT MESSAGE (OUTPATIENT)
Dept: BEHAVIORAL HEALTH | Facility: CLINIC | Age: 43
End: 2023-11-08
Payer: MEDICARE

## 2023-11-08 NOTE — PATIENT COMMUNICATION
Update from Tracie at [employer]: The med change was started today (11/7/2023).  He was also able to get bloodwork this morning.  I will keep you posted about his sleepiness and whatever else we observe.  If there are any issues with the bloodwork please let me know.

## 2023-11-15 ENCOUNTER — PREP FOR PROCEDURE (OUTPATIENT)
Dept: DENTISTRY | Age: 43
End: 2023-11-15

## 2023-11-15 RX ORDER — SODIUM CHLORIDE 0.9 % (FLUSH) 0.9 %
5-40 SYRINGE (ML) INJECTION EVERY 12 HOURS SCHEDULED
Status: CANCELLED | OUTPATIENT
Start: 2023-11-15

## 2023-11-15 RX ORDER — SODIUM CHLORIDE 9 MG/ML
INJECTION, SOLUTION INTRAVENOUS PRN
Status: CANCELLED | OUTPATIENT
Start: 2023-11-15

## 2023-11-15 RX ORDER — SODIUM CHLORIDE 0.9 % (FLUSH) 0.9 %
5-40 SYRINGE (ML) INJECTION PRN
Status: CANCELLED | OUTPATIENT
Start: 2023-11-15

## 2023-11-15 RX ORDER — SODIUM CHLORIDE, SODIUM LACTATE, POTASSIUM CHLORIDE, CALCIUM CHLORIDE 600; 310; 30; 20 MG/100ML; MG/100ML; MG/100ML; MG/100ML
INJECTION, SOLUTION INTRAVENOUS CONTINUOUS
Status: CANCELLED | OUTPATIENT
Start: 2023-11-15

## 2023-11-28 PROBLEM — R41.82 ALTERED MENTAL STATUS: Status: RESOLVED | Noted: 2023-11-03 | Resolved: 2023-11-28

## 2023-11-28 NOTE — PROGRESS NOTES
Outpatient Psychiatry    A HIPAA-compliant interactive audio and video telecommunication system which permits real time communications between the patient (at the originating site) and provider (at the distant site) was utilized to provide this telehealth service.     The patient, family, caregivers and guardian (as appropriate) have provided consent on this date to conduct treatment via this telehealth service.  The patient's identity and physical location were verified at the time of this visit.      Present for appointment: Loretta Jackman staff (Sophia) and [facility] staff (Heather).    SUBJECTIVE    Generally pretty stable in terms of overall mood and impulsivity.  No psychotic symptoms elicited.  No episodes of agitation, irritability, or aggression.  Says he's sleeping well at night but he is also sleeping excessively during the daytime (might nap for several hours upon arriving to workshop).  We decreased his dose of clonidine about 3-4 weeks ago with slight improvement in daytime sedation.  Unfortunately, additional labs (oxcarbazepine level, clozapine level, ammonia) were not collected on 11/07/23 with monthly CBCD.  Appetite, weight, and eating patterns are unchanged. He does not have any dysphagia.  He is still having some occasional problems with constipation but is is mostly well-controlled.  He is not having any problems with bowel or bladder incontinence.  No appreciable tremors, drooling, or muscle stiffness noted.  AIMS exam appears negative.  He has not had any episodes of appearing dizzy and has not had any falls or syncope.  He has not had any seizures.     Review of Systems   Constitutional:  Positive for activity change (more daytime sleepiness).   HENT:  Negative for drooling and trouble swallowing.    Respiratory:  Negative for choking.    Cardiovascular:  Negative for chest pain.   Gastrointestinal:  Positive for constipation.   Musculoskeletal:  Negative for gait problem.   Neurological:   Negative for dizziness, tremors and seizures.   Psychiatric/Behavioral:  Negative for agitation and suicidal ideas.       Controlled Substance Evaluation  OARRS/PDMP reviewed: Roland Hampton MD on 11/29/2023  9:38 AM  Is the patient prescribed a combination of a benzodiazepine and opioid? No  I have personally reviewed the OARRS report for Gasper Burns.   I have considered the risks of abuse, dependence, addiction and diversion.    I believe that it is clinically appropriate for Gasper Burns to be prescribed this medication.    Last Urine Drug Screen: 08/30/2022  No results found for this or any previous visit (from the past 8760 hour(s)).  Results as expected? Yes    Controlled Substance Agreement: 12/09/2019  Reviewed Controlled Substance Agreement, including but not limited to:  Benefits, risks, and alternatives to treatment with a controlled substance medication(s).    Prescribed Controlled Substances:  Benzodiazepines: Lorazepam  What is the patient's goal of therapy? Akathisia  Is this being achieved with current treatment? Yes  Activities of Daily Living:   Is your overall impression that this patient is benefiting (symptom reduction outweighs side effects) from benzodiazepine therapy? Yes   1. Physical Functioning: Same  2. Family Relationship: Same  3. Social Relationship: Same  4. Mood: Same  5. Sleep Patterns: Same  6. Overall Function: Same     MEDICATION HISTORY  Aripiprazole  Artane  Carbamazepine  Depakote - hyperammonemic encephalopathy  Escitalopram  Haldol - severe EPS  Invega Sustenna  Lithium  Methylphenidate - increased irritability  Metoprolol  Mirtazapine  Quetiapine  Risperidone  Sertraline  Trazodone  Zolpidem    CURRENT MEDICATIONS    Current Outpatient Medications:     asenapine (Saphris) SL tablet, Place 1 tablet (10 mg) under the tongue once daily at bedtime., Disp: , Rfl:     benztropine (Cogentin) 0.5 mg tablet, Take 1 tablet (0.5 mg) by mouth 2 times a day., Disp: ,  Rfl:     cloNIDine (Catapres) 0.1 mg tablet, 1 tablet twice daily morning and 3pm, Disp: 62 tablet, Rfl: 1    cloZAPine (Clozaril) 100 mg tablet, Take 1 tablet (100 mg) by mouth once daily in the morning., Disp: , Rfl:     cloZAPine (Clozaril) 200 mg tablet, Take 2 tablets (400 mg) by mouth once daily at bedtime., Disp: , Rfl:     docusate sodium (Colace) 100 mg capsule, Take 2 capsules (200 mg) by mouth once daily in the morning., Disp: , Rfl:     LORazepam (Ativan) 1 mg tablet, Take 1 tablet (1 mg) by mouth 2 times a day. AM and 3PM, Disp: , Rfl:     LORazepam (Ativan) 2 mg tablet, Take 1 tablet (2 mg) by mouth once daily at bedtime., Disp: , Rfl:     melatonin 12 mg tablet, Take 1 tablet by mouth once daily at bedtime., Disp: , Rfl:     OXcarbazepine (Trileptal) 600 mg tablet, Take 1.5 tablets (900 mg) by mouth 2 times a day., Disp: , Rfl:     SOCIAL HISTORY  Living situation Lives with staff and 1 male house-mate   Provider agency [facility]   Work or day program [place] 5 days/week   School N/A   Guardianship Self   SSA ?   Bx Specialist ?   Nicotine None   Alcohol None   Other drugs None     OBJECTIVE    Lab Results   Component Value Date    HGB 14.4 12/29/2020    PLT 87 (L) 12/29/2020    NEUTROABS 3.25 12/29/2020    GLUCOSE 111 (H) 12/29/2020     12/29/2020    K 3.9 12/29/2020    CO2 27 12/29/2020    CALCIUM 8.8 12/29/2020    CREATININE 0.92 12/29/2020    AST 24 12/29/2020    ALT 36 12/29/2020    HGBA1C 5.2 08/04/2023    AMMONIA 45 12/30/2020    TSH 1.68 12/30/2020     Therapeutic Drug Monitoring  07/06/2023: Oxcarbazepine level = 31 [3-35] (1800mg/day)  06/00/2022: Clozapine level = 367 (450mg/day)  06/00/2022: Oxcarbazepine level = 33 [3-35] (1800mg/day)    Electrocardiograms  05/02/2022: Sinus tachycardia, , QTc 445    MENTAL STATUS EXAM  General/Appearance: Appropriate dress/hygiene/grooming. Unshaven for No-Shave November.  Attitude/Behavior: Actively engages with interview.  "Average eye contact. Calm/pleasant. Cooperative.  Speech/Communication: Interrupts or talks over others. Normal volume/rate/tone. Rapid.  Motor: Fidgets. Restless.  Gait: Not assessed at this visit.  Mood: Appears to be in good spirits.  Affect: Congruent. Euthymic. Full range.  Thought processes: Tangential.  Thought content: Non-sequiturs.  Perception: Does not appear to be experiencing or responding to hallucinations. Does not report any auditory or visual hallucinations.  Sensorium/Cognition: Alert, awake, oriented to person/place/time. Distracted. Impaired ability to sustain attention/concentration. Difficulty shifting between topics. No blunt suppression.  Memory: Recent & remote recall is intact.  Insight: Fair.  Judgment: Fair.    ASSESSMENT  We will need to get labs done to help guide additional decisions about medication changes.  Today he appears awake/alert and interactive but also noticeably more subdued and less \"elevate\" than usual.  Orders provided for urine drug screen (for lorazepam prescribing), CMP, oxcarbazepine level, clozapine level and ammonia.  Gasper also needs to complete an updated Controlled Substance Agreement which has been provided to him via his agency.    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    PLAN  -- Continue lorazepam 1mg - 1mg - 2mg (AM, 15:00, HS) for anxiety and mood  -- Continue clozapine 100mg QAM and 400mg QHS for psychosis  -- Continue asenapine 10mg QHS for psychosis, sleep, and mood stabilization  -- Continue oxcarbazepine 900mg BID (AM & HS) for mood stabilization  -- Continue clonidine 0.1mg BID (AM & 15:00) for impulsivity and hyperactivity  -- Continue melatonin 12mg QHS for sleep  -- Continue benztropine 0.5mg BID (AM & 15:00) for EPS  -- Check updated labs next week  -- Follow up 8-10 weeks    Roland " MD Memo    Prep time on date of the patient encounter: 5 minutes   Time spent directly with patient/family/caregiver: 30 minutes   Additional time spent on patient care activities: 0 minutes   Documentation time: 10 minutes   Other time spent: 0 minutes   Total time on date of patient encounter: 45 minutes

## 2023-11-29 ENCOUNTER — TELEMEDICINE (OUTPATIENT)
Dept: BEHAVIORAL HEALTH | Facility: CLINIC | Age: 43
End: 2023-11-29
Payer: MEDICARE

## 2023-11-29 DIAGNOSIS — K59.03 DRUG-INDUCED CONSTIPATION: ICD-10-CM

## 2023-11-29 DIAGNOSIS — G21.19 DRUG-INDUCED PARKINSONISM (MULTI): ICD-10-CM

## 2023-11-29 DIAGNOSIS — F25.0 SCHIZOAFFECTIVE DISORDER, BIPOLAR TYPE (MULTI): Primary | ICD-10-CM

## 2023-11-29 DIAGNOSIS — E66.01 OBESITY, MORBID (MULTI): ICD-10-CM

## 2023-11-29 DIAGNOSIS — Z79.899 HIGH RISK MEDICATION USE: ICD-10-CM

## 2023-11-29 DIAGNOSIS — F84.0 AUTISM SPECTRUM DISORDER (HHS-HCC): ICD-10-CM

## 2023-11-29 DIAGNOSIS — D70.2 DRUG-INDUCED NEUTROPENIA (CMS-HCC): ICD-10-CM

## 2023-11-29 PROCEDURE — 99215 OFFICE O/P EST HI 40 MIN: CPT | Performed by: PSYCHIATRY & NEUROLOGY

## 2023-11-29 RX ORDER — BENZTROPINE MESYLATE 0.5 MG/1
0.5 TABLET ORAL 2 TIMES DAILY
Qty: 62 TABLET | Refills: 2 | Status: SHIPPED | OUTPATIENT
Start: 2023-11-29 | End: 2024-02-02 | Stop reason: SDUPTHER

## 2023-11-29 RX ORDER — ASENAPINE 10 MG/1
10 TABLET SUBLINGUAL NIGHTLY
Qty: 31 TABLET | Refills: 2 | Status: SHIPPED | OUTPATIENT
Start: 2023-11-29 | End: 2024-02-02 | Stop reason: SDUPTHER

## 2023-11-29 RX ORDER — CHLORHEXIDINE GLUCONATE 4 %
1 LIQUID (ML) TOPICAL NIGHTLY
Qty: 31 TABLET | Refills: 2 | Status: SHIPPED | OUTPATIENT
Start: 2023-11-29 | End: 2024-02-02 | Stop reason: SDUPTHER

## 2023-11-29 RX ORDER — LORAZEPAM 2 MG/1
2 TABLET ORAL NIGHTLY
Qty: 31 TABLET | Refills: 2 | Status: SHIPPED | OUTPATIENT
Start: 2023-11-29 | End: 2024-02-02 | Stop reason: SDUPTHER

## 2023-11-29 RX ORDER — DOCUSATE SODIUM 100 MG/1
200 CAPSULE, LIQUID FILLED ORAL EVERY MORNING
Qty: 62 CAPSULE | Refills: 2 | Status: SHIPPED | OUTPATIENT
Start: 2023-11-29 | End: 2024-02-02 | Stop reason: SDUPTHER

## 2023-11-29 RX ORDER — CLONIDINE HYDROCHLORIDE 0.1 MG/1
TABLET ORAL
Qty: 62 TABLET | Refills: 2 | Status: SHIPPED | OUTPATIENT
Start: 2023-11-29 | End: 2024-02-02 | Stop reason: SDUPTHER

## 2023-11-29 RX ORDER — OXCARBAZEPINE 600 MG/1
900 TABLET, FILM COATED ORAL 2 TIMES DAILY
Qty: 93 TABLET | Refills: 2 | Status: SHIPPED | OUTPATIENT
Start: 2023-11-29 | End: 2024-02-02 | Stop reason: SDUPTHER

## 2023-11-29 RX ORDER — CLOZAPINE 100 MG/1
100 TABLET ORAL EVERY MORNING
Qty: 31 TABLET | Refills: 2 | Status: SHIPPED | OUTPATIENT
Start: 2023-11-29 | End: 2024-02-02 | Stop reason: SDUPTHER

## 2023-11-29 RX ORDER — CLOZAPINE 200 MG/1
400 TABLET ORAL NIGHTLY
Qty: 62 TABLET | Refills: 2 | Status: SHIPPED | OUTPATIENT
Start: 2023-11-29 | End: 2024-02-02 | Stop reason: SDUPTHER

## 2023-11-29 RX ORDER — LORAZEPAM 1 MG/1
1 TABLET ORAL 2 TIMES DAILY
Qty: 62 TABLET | Refills: 2 | Status: SHIPPED | OUTPATIENT
Start: 2023-11-29 | End: 2024-02-02 | Stop reason: SDUPTHER

## 2023-11-29 ASSESSMENT — ENCOUNTER SYMPTOMS
TROUBLE SWALLOWING: 0
CHOKING: 0
SEIZURES: 0
DIZZINESS: 0
AGITATION: 0
CONSTIPATION: 1
ACTIVITY CHANGE: 1
TREMORS: 0

## 2023-11-29 NOTE — PATIENT INSTRUCTIONS
We will have additional labs done when Gasper goes in December -- please bring attached lab orders to the lab when he goes.  We can discuss additional medication changes (if needed) based on the lab results.    Gasper is due to complete a yearly Controlled Substance Agreement for prescribing of the Ativan (lorazepam) -- see attached.    Next appointment: Friday 2/02/2024 at 9:00 AM virtual.

## 2023-11-30 ENCOUNTER — TELEPHONE (OUTPATIENT)
Dept: FAMILY MEDICINE CLINIC | Age: 43
End: 2023-11-30

## 2023-11-30 NOTE — TELEPHONE ENCOUNTER
I spoke w/Dr. Viviana Block and asked if would be agreeable to see patient for Pre Op and he agreed.    I called Gume Schultz back, informing and scheduled patient on 12/7/23 at 2:30 pm.     Last seen 7/17/2023  Next appt 12/7/2023

## 2023-11-30 NOTE — TELEPHONE ENCOUNTER
[facility] called back to schedule Pre Op appt w/Justina Sofia prior to dental SX 12/15/23. I informed her that the msg was rec'd, however there are no openings for Pre Op with Justina at this time and she's out of the ofc til next Tuesday. Darlene Westbrook informed that patient will see any provider just so that he can have SX done. I informed her that I will send a msg and get back w/her as soon as I have a response.      Last seen 7/17/2023  Next appt 1/23/2024

## 2023-12-07 ENCOUNTER — OFFICE VISIT (OUTPATIENT)
Dept: FAMILY MEDICINE CLINIC | Age: 43
End: 2023-12-07
Payer: MEDICARE

## 2023-12-07 VITALS
TEMPERATURE: 97 F | BODY MASS INDEX: 34.97 KG/M2 | DIASTOLIC BLOOD PRESSURE: 80 MMHG | WEIGHT: 230 LBS | SYSTOLIC BLOOD PRESSURE: 126 MMHG | OXYGEN SATURATION: 97 % | HEART RATE: 120 BPM

## 2023-12-07 DIAGNOSIS — E78.5 HYPERLIPIDEMIA, UNSPECIFIED HYPERLIPIDEMIA TYPE: ICD-10-CM

## 2023-12-07 DIAGNOSIS — R73.9 HYPERGLYCEMIA: ICD-10-CM

## 2023-12-07 DIAGNOSIS — F79 INTELLECTUAL DISABILITY: ICD-10-CM

## 2023-12-07 DIAGNOSIS — Z01.818 PRE-OP EVALUATION: Primary | ICD-10-CM

## 2023-12-07 DIAGNOSIS — R94.31 ABNORMAL EKG: ICD-10-CM

## 2023-12-07 DIAGNOSIS — F25.0 SCHIZOAFFECTIVE DISORDER, BIPOLAR TYPE (HCC): ICD-10-CM

## 2023-12-07 PROCEDURE — 1036F TOBACCO NON-USER: CPT | Performed by: FAMILY MEDICINE

## 2023-12-07 PROCEDURE — 99204 OFFICE O/P NEW MOD 45 MIN: CPT | Performed by: FAMILY MEDICINE

## 2023-12-07 PROCEDURE — G8417 CALC BMI ABV UP PARAM F/U: HCPCS | Performed by: FAMILY MEDICINE

## 2023-12-07 PROCEDURE — 93000 ELECTROCARDIOGRAM COMPLETE: CPT | Performed by: FAMILY MEDICINE

## 2023-12-07 PROCEDURE — G8427 DOCREV CUR MEDS BY ELIG CLIN: HCPCS | Performed by: FAMILY MEDICINE

## 2023-12-07 PROCEDURE — G8484 FLU IMMUNIZE NO ADMIN: HCPCS | Performed by: FAMILY MEDICINE

## 2023-12-07 SDOH — ECONOMIC STABILITY: FOOD INSECURITY: WITHIN THE PAST 12 MONTHS, YOU WORRIED THAT YOUR FOOD WOULD RUN OUT BEFORE YOU GOT MONEY TO BUY MORE.: NEVER TRUE

## 2023-12-07 SDOH — ECONOMIC STABILITY: INCOME INSECURITY: HOW HARD IS IT FOR YOU TO PAY FOR THE VERY BASICS LIKE FOOD, HOUSING, MEDICAL CARE, AND HEATING?: NOT HARD AT ALL

## 2023-12-07 SDOH — ECONOMIC STABILITY: FOOD INSECURITY: WITHIN THE PAST 12 MONTHS, THE FOOD YOU BOUGHT JUST DIDN'T LAST AND YOU DIDN'T HAVE MONEY TO GET MORE.: NEVER TRUE

## 2023-12-07 ASSESSMENT — ENCOUNTER SYMPTOMS
RESPIRATORY NEGATIVE: 1
ALLERGIC/IMMUNOLOGIC NEGATIVE: 1
GASTROINTESTINAL NEGATIVE: 1
EYES NEGATIVE: 1

## 2023-12-07 NOTE — PROGRESS NOTES
OFFICE PROGRESS NOTE      SUBJECTIVE:        Patient ID:   Sam Romano is a 38 y.o. male who presents for   Chief Complaint   Patient presents with    Pre-op Exam     Here for pre-op for  dental procedure. HPI:   Patient here with caregiver for the medical clearance for the dental  Patient records reviewed  Lab work reviewed from before  Done on 8/4/2023  LDL was 122  Sugar 155  According to the caregiver patient has been doing okay  Does follow with a psychiatrist      Prior to Visit Medications    Medication Sig Taking? Authorizing Provider   acetaminophen (AMINOFEN) 325 MG tablet Take 2 tablets by mouth every 4 hours as needed for Pain or Fever Yes LUCINA Barber CNP   STOOL SOFTENER 100 MG capsule  Yes Phillip Fraire MD   loratadine (CLARITIN) 10 MG tablet TAKE 1 TABLET BY MOUTH DAILY. Yes LUCINA Barber CNP   omeprazole (PRILOSEC) 20 MG delayed release capsule TAKE 1 CAPSULE BY MOUTH ONCE A DAY. ([facility], ) Yes Justina Christianson APRN - CNP   cloZAPine (CLOZARIL) 100 MG tablet Take 1 tablet by mouth 2 times daily Yes Phillip Fraire MD   LORazepam (ATIVAN) 0.5 MG tablet Take 1 tablet by mouth in the morning and 1 tablet in the evening. Yes Phillip Fraire MD   LORazepam (ATIVAN) 1 MG tablet Take 1 tablet by mouth.  Take 1 mg at 3 pm Yes Phillip Fraire MD   asenapine maleate (SAPHRIS) 10 MG SUBL sublingual tablet Place 1 tablet under the tongue nightly Yes Phillip Fraire MD   magnesium hydroxide (COATES MILK OF MAGNESIA) 400 MG/5ML suspension Take 30 mLs by mouth as needed for Constipation (no more than 2 times a week) Yes LUCINA Amaya CNP   cloNIDine (CATAPRES) 0.3 MG tablet 1 tablet daily In am Yes Phillip Fraire MD   Melatonin 12 MG TABS Take 12 mg by mouth nightly Yes Phillip Fraire MD   cloNIDine (CATAPRES) 0.2 MG tablet Take 1 tablet by mouth Daily One at 3 pm

## 2023-12-07 NOTE — PATIENT INSTRUCTIONS
Low-sodium low-fat low-carb diet  Regular exercises  Cardiology referral has been made with Dr. Antoinette Matute  Lab work to be done before the next visit  Return to clinic earlier if any problems  Follow-up with Bill Soto

## 2023-12-20 DIAGNOSIS — R73.9 HYPERGLYCEMIA: ICD-10-CM

## 2023-12-20 DIAGNOSIS — E78.5 HYPERLIPIDEMIA, UNSPECIFIED HYPERLIPIDEMIA TYPE: ICD-10-CM

## 2023-12-20 DIAGNOSIS — Z01.818 PRE-OP EVALUATION: ICD-10-CM

## 2023-12-20 DIAGNOSIS — F79 INTELLECTUAL DISABILITY: ICD-10-CM

## 2023-12-20 LAB
ABSOLUTE IMMATURE GRANULOCYTE: 0.04 K/UL (ref 0–0.58)
ALBUMIN SERPL-MCNC: 4 G/DL (ref 3.5–5.2)
ALP BLD-CCNC: 140 U/L (ref 40–129)
ALT SERPL-CCNC: 17 U/L (ref 0–40)
ANION GAP SERPL CALCULATED.3IONS-SCNC: 16 MMOL/L (ref 7–16)
AST SERPL-CCNC: 14 U/L (ref 0–39)
BASOPHILS ABSOLUTE: 0.05 K/UL (ref 0–0.2)
BASOPHILS RELATIVE PERCENT: 1 % (ref 0–2)
BILIRUB SERPL-MCNC: 0.2 MG/DL (ref 0–1.2)
BUN BLDV-MCNC: 18 MG/DL (ref 6–20)
CALCIUM SERPL-MCNC: 8.9 MG/DL (ref 8.6–10.2)
CHLORIDE BLD-SCNC: 106 MMOL/L (ref 98–107)
CHOLESTEROL: 158 MG/DL
CO2: 21 MMOL/L (ref 22–29)
CREAT SERPL-MCNC: 0.8 MG/DL (ref 0.7–1.2)
EOSINOPHILS ABSOLUTE: 0.26 K/UL (ref 0.05–0.5)
EOSINOPHILS RELATIVE PERCENT: 5 % (ref 0–6)
GFR SERPL CREATININE-BSD FRML MDRD: >60 ML/MIN/1.73M2
GLUCOSE BLD-MCNC: 111 MG/DL (ref 74–99)
HCT VFR BLD CALC: 43.6 % (ref 37–54)
HDLC SERPL-MCNC: 38 MG/DL
HEMOGLOBIN: 14.8 G/DL (ref 12.5–16.5)
IMMATURE GRANULOCYTES: 1 % (ref 0–5)
LDL CHOLESTEROL: 105 MG/DL
LYMPHOCYTES ABSOLUTE: 1.77 K/UL (ref 1.5–4)
LYMPHOCYTES RELATIVE PERCENT: 34 % (ref 20–42)
MCH RBC QN AUTO: 31.7 PG (ref 26–35)
MCHC RBC AUTO-ENTMCNC: 33.9 G/DL (ref 32–34.5)
MCV RBC AUTO: 93.4 FL (ref 80–99.9)
MONOCYTES ABSOLUTE: 0.43 K/UL (ref 0.1–0.95)
MONOCYTES RELATIVE PERCENT: 8 % (ref 2–12)
NEUTROPHILS ABSOLUTE: 2.6 K/UL (ref 1.8–7.3)
NEUTROPHILS RELATIVE PERCENT: 51 % (ref 43–80)
PDW BLD-RTO: 11.7 % (ref 11.5–15)
PLATELET # BLD: 226 K/UL (ref 130–450)
PMV BLD AUTO: 10.1 FL (ref 7–12)
POTASSIUM SERPL-SCNC: 4.1 MMOL/L (ref 3.5–5)
RBC # BLD: 4.67 M/UL (ref 3.8–5.8)
SODIUM BLD-SCNC: 143 MMOL/L (ref 132–146)
TOTAL PROTEIN: 6.6 G/DL (ref 6.4–8.3)
TRIGL SERPL-MCNC: 75 MG/DL
VLDLC SERPL CALC-MCNC: 15 MG/DL
WBC # BLD: 5.2 K/UL (ref 4.5–11.5)

## 2024-01-01 ENCOUNTER — HOSPITAL ENCOUNTER (EMERGENCY)
Age: 44
End: 2024-11-16
Attending: EMERGENCY MEDICINE
Payer: MEDICARE

## 2024-01-01 DIAGNOSIS — I46.9 CARDIOPULMONARY ARREST: Primary | ICD-10-CM

## 2024-01-01 PROCEDURE — 36556 INSERT NON-TUNNEL CV CATH: CPT

## 2024-01-01 PROCEDURE — 2500000003 HC RX 250 WO HCPCS

## 2024-01-01 PROCEDURE — 2500000003 HC RX 250 WO HCPCS: Performed by: EMERGENCY MEDICINE

## 2024-01-01 PROCEDURE — 99285 EMERGENCY DEPT VISIT HI MDM: CPT

## 2024-01-01 PROCEDURE — 92950 HEART/LUNG RESUSCITATION CPR: CPT

## 2024-01-01 PROCEDURE — 2580000003 HC RX 258

## 2024-01-01 PROCEDURE — 96375 TX/PRO/DX INJ NEW DRUG ADDON: CPT

## 2024-01-01 PROCEDURE — 6360000002 HC RX W HCPCS

## 2024-01-01 PROCEDURE — 6360000002 HC RX W HCPCS: Performed by: EMERGENCY MEDICINE

## 2024-01-01 PROCEDURE — 96374 THER/PROPH/DIAG INJ IV PUSH: CPT

## 2024-01-01 RX ORDER — EPINEPHRINE IN SOD CHLOR,ISO 1 MG/10 ML
SYRINGE (ML) INTRAVENOUS DAILY PRN
Status: COMPLETED | OUTPATIENT
Start: 2024-01-01 | End: 2024-01-01

## 2024-01-01 RX ORDER — MAGNESIUM SULFATE HEPTAHYDRATE 500 MG/ML
INJECTION, SOLUTION INTRAMUSCULAR; INTRAVENOUS DAILY PRN
Status: COMPLETED | OUTPATIENT
Start: 2024-01-01 | End: 2024-01-01

## 2024-01-01 RX ADMIN — Medication 1 MG: at 18:22

## 2024-01-01 RX ADMIN — SODIUM BICARBONATE 50 MEQ: 84 INJECTION, SOLUTION INTRAVENOUS at 18:06

## 2024-01-01 RX ADMIN — ALTEPLASE 50 MG: KIT at 18:17

## 2024-01-01 RX ADMIN — SODIUM BICARBONATE 50 MEQ: 84 INJECTION, SOLUTION INTRAVENOUS at 18:18

## 2024-01-01 RX ADMIN — Medication 1 MG: at 18:25

## 2024-01-01 RX ADMIN — Medication 1 MG: at 18:05

## 2024-01-01 RX ADMIN — Medication 1 MG: at 18:12

## 2024-01-01 RX ADMIN — Medication 1 MG: at 18:16

## 2024-01-01 RX ADMIN — MAGNESIUM SULFATE HEPTAHYDRATE 2000 MG: 500 INJECTION, SOLUTION INTRAMUSCULAR; INTRAVENOUS at 18:12

## 2024-01-01 RX ADMIN — Medication 1 MG: at 18:28

## 2024-01-01 RX ADMIN — MAGNESIUM SULFATE HEPTAHYDRATE 2000 MG: 500 INJECTION, SOLUTION INTRAMUSCULAR; INTRAVENOUS at 18:20

## 2024-01-15 ENCOUNTER — OFFICE VISIT (OUTPATIENT)
Dept: CARDIOLOGY CLINIC | Age: 44
End: 2024-01-15
Payer: MEDICARE

## 2024-01-15 VITALS
RESPIRATION RATE: 18 BRPM | WEIGHT: 261 LBS | HEART RATE: 92 BPM | HEIGHT: 68 IN | BODY MASS INDEX: 39.56 KG/M2 | SYSTOLIC BLOOD PRESSURE: 106 MMHG | DIASTOLIC BLOOD PRESSURE: 78 MMHG

## 2024-01-15 DIAGNOSIS — E78.5 BORDERLINE HYPERLIPIDEMIA: Primary | ICD-10-CM

## 2024-01-15 PROCEDURE — 93000 ELECTROCARDIOGRAM COMPLETE: CPT | Performed by: INTERNAL MEDICINE

## 2024-01-15 PROCEDURE — G8427 DOCREV CUR MEDS BY ELIG CLIN: HCPCS | Performed by: INTERNAL MEDICINE

## 2024-01-15 PROCEDURE — G8417 CALC BMI ABV UP PARAM F/U: HCPCS | Performed by: INTERNAL MEDICINE

## 2024-01-15 PROCEDURE — G8484 FLU IMMUNIZE NO ADMIN: HCPCS | Performed by: INTERNAL MEDICINE

## 2024-01-15 PROCEDURE — 99203 OFFICE O/P NEW LOW 30 MIN: CPT | Performed by: INTERNAL MEDICINE

## 2024-01-15 RX ORDER — IBUPROFEN 600 MG/1
600 TABLET ORAL EVERY 6 HOURS PRN
COMMUNITY

## 2024-01-15 NOTE — PROGRESS NOTES
[facility] consult  Dr. Ashley Martinez      Reason for Consult: Abnormal EKG, preoperative cardiac clearance  Referring Physician: Justina Christianson, APRN - CNP     CHIEF COMPLAINT:   Chief Complaint   Patient presents with    New Patient    Cardiac Clearance       HISTORY OF PRESENT ILLNESS:   Patient is 43 years old man with history of fall mental retardation, psychoaffective disorder, was referred to cardiology for preoperative cardiac evaluation prior to dental surgery.  Patient denies any chest pain, no shortness of breath, no lightheadedness, no dizziness, no palpitations, no pedal edema, no PND, no orthopnea, no syncope, no presyncopal episodes.    Past Medical History:   Diagnosis Date    Acute psychosis ([facility]) 4/9/2019    ADHD (attention deficit hyperactivity disorder)     Autism     PATIENT IS COOPERATIVE AND VERBAL PER MOM    Autism     Bipolar 1 disorder ([facility])     Drug-induced neutropenia ([facility]) 10/14/2020    Drug-induced parkinsonism ([facility]) 10/14/2020    Dysuria 5/26/2021    Encounter for lithium monitoring 6/9/2016    Enuresis, nocturnal only 9/18/2019    Eruption due to drug 6/28/2019    HE (hepatic encephalopathy) ([facility]) 5/26/2021    Mental retardation     Positive occult stool blood test 10/23/2018    Rectal bleeding 10/23/2018    Schizoaffective disorder ([facility])     Schizoaffective disorder, chronic condition with acute exacerbation ([facility]) 5/2/2019    Viral upper respiratory illness 4/10/2019         Past Surgical History:   Procedure Laterality Date    OTHER SURGICAL HISTORY      FASCIITIS AND HEEL SPUR    WISDOM TOOTH EXTRACTION  2006         Current Outpatient Medications   Medication Sig Dispense Refill    ibuprofen (ADVIL;MOTRIN) 600 MG tablet Take 1 tablet by mouth every 6 hours as needed for Pain      acetaminophen (AMINOFEN) 325 MG tablet Take 2 tablets by mouth every 4 hours as needed for Pain or Fever 40 tablet 1    loratadine (CLARITIN) 10 MG tablet TAKE 1 TABLET BY MOUTH DAILY. 93 tablet 3

## 2024-01-19 ENCOUNTER — PREP FOR PROCEDURE (OUTPATIENT)
Dept: DENTISTRY | Age: 44
End: 2024-01-19

## 2024-01-19 RX ORDER — SODIUM CHLORIDE, SODIUM LACTATE, POTASSIUM CHLORIDE, CALCIUM CHLORIDE 600; 310; 30; 20 MG/100ML; MG/100ML; MG/100ML; MG/100ML
INJECTION, SOLUTION INTRAVENOUS CONTINUOUS
Status: CANCELLED | OUTPATIENT
Start: 2024-01-19

## 2024-01-19 RX ORDER — SODIUM CHLORIDE 0.9 % (FLUSH) 0.9 %
5-40 SYRINGE (ML) INJECTION PRN
Status: CANCELLED | OUTPATIENT
Start: 2024-01-19

## 2024-01-19 RX ORDER — SODIUM CHLORIDE 0.9 % (FLUSH) 0.9 %
5-40 SYRINGE (ML) INJECTION EVERY 12 HOURS SCHEDULED
Status: CANCELLED | OUTPATIENT
Start: 2024-01-19

## 2024-01-19 RX ORDER — SODIUM CHLORIDE 9 MG/ML
INJECTION, SOLUTION INTRAVENOUS PRN
Status: CANCELLED | OUTPATIENT
Start: 2024-01-19

## 2024-01-22 ENCOUNTER — TELEPHONE (OUTPATIENT)
Dept: BEHAVIORAL HEALTH | Facility: CLINIC | Age: 44
End: 2024-01-22
Payer: COMMERCIAL

## 2024-01-22 DIAGNOSIS — F25.0 SCHIZOAFFECTIVE DISORDER, BIPOLAR TYPE (MULTI): ICD-10-CM

## 2024-01-22 NOTE — TELEPHONE ENCOUNTER
"Updates from [facility] staff:    \"Gasper was not feeling well this week.  All he would say was that he 'didn't feel good' but unable to say why or where he was hurting.  I only bring that up because I worked with him throughout the day today [1/21/24] and he seemed off, mentally.    He napped most of the day, could not seem to keep his eyes open.  When awake, I was asking him to get his water bottle from the frig, just a direct prompt.  He was standing in front of the open frig but was not able to follow the direction to grab it for 3 or 4 prompts. It was like that with most tasks today.    When he was awake he was happy, almost too happy, giddy in short spurts.    Because he was not feeling well, we waited for blood work.  I will send him this week but I was not certain if you wanted to add any other labs.    I have seen him do those in the past but it is usually a sign of stormy weather ahead.\"    Reviewed labs completed December 2023.  We will repeat BMP and Oxcarbazepine level with routine CBCD this week.  Has appointment scheduled 2/02/24.    "

## 2024-01-23 ENCOUNTER — OFFICE VISIT (OUTPATIENT)
Dept: FAMILY MEDICINE CLINIC | Age: 44
End: 2024-01-23
Payer: MEDICARE

## 2024-01-23 VITALS
HEART RATE: 88 BPM | RESPIRATION RATE: 16 BRPM | WEIGHT: 254 LBS | BODY MASS INDEX: 38.49 KG/M2 | HEIGHT: 68 IN | DIASTOLIC BLOOD PRESSURE: 72 MMHG | OXYGEN SATURATION: 98 % | SYSTOLIC BLOOD PRESSURE: 120 MMHG | TEMPERATURE: 98.6 F

## 2024-01-23 DIAGNOSIS — F25.0 SCHIZOAFFECTIVE DISORDER, BIPOLAR TYPE (HCC): ICD-10-CM

## 2024-01-23 DIAGNOSIS — Z00.00 MEDICARE ANNUAL WELLNESS VISIT, SUBSEQUENT: Primary | ICD-10-CM

## 2024-01-23 DIAGNOSIS — E66.01 SEVERE OBESITY (BMI 35.0-39.9) WITH COMORBIDITY (HCC): ICD-10-CM

## 2024-01-23 DIAGNOSIS — Z23 NEED FOR PROPHYLACTIC VACCINATION AGAINST DIPHTHERIA-TETANUS-PERTUSSIS (DTP): ICD-10-CM

## 2024-01-23 DIAGNOSIS — R74.8 ELEVATED SERUM ALKALINE PHOSPHATASE LEVEL: ICD-10-CM

## 2024-01-23 PROCEDURE — G8484 FLU IMMUNIZE NO ADMIN: HCPCS | Performed by: NURSE PRACTITIONER

## 2024-01-23 PROCEDURE — G0439 PPPS, SUBSEQ VISIT: HCPCS | Performed by: NURSE PRACTITIONER

## 2024-01-23 ASSESSMENT — PATIENT HEALTH QUESTIONNAIRE - PHQ9
9. THOUGHTS THAT YOU WOULD BE BETTER OFF DEAD, OR OF HURTING YOURSELF: 0
4. FEELING TIRED OR HAVING LITTLE ENERGY: 2
8. MOVING OR SPEAKING SO SLOWLY THAT OTHER PEOPLE COULD HAVE NOTICED. OR THE OPPOSITE, BEING SO FIGETY OR RESTLESS THAT YOU HAVE BEEN MOVING AROUND A LOT MORE THAN USUAL: 0
SUM OF ALL RESPONSES TO PHQ QUESTIONS 1-9: 7
SUM OF ALL RESPONSES TO PHQ QUESTIONS 1-9: 7
7. TROUBLE CONCENTRATING ON THINGS, SUCH AS READING THE NEWSPAPER OR WATCHING TELEVISION: 1
5. POOR APPETITE OR OVEREATING: 0
6. FEELING BAD ABOUT YOURSELF - OR THAT YOU ARE A FAILURE OR HAVE LET YOURSELF OR YOUR FAMILY DOWN: 0
1. LITTLE INTEREST OR PLEASURE IN DOING THINGS: 1
SUM OF ALL RESPONSES TO PHQ QUESTIONS 1-9: 7
SUM OF ALL RESPONSES TO PHQ QUESTIONS 1-9: 7
3. TROUBLE FALLING OR STAYING ASLEEP: 2
SUM OF ALL RESPONSES TO PHQ9 QUESTIONS 1 & 2: 2
2. FEELING DOWN, DEPRESSED OR HOPELESS: 1
10. IF YOU CHECKED OFF ANY PROBLEMS, HOW DIFFICULT HAVE THESE PROBLEMS MADE IT FOR YOU TO DO YOUR WORK, TAKE CARE OF THINGS AT HOME, OR GET ALONG WITH OTHER PEOPLE: 1

## 2024-01-23 NOTE — PROGRESS NOTES
hydroxide (COATES MILK OF MAGNESIA) 400 MG/5ML suspension Take 30 mLs by mouth as needed for Constipation (no more than 2 times a week) Yes Justina Christianson APRN - CNP   Melatonin 12 MG TABS Take 12 mg by mouth nightly Yes Phillip Fraire MD   cloNIDine (CATAPRES) 0.1 MG tablet Take 1 tablet by mouth Daily One at 3 pm Yes Phillip Fraire MD   guaiFENesin (MUCINEX) 600 MG extended release tablet Take 1 tablet by mouth 2 times daily as needed for Congestion Yes Justina Christianson APRN - CNP   ciclopirox (PENLAC) 8 % solution  Yes Phillip Fraire MD   Urea (CARMOL) 40 % cream apply to affected area twice a day (MORNING AND NIGHT) Yes Phillip Frarie MD   benztropine (COGENTIN) 0.5 MG tablet Take 1 tablet by mouth 2 times daily Yes Carissa Adame APRN - CNP   cloZAPine (CLOZARIL) 50 MG tablet Take 1 tablet by mouth daily  Patient taking differently: Take 1 tablet by mouth nightly Take with the 200 mg and 100 mg to equal 350 mg Yes Carissa Adame APRN - CNP   cloZAPine (CLOZARIL) 200 MG tablet Take 1 tablet by mouth nightly Yes Carissa Adame APRN - CNP   OXcarbazepine (TRILEPTAL) 300 MG tablet Take 1 tablet by mouth 2 times daily  Patient taking differently: Take 3 tablets by mouth 2 times daily Yes Carissa Adame APRN - CNP   LORazepam (ATIVAN) 0.5 MG tablet Take 2 tablets by mouth in the morning and 2 tablets in the evening.  Phillip Fraire MD   cloNIDine (CATAPRES) 0.3 MG tablet 1 tablet daily In am  Phillip Fraire MD CareTeam (Including outside providers/suppliers regularly involved in providing care):   Patient Care Team:  Justina Christianson APRN - CNP as PCP - General (Nurse Practitioner)  Justina Christianson APRN - CNP as PCP - Empaneled Provider     Reviewed and updated this visit:  Tobacco  Allergies  Meds  Problems  Med Hx  Surg Hx  Soc Hx  Fam Hx

## 2024-01-23 NOTE — ASSESSMENT & PLAN NOTE
Monitored by specialist- no acute findings meriting change in the plan continue current medications per psychiatry

## 2024-01-24 NOTE — PROGRESS NOTES
I left a voicemail for the medical coordinator with [facility], Ede, requesting a callback and information for future dental surgery.

## 2024-01-25 NOTE — PROGRESS NOTES
Left a voicemail message for Ede Jackson, [facility] requesting documentation for patient's chart as well as a return call to [facility].

## 2024-01-29 NOTE — PROGRESS NOTES
Karan from [facility] called PAT to notify us that the patient does not have a DNR or living will and that he signs his own consent forms.  She stated that the patient went for cardiology clearance on 1/15/24 and went to the PCP in December 2023 and also 1/23/24.

## 2024-01-31 DIAGNOSIS — R74.8 ELEVATED SERUM ALKALINE PHOSPHATASE LEVEL: ICD-10-CM

## 2024-01-31 LAB
ABSOLUTE IMMATURE GRANULOCYTE: 0.05 K/UL (ref 0–0.58)
ALBUMIN SERPL-MCNC: 4.1 G/DL (ref 3.5–5.2)
ALP BLD-CCNC: 88 U/L (ref 40–129)
ALT SERPL-CCNC: 20 U/L (ref 0–40)
ANION GAP SERPL CALCULATED.3IONS-SCNC: 11 MMOL/L (ref 7–16)
AST SERPL-CCNC: 22 U/L (ref 0–39)
BASOPHILS ABSOLUTE: 0.05 K/UL (ref 0–0.2)
BASOPHILS RELATIVE PERCENT: 1 % (ref 0–2)
BILIRUB SERPL-MCNC: 0.3 MG/DL (ref 0–1.2)
BUN BLDV-MCNC: 12 MG/DL (ref 6–20)
CALCIUM SERPL-MCNC: 9.2 MG/DL (ref 8.6–10.2)
CHLORIDE BLD-SCNC: 106 MMOL/L (ref 98–107)
CO2: 26 MMOL/L (ref 22–29)
CREAT SERPL-MCNC: 0.9 MG/DL (ref 0.7–1.2)
EOSINOPHILS ABSOLUTE: 0.25 K/UL (ref 0.05–0.5)
EOSINOPHILS RELATIVE PERCENT: 4 % (ref 0–6)
GFR SERPL CREATININE-BSD FRML MDRD: >60 ML/MIN/1.73M2
GLUCOSE BLD-MCNC: 109 MG/DL (ref 74–99)
HCT VFR BLD CALC: 45.3 % (ref 37–54)
HEMOGLOBIN: 15.3 G/DL (ref 12.5–16.5)
IMMATURE GRANULOCYTES: 1 % (ref 0–5)
LYMPHOCYTES ABSOLUTE: 2.14 K/UL (ref 1.5–4)
LYMPHOCYTES RELATIVE PERCENT: 37 % (ref 20–42)
MCH RBC QN AUTO: 31.7 PG (ref 26–35)
MCHC RBC AUTO-ENTMCNC: 33.8 G/DL (ref 32–34.5)
MCV RBC AUTO: 94 FL (ref 80–99.9)
MONOCYTES ABSOLUTE: 0.32 K/UL (ref 0.1–0.95)
MONOCYTES RELATIVE PERCENT: 6 % (ref 2–12)
NEUTROPHILS ABSOLUTE: 2.94 K/UL (ref 1.8–7.3)
NEUTROPHILS RELATIVE PERCENT: 51 % (ref 43–80)
PDW BLD-RTO: 11.9 % (ref 11.5–15)
PLATELET # BLD: 269 K/UL (ref 130–450)
PMV BLD AUTO: 10.1 FL (ref 7–12)
POTASSIUM SERPL-SCNC: 3.8 MMOL/L (ref 3.5–5)
RBC # BLD: 4.82 M/UL (ref 3.8–5.8)
SODIUM BLD-SCNC: 143 MMOL/L (ref 132–146)
TOTAL PROTEIN: 6.7 G/DL (ref 6.4–8.3)
WBC # BLD: 5.8 K/UL (ref 4.5–11.5)

## 2024-02-02 ENCOUNTER — TELEMEDICINE (OUTPATIENT)
Dept: BEHAVIORAL HEALTH | Facility: CLINIC | Age: 44
End: 2024-02-02
Payer: COMMERCIAL

## 2024-02-02 DIAGNOSIS — G21.19 DRUG-INDUCED PARKINSONISM (MULTI): ICD-10-CM

## 2024-02-02 DIAGNOSIS — K59.03 DRUG-INDUCED CONSTIPATION: ICD-10-CM

## 2024-02-02 DIAGNOSIS — F70 MILD INTELLECTUAL DISABILITY: ICD-10-CM

## 2024-02-02 DIAGNOSIS — F84.0 AUTISM SPECTRUM DISORDER (HHS-HCC): ICD-10-CM

## 2024-02-02 DIAGNOSIS — F25.0 SCHIZOAFFECTIVE DISORDER, BIPOLAR TYPE (MULTI): Primary | ICD-10-CM

## 2024-02-02 PROCEDURE — 99215 OFFICE O/P EST HI 40 MIN: CPT | Performed by: PSYCHIATRY & NEUROLOGY

## 2024-02-02 RX ORDER — ASENAPINE 10 MG/1
10 TABLET SUBLINGUAL NIGHTLY
Qty: 31 TABLET | Refills: 0 | Status: SHIPPED | OUTPATIENT
Start: 2024-02-02 | End: 2024-03-15 | Stop reason: SDUPTHER

## 2024-02-02 RX ORDER — LORAZEPAM 1 MG/1
1 TABLET ORAL 2 TIMES DAILY
Qty: 62 TABLET | Refills: 0 | Status: SHIPPED | OUTPATIENT
Start: 2024-02-02 | End: 2024-03-15 | Stop reason: SDUPTHER

## 2024-02-02 RX ORDER — DOCUSATE SODIUM 100 MG/1
200 CAPSULE, LIQUID FILLED ORAL EVERY MORNING
Qty: 62 CAPSULE | Refills: 0 | Status: SHIPPED | OUTPATIENT
Start: 2024-02-02 | End: 2024-03-15 | Stop reason: SDUPTHER

## 2024-02-02 RX ORDER — CLOZAPINE 100 MG/1
100 TABLET ORAL EVERY MORNING
Qty: 31 TABLET | Refills: 0 | Status: SHIPPED | OUTPATIENT
Start: 2024-02-02 | End: 2024-03-15 | Stop reason: SDUPTHER

## 2024-02-02 RX ORDER — CLONIDINE HYDROCHLORIDE 0.1 MG/1
TABLET ORAL
Qty: 62 TABLET | Refills: 0 | Status: SHIPPED | OUTPATIENT
Start: 2024-02-02 | End: 2024-03-15 | Stop reason: SDUPTHER

## 2024-02-02 RX ORDER — OXCARBAZEPINE 600 MG/1
TABLET, FILM COATED ORAL
Qty: 78 TABLET | Refills: 1 | Status: SHIPPED | OUTPATIENT
Start: 2024-02-02 | End: 2024-03-15 | Stop reason: SDUPTHER

## 2024-02-02 RX ORDER — BENZTROPINE MESYLATE 0.5 MG/1
0.5 TABLET ORAL 2 TIMES DAILY
Qty: 62 TABLET | Refills: 0 | Status: SHIPPED | OUTPATIENT
Start: 2024-02-02 | End: 2024-03-15 | Stop reason: SDUPTHER

## 2024-02-02 RX ORDER — CLOZAPINE 200 MG/1
400 TABLET ORAL NIGHTLY
Qty: 62 TABLET | Refills: 0 | Status: SHIPPED | OUTPATIENT
Start: 2024-02-02 | End: 2024-03-15 | Stop reason: SDUPTHER

## 2024-02-02 RX ORDER — LORAZEPAM 2 MG/1
2 TABLET ORAL NIGHTLY
Qty: 31 TABLET | Refills: 0 | Status: SHIPPED | OUTPATIENT
Start: 2024-02-02 | End: 2024-03-15 | Stop reason: SDUPTHER

## 2024-02-02 RX ORDER — CHLORHEXIDINE GLUCONATE 4 %
1 LIQUID (ML) TOPICAL NIGHTLY
Qty: 31 TABLET | Refills: 0 | Status: SHIPPED | OUTPATIENT
Start: 2024-02-02 | End: 2024-03-15 | Stop reason: SDUPTHER

## 2024-02-02 ASSESSMENT — ENCOUNTER SYMPTOMS
CHOKING: 0
CONFUSION: 1
TREMORS: 0
CONSTIPATION: 0
ACTIVITY CHANGE: 1
SPEECH DIFFICULTY: 1
TROUBLE SWALLOWING: 0
DIZZINESS: 1
SEIZURES: 0
AGITATION: 0

## 2024-02-02 NOTE — PROGRESS NOTES
Outpatient Psychiatry    A HIPAA-compliant interactive audio and video telecommunication system which permits real time communications between the patient (at the originating site) and provider (at the distant site) was utilized to provide this telehealth service.     The patient, family, caregivers and guardian (as appropriate) have provided consent on this date to conduct treatment via this telehealth service.  The patient's identity and physical location were verified at the time of this visit.      Present for appointment: Loretta Jackman staff (Sophia) and [facility] staff (Heather).    SUBJECTIVE    He has been having more problems with appearing sedated, confused/disoriented, and unsteady over the past several weeks.  No particularly obvious signs of alisha or psychosis noted.  Recent labs done.  He had some GI problems last week (possibly a viral gastroenteritis).  He does not seem to have any current tremors or drooling.  He is not having problems with bladder incontinence.  He has not had any episodes concerning for seizures.    Updates from providers and day program staff below:  I know Gasper has an upcoming appointment and I thought giving some insight from what we are seeing may help. Some out of character things Gasper is displaying while at the workshop (these are things I myself have seen):  - Sleeping almost the entire day.  - Not wanting to wake up and eat, and if he does, not eating his entire lunch.  - Not responding appropriately or forgetting who I am.   - Not knowing how to go to the bus; looking for his lunch box that is in front of him saying he can’t find it; asking me to put his coat on when it is already on.  - Dazed when he is awake.      Review of Systems   Constitutional:  Positive for activity change (more daytime sleepiness).   HENT:  Negative for drooling and trouble swallowing.    Respiratory:  Negative for choking.    Cardiovascular:  Negative for chest pain.   Gastrointestinal:   Negative for constipation.   Genitourinary:  Negative for enuresis.   Musculoskeletal:  Positive for gait problem.   Neurological:  Positive for dizziness and speech difficulty. Negative for tremors and seizures.   Psychiatric/Behavioral:  Positive for confusion. Negative for agitation and suicidal ideas.       Controlled Substance Evaluation  OARRS/PDMP reviewed: Roland Hampton MD on 2/2/2024  7:13 AM  Is the patient prescribed a combination of a benzodiazepine and opioid? No  I have personally reviewed the OARRS report for Gasper Burns.   I have considered the risks of abuse, dependence, addiction and diversion.    I believe that it is clinically appropriate for Gasper Burns to be prescribed this medication.    Last Urine Drug Screen: 12/20/2023  No results found for this or any previous visit (from the past 8760 hour(s)).  Results as expected? Yes    Controlled Substance Agreement: 12/09/2019  Reviewed Controlled Substance Agreement, including but not limited to:  Benefits, risks, and alternatives to treatment with a controlled substance medication(s).    Prescribed Controlled Substances:  Benzodiazepines: Lorazepam  What is the patient's goal of therapy? Akathisia  Is this being achieved with current treatment? Yes  Activities of Daily Living:   Is your overall impression that this patient is benefiting (symptom reduction outweighs side effects) from benzodiazepine therapy? Yes   1. Physical Functioning: Same  2. Family Relationship: Same  3. Social Relationship: Same  4. Mood: Same  5. Sleep Patterns: Same  6. Overall Function: Same     MEDICATION HISTORY  Aripiprazole  Artane  Carbamazepine  Depakote - hyperammonemic encephalopathy  Escitalopram  Haldol - severe EPS  Invega Sustenna  Lithium  Methylphenidate - increased irritability  Metoprolol  Mirtazapine  Quetiapine  Risperidone  Sertraline  Trazodone  Zolpidem    CURRENT MEDICATIONS    Current Outpatient Medications:     asenapine (Saphris)  SL tablet, Place 1 tablet (10 mg) under the tongue once daily at bedtime., Disp: 31 tablet, Rfl: 2    benztropine (Cogentin) 0.5 mg tablet, Take 1 tablet (0.5 mg) by mouth 2 times a day., Disp: 62 tablet, Rfl: 2    cloNIDine (Catapres) 0.1 mg tablet, 1 tablet twice daily morning and 3pm, Disp: 62 tablet, Rfl: 2    cloZAPine (Clozaril) 100 mg tablet, Take 1 tablet (100 mg) by mouth once daily in the morning., Disp: 31 tablet, Rfl: 2    cloZAPine (Clozaril) 200 mg tablet, Take 2 tablets (400 mg) by mouth once daily at bedtime., Disp: 62 tablet, Rfl: 2    docusate sodium (Colace) 100 mg capsule, Take 2 capsules (200 mg) by mouth once daily in the morning., Disp: 62 capsule, Rfl: 2    LORazepam (Ativan) 1 mg tablet, Take 1 tablet (1 mg) by mouth 2 times a day. AM and 3PM, Disp: 62 tablet, Rfl: 2    LORazepam (Ativan) 2 mg tablet, Take 1 tablet (2 mg) by mouth once daily at bedtime., Disp: 31 tablet, Rfl: 2    melatonin 12 mg tablet, Take 1 tablet by mouth once daily at bedtime., Disp: 31 tablet, Rfl: 2    OXcarbazepine (Trileptal) 600 mg tablet, Take 1.5 tablets (900 mg) by mouth 2 times a day., Disp: 93 tablet, Rfl: 2    SOCIAL HISTORY  Living situation Lives with staff and 1 male house-mate   Provider agency [facility]   Work or day program [place] 5 days/week   School N/A   Guardianship Self   SSA ?   Bx Specialist ?   Nicotine None   Alcohol None   Other drugs None     OBJECTIVE    Lab Results   Component Value Date    HGB 14.4 12/29/2020    PLT 87 (L) 12/29/2020    NEUTROABS 3.25 12/29/2020    GLUCOSE 111 (H) 12/29/2020     12/29/2020    K 3.9 12/29/2020    CO2 27 12/29/2020    CALCIUM 8.8 12/29/2020    CREATININE 0.92 12/29/2020    AST 24 12/29/2020    ALT 36 12/29/2020    HGBA1C 5.2 08/04/2023    AMMONIA 45 12/30/2020    TSH 1.68 12/30/2020     Therapeutic Drug Monitoring  12/20/2023: Oxcarbazepine level = 34 [3-35] (1800mg/day)  12/20/2023: Clozapine level = 411 (500mg/day)  07/06/2023:  Oxcarbazepine level = 31 [3-35] (1800mg/day)  06/00/2022: Clozapine level = 367 (450mg/day)  06/00/2022: Oxcarbazepine level = 33 [3-35] (1800mg/day)    Electrocardiograms  05/02/2022: Sinus tachycardia, , QTc 445    MENTAL STATUS EXAM  General/Appearance: Appropriate dress/hygiene/grooming. No drooling.  Attitude/Behavior: Difficult to engage. Poor/limited eye contact. Seems drowsy.  Speech/Communication:  Slurred.  Motor: No abnormal or involuntary movements observed.  Gait: Unsteady.  Mood: Appears to be in good spirits.  Affect: Flat.   Thought processes:  Somewhat organized.  Thought content: Non-sequiturs.  Perception: Does not appear to be experiencing or responding to hallucinations.  Sensorium/Cognition: Oriented to self and surroundings. Intellectual impairment. Drowsy. Impaired ability to sustain attention/concentration. Appears over-medicated.  Memory:  Fair recall. Could remember that today is Groundhog's Day.  Insight: Minimal.  Judgment: Fair.     ASSESSMENT  Based on current symptom presentation (recent GI upset, sedation, confusion, ataxia) and labs, it seems possible that he may be experiencing toxicity from oxcarbazepine.  If he doesn't improve after reducing dose of oxcarbazepine I would consider trying to reduce the dose of asenapine as a next step.  Annual CSA paperwork has again been sent to patient and his provider agency for completion.    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    PLAN  -- Decrease oxcarbazepine to 600mg in the morning and 900mg at bedtime for mood stabilization  -- Continue asenapine 10mg QHS for psychosis, sleep, and mood stabilization  -- Continue clozapine 100mg QAM and 400mg QHS for psychosis  -- Continue lorazepam 1mg - 1mg - 2mg (AM, 15:00, HS) for anxiety and mood  -- Continue clonidine 0.1mg BID (AM & 15:00)  for impulsivity and hyperactivity  -- Continue melatonin 12mg QHS for sleep  -- Continue benztropine 0.5mg BID (AM & 15:00) for EPS  -- Follow up 6 weeks    Roland Hampton MD    Prep time on date of the patient encounter: 10 minutes   Time spent directly with patient/family/caregiver: 30 minutes   Additional time spent on patient care activities: 0 minutes   Documentation time: 5 minutes   Other time spent: 0 minutes   Total time on date of patient encounter: 45 minutes

## 2024-02-02 NOTE — PATIENT INSTRUCTIONS
Side effects may be related to toxicity from Trileptal (oxcarbazepine):  > Decrease oxcarbazepine to 600mg (one tablet) in the morning and 900mg (one and one half tablets) at bedtime.    Please have Gasper complete attached Controlled Substance Agreement for 2024 and return to Dr. Hampton.    Next appointment: Friday 3/15/2024 at 9:00 AM virtual.

## 2024-02-03 LAB — OXCARBAZEPINE: 34 UG/ML (ref 3–35)

## 2024-02-09 RX ORDER — PRENATAL VIT 91/IRON/FOLIC/DHA 28-975-200
COMBINATION PACKAGE (EA) ORAL 2 TIMES DAILY
COMMUNITY

## 2024-02-09 RX ORDER — LORAZEPAM 2 MG/1
TABLET ORAL NIGHTLY PRN
COMMUNITY
Start: 2024-01-26

## 2024-02-09 RX ORDER — CLOZAPINE 100 MG/1
100 TABLET ORAL EVERY MORNING
COMMUNITY

## 2024-02-09 RX ORDER — OXCARBAZEPINE 600 MG/1
900 TABLET, FILM COATED ORAL 2 TIMES DAILY
COMMUNITY

## 2024-02-09 NOTE — PROGRESS NOTES
[facility]   PRE-ADMISSION TESTING GENERAL INSTRUCTIONS  PAT Phone Number: [phone]      GENERAL INSTRUCTIONS:    [x] Antibacterial Soap Shower Night before and/or AM of surgery.  [x] Do not wear makeup, lotions, powders, deodorant.   [x] Nothing by mouth after midnight; including gum, candy, mints, or water.  [x] You may brush your teeth, gargle, but do NOT swallow water.   [x] No tobacco products, illegal drugs, or alcohol within 24 hours of your surgery.  [x] Jewelry or valuables should not be brought to the hospital. All body and/or tongue piercing's must be removed prior to arriving to hospital. No contact lens or hair pins.   [x] Arrange transportation with a responsible adult  to and from the hospital. Arrange for someone to be with you for the remainder of the day and for 24 hours after your procedure due to having had anesthesia.          -Who will be your  for transportation?__facility to transport__  [x] Bring insurance card and photo ID.     PARKING INSTRUCTIONS:     [x] ARRIVAL DATE & TIME: 2/14 @ 0830am  [x] Times are subject to change. We will contact you the business day before surgery if that were to occur.  [x] Enter into the [facility] Entrance. Two people may accompany you. Masks are not required.  [x] Parking Lot \"I\" is where you will park. It is located on the corner of [address] and [address]. The entrance is on [address].   Only one vehicle - per patient, is permitted in parking lot.   Upon entering the parking lot, a voucher ticket will print.      MEDICATION INSTRUCTIONS:    [x] Bring a complete list of your medications, please write the last time you took the medicine, give this list to the nurse in Pre-Op.    [x] Take only the following medications the morning of surgery with 1-2 ounces of water:    - Ativan, oxcarbazepine, clonidine, benztropine, clozapine, omeprazole    [x] Stop all herbal supplements and vitamins 5 days

## 2024-02-09 NOTE — H&P (VIEW-ONLY)
Dental History and Physical    See Mcdaniels    [address] [place] [address]    The patient is a 43 y.o. male     Chief Complaint: Generalized heavy calculus, missing teeth    History of present illness: Due to patient's health history and inability to cooperate for necessary treatment in the clinic setting, recommend for patient to have comprehensive dental care under [place]      Past Medical History:      Diagnosis Date    Acute psychosis ([facility]) 4/9/2019    ADHD (attention deficit hyperactivity disorder)     Autism     PATIENT IS COOPERATIVE AND VERBAL PER MOM    Autism     Bipolar 1 disorder ([facility])     Drug-induced neutropenia ([facility]) 10/14/2020    Drug-induced parkinsonism ([facility]) 10/14/2020    Dysuria 5/26/2021    Encounter for lithium monitoring 6/9/2016    Enuresis, nocturnal only 9/18/2019    Eruption due to drug 6/28/2019    HE (hepatic encephalopathy) ([facility]) 5/26/2021    Mental retardation     Positive occult stool blood test 10/23/2018    Rectal bleeding 10/23/2018    Schizoaffective disorder ([facility])     Schizoaffective disorder, chronic condition with acute exacerbation ([facility]) 5/2/2019    Viral upper respiratory illness 4/10/2019       Past Surgical History:        Procedure Laterality Date    OTHER SURGICAL HISTORY      FASCIITIS AND HEEL SPUR    WISDOM TOOTH EXTRACTION  2006       Medications Prior to Admission:    Prior to Admission medications    Medication Sig Start Date End Date Taking? Authorizing Provider   OXcarbazepine (TRILEPTAL) 600 MG tablet Take 1.5 tablets by mouth 2 times daily    Phillip Fraire MD   cloZAPine (CLOZARIL) 100 MG tablet Take 1 tablet by mouth every morning    Phillip Fraire MD   LORazepam (ATIVAN) 2 MG tablet nightly as needed. 1/26/24   Phillip Fraire MD   terbinafine (LAMISIL) 1 % cream Apply topically 2 times daily Apply topically 2 times daily.    Phillip Fraire MD   acetaminophen (AMINOFEN) 325 MG tablet Take 2 tablets by mouth every 4 hours

## 2024-02-09 NOTE — H&P
Dental History and Physical    See Mcdaniels    [address] [place] [address]    The patient is a 43 y.o. male     Chief Complaint: Generalized heavy calculus, missing teeth    History of present illness: Due to patient's health history and inability to cooperate for necessary treatment in the clinic setting, recommend for patient to have comprehensive dental care under [place]      Past Medical History:      Diagnosis Date    Acute psychosis ([facility]) 4/9/2019    ADHD (attention deficit hyperactivity disorder)     Autism     PATIENT IS COOPERATIVE AND VERBAL PER MOM    Autism     Bipolar 1 disorder ([facility])     Drug-induced neutropenia ([facility]) 10/14/2020    Drug-induced parkinsonism ([facility]) 10/14/2020    Dysuria 5/26/2021    Encounter for lithium monitoring 6/9/2016    Enuresis, nocturnal only 9/18/2019    Eruption due to drug 6/28/2019    HE (hepatic encephalopathy) ([facility]) 5/26/2021    Mental retardation     Positive occult stool blood test 10/23/2018    Rectal bleeding 10/23/2018    Schizoaffective disorder ([facility])     Schizoaffective disorder, chronic condition with acute exacerbation ([facility]) 5/2/2019    Viral upper respiratory illness 4/10/2019       Past Surgical History:        Procedure Laterality Date    OTHER SURGICAL HISTORY      FASCIITIS AND HEEL SPUR    WISDOM TOOTH EXTRACTION  2006       Medications Prior to Admission:    Prior to Admission medications    Medication Sig Start Date End Date Taking? Authorizing Provider   OXcarbazepine (TRILEPTAL) 600 MG tablet Take 1.5 tablets by mouth 2 times daily    Phillip Fraire MD   cloZAPine (CLOZARIL) 100 MG tablet Take 1 tablet by mouth every morning    Phillip Fraire MD   LORazepam (ATIVAN) 2 MG tablet nightly as needed. 1/26/24   Phillip Fraire MD   terbinafine (LAMISIL) 1 % cream Apply topically 2 times daily Apply topically 2 times daily.    Phillip Fraire MD   acetaminophen (AMINOFEN) 325 MG tablet Take 2 tablets by mouth every 4 hours

## 2024-02-13 ENCOUNTER — ANESTHESIA EVENT (OUTPATIENT)
Dept: OPERATING ROOM | Age: 44
End: 2024-02-13
Payer: MEDICARE

## 2024-02-14 ENCOUNTER — HOSPITAL ENCOUNTER (OUTPATIENT)
Age: 44
Setting detail: OUTPATIENT SURGERY
Discharge: HOME | End: 2024-02-14
Attending: DENTIST | Admitting: DENTIST
Payer: MEDICARE

## 2024-02-14 ENCOUNTER — ANESTHESIA (OUTPATIENT)
Dept: OPERATING ROOM | Age: 44
End: 2024-02-14
Payer: MEDICARE

## 2024-02-14 VITALS
TEMPERATURE: 97.4 F | DIASTOLIC BLOOD PRESSURE: 84 MMHG | SYSTOLIC BLOOD PRESSURE: 131 MMHG | OXYGEN SATURATION: 93 % | RESPIRATION RATE: 17 BRPM | HEART RATE: 94 BPM

## 2024-02-14 PROBLEM — K02.9 DENTAL CARIES: Status: ACTIVE | Noted: 2024-02-14

## 2024-02-14 PROCEDURE — 3700000000 HC ANESTHESIA ATTENDED CARE: Performed by: DENTIST

## 2024-02-14 PROCEDURE — 2580000003 HC RX 258

## 2024-02-14 PROCEDURE — 3600000006 HC SURGERY LEVEL 6 BASE: Performed by: DENTIST

## 2024-02-14 PROCEDURE — 7100000001 HC PACU RECOVERY - ADDTL 15 MIN: Performed by: DENTIST

## 2024-02-14 PROCEDURE — 2500000003 HC RX 250 WO HCPCS

## 2024-02-14 PROCEDURE — 2709999900 HC NON-CHARGEABLE SUPPLY: Performed by: DENTIST

## 2024-02-14 PROCEDURE — 7100000010 HC PHASE II RECOVERY - FIRST 15 MIN: Performed by: DENTIST

## 2024-02-14 PROCEDURE — 7100000000 HC PACU RECOVERY - FIRST 15 MIN: Performed by: DENTIST

## 2024-02-14 PROCEDURE — 7100000011 HC PHASE II RECOVERY - ADDTL 15 MIN: Performed by: DENTIST

## 2024-02-14 PROCEDURE — 3600000016 HC SURGERY LEVEL 6 ADDTL 15MIN: Performed by: DENTIST

## 2024-02-14 PROCEDURE — 6360000002 HC RX W HCPCS: Performed by: ANESTHESIOLOGY

## 2024-02-14 PROCEDURE — 6360000002 HC RX W HCPCS

## 2024-02-14 PROCEDURE — 3700000001 HC ADD 15 MINUTES (ANESTHESIA): Performed by: DENTIST

## 2024-02-14 RX ORDER — LABETALOL HYDROCHLORIDE 5 MG/ML
10 INJECTION, SOLUTION INTRAVENOUS
Status: DISCONTINUED | OUTPATIENT
Start: 2024-02-14 | End: 2024-02-14 | Stop reason: HOSPADM

## 2024-02-14 RX ORDER — FENTANYL CITRATE 50 UG/ML
INJECTION, SOLUTION INTRAMUSCULAR; INTRAVENOUS PRN
Status: DISCONTINUED | OUTPATIENT
Start: 2024-02-14 | End: 2024-02-14 | Stop reason: SDUPTHER

## 2024-02-14 RX ORDER — SODIUM CHLORIDE 0.9 % (FLUSH) 0.9 %
5-40 SYRINGE (ML) INJECTION PRN
Status: DISCONTINUED | OUTPATIENT
Start: 2024-02-14 | End: 2024-02-14 | Stop reason: HOSPADM

## 2024-02-14 RX ORDER — MIDAZOLAM HYDROCHLORIDE 1 MG/ML
INJECTION INTRAMUSCULAR; INTRAVENOUS
Status: DISCONTINUED
Start: 2024-02-14 | End: 2024-02-14 | Stop reason: HOSPADM

## 2024-02-14 RX ORDER — SODIUM CHLORIDE 9 MG/ML
INJECTION, SOLUTION INTRAVENOUS PRN
Status: DISCONTINUED | OUTPATIENT
Start: 2024-02-14 | End: 2024-02-14 | Stop reason: HOSPADM

## 2024-02-14 RX ORDER — HYDRALAZINE HYDROCHLORIDE 20 MG/ML
10 INJECTION INTRAMUSCULAR; INTRAVENOUS
Status: DISCONTINUED | OUTPATIENT
Start: 2024-02-14 | End: 2024-02-14 | Stop reason: HOSPADM

## 2024-02-14 RX ORDER — HYDROMORPHONE HYDROCHLORIDE 1 MG/ML
0.25 INJECTION, SOLUTION INTRAMUSCULAR; INTRAVENOUS; SUBCUTANEOUS EVERY 5 MIN PRN
Status: DISCONTINUED | OUTPATIENT
Start: 2024-02-14 | End: 2024-02-14 | Stop reason: HOSPADM

## 2024-02-14 RX ORDER — MEPERIDINE HYDROCHLORIDE 25 MG/ML
12.5 INJECTION INTRAMUSCULAR; INTRAVENOUS; SUBCUTANEOUS EVERY 5 MIN PRN
Status: DISCONTINUED | OUTPATIENT
Start: 2024-02-14 | End: 2024-02-14 | Stop reason: HOSPADM

## 2024-02-14 RX ORDER — MIDAZOLAM HYDROCHLORIDE 1 MG/ML
2 INJECTION INTRAMUSCULAR; INTRAVENOUS ONCE
Status: DISCONTINUED | OUTPATIENT
Start: 2024-02-14 | End: 2024-02-14 | Stop reason: HOSPADM

## 2024-02-14 RX ORDER — KETOROLAC TROMETHAMINE 30 MG/ML
INJECTION, SOLUTION INTRAMUSCULAR; INTRAVENOUS PRN
Status: DISCONTINUED | OUTPATIENT
Start: 2024-02-14 | End: 2024-02-14 | Stop reason: SDUPTHER

## 2024-02-14 RX ORDER — IPRATROPIUM BROMIDE AND ALBUTEROL SULFATE 2.5; .5 MG/3ML; MG/3ML
1 SOLUTION RESPIRATORY (INHALATION)
Status: DISCONTINUED | OUTPATIENT
Start: 2024-02-14 | End: 2024-02-14 | Stop reason: HOSPADM

## 2024-02-14 RX ORDER — MIDAZOLAM HYDROCHLORIDE 2 MG/2ML
2 INJECTION, SOLUTION INTRAMUSCULAR; INTRAVENOUS
Status: COMPLETED | OUTPATIENT
Start: 2024-02-14 | End: 2024-02-14

## 2024-02-14 RX ORDER — SODIUM CHLORIDE 0.9 % (FLUSH) 0.9 %
5-40 SYRINGE (ML) INJECTION EVERY 12 HOURS SCHEDULED
Status: DISCONTINUED | OUTPATIENT
Start: 2024-02-14 | End: 2024-02-14 | Stop reason: HOSPADM

## 2024-02-14 RX ORDER — HYDROMORPHONE HYDROCHLORIDE 1 MG/ML
0.5 INJECTION, SOLUTION INTRAMUSCULAR; INTRAVENOUS; SUBCUTANEOUS EVERY 5 MIN PRN
Status: DISCONTINUED | OUTPATIENT
Start: 2024-02-14 | End: 2024-02-14 | Stop reason: HOSPADM

## 2024-02-14 RX ORDER — DEXAMETHASONE SODIUM PHOSPHATE 10 MG/ML
INJECTION INTRAMUSCULAR; INTRAVENOUS PRN
Status: DISCONTINUED | OUTPATIENT
Start: 2024-02-14 | End: 2024-02-14 | Stop reason: SDUPTHER

## 2024-02-14 RX ORDER — MIDAZOLAM HYDROCHLORIDE 2 MG/2ML
2 INJECTION, SOLUTION INTRAMUSCULAR; INTRAVENOUS ONCE
OUTPATIENT
Start: 2024-02-14 | End: 2024-02-14

## 2024-02-14 RX ORDER — MIDAZOLAM HYDROCHLORIDE 1 MG/ML
INJECTION INTRAMUSCULAR; INTRAVENOUS PRN
Status: DISCONTINUED | OUTPATIENT
Start: 2024-02-14 | End: 2024-02-14 | Stop reason: SDUPTHER

## 2024-02-14 RX ORDER — ONDANSETRON 2 MG/ML
INJECTION INTRAMUSCULAR; INTRAVENOUS PRN
Status: DISCONTINUED | OUTPATIENT
Start: 2024-02-14 | End: 2024-02-14 | Stop reason: SDUPTHER

## 2024-02-14 RX ORDER — SODIUM CHLORIDE 9 MG/ML
INJECTION, SOLUTION INTRAVENOUS CONTINUOUS PRN
Status: DISCONTINUED | OUTPATIENT
Start: 2024-02-14 | End: 2024-02-14 | Stop reason: SDUPTHER

## 2024-02-14 RX ORDER — ROCURONIUM BROMIDE 10 MG/ML
INJECTION, SOLUTION INTRAVENOUS PRN
Status: DISCONTINUED | OUTPATIENT
Start: 2024-02-14 | End: 2024-02-14 | Stop reason: SDUPTHER

## 2024-02-14 RX ORDER — SODIUM CHLORIDE, SODIUM LACTATE, POTASSIUM CHLORIDE, CALCIUM CHLORIDE 600; 310; 30; 20 MG/100ML; MG/100ML; MG/100ML; MG/100ML
INJECTION, SOLUTION INTRAVENOUS CONTINUOUS
Status: DISCONTINUED | OUTPATIENT
Start: 2024-02-14 | End: 2024-02-14 | Stop reason: HOSPADM

## 2024-02-14 RX ORDER — LIDOCAINE HYDROCHLORIDE 20 MG/ML
INJECTION, SOLUTION INTRAVENOUS PRN
Status: DISCONTINUED | OUTPATIENT
Start: 2024-02-14 | End: 2024-02-14 | Stop reason: SDUPTHER

## 2024-02-14 RX ORDER — ONDANSETRON 2 MG/ML
4 INJECTION INTRAMUSCULAR; INTRAVENOUS
Status: DISCONTINUED | OUTPATIENT
Start: 2024-02-14 | End: 2024-02-14 | Stop reason: HOSPADM

## 2024-02-14 RX ORDER — IBUPROFEN 600 MG/1
600 TABLET ORAL EVERY 6 HOURS PRN
Qty: 28 TABLET | Refills: 0 | Status: SHIPPED | OUTPATIENT
Start: 2024-02-14 | End: 2024-02-21

## 2024-02-14 RX ORDER — PROPOFOL 10 MG/ML
INJECTION, EMULSION INTRAVENOUS PRN
Status: DISCONTINUED | OUTPATIENT
Start: 2024-02-14 | End: 2024-02-14 | Stop reason: SDUPTHER

## 2024-02-14 RX ADMIN — ONDANSETRON 4 MG: 2 INJECTION INTRAMUSCULAR; INTRAVENOUS at 13:29

## 2024-02-14 RX ADMIN — SUGAMMADEX 200 MG: 100 INJECTION, SOLUTION INTRAVENOUS at 13:36

## 2024-02-14 RX ADMIN — FENTANYL CITRATE 50 MCG: 50 INJECTION, SOLUTION INTRAMUSCULAR; INTRAVENOUS at 10:44

## 2024-02-14 RX ADMIN — PROPOFOL 170 MG: 10 INJECTION, EMULSION INTRAVENOUS at 10:44

## 2024-02-14 RX ADMIN — MIDAZOLAM 2 MG: 1 INJECTION INTRAMUSCULAR; INTRAVENOUS at 10:37

## 2024-02-14 RX ADMIN — SODIUM CHLORIDE: 9 INJECTION, SOLUTION INTRAVENOUS at 10:37

## 2024-02-14 RX ADMIN — LIDOCAINE HYDROCHLORIDE 60 MG: 20 INJECTION, SOLUTION INTRAVENOUS at 10:44

## 2024-02-14 RX ADMIN — PROPOFOL 30 MG: 10 INJECTION, EMULSION INTRAVENOUS at 12:25

## 2024-02-14 RX ADMIN — FENTANYL CITRATE 50 MCG: 50 INJECTION, SOLUTION INTRAMUSCULAR; INTRAVENOUS at 11:01

## 2024-02-14 RX ADMIN — KETOROLAC TROMETHAMINE 30 MG: 30 INJECTION, SOLUTION INTRAMUSCULAR at 13:31

## 2024-02-14 RX ADMIN — DEXAMETHASONE SODIUM PHOSPHATE 10 MG: 10 INJECTION INTRAMUSCULAR; INTRAVENOUS at 10:51

## 2024-02-14 RX ADMIN — MIDAZOLAM 2 MG: 1 INJECTION INTRAMUSCULAR; INTRAVENOUS at 14:17

## 2024-02-14 RX ADMIN — ROCURONIUM BROMIDE 35 MG: 10 INJECTION, SOLUTION INTRAVENOUS at 10:44

## 2024-02-14 ASSESSMENT — PAIN - FUNCTIONAL ASSESSMENT
PAIN_FUNCTIONAL_ASSESSMENT: NONE - DENIES PAIN
PAIN_FUNCTIONAL_ASSESSMENT: ADULT NONVERBAL PAIN SCALE (NPVS)

## 2024-02-14 NOTE — DISCHARGE INSTRUCTIONS
Post- Operative Patient  Instructions for [facility]     Please read and follow these instructions carefully. The after effects of oral surgery vary per child, so not all of these instructions may apply. Please feel free to call our clinic any time should you have any questions, or are experiencing any unusual symptoms following your treatment. There is always a dental resident on call after hours that you may reach to discuss your child's care.                                                            Day of Surgery    Immediately after surgery.Patients that have received a general anesthetic should return home from the hospital immediately upon discharge, and lie down with head elevated until all effects of the anesthesia have disappeared. Anesthetic effects vary by individual, and you may feel drowsy for a short period of time or for several hours. Your child should not participate in activities for at least 12 hours or longer if they appear drowsing or tired from the residual effects of the anesthesia.    Do not send your child to school within 24 hours after procedure.    Do not allow your child to participate in activities before 12 hours or longer depending on how your child is feeling.     Watch out for dizziness. Have them walk slowly and take their time. Sudden changes of position can also cause nausea.    Diet: If they feel nauseated or sick to your stomach, drink clear liquids like 7-up, room temperature broth, apple juice, ginger ale, room temperature tea, cola, or eat jello. If these liquids do not make you sick to their stomach, try eating soft foods like mashed potatoes, scrambled eggs, and cereal.    6)  Discuss any questions you may have with the dental clinic at [phone] during    office hours or [phone] on weekends and evening.                                                                                                      Oral Hygiene and Care    Do not disturb

## 2024-02-14 NOTE — OP NOTE
Date of Procedure: 2/14/2024     Surgeon: Odette Mckeon DDS ; Sergei Bradley DMD; Hardeep Mireles DMD    Surgical Wound Classification: Clean Contaminated II    Preoperative Diagnosis: Dental caries      Postoperative Diagnosis: Dental caries, gingivitis    Operation: Exam, Prophy, Fluoride Treatment, Restorative:9 Extractions: none    Anesthesia: General ETT    Estimated Blood Loss: <20    Complications:  none    Fluids: 900 mL    Specimen: none    Conditions:  good    Disposition: To PACU, stable    Procedure: This patient was initially seen in the preoperative holding area, where the history, physical and consents were updated and verified.  The patient was then transferred via the anesthesia team and [facility] to operating room # 10 at [facility] on 2/14/2024 , at which time the patient was transferred from the [facility] onto the operating room table and placed in the supine position.  The patient's arms and legs were padded at the sides.  The patient had the general anesthetic monitors applied.  Please see anesthesia notes for complete details.    Once the patient was placed into a general anesthetic state, the surgical area was prepped and draped in a standard oral and maxillofacial surgery fashion.  A throat pack was placed.  A comprehensive oral exam was performed.  15 radiographs were taken.  A treatment plan was formulated based upon presenting clinical and radiographic findings.  Caries were identified, followed by the preparation of the following teeth: #2-MO, 3-DO, 10-F, 11-F, 14-DO, 15-MO, 18-B, 21-B5, 31-OB.  Silver diamine fluoride placed #14,15. Dental restorations were placed as follows: 2-MO (amalgam), 3-DO (GI), 10-F, 11-F, 14-DO, 15-MO (amalgam), 18-B, 21-B5, 31-OB.  Dental restorations were polished and refined to proper occlusion.  A prophylaxis with cavitron completed. Polished with pumice. Fluoride and peridex applied.     Throat pack was removed.  Patient was awake from  anesthesia.  Patient was released to recovery room in good condition.  Patient tolerated procedure well.  Postoperative instructions given to parent.      The following prescriptions were given: (1) Ibuprofen.  No refills on prescription.     1-Week Post Op:  02/21/2024 at  3:00 pm    Written by: Sergei Bradley DMD , for Odette Mckeon DDS     I was present with the above resident and patient for pre-operative, procedure, and post-operative care. I agree with the above. Written and verbal post-op instructions given to patient's mother and caregiver who verbalized their understanding. All questions addressed. Electronically signed by Odette Mckeon DDS on 2/14/2024 at 4:02 PM

## 2024-02-14 NOTE — ANESTHESIA PRE PROCEDURE
Department of Anesthesiology  Preprocedure Note       Name:  See Mcdaniels   Age:  43 y.o.  :  1980                                          MRN:  [id number]         Date:  2024      Surgeon: Surgeon(s):  Odette Mckeon DDS    Procedure: Procedure(s):  EXAM, RADIOGRAPHS, ADULT PROPHYALXIS, FLOURIDE, RESTORATIONS, AND EXTRACTIONS X    Medications prior to admission:   Prior to Admission medications    Medication Sig Start Date End Date Taking? Authorizing Provider   OXcarbazepine (TRILEPTAL) 600 MG tablet Take 1.5 tablets by mouth 2 times daily   Yes Phillip Fraire MD   cloZAPine (CLOZARIL) 100 MG tablet Take 1 tablet by mouth every morning   Yes Phillip Fraire MD   LORazepam (ATIVAN) 2 MG tablet nightly as needed. 24  Yes Phillip Fraire MD   terbinafine (LAMISIL) 1 % cream Apply topically 2 times daily Apply topically 2 times daily.   Yes Phillip Fraire MD   acetaminophen (AMINOFEN) 325 MG tablet Take 2 tablets by mouth every 4 hours as needed for Pain or Fever 23   Justina Christianson APRN - CNP   STOOL SOFTENER 100 MG capsule  23   Phillip Fraire MD   loratadine (CLARITIN) 10 MG tablet TAKE 1 TABLET BY MOUTH DAILY. 23   Justina Christianson APRN - CNP   omeprazole (PRILOSEC) 20 MG delayed release capsule TAKE 1 CAPSULE BY MOUTH ONCE A DAY. (30 MINUTES PRIOR TO BREAKFAST) 22   Justina Christianson APRN - CNP   cloZAPine (CLOZARIL) 100 MG tablet Take 2 tablets by mouth nightly    Phillip Fraire MD   LORazepam (ATIVAN) 1 MG tablet Take 1 tablet by mouth daily. Take 1 mg at 3 pm 22   Phillip Fraire MD   asenapine maleate (SAPHRIS) 10 MG SUBL sublingual tablet Place 1 tablet under the tongue nightly 22   Phillip Fraire MD   Melatonin 12 MG TABS Take 12 mg by mouth nightly 10/27/21   Phillip Fraire MD   cloNIDine (CATAPRES) 0.1 MG tablet Take 1 tablet by mouth Daily One at 3 pm    Phillip Fraire MD guaiFENesin

## 2024-02-14 NOTE — ANESTHESIA POSTPROCEDURE EVALUATION
Department of Anesthesiology  Postprocedure Note    Patient: See Mcdaniels  MRN: [id number]  YOB: 1980  Date of evaluation: 2/14/2024    Procedure Summary       Date: 02/14/24 Room / Location: [address]    Anesthesia Start: 1037 Anesthesia Stop: 1352    Procedure: EXAM, RADIOGRAPHS, ADULT PROPHYALXIS, FLOURIDE, RESTORATIONS (Mouth) Diagnosis:       Dental caries      (Dental caries [K02.9])    Surgeons: Odette Mckeon DDS Responsible Provider: Anupama Hunter DO    Anesthesia Type: General ASA Status: 2            Anesthesia Type: General    Fabrizio Phase I: Fabrizio Score: 8    Fabrizio Phase II:      Anesthesia Post Evaluation    Patient location during evaluation: PACU  Patient participation: complete - patient participated  Level of consciousness: awake  Airway patency: patent  Nausea & Vomiting: no nausea and no vomiting  Cardiovascular status: hemodynamically stable  Respiratory status: acceptable  Hydration status: stable  Pain management: adequate    No notable events documented.

## 2024-02-14 NOTE — PROGRESS NOTES
At 1410 patient woke from sedation swinging and kicking. This nurse was trying to redirect patient and calm him. Patient shouting \"I am going to pee on my self. I myself was unable to redirect patient . Several other nurses stepped in to help try to redirecting patient and informing patient that we had a urinal on hand and he could go to the bathroom. Patient tried to sliding down to the bottom of the bed to get off . Patient still very groggy from anesthesia. Staff transferred patient back to the top of the bed. Patient continued to swing and kick. Anesthesia in formed and ordered 2 ml of versad which was administered at 1417. Patient continued to kick and swing at staff. Mother was retrieved from cafeteria to help calm patient. Patient continued to holler out \"I have to pee\". Patients undergarments were already soiled. Staff and mother continued to encourage patient to use the urinal. Patient continued to swing and kick at staff until 1435. Versad had minimal effects on patient but calmed him some. At 1440 we were able to stand patient and get him to void in the urinal. I was able to get a pulse, respiration and blood pressure at 1445 but patient did not want any equipment kept on him.  Staff and myself helped patient get dressed and at 1455 I discussed discharge instructions with mother. She verbalized understanding. Patient discharged at 1510.

## 2024-02-14 NOTE — INTERVAL H&P NOTE
Update History & Physical    The patient's History and Physical of January 26, 2024 was reviewed with the patient and I examined the patient. There was no change. The surgical site was confirmed by the patient, caregiver, and me.     Plan: The risks, benefits, expected outcome, and alternative to the recommended procedure have been discussed with the patient and caregiver. Patient and parent understand and want to proceed with the procedure.     Electronically signed by Sergei Bradley DMD on 2/14/2024 at 10:45 AM    I agree with the above. Electronically signed by Odette Mckeon DDS on 2/14/2024 at 2:47 PM

## 2024-02-14 NOTE — BRIEF OP NOTE
Brief Postoperative Note      Patient: See Mcdaniels  YOB: 1980  MRN: [id number]    Date of Procedure: 2/14/2024    Pre-Op Diagnosis Codes:     * Dental caries [K02.9]    Post-Op Diagnosis: Same       Procedure(s):  EXAM, RADIOGRAPHS, ADULT PROPHYALXIS, FLOURIDE, RESTORATIONS    Surgeon(s):  Odette Mckeon DDS Lien, Jay DMD Starkey, Christopher, DMD      Anesthesia: General ETT    Estimated Blood Loss (mL): <20    Complications: None    Specimens:   * No specimens in log *    Implants:  * No implants in log *      Drains: * No LDAs found *    Findings: Generalized dental caries with deep decay #2,3,14,15     Electronically signed by Sergei Bradley DMD on 2/14/2024 at 1:38 PM    I agree with the above. Electronically signed by Odette Mckeon DDS on 2/14/2024 at 2:48 PM

## 2024-03-14 ENCOUNTER — HOSPITAL ENCOUNTER (OUTPATIENT)
Age: 44
Discharge: HOME | End: 2024-03-14
Payer: MEDICARE

## 2024-03-14 LAB
BASOPHILS # BLD: 0.05 K/UL (ref 0–0.2)
BASOPHILS NFR BLD: 1 % (ref 0–2)
EOSINOPHIL # BLD: 0.33 K/UL (ref 0.05–0.5)
EOSINOPHILS RELATIVE PERCENT: 6 % (ref 0–6)
ERYTHROCYTE [DISTWIDTH] IN BLOOD BY AUTOMATED COUNT: 11.6 % (ref 11.5–15)
HCT VFR BLD AUTO: 39.6 % (ref 37–54)
HGB BLD-MCNC: 13.8 G/DL (ref 12.5–16.5)
IMM GRANULOCYTES # BLD AUTO: 0.04 K/UL (ref 0–0.58)
IMM GRANULOCYTES NFR BLD: 1 % (ref 0–5)
LYMPHOCYTES NFR BLD: 1.72 K/UL (ref 1.5–4)
LYMPHOCYTES RELATIVE PERCENT: 33 % (ref 20–42)
MCH RBC QN AUTO: 31.8 PG (ref 26–35)
MCHC RBC AUTO-ENTMCNC: 34.8 G/DL (ref 32–34.5)
MCV RBC AUTO: 91.2 FL (ref 80–99.9)
MONOCYTES NFR BLD: 0.35 K/UL (ref 0.1–0.95)
MONOCYTES NFR BLD: 7 % (ref 2–12)
NEUTROPHILS NFR BLD: 52 % (ref 43–80)
NEUTS SEG NFR BLD: 2.76 K/UL (ref 1.8–7.3)
PLATELET # BLD AUTO: 235 K/UL (ref 130–450)
PMV BLD AUTO: 10.1 FL (ref 7–12)
RBC # BLD AUTO: 4.34 M/UL (ref 3.8–5.8)
WBC OTHER # BLD: 5.3 K/UL (ref 4.5–11.5)

## 2024-03-14 PROCEDURE — 36415 COLL VENOUS BLD VENIPUNCTURE: CPT

## 2024-03-14 PROCEDURE — 85025 COMPLETE CBC W/AUTO DIFF WBC: CPT

## 2024-03-15 ENCOUNTER — TELEMEDICINE (OUTPATIENT)
Dept: BEHAVIORAL HEALTH | Facility: CLINIC | Age: 44
End: 2024-03-15
Payer: COMMERCIAL

## 2024-03-15 DIAGNOSIS — G21.19 DRUG-INDUCED PARKINSONISM (MULTI): ICD-10-CM

## 2024-03-15 DIAGNOSIS — K59.03 DRUG-INDUCED CONSTIPATION: ICD-10-CM

## 2024-03-15 DIAGNOSIS — F84.0 AUTISM SPECTRUM DISORDER (HHS-HCC): ICD-10-CM

## 2024-03-15 DIAGNOSIS — F25.0 SCHIZOAFFECTIVE DISORDER, BIPOLAR TYPE (MULTI): Primary | ICD-10-CM

## 2024-03-15 DIAGNOSIS — F70 MILD INTELLECTUAL DISABILITY: ICD-10-CM

## 2024-03-15 PROCEDURE — 1036F TOBACCO NON-USER: CPT | Performed by: PSYCHIATRY & NEUROLOGY

## 2024-03-15 PROCEDURE — 99215 OFFICE O/P EST HI 40 MIN: CPT | Performed by: PSYCHIATRY & NEUROLOGY

## 2024-03-15 RX ORDER — CLOZAPINE 100 MG/1
100 TABLET ORAL EVERY MORNING
Qty: 31 TABLET | Refills: 2 | Status: SHIPPED | OUTPATIENT
Start: 2024-03-15 | End: 2024-05-10 | Stop reason: SDUPTHER

## 2024-03-15 RX ORDER — LORAZEPAM 1 MG/1
TABLET ORAL
Qty: 62 TABLET | Refills: 0 | Status: SHIPPED | OUTPATIENT
Start: 2024-03-15 | End: 2024-05-10 | Stop reason: SDUPTHER

## 2024-03-15 RX ORDER — BENZTROPINE MESYLATE 0.5 MG/1
0.5 TABLET ORAL 2 TIMES DAILY
Qty: 62 TABLET | Refills: 2 | Status: SHIPPED | OUTPATIENT
Start: 2024-03-15 | End: 2024-05-10 | Stop reason: SDUPTHER

## 2024-03-15 RX ORDER — CLOZAPINE 200 MG/1
400 TABLET ORAL NIGHTLY
Qty: 62 TABLET | Refills: 2 | Status: SHIPPED | OUTPATIENT
Start: 2024-03-15 | End: 2024-05-10 | Stop reason: SDUPTHER

## 2024-03-15 RX ORDER — LORAZEPAM 2 MG/1
2 TABLET ORAL NIGHTLY
Qty: 31 TABLET | Refills: 0 | Status: SHIPPED | OUTPATIENT
Start: 2024-03-15 | End: 2024-05-10 | Stop reason: SDUPTHER

## 2024-03-15 RX ORDER — OXCARBAZEPINE 600 MG/1
TABLET, FILM COATED ORAL
Qty: 78 TABLET | Refills: 2 | Status: SHIPPED | OUTPATIENT
Start: 2024-03-15 | End: 2024-05-10 | Stop reason: SDUPTHER

## 2024-03-15 RX ORDER — CLONIDINE HYDROCHLORIDE 0.1 MG/1
TABLET ORAL
Qty: 62 TABLET | Refills: 2 | Status: SHIPPED | OUTPATIENT
Start: 2024-03-15 | End: 2024-03-18

## 2024-03-15 RX ORDER — DOCUSATE SODIUM 100 MG/1
200 CAPSULE, LIQUID FILLED ORAL EVERY MORNING
Qty: 62 CAPSULE | Refills: 2 | Status: SHIPPED | OUTPATIENT
Start: 2024-03-15 | End: 2024-05-10 | Stop reason: SDUPTHER

## 2024-03-15 RX ORDER — ASENAPINE 10 MG/1
10 TABLET SUBLINGUAL NIGHTLY
Qty: 31 TABLET | Refills: 2 | Status: SHIPPED | OUTPATIENT
Start: 2024-03-15 | End: 2024-05-10 | Stop reason: SDUPTHER

## 2024-03-15 RX ORDER — CHLORHEXIDINE GLUCONATE 4 %
1 LIQUID (ML) TOPICAL NIGHTLY
Qty: 31 TABLET | Refills: 2 | Status: SHIPPED | OUTPATIENT
Start: 2024-03-15 | End: 2024-05-10 | Stop reason: SDUPTHER

## 2024-03-15 ASSESSMENT — ENCOUNTER SYMPTOMS
SLEEP DISTURBANCE: 0
TREMORS: 0
CHOKING: 0
TROUBLE SWALLOWING: 0
CONFUSION: 0
SEIZURES: 0
DIZZINESS: 0
VOMITING: 0
SPEECH DIFFICULTY: 0
CONSTIPATION: 0
AGITATION: 0

## 2024-03-15 NOTE — PATIENT INSTRUCTIONS
Mental status has improved since last visit.  No additional changes or reductions to medications recommended at this visit.    Please note that due to prescribing of controlled medications, Gasper Burns will need ONE face-to-face in-person office visit in 2024 to meet  compliance requirements.  We will plan to coordinate this over the summer.    Next appointment: Friday 5/10/2024 at 9:00 AM virtual.

## 2024-03-15 NOTE — PROGRESS NOTES
Outpatient Psychiatry    A HIPAA-compliant interactive audio and video telecommunication system which permits real time communications between the patient (at the originating site) and provider (at the distant site) was utilized to provide this telehealth service.     The patient, family, caregivers and guardian (as appropriate) have provided consent on this date to conduct treatment via this telehealth service.  The patient's identity and physical location were verified at the time of this visit.      Present for appointment: [facility] staff (Sophia) and [facility] staff (Heather).    SUBJECTIVE    No major health problems for Gasper since last visit (2/02/24).  Had dental exam/treatment under general anesthesia on 2/14/24 (needed 9 cavities filled).  He also had a routine/screening colonoscopy done.      He seems to be doing much better since we reduced the dose of oxcarbazepine.  While he does still take a short nap in the mid-morning at [facility], he is overall much more awake, alert, and active.  He no longer appears confused or disoriented.  No particularly obvious signs of alisha or psychosis noted.  Looking forward to [employer] festivities this weekend.    He is still sleeping consistently at night and getting an adequate amount of rest overall.    He is taking 1/2 capful of Miralax along with 200mg docusate daily and constipation seems well-controlled.  He is not having problems with bladder incontinence.    He does not seem to have any current tremors or drooling.  He has not had any episodes concerning for seizures.     Review of Systems   HENT:  Negative for dental problem, drooling and trouble swallowing.    Respiratory:  Negative for choking.    Cardiovascular:  Negative for chest pain.   Gastrointestinal:  Negative for constipation and vomiting.   Genitourinary:  Negative for enuresis.   Musculoskeletal:  Negative for gait problem.   Neurological:  Negative for dizziness, tremors, seizures  and speech difficulty.   Psychiatric/Behavioral:  Negative for agitation, confusion, sleep disturbance and suicidal ideas.       Controlled Substance Evaluation  OARRS/PDMP reviewed: Roland Hampton MD on 3/15/2024  9:05 AM  Is the patient prescribed a combination of a benzodiazepine and opioid? No  I have personally reviewed the OARRS report for Gasper Burns.   I have considered the risks of abuse, dependence, addiction and diversion.    I believe that it is clinically appropriate for Gasper Burns to be prescribed this medication.    Last Urine Drug Screen: 12/20/2023  No results found for this or any previous visit (from the past 8760 hour(s)).  Results as expected? Yes    Controlled Substance Agreement: 12/09/2019  Reviewed Controlled Substance Agreement, including but not limited to:  Benefits, risks, and alternatives to treatment with a controlled substance medication(s).    Prescribed Controlled Substances:  Benzodiazepines: Lorazepam  What is the patient's goal of therapy? Akathisia  Is this being achieved with current treatment? Yes  Activities of Daily Living:   Is your overall impression that this patient is benefiting (symptom reduction outweighs side effects) from benzodiazepine therapy? Yes   1. Physical Functioning: Same  2. Family Relationship: Same  3. Social Relationship: Same  4. Mood: Same  5. Sleep Patterns: Same  6. Overall Function: Same     MEDICATION HISTORY  Aripiprazole  Artane  Carbamazepine  Depakote - hyperammonemic encephalopathy  Escitalopram  Haldol - severe EPS  Invega Sustenna  Lithium  Methylphenidate - increased irritability  Metoprolol  Mirtazapine  Quetiapine  Risperidone  Sertraline  Trazodone  Zolpidem    CURRENT MEDICATIONS    Current Outpatient Medications:     asenapine (Saphris) SL tablet, Place 1 tablet (10 mg) under the tongue once daily at bedtime., Disp: 31 tablet, Rfl: 0    benztropine (Cogentin) 0.5 mg tablet, Take 1 tablet (0.5 mg) by mouth 2 times a  day., Disp: 62 tablet, Rfl: 0    cloNIDine (Catapres) 0.1 mg tablet, 1 tablet twice daily morning and 3pm, Disp: 62 tablet, Rfl: 0    cloZAPine (Clozaril) 100 mg tablet, Take 1 tablet (100 mg) by mouth once daily in the morning., Disp: 31 tablet, Rfl: 0    cloZAPine (Clozaril) 200 mg tablet, Take 2 tablets (400 mg) by mouth once daily at bedtime., Disp: 62 tablet, Rfl: 0    docusate sodium (Colace) 100 mg capsule, Take 2 capsules (200 mg) by mouth once daily in the morning., Disp: 62 capsule, Rfl: 0    LORazepam (Ativan) 1 mg tablet, Take 1 tablet (1 mg) by mouth 2 times a day. 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   Bx Specialist ?   Nicotine None   Alcohol None   Other drugs None     OBJECTIVE    Lab Results   Component Value Date    HGB 14.4 12/29/2020    PLT 87 (L) 12/29/2020    NEUTROABS 3.25 12/29/2020    GLUCOSE 111 (H) 12/29/2020     12/29/2020    K 3.9 12/29/2020    CO2 27 12/29/2020    CALCIUM 8.8 12/29/2020    CREATININE 0.92 12/29/2020    AST 24 12/29/2020    ALT 36 12/29/2020    HGBA1C 5.2 08/04/2023    AMMONIA 45 12/30/2020    TSH 1.68 12/30/2020     Therapeutic Drug Monitoring  01/31/2024: Oxcarbazepine level = 34 [3-35] (1800mg/day)  12/20/2023: Oxcarbazepine level = 34 [3-35] (1800mg/day)  12/20/2023: Clozapine level = 411 (500mg/day)  07/06/2023: Oxcarbazepine level = 31 [3-35] (1800mg/day)  06/00/2022: Clozapine level = 367  (450mg/day)  06/00/2022: Oxcarbazepine level = 33 [3-35] (1800mg/day)    Electrocardiograms  05/02/2022: Sinus tachycardia, , QTc 445    MENTAL STATUS EXAM  General/Appearance: Appropriate dress/hygiene/grooming. No drooling.  Attitude/Behavior: Actively engages with interview. Calm/pleasant. Cooperative.  Speech/Communication: Hypertalkative. Interrupts or talks over others. Normal volume/rate/tone.  Motor: No abnormal or involuntary movements observed.  Gait: Not assessed at this visit.  Mood: Appears to be in good spirits.  Affect: Euthymic. Full range.  Thought processes: Goal-directed. Organized/coherent. Tangential.  Thought content: Non-sequiturs.  Perception: Does not appear to be experiencing or responding to hallucinations.  Sensorium/Cognition: Oriented to self and surroundings. Intellectual impairment.  Memory: Recent & remote recall is intact.  Insight: Minimal.  Judgment: Fair.     ASSESSMENT  Gasper seems to be doing significantly better since last visit.  Altered mentation, sedation, confusion, and ataxia have all improved since reducing dose of oxcarbazepine.  We will continue/maintain current treatments at this time, but could consider reducing the dose again if clinical presentation worsens.  We will plan for his yearly in-person visit in [place] over the summer.    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    PLAN  -- Continue oxcarbazepine 600mg in the morning and 900mg at bedtime for mood stabilization  -- Continue asenapine 10mg QHS for psychosis, sleep, and mood stabilization  -- Continue clozapine 100mg QAM and 400mg QHS for psychosis  -- Continue lorazepam 1mg - 1mg - 2mg (AM, 15:00, HS) for anxiety and mood  -- Continue clonidine 0.1mg BID (AM & 15:00) for impulsivity and hyperactivity  -- Continue melatonin 12mg QHS for sleep  --  Continue benztropine 0.5mg BID (AM & 15:00) for EPS  -- Follow up 8 weeks    Roland Hampton MD    Prep time on date of the patient encounter: 5 minutes   Time spent directly with patient/family/caregiver: 35 minutes   Additional time spent on patient care activities: 0 minutes   Documentation time: 5 minutes   Other time spent: 0 minutes   Total time on date of patient encounter: 45 minutes

## 2024-03-16 DIAGNOSIS — F25.0 SCHIZOAFFECTIVE DISORDER, BIPOLAR TYPE (MULTI): ICD-10-CM

## 2024-03-18 RX ORDER — CLONIDINE HYDROCHLORIDE 0.1 MG/1
TABLET ORAL
Qty: 60 TABLET | Refills: 1 | Status: SHIPPED | OUTPATIENT
Start: 2024-03-18 | End: 2024-05-10 | Stop reason: SDUPTHER

## 2024-03-27 ENCOUNTER — OFFICE VISIT (OUTPATIENT)
Dept: FAMILY MEDICINE CLINIC | Age: 44
End: 2024-03-27
Payer: MEDICARE

## 2024-03-27 VITALS
SYSTOLIC BLOOD PRESSURE: 128 MMHG | DIASTOLIC BLOOD PRESSURE: 72 MMHG | OXYGEN SATURATION: 100 % | HEART RATE: 104 BPM | RESPIRATION RATE: 16 BRPM | WEIGHT: 251 LBS | HEIGHT: 69 IN | TEMPERATURE: 97.3 F | BODY MASS INDEX: 37.18 KG/M2

## 2024-03-27 DIAGNOSIS — M25.551 BILATERAL HIP PAIN: Primary | ICD-10-CM

## 2024-03-27 DIAGNOSIS — M25.552 BILATERAL HIP PAIN: Primary | ICD-10-CM

## 2024-03-27 PROCEDURE — 99212 OFFICE O/P EST SF 10 MIN: CPT | Performed by: NURSE PRACTITIONER

## 2024-03-27 PROCEDURE — G8482 FLU IMMUNIZE ORDER/ADMIN: HCPCS | Performed by: NURSE PRACTITIONER

## 2024-03-27 PROCEDURE — G8417 CALC BMI ABV UP PARAM F/U: HCPCS | Performed by: NURSE PRACTITIONER

## 2024-03-27 PROCEDURE — G8427 DOCREV CUR MEDS BY ELIG CLIN: HCPCS | Performed by: NURSE PRACTITIONER

## 2024-03-27 PROCEDURE — 1036F TOBACCO NON-USER: CPT | Performed by: NURSE PRACTITIONER

## 2024-03-27 RX ORDER — SODIUM FLUORIDE 5 MG/G
CREAM DENTAL
COMMUNITY
Start: 2024-02-21

## 2024-03-27 ASSESSMENT — ENCOUNTER SYMPTOMS
WHEEZING: 0
SHORTNESS OF BREATH: 0
COUGH: 0

## 2024-03-27 NOTE — PROGRESS NOTES
OFFICE PROGRESS NOTE  [facility]  [address] [place] [address]  Dept: [phone]   Chief Complaint   Patient presents with    bilateral hip pain       ASSESSMENT/PLAN   1. Bilateral hip pain  Assessment & Plan:  New staff is requesting he get an x ray, psych isn't sure if true pain or if psychological.   Will call with results   Orders:  -     XR HIP BILATERAL W AP PELVIS (2 VIEWS); Future       Reviewed labs:   Reviewed notes from   Reviewed radiology     Discussed weight loss, Discussed exercising 30 minutes daily, and Discussed taking medications as directed and adverse effects    Return in about 3 months (around 6/27/2024), or if symptoms worsen or fail to improve, for make next appt with Dr Sears for new patient appt.     HPI:   Here today for complaints of bilateral hip pain this week, per mom he was having problems getting out of bed and they gave him tylenol which does help. He was stiff in the mornings and the staff was helping him walk. He denies any injury. Today he says he has no pain but again did take tylenol today.     Current Outpatient Medications:     SODIUM FLUORIDE 5000 PLUS 1.1 % CREA, , Disp: , Rfl:     ibuprofen (ADVIL;MOTRIN) 600 MG tablet, Take 1 tablet by mouth every 6 hours as needed for Pain, Disp: 28 tablet, Rfl: 0    OXcarbazepine (TRILEPTAL) 600 MG tablet, Take 1.5 tablets by mouth 2 times daily, Disp: , Rfl:     cloZAPine (CLOZARIL) 100 MG tablet, Take 1 tablet by mouth every morning, Disp: , Rfl:     LORazepam (ATIVAN) 2 MG tablet, nightly as needed., Disp: , Rfl:     terbinafine (LAMISIL) 1 % cream, Apply topically 2 times daily Apply topically 2 times daily., Disp: , Rfl:     acetaminophen (AMINOFEN) 325 MG tablet, Take 2 tablets by mouth every 4 hours as needed for Pain or Fever, Disp: 40 tablet, Rfl: 1    STOOL SOFTENER 100 MG capsule, , Disp: , Rfl:     loratadine (CLARITIN) 10 MG tablet, TAKE 1 TABLET BY MOUTH

## 2024-04-18 DIAGNOSIS — J30.1 CHRONIC SEASONAL ALLERGIC RHINITIS DUE TO POLLEN: ICD-10-CM

## 2024-04-18 RX ORDER — LORATADINE 10 MG/1
10 TABLET ORAL DAILY
Qty: 93 TABLET | Refills: 3 | Status: SHIPPED | OUTPATIENT
Start: 2024-04-18

## 2024-04-18 RX ORDER — LORATADINE 10 MG/1
TABLET ORAL
Qty: 31 TABLET | Refills: 9 | OUTPATIENT
Start: 2024-04-18

## 2024-05-10 ENCOUNTER — OFFICE VISIT (OUTPATIENT)
Dept: BEHAVIORAL HEALTH | Facility: CLINIC | Age: 44
End: 2024-05-10
Payer: MEDICARE

## 2024-05-10 DIAGNOSIS — F84.0 AUTISM SPECTRUM DISORDER (HHS-HCC): ICD-10-CM

## 2024-05-10 DIAGNOSIS — F70 MILD INTELLECTUAL DISABILITY: ICD-10-CM

## 2024-05-10 DIAGNOSIS — G21.19 DRUG-INDUCED PARKINSONISM (MULTI): ICD-10-CM

## 2024-05-10 DIAGNOSIS — K59.03 DRUG-INDUCED CONSTIPATION: ICD-10-CM

## 2024-05-10 DIAGNOSIS — F25.0 SCHIZOAFFECTIVE DISORDER, BIPOLAR TYPE (MULTI): Primary | ICD-10-CM

## 2024-05-10 PROCEDURE — 99215 OFFICE O/P EST HI 40 MIN: CPT | Performed by: PSYCHIATRY & NEUROLOGY

## 2024-05-10 PROCEDURE — 1036F TOBACCO NON-USER: CPT | Performed by: PSYCHIATRY & NEUROLOGY

## 2024-05-10 RX ORDER — LORAZEPAM 1 MG/1
TABLET ORAL
Qty: 62 TABLET | Refills: 2 | Status: SHIPPED | OUTPATIENT
Start: 2024-05-10

## 2024-05-10 RX ORDER — ASENAPINE 10 MG/1
10 TABLET SUBLINGUAL NIGHTLY
Qty: 31 TABLET | Refills: 2 | Status: SHIPPED | OUTPATIENT
Start: 2024-05-10

## 2024-05-10 RX ORDER — CLONIDINE HYDROCHLORIDE 0.1 MG/1
TABLET ORAL
Qty: 60 TABLET | Refills: 2 | Status: SHIPPED | OUTPATIENT
Start: 2024-05-10

## 2024-05-10 RX ORDER — LORAZEPAM 2 MG/1
2 TABLET ORAL NIGHTLY
Qty: 31 TABLET | Refills: 2 | Status: SHIPPED | OUTPATIENT
Start: 2024-05-10

## 2024-05-10 RX ORDER — OXCARBAZEPINE 600 MG/1
TABLET, FILM COATED ORAL
Qty: 78 TABLET | Refills: 2 | Status: SHIPPED | OUTPATIENT
Start: 2024-05-10

## 2024-05-10 RX ORDER — CLOZAPINE 100 MG/1
100 TABLET ORAL EVERY MORNING
Qty: 31 TABLET | Refills: 2 | Status: SHIPPED | OUTPATIENT
Start: 2024-05-10

## 2024-05-10 RX ORDER — CLOZAPINE 200 MG/1
400 TABLET ORAL NIGHTLY
Qty: 62 TABLET | Refills: 2 | Status: SHIPPED | OUTPATIENT
Start: 2024-05-10

## 2024-05-10 RX ORDER — CHLORHEXIDINE GLUCONATE 4 %
1 LIQUID (ML) TOPICAL NIGHTLY
Qty: 31 TABLET | Refills: 2 | Status: SHIPPED | OUTPATIENT
Start: 2024-05-10

## 2024-05-10 RX ORDER — BENZTROPINE MESYLATE 0.5 MG/1
0.5 TABLET ORAL 2 TIMES DAILY
Qty: 62 TABLET | Refills: 2 | Status: SHIPPED | OUTPATIENT
Start: 2024-05-10

## 2024-05-10 RX ORDER — DOCUSATE SODIUM 100 MG/1
200 CAPSULE, LIQUID FILLED ORAL EVERY MORNING
Qty: 62 CAPSULE | Refills: 2 | Status: SHIPPED | OUTPATIENT
Start: 2024-05-10

## 2024-05-10 SDOH — SOCIAL STABILITY: SOCIAL INSECURITY
WITHIN THE LAST YEAR, HAVE YOU BEEN KICKED, HIT, SLAPPED, OR OTHERWISE PHYSICALLY HURT BY YOUR PARTNER OR EX-PARTNER?: NO

## 2024-05-10 SDOH — SOCIAL STABILITY: SOCIAL INSECURITY
WITHIN THE LAST YEAR, HAVE TO BEEN RAPED OR FORCED TO HAVE ANY KIND OF SEXUAL ACTIVITY BY YOUR PARTNER OR EX-PARTNER?: NO

## 2024-05-10 SDOH — HEALTH STABILITY: PHYSICAL HEALTH: ON AVERAGE, HOW MANY DAYS PER WEEK DO YOU ENGAGE IN MODERATE TO STRENUOUS EXERCISE (LIKE A BRISK WALK)?: 0 DAYS

## 2024-05-10 SDOH — HEALTH STABILITY: MENTAL HEALTH
HOW OFTEN DO YOU NEED TO HAVE SOMEONE HELP YOU WHEN YOU READ INSTRUCTIONS, PAMPHLETS, OR OTHER WRITTEN MATERIAL FROM YOUR DOCTOR OR PHARMACY?: ALWAYS

## 2024-05-10 SDOH — HEALTH STABILITY: MENTAL HEALTH: HOW MANY STANDARD DRINKS CONTAINING ALCOHOL DO YOU HAVE ON A TYPICAL DAY?: PATIENT DOES NOT DRINK

## 2024-05-10 SDOH — HEALTH STABILITY: MENTAL HEALTH: HOW OFTEN DO YOU HAVE A DRINK CONTAINING ALCOHOL?: NEVER

## 2024-05-10 SDOH — SOCIAL STABILITY: SOCIAL INSECURITY: WITHIN THE LAST YEAR, HAVE YOU BEEN HUMILIATED OR EMOTIONALLY ABUSED IN OTHER WAYS BY YOUR PARTNER OR EX-PARTNER?: NO

## 2024-05-10 SDOH — HEALTH STABILITY: MENTAL HEALTH: HOW OFTEN DO YOU HAVE 6 OR MORE DRINKS ON ONE OCCASION?: NEVER

## 2024-05-10 SDOH — SOCIAL STABILITY: SOCIAL INSECURITY: WITHIN THE LAST YEAR, HAVE YOU BEEN AFRAID OF YOUR PARTNER OR EX-PARTNER?: NO

## 2024-05-10 SDOH — ECONOMIC STABILITY: INCOME INSECURITY: HOW HARD IS IT FOR YOU TO PAY FOR THE VERY BASICS LIKE FOOD, HOUSING, MEDICAL CARE, AND HEATING?: NOT HARD AT ALL

## 2024-05-10 SDOH — HEALTH STABILITY: PHYSICAL HEALTH: ON AVERAGE, HOW MANY MINUTES DO YOU ENGAGE IN EXERCISE AT THIS LEVEL?: 0 MIN

## 2024-05-10 ASSESSMENT — ENCOUNTER SYMPTOMS
DELUSIONS: 0
BIZARRE BEHAVIOR: 0
GASTROINTESTINAL NEGATIVE: 1
TREMORS: 0
SPEECH DIFFICULTY: 0
TROUBLE SWALLOWING: 0
CONFUSION: 0
SEIZURES: 0
CHOKING: 0
DIZZINESS: 0

## 2024-05-10 ASSESSMENT — LIFESTYLE VARIABLES
AUDIT-C TOTAL SCORE: 0
SKIP TO QUESTIONS 9-10: 1

## 2024-05-10 NOTE — PROGRESS NOTES
Outpatient Psychiatry    A HIPAA-compliant interactive audio and video telecommunication system which permits real time communications between the patient (at the originating site) and provider (at the distant site) was utilized to provide this telehealth service.     The patient, family, caregivers and guardian (as appropriate) have provided consent on this date to conduct treatment via this telehealth service.  The patient's identity and physical location were verified at the time of this visit.      Present for appointment: Gasper and [facility] staff (Heather).    SUBJECTIVE    Gasper has been doing well since our last visit.  He does not seem to be having any problems with sedation, confusion, disorientation, or altered sensorium.  He seems to be more awake, alert, and active during the daytime.  He seems to be in a good mood and talks about various things that interest him.  No danielito signs/symptoms of alisha or psychosis noted.  He is still sleeping consistently at night.  No current issues with constipation.  He does not seem to have any current tremors or drooling.  He has not had any episodes concerning for seizures.     Review of Systems   HENT:  Negative for dental problem, drooling and trouble swallowing.    Respiratory:  Negative for choking.    Cardiovascular:  Negative for chest pain.   Gastrointestinal: Negative.    Genitourinary:  Negative for enuresis.   Musculoskeletal:  Negative for gait problem.   Neurological:  Negative for dizziness, tremors, seizures and speech difficulty.   Psychiatric/Behavioral:  Negative for confusion and suicidal ideas.      Psych Review of Symptoms:    ADHD:   Inattention Symptoms: Difficulty sustaining attention, difficulty paying attention when spoken to and easily distracted.   Hyperactivity/Impulsivity Symptoms: Answers before the question is finished, interrupts others and excessive talking.       Anxiety: Patient denied any symptoms.         Developmental and Sensory  Concerns:   Decreased ability to enter into and maintain friendship with peers and difficulty with social emotional reciprocity.       Depressive Symptoms: Patient denied any symptoms.         Manic Symptoms: Patient denied any symptoms.         Psychotic Symptoms:   No bizarre behavior and no delusions.        Sleep Concerns: Patient denied any symptoms.          Controlled Substance Evaluation  OARRS/PDMP reviewed: Roland Hampton MD on 5/10/2024  7:17 AM  Is the patient prescribed a combination of a benzodiazepine and opioid? No  I have personally reviewed the OARRS report for Gasper Burns.   I have considered the risks of abuse, dependence, addiction and diversion.    I believe that it is clinically appropriate for Gasper Burns to be prescribed this medication.    Last Urine Drug Screen: 12/20/2023  No results found for this or any previous visit (from the past 8760 hour(s)).    Controlled Substance Agreement: 12/09/2019  Reviewed Controlled Substance Agreement, including but not limited to:  Benefits, risks, and alternatives to treatment with a controlled substance medication(s).    Prescribed Controlled Substances:  > Benzodiazepines: Lorazepam  What is the patient's goal of therapy? Akathisia  Is this being achieved with current treatment? Yes  Activities of Daily Living:   Is your overall impression that this patient is benefiting (symptom reduction outweighs side effects) from benzodiazepine therapy? Yes   1. Physical Functioning: Same  2. Family Relationship: Same  3. Social Relationship: Same  4. Mood: Same  5. Sleep Patterns: Same  6. Overall Function: Same     MEDICATION HISTORY  Aripiprazole  Artane  Carbamazepine  Depakote - hyperammonemic encephalopathy  Escitalopram  Haldol - severe EPS  Invega Sustenna  Lithium  Methylphenidate - increased irritability  Metoprolol  Mirtazapine  Quetiapine  Risperidone  Sertraline  Trazodone  Zolpidem    CURRENT MEDICATIONS    Current Outpatient  Medications:     asenapine (Saphris) SL tablet, Place 1 tablet (10 mg) under the tongue once daily at bedtime., Disp: 31 tablet, Rfl: 2    benztropine (Cogentin) 0.5 mg tablet, Take 1 tablet (0.5 mg) by mouth 2 times a day., Disp: 62 tablet, Rfl: 2    cloNIDine (Catapres) 0.1 mg tablet, 1 TAB BY MOUTH TWICE A DAY AT 8AM & 3PM, Disp: 60 tablet, Rfl: 1    cloZAPine (Clozaril) 100 mg tablet, Take 1 tablet (100 mg) by mouth once daily in the morning., Disp: 31 tablet, Rfl: 2    cloZAPine (Clozaril) 200 mg tablet, Take 2 tablets (400 mg) by mouth once daily at bedtime., Disp: 62 tablet, Rfl: 2    docusate sodium (Colace) 100 mg capsule, Take 2 capsules (200 mg) by mouth once daily in the morning., Disp: 62 capsule, Rfl: 2    LORazepam (Ativan) 1 mg tablet, Take 1 tablet (1 mg) by mouth twice daily - morning and 3PM., Disp: 62 tablet, Rfl: 0    LORazepam (Ativan) 2 mg tablet, Take 1 tablet (2 mg) by mouth once daily at bedtime., Disp: 31 tablet, Rfl: 0    melatonin 12 mg tablet, Take 1 tablet by mouth once daily at bedtime., Disp: 31 tablet, Rfl: 2    OXcarbazepine (Trileptal) 600 mg tablet, Take 1 tablet (600 mg) by mouth every morning and 1.5 tablets (900 mg) by mouth at bedtime., Disp: 78 tablet, Rfl: 2    SOCIAL HISTORY  Living situation Lives with staff and 1 male house-mate   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   Bx Specialist ?   Nicotine None   Alcohol None   Other drugs None     OBJECTIVE    Lab Results   Component Value Date    HGB 14.4 12/29/2020    PLT 87 (L) 12/29/2020    NEUTROABS 3.25 12/29/2020    GLUCOSE 111 (H) 12/29/2020     12/29/2020    K 3.9 12/29/2020    CO2 27 12/29/2020    CALCIUM 8.8 12/29/2020    CREATININE 0.92 12/29/2020    AST 24 12/29/2020    ALT 36 12/29/2020    HGBA1C 5.2 08/04/2023    AMMONIA 45 12/30/2020    TSH 1.68 12/30/2020     Therapeutic Drug Monitoring  01/31/2024: Oxcarbazepine level = 34  [3-35] (1800mg/day)  12/20/2023: Oxcarbazepine level = 34 [3-35] (1800mg/day)  12/20/2023: Clozapine level = 411 (500mg/day)  07/06/2023: Oxcarbazepine level = 31 [3-35] (1800mg/day)  06/00/2022: Clozapine level = 367 (450mg/day)  06/00/2022: Oxcarbazepine level = 33 [3-35] (1800mg/day)    Electrocardiograms  05/02/2022: Sinus tachycardia, , QTc 445    MENTAL STATUS EXAM  General/Appearance: Appropriate dress/hygiene/grooming. No drooling.  Attitude/Behavior: Actively engages with interview. Calm/pleasant. Cooperative.  Speech/Communication: Hypertalkative. Interrupts or talks over others. Normal volume/rate/tone.  Motor: No abnormal or involuntary movements observed.  Gait: Not assessed at this visit.  Mood: Appears to be in good spirits.  Affect: Euthymic. Full range.  Thought processes: Goal-directed. Organized/coherent. Tangential.  Thought content: Non-sequiturs.  Perception: Does not appear to be experiencing or responding to hallucinations.  Sensorium/Cognition: Oriented to self and surroundings. Intellectual impairment.  Memory: Recent & remote recall is intact.  Insight: Minimal.  Judgment: Fair.     ASSESSMENT  Gasper seems to be doing well over the past one to two months.  No additional treatment changes are felt to be needed at this time.  We will coordinate an in-person office visit for our next check-in over the summer.    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    PLAN  -- Continue asenapine 10mg QHS for psychosis, sleep, and mood stabilization  -- Continue clozapine 100mg QAM and 400mg QHS for psychosis  -- Continue oxcarbazepine 600mg in the morning and 900mg at bedtime for mood stabilization  -- Continue lorazepam 1mg - 1mg - 2mg (AM, 15:00, HS) for anxiety and mood  -- Continue clonidine 0.1mg BID (AM & 15:00) for impulsivity and  hyperactivity  -- Continue melatonin 12mg QHS for sleep  -- Continue benztropine 0.5mg BID (AM & 15:00) for EPS  -- Follow up 2-3 months    Roland Hampton MD    Prep time on date of the patient encounter: 5 minutes   Time spent directly with patient/family/caregiver: 35 minutes   Additional time spent on patient care activities: 0 minutes   Documentation time: 5 minutes   Other time spent: 0 minutes   Total time on date of patient encounter: 45 minutes

## 2024-06-17 NOTE — TELEPHONE ENCOUNTER
----- Message from Bryce Dubois sent at 11/30/2023  1:13 PM EST -----  Subject: Appointment Request    Reason for Call: Established Patient Appointment needed: Routine Pre-Op    QUESTIONS    Reason for appointment request? Available appointments did not meet   patient need     Additional Information for Provider? surgery 12/15/23 ext [phone] when calling     ---------------------------------------------------------------------------  --------------  CALL BACK INFO  [phone]; OK to leave message on voicemail  ---------------------------------------------------------------------------  --------------  SCRIPT ANSWERS She is currently smoking 4 cigarettes a day  She, in the past was smoking half a pack which is 10 cigarettes a day  She plans on quitting tobacco for surgery by using patches  I have suggested for her to discuss the patch use with the orthopedic appointment she has later this morning     I suggested to consider stopping  smoking tobacco for its benefits in the [place] operative period and in the long term    I  Informed about risk of wound healing problem ,infection,lung complications,thrombosis with tobacco use     Suggested quitting tobacco     Offered tobacco cessation service     CT scan from 2023 showed mild emphysema    Not known to have wheezing , chronic phlegm   No inhaler, oxygen use    She feels fine from a breathing standpoint

## 2024-06-22 DIAGNOSIS — F25.0 SCHIZOAFFECTIVE DISORDER, BIPOLAR TYPE (MULTI): ICD-10-CM

## 2024-06-22 RX ORDER — CLOZAPINE 25 MG/1
25 TABLET ORAL NIGHTLY
Qty: 31 TABLET | Refills: 1 | Status: SHIPPED | OUTPATIENT
Start: 2024-06-22

## 2024-06-22 NOTE — PROGRESS NOTES
"Updates from Sophia at [facility]:    Over the past week he's had two nights of getting only 2-3 hours of sleep, and other nights getting only 5-6 hours.    \"I saw him briefly on Wednesday and Thursday. Wednesday he was very busy, going through items and was distracted by having to throw papers away or fix things. Thursday they were on their way to an activity and he seemed intent on asking questions about upcoming things, again, kind of distracted from what was happening at the time. His mood is good, with no apparent sadness or anger, but I do think his mind is racing. Just wanted to keep you posted because we both know that it does not take much to have him turn a corner.\"    Reviewed last med changes.  Most recent serum clozapine level was 411 on current dose (500mg/day).    PLAN  -- Increase clozapine to 100mg QAM and 425mg QHS for psychosis  -- Continue asenapine 10mg QHS for psychosis, sleep, and mood stabilization  -- Continue oxcarbazepine 600mg in the morning and 900mg at bedtime for mood stabilization  -- Continue lorazepam 1mg - 1mg - 2mg (AM, 15:00, HS) for anxiety and mood  -- Continue clonidine 0.1mg BID (AM & 15:00) for impulsivity and hyperactivity  -- Continue melatonin 12mg QHS for sleep  -- Continue benztropine 0.5mg BID (AM & 15:00) for EPS  -- Follow up TBD (in-office WOW)    "

## 2024-06-29 DIAGNOSIS — F25.0 SCHIZOAFFECTIVE DISORDER, BIPOLAR TYPE (MULTI): ICD-10-CM

## 2024-06-29 RX ORDER — LORAZEPAM 1 MG/1
TABLET ORAL
Qty: 31 TABLET | Refills: 0 | Status: SHIPPED | OUTPATIENT
Start: 2024-06-29

## 2024-06-29 RX ORDER — LORAZEPAM 2 MG/1
TABLET ORAL
Qty: 62 TABLET | Refills: 0 | Status: SHIPPED | OUTPATIENT
Start: 2024-06-29

## 2024-07-09 ENCOUNTER — TELEPHONE (OUTPATIENT)
Dept: FAMILY MEDICINE CLINIC | Age: 44
End: 2024-07-09

## 2024-07-09 NOTE — TELEPHONE ENCOUNTER
Haider/Suresh Allen called for a same day appt for patient who has a red blotches on his chest and abdomen x 5 days.  I informed her that [facility]on does not have any open appts, and advised for patient to go to the [facility].  Haider was agreeable.     Last seen 3/27/2024  Next appt 7/23/2024    Requested Prescriptions      No prescriptions requested or ordered in this encounter

## 2024-07-10 ENCOUNTER — OFFICE VISIT (OUTPATIENT)
Dept: FAMILY MEDICINE CLINIC | Age: 44
End: 2024-07-10
Payer: MEDICARE

## 2024-07-10 VITALS
TEMPERATURE: 97.4 F | RESPIRATION RATE: 18 BRPM | HEIGHT: 69 IN | HEART RATE: 117 BPM | WEIGHT: 251 LBS | SYSTOLIC BLOOD PRESSURE: 129 MMHG | BODY MASS INDEX: 37.18 KG/M2 | DIASTOLIC BLOOD PRESSURE: 78 MMHG | OXYGEN SATURATION: 98 %

## 2024-07-10 DIAGNOSIS — B35.4 TINEA CORPORIS: Primary | ICD-10-CM

## 2024-07-10 PROCEDURE — 1036F TOBACCO NON-USER: CPT

## 2024-07-10 PROCEDURE — 99213 OFFICE O/P EST LOW 20 MIN: CPT

## 2024-07-10 PROCEDURE — G8427 DOCREV CUR MEDS BY ELIG CLIN: HCPCS

## 2024-07-10 PROCEDURE — G8417 CALC BMI ABV UP PARAM F/U: HCPCS

## 2024-07-10 RX ORDER — CLOTRIMAZOLE AND BETAMETHASONE DIPROPIONATE 10; .64 MG/G; MG/G
CREAM TOPICAL
Qty: 45 G | Refills: 0 | Status: SHIPPED | OUTPATIENT
Start: 2024-07-10

## 2024-07-10 RX ORDER — FLUCONAZOLE 150 MG/1
TABLET ORAL
Qty: 4 TABLET | Refills: 0 | Status: SHIPPED | OUTPATIENT
Start: 2024-07-10

## 2024-07-10 NOTE — PROGRESS NOTES
7/10/24  See Mcdaniels : 1980 Sex: male  Age 43 y.o.    Subjective:  Chief Complaint   Patient presents with    Rash     On chest , arms and on back started friday       HPI:   See Mcdaniels , 43 y.o. male presents to the clinic for evaluation of rash on bilateral chest, arms and back x 5 days. The patient has taken nothing OTC for symptoms. The patient reports no change in symptoms over time. Denies any known cause for the rash including any new soaps, detergents, lotions, foods, or medications. Denies recent travel, recent illness, and ill exposure. Denies any bleeding, drainage, lymphangitic streaking, arthralgia, myalgia,or lethargy.The patient also denies headache, fever, chest pain, abdominal pain, shortness of breath, and nausea / vomiting / diarrhea.    ROS:   Unless otherwise stated in this report the patient's positive and negative responses for review of systems for constitutional, eyes, ENT, cardiovascular, respiratory, gastrointestinal, neurological, , musculoskeletal, and integument systems and related systems to the presenting problem are either stated in the history of present illness or were not pertinent or were negative for the symptoms and/or complaints related to the presenting medical problem.  Positives and pertinent negatives as per HPI.  All others reviewed and are negative.      PMH:     Past Medical History:   Diagnosis Date    Acute psychosis (HCC) 2019    ADHD (attention deficit hyperactivity disorder)     Autism     PATIENT IS COOPERATIVE AND VERBAL PER MOM    Autism     Bipolar 1 disorder (HCC)     Drug-induced neutropenia (HCC) 10/14/2020    Drug-induced parkinsonism (HCC) 10/14/2020    Dysuria 2021    Encounter for lithium monitoring 2016    Enuresis, nocturnal only 2019    Eruption due to drug 2019    HE (hepatic encephalopathy) (HCC) 2021    Mental retardation     Positive occult stool blood test 10/23/2018    Rectal bleeding

## 2024-07-15 DIAGNOSIS — K59.03 DRUG-INDUCED CONSTIPATION: ICD-10-CM

## 2024-07-15 DIAGNOSIS — G21.19 DRUG-INDUCED PARKINSONISM (MULTI): ICD-10-CM

## 2024-07-15 DIAGNOSIS — F25.0 SCHIZOAFFECTIVE DISORDER, BIPOLAR TYPE (MULTI): ICD-10-CM

## 2024-07-16 RX ORDER — CLOZAPINE 200 MG/1
400 TABLET ORAL NIGHTLY
Qty: 62 TABLET | Refills: 11 | Status: SHIPPED | OUTPATIENT
Start: 2024-07-16

## 2024-07-16 RX ORDER — LORAZEPAM 1 MG/1
TABLET ORAL
Qty: 31 TABLET | Refills: 1 | Status: SHIPPED | OUTPATIENT
Start: 2024-07-16

## 2024-07-16 RX ORDER — CLOZAPINE 25 MG/1
TABLET ORAL
Qty: 31 TABLET | Refills: 11 | Status: SHIPPED | OUTPATIENT
Start: 2024-07-16

## 2024-07-16 RX ORDER — CHLORHEXIDINE GLUCONATE 4 %
1 LIQUID (ML) TOPICAL NIGHTLY
Qty: 31 TABLET | Refills: 11 | Status: SHIPPED | OUTPATIENT
Start: 2024-07-16

## 2024-07-16 RX ORDER — OXCARBAZEPINE 600 MG/1
TABLET, FILM COATED ORAL
Qty: 78 TABLET | Refills: 11 | Status: SHIPPED | OUTPATIENT
Start: 2024-07-16

## 2024-07-16 RX ORDER — LORAZEPAM 2 MG/1
TABLET ORAL
Qty: 62 TABLET | Refills: 1 | Status: SHIPPED | OUTPATIENT
Start: 2024-07-16

## 2024-07-16 RX ORDER — ASENAPINE 10 MG/1
10 TABLET SUBLINGUAL NIGHTLY
Qty: 31 TABLET | Refills: 11 | Status: SHIPPED | OUTPATIENT
Start: 2024-07-16

## 2024-07-16 RX ORDER — BENZTROPINE MESYLATE 0.5 MG/1
0.5 TABLET ORAL 2 TIMES DAILY
Qty: 62 TABLET | Refills: 11 | Status: SHIPPED | OUTPATIENT
Start: 2024-07-16

## 2024-07-16 RX ORDER — CLOZAPINE 100 MG/1
100 TABLET ORAL EVERY MORNING
Qty: 31 TABLET | Refills: 11 | Status: SHIPPED | OUTPATIENT
Start: 2024-07-16

## 2024-07-16 RX ORDER — DOCUSATE SODIUM 100 MG/1
200 CAPSULE, LIQUID FILLED ORAL EVERY MORNING
Qty: 62 CAPSULE | Refills: 11 | Status: SHIPPED | OUTPATIENT
Start: 2024-07-16

## 2024-07-16 RX ORDER — CLONIDINE HYDROCHLORIDE 0.1 MG/1
TABLET ORAL
Qty: 62 TABLET | Refills: 11 | Status: SHIPPED | OUTPATIENT
Start: 2024-07-16

## 2024-07-23 ENCOUNTER — OFFICE VISIT (OUTPATIENT)
Dept: FAMILY MEDICINE CLINIC | Age: 44
End: 2024-07-23
Payer: MEDICARE

## 2024-07-23 VITALS
HEIGHT: 69 IN | RESPIRATION RATE: 16 BRPM | TEMPERATURE: 97.7 F | OXYGEN SATURATION: 99 % | SYSTOLIC BLOOD PRESSURE: 132 MMHG | WEIGHT: 247.7 LBS | HEART RATE: 93 BPM | DIASTOLIC BLOOD PRESSURE: 78 MMHG | BODY MASS INDEX: 36.69 KG/M2

## 2024-07-23 DIAGNOSIS — L42 PITYRIASIS ROSEA: Primary | ICD-10-CM

## 2024-07-23 DIAGNOSIS — R73.01 IMPAIRED FASTING BLOOD SUGAR: ICD-10-CM

## 2024-07-23 DIAGNOSIS — F84.0 AUTISM SPECTRUM DISORDER: ICD-10-CM

## 2024-07-23 DIAGNOSIS — F79 INTELLECTUAL DISABILITY: ICD-10-CM

## 2024-07-23 DIAGNOSIS — K59.03 DRUG-INDUCED CONSTIPATION: ICD-10-CM

## 2024-07-23 DIAGNOSIS — F25.0 SCHIZOAFFECTIVE DISORDER, BIPOLAR TYPE (HCC): ICD-10-CM

## 2024-07-23 DIAGNOSIS — E66.01 MORBID OBESITY (HCC): ICD-10-CM

## 2024-07-23 DIAGNOSIS — F31.64 BIPOLAR AFFECTIVE DISORDER, MIXED, SEVERE, WITH PSYCHOTIC BEHAVIOR (HCC): ICD-10-CM

## 2024-07-23 DIAGNOSIS — K59.01 SLOW TRANSIT CONSTIPATION: ICD-10-CM

## 2024-07-23 DIAGNOSIS — G25.71 AKATHISIA: ICD-10-CM

## 2024-07-23 PROBLEM — F29 PSYCHOSIS (HCC): Status: RESOLVED | Noted: 2018-09-22 | Resolved: 2024-07-23

## 2024-07-23 PROBLEM — Z23 FLU VACCINE NEED: Status: RESOLVED | Noted: 2021-09-20 | Resolved: 2024-07-23

## 2024-07-23 PROBLEM — Z23 NEED FOR TETANUS, DIPHTHERIA, AND ACELLULAR PERTUSSIS (TDAP) VACCINE: Status: RESOLVED | Noted: 2021-09-20 | Resolved: 2024-07-23

## 2024-07-23 PROBLEM — Z23 NEED FOR PROPHYLACTIC VACCINATION AGAINST DIPHTHERIA AND TETANUS: Status: RESOLVED | Noted: 2022-09-21 | Resolved: 2024-07-23

## 2024-07-23 PROCEDURE — G8427 DOCREV CUR MEDS BY ELIG CLIN: HCPCS | Performed by: FAMILY MEDICINE

## 2024-07-23 PROCEDURE — 99214 OFFICE O/P EST MOD 30 MIN: CPT | Performed by: FAMILY MEDICINE

## 2024-07-23 PROCEDURE — 1036F TOBACCO NON-USER: CPT | Performed by: FAMILY MEDICINE

## 2024-07-23 PROCEDURE — G8417 CALC BMI ABV UP PARAM F/U: HCPCS | Performed by: FAMILY MEDICINE

## 2024-07-23 RX ORDER — OXCARBAZEPINE 300 MG/1
300 TABLET, FILM COATED ORAL NIGHTLY
COMMUNITY
Start: 2024-06-26

## 2024-07-23 NOTE — PROGRESS NOTES
[facility]  [address], [place] [address]  Phone: [phone]        Patient:  See Mcdaniels 43 y.o. male          Chief complaint:   Chief Complaint   Patient presents with    New Patient    Rash     Pt states rash is on torso and bilateral arms.  Pt was seen at walk in clinic on 7/10/2024.  Pt states no relief with cream.        Assessment and Plan     See was seen today for new patient and rash.    Diagnoses and all orders for this visit:    Pityriasis rosea  Subacute and not controlled  Not itchy and not causing patient distress  Okay to stop fluconazole and Lotrisone  Can use OTC hydrocortisone if becomes itchy  Symptomatic management  Watch and wait, to call if continues to worsen after 1 month and can consider dermatology referral  Consider possible reaction to medication, advised to pay attention to oral cavity for development of ulceration    Autism  Intellectual disability  Schizoaffective disorder, bipolar type (HCC)  Bipolar affective disorder, mixed, severe, with psychotic behavior (HCC)  Chronic and stable  Following with Alexander Herr MD. at [facility]  Discussed with caregiver need for frequent lab monitoring that is to be done per specialist  Continue medications per specialist    Slow transit constipation  Chronic and stable    Akathisia  Chronic and stable  Continue Cogentin per psychiatry    Morbid obesity (HCC)  Chronic and not well-controlled  The patient is asked to make an attempt to improve diet and exercise patterns to aid in medical management of this problem.            Please see Patient Instructions for further counseling and information given.        Advised to please be adherent to the treatment plans discussed today, and please call with any questions or concerns, letting the office know of any reasons that the plans may not be followed.  The risks of untreated conditions include worsening illness, injury, disability, and

## 2024-08-27 DIAGNOSIS — F25.0 SCHIZOAFFECTIVE DISORDER, BIPOLAR TYPE (MULTI): ICD-10-CM

## 2024-08-27 RX ORDER — CLOZAPINE 50 MG/1
50 TABLET ORAL NIGHTLY
Qty: 31 TABLET | Refills: 11 | Status: SHIPPED | OUTPATIENT
Start: 2024-08-27
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
## 2024-09-27 ENCOUNTER — APPOINTMENT (OUTPATIENT)
Dept: BEHAVIORAL HEALTH | Facility: CLINIC | Age: 44
End: 2024-09-27
Payer: MEDICARE

## 2024-09-27 VITALS — TEMPERATURE: 98.7 F | DIASTOLIC BLOOD PRESSURE: 77 MMHG | SYSTOLIC BLOOD PRESSURE: 113 MMHG

## 2024-09-27 DIAGNOSIS — F70 MILD INTELLECTUAL DISABILITY: ICD-10-CM

## 2024-09-27 DIAGNOSIS — D70.2 DRUG-INDUCED NEUTROPENIA (CMS-HCC): ICD-10-CM

## 2024-09-27 DIAGNOSIS — F25.0 SCHIZOAFFECTIVE DISORDER, BIPOLAR TYPE (MULTI): Primary | ICD-10-CM

## 2024-09-27 DIAGNOSIS — Z79.899 HIGH RISK MEDICATION USE: ICD-10-CM

## 2024-09-27 DIAGNOSIS — F84.0 AUTISM SPECTRUM DISORDER (HHS-HCC): ICD-10-CM

## 2024-09-27 DIAGNOSIS — G21.19 DRUG-INDUCED PARKINSONISM (MULTI): ICD-10-CM

## 2024-09-27 PROCEDURE — 99215 OFFICE O/P EST HI 40 MIN: CPT | Performed by: PSYCHIATRY & NEUROLOGY

## 2024-09-27 PROCEDURE — 1036F TOBACCO NON-USER: CPT | Performed by: PSYCHIATRY & NEUROLOGY

## 2024-09-27 ASSESSMENT — ABNORMAL INVOLUNTARY MOVEMENT SCALE (AIMS)
LIPS_PARIETAL: NONE, NORMAL
PATIENT_WEARS_DENTURES: NO
JAW: NONE, NORMAL
AIMS_PATIENT_AWARENESS: NO AWARENESS
FACIAL_EXPRESSION_MUSCLES: NONE, NORMAL
TONGUE: NONE, NORMAL
UPPER_BODY_EXTREMITIES: NONE, NORMAL
AIMS_SEVERITY: 0
AIMS_PATIENT_INCAPACITATION: NONE, NORMAL
LOWER_BODY_EXTREMITIES: NONE, NORMAL
NECK_SHOULDER_HIPS: NONE, NORMAL
CURRENT_PROBLEMS_TEETH_DENTURES: NO

## 2024-09-27 ASSESSMENT — ENCOUNTER SYMPTOMS
TROUBLE SWALLOWING: 0
CONFUSION: 0
CHOKING: 0
GASTROINTESTINAL NEGATIVE: 1
SEIZURES: 0
TREMORS: 0
SPEECH DIFFICULTY: 0
DIZZINESS: 0

## 2024-09-27 NOTE — PATIENT INSTRUCTIONS
Continue current medications.  Order for monthly bloodwork ([facility]CD) provided.  Next appointment:  Thursday 11/14/2024 at 9:00 AM virtual.

## 2024-09-27 NOTE — PROGRESS NOTES
"Outpatient Adult IDD Psychiatry    Present for appointment: Gasper and Loretta Lares staff (Sophia).    SUBJECTIVE    Gasper has not had any acute or significant health problems over the past few months.    He and his house-mate moved into a new 3-bedroom home at the end of August; they both seem to be adjusting well to the new location.    We've had to make a few different medication changes for Gasper between visits (increasing his lorazepam and clozapine) to address anxiety and alisha.  Sophia feels that right now he's been very stable, though the holiday season (Halloween, Thanksgiving, and Christmas) is usually a time of year where he gets very hyper, excited, and \"amped up\" with all of the celebrations.    He does not seem to be having any problems with sedation, confusion, disorientation, or altered sensorium.  He seems to be awake, alert, and active during the daytime.      No other side effects identified today.  AIMS exam is negative.    Review of Systems   HENT:  Negative for dental problem, drooling and trouble swallowing.    Respiratory:  Negative for choking.    Cardiovascular:  Negative for chest pain.   Gastrointestinal: Negative.    Genitourinary:  Negative for enuresis.   Musculoskeletal:  Negative for gait problem.   Neurological:  Negative for dizziness, tremors, seizures and speech difficulty.   Psychiatric/Behavioral:  Negative for confusion and suicidal ideas.       Controlled Substance Evaluation  OARRS/PDMP reviewed: Roland Hampton MD on 9/27/2024  6:43 AM  Is the patient prescribed a combination of a benzodiazepine and opioid? No  I have personally reviewed the OARRS report for Gasper Burns.   I have considered the risks of abuse, dependence, addiction and diversion.    I believe that it is clinically appropriate for Gasper Burns to be prescribed this medication.    Last Urine Drug Screen: 12/20/2023  No results found for this or any previous visit (from the past 8760 " hour(s)).    Controlled Substance Agreement: 09/27/2024  Reviewed Controlled Substance Agreement, including but not limited to:  Benefits, risks, and alternatives to treatment with a controlled substance medication(s).    Prescribed Controlled Substances:  > Benzodiazepines: Lorazepam  What is the patient's goal of therapy? Akathisia  Is this being achieved with current treatment? Yes  Activities of Daily Living:   Is your overall impression that this patient is benefiting (symptom reduction outweighs side effects) from benzodiazepine therapy? Yes   1. Physical Functioning: Same  2. Family Relationship: Same  3. Social Relationship: Same  4. Mood: Same  5. Sleep Patterns: Same  6. Overall Function: Same     MEDICATION HISTORY  Aripiprazole  Artane  Carbamazepine  Depakote - hyperammonemic encephalopathy  Escitalopram  Haldol - severe EPS  Invega Sustenna  Lithium  Methylphenidate - increased irritability  Metoprolol  Mirtazapine  Quetiapine  Risperidone  Sertraline  Trazodone  Zolpidem    CURRENT MEDICATIONS    Current Outpatient Medications:     asenapine (Saphris) SL tablet, Place 1 tablet (10 mg) under the tongue once daily at bedtime., Disp: 31 tablet, Rfl: 11    benztropine (Cogentin) 0.5 mg tablet, Take 1 tablet (0.5 mg) by mouth 2 times a day., Disp: 62 tablet, Rfl: 11    cloNIDine (Catapres) 0.1 mg tablet, 1 TAB BY MOUTH TWICE A DAY AT 8AM & 3PM, Disp: 62 tablet, Rfl: 11    cloZAPine (Clozaril) 100 mg tablet, Take 1 tablet (100 mg) by mouth once daily in the morning., Disp: 31 tablet, Rfl: 11    cloZAPine (Clozaril) 200 mg tablet, Take 2 tablets (400 mg) by mouth once daily at bedtime., Disp: 62 tablet, Rfl: 11    cloZAPine (Clozaril) 50 mg tablet, Take 1 tablet (50 mg) by mouth once daily at bedtime., Disp: 31 tablet, Rfl: 11    docusate sodium (Colace) 100 mg capsule, Take 2 capsules (200 mg) by mouth once daily in the morning., Disp: 62 capsule, Rfl: 11    LORazepam (Ativan) 1 mg tablet, Take 1 tablet (1  mg) by mouth once daily at 3pm., Disp: 31 tablet, Rfl: 1    LORazepam (Ativan) 2 mg tablet, 1 TAB BY MOUTH TWICE A DAY AT 8AM & 8PM, Disp: 62 tablet, Rfl: 1    melatonin 12 mg tablet, Take 1 tablet by mouth once daily at bedtime., Disp: 31 tablet, Rfl: 11    OXcarbazepine (Trileptal) 600 mg tablet, Take 1 tablet (600 mg) by mouth every morning and 1.5 tablets (900 mg) by mouth at bedtime., Disp: 78 tablet, Rfl: 11    SOCIAL HISTORY  Living situation Lives with staff and 1 male house-mate   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   Bx Specialist ?   Nicotine None   Alcohol None   Other drugs None     OBJECTIVE    Visit Vitals  /77 (BP Location: Left arm)   Temp 37.1 °C (98.7 °F) (Temporal)   Smoking Status Never     Lab Results   Component Value Date    HGB 14.4 12/29/2020    PLT 87 (L) 12/29/2020    NEUTROABS 3.25 12/29/2020    GLUCOSE 111 (H) 12/29/2020     12/29/2020    K 3.9 12/29/2020    CO2 27 12/29/2020    CALCIUM 8.8 12/29/2020    CREATININE 0.92 12/29/2020    AST 24 12/29/2020    ALT 36 12/29/2020    HGBA1C 5.2 08/04/2023    AMMONIA 45 12/30/2020    TSH 1.68 12/30/2020     Therapeutic Drug Monitoring  01/31/2024: Oxcarbazepine level = 34 [3-35] (1800mg/day)  12/20/2023: Oxcarbazepine level = 34 [3-35] (1800mg/day)  12/20/2023: Clozapine level = 411 (500mg/day)  07/06/2023: Oxcarbazepine level = 31 [3-35] (1800mg/day)  06/00/2022: Clozapine level = 367 (450mg/day)  06/00/2022: Oxcarbazepine level = 33 [3-35] (1800mg/day)    Electrocardiograms  05/02/2022: Sinus tachycardia, , QTc 445    MENTAL STATUS EXAM  General/Appearance: Appropriate dress/hygiene/grooming. No drooling.  Attitude/Behavior: Actively engages with interview. Calm/pleasant. Cooperative.  Speech/Communication:  Less hypertalkative than usual, but a little slurred/dysarthric.  Motor: No abnormal or involuntary movements observed.  Gait:  Ambulates w/o difficulty. Stable/steady.  Mood: Appears to be in good spirits.  Affect: Euthymic. Full range.  Thought processes: Goal-directed. Organized/coherent. Tangential.  Thought content: Non-sequiturs.  Perception: Does not appear to be experiencing or responding to hallucinations.  Sensorium/Cognition: Oriented to self and surroundings. Intellectual impairment.  Memory: Recent & remote recall is intact.  Insight: Minimal.  Judgment: Fair.     ASSESSMENT  Gasper seems to be doing well currently.  He has had some tentative stability with recent med changes.  We may be able to consider reducing his dose of clozapine if he remains stable after the holidays.  No additional treatment changes are felt to be needed at this time.  He will have an updated annual urine drug screen done today when he gets home.    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    PLAN  -- Continue asenapine 10mg QHS for psychosis, sleep, and mood stabilization  -- Continue clozapine 100mg QAM and 450mg QHS for psychosis  -- Continue oxcarbazepine 600mg in the morning and 900mg at bedtime for mood stabilization and seizure prevention  -- Continue lorazepam 2mg - 1mg - 2mg (AM, 15:00, HS) for anxiety and mood  -- Continue clonidine 0.1mg BID (AM & 15:00) for impulsivity and hyperactivity  -- Continue melatonin 12mg QHS for sleep  -- Continue benztropine 0.5mg BID (AM & 15:00) for EPS  -- Follow up 2-3 months    Roland Hampton MD    Prep time on date of the patient encounter: 5 minutes   Time spent directly with patient/family/caregiver: 30 minutes   Additional time spent on patient care activities: 0 minutes   Documentation time: 5 minutes   Other time spent: 0 minutes   Total time on date of patient encounter: 40 minutes

## 2024-09-28 RX ORDER — LORAZEPAM 1 MG/1
TABLET ORAL
Qty: 31 TABLET | Refills: 0 | Status: SHIPPED | OUTPATIENT
Start: 2024-09-28

## 2024-09-28 RX ORDER — POLYETHYLENE GLYCOL 3350 17 G/17G
17 POWDER, FOR SOLUTION ORAL EVERY OTHER DAY
COMMUNITY
Start: 2024-09-26

## 2024-09-28 RX ORDER — LORATADINE 10 MG/1
10 TABLET ORAL DAILY
COMMUNITY
Start: 2024-09-26

## 2024-09-28 RX ORDER — OMEPRAZOLE 20 MG/1
20 CAPSULE, DELAYED RELEASE ORAL
COMMUNITY
Start: 2024-09-26

## 2024-09-28 RX ORDER — LORAZEPAM 2 MG/1
TABLET ORAL
Qty: 62 TABLET | Refills: 0 | Status: SHIPPED | OUTPATIENT
Start: 2024-09-28

## 2024-10-16 ENCOUNTER — HOSPITAL ENCOUNTER (OUTPATIENT)
Age: 44
Discharge: HOME | End: 2024-10-16
Payer: MEDICARE

## 2024-10-16 DIAGNOSIS — F84.0 AUTISM SPECTRUM DISORDER (HHS-HCC): Primary | ICD-10-CM

## 2024-10-16 DIAGNOSIS — F25.0 SCHIZOAFFECTIVE DISORDER, BIPOLAR TYPE (MULTI): ICD-10-CM

## 2024-10-16 LAB
BASOPHILS # BLD: 0.05 K/UL (ref 0–0.2)
BASOPHILS NFR BLD: 1 % (ref 0–2)
EOSINOPHIL # BLD: 0.18 K/UL (ref 0.05–0.5)
EOSINOPHILS RELATIVE PERCENT: 3 % (ref 0–6)
ERYTHROCYTE [DISTWIDTH] IN BLOOD BY AUTOMATED COUNT: 11.9 % (ref 11.5–15)
HCT VFR BLD AUTO: 42.4 % (ref 37–54)
HGB BLD-MCNC: 15 G/DL (ref 12.5–16.5)
IMM GRANULOCYTES # BLD AUTO: 0.04 K/UL (ref 0–0.58)
IMM GRANULOCYTES NFR BLD: 1 % (ref 0–5)
LYMPHOCYTES NFR BLD: 1.73 K/UL (ref 1.5–4)
LYMPHOCYTES RELATIVE PERCENT: 24 % (ref 20–42)
MCH RBC QN AUTO: 32 PG (ref 26–35)
MCHC RBC AUTO-ENTMCNC: 35.4 G/DL (ref 32–34.5)
MCV RBC AUTO: 90.4 FL (ref 80–99.9)
MONOCYTES NFR BLD: 0.37 K/UL (ref 0.1–0.95)
MONOCYTES NFR BLD: 5 % (ref 2–12)
NEUTROPHILS NFR BLD: 67 % (ref 43–80)
NEUTS SEG NFR BLD: 4.84 K/UL (ref 1.8–7.3)
PLATELET # BLD AUTO: 232 K/UL (ref 130–450)
PMV BLD AUTO: 9.9 FL (ref 7–12)
RBC # BLD AUTO: 4.69 M/UL (ref 3.8–5.8)
WBC OTHER # BLD: 7.2 K/UL (ref 4.5–11.5)

## 2024-10-16 PROCEDURE — 85025 COMPLETE CBC W/AUTO DIFF WBC: CPT

## 2024-10-16 PROCEDURE — 36415 COLL VENOUS BLD VENIPUNCTURE: CPT

## 2024-10-16 PROCEDURE — G0480 DRUG TEST DEF 1-7 CLASSES: HCPCS

## 2024-10-16 PROCEDURE — 80307 DRUG TEST PRSMV CHEM ANLYZR: CPT

## 2024-10-16 RX ORDER — LORAZEPAM 2 MG/1
TABLET ORAL
Qty: 93 TABLET | Refills: 1 | Status: SHIPPED | OUTPATIENT
Start: 2024-10-16 | End: 2024-10-17 | Stop reason: SDUPTHER

## 2024-10-16 NOTE — PROGRESS NOTES
Received email updates from [facility]: 4 days of little sleep, excessive talking, hyperactivity, and racing thoughts.     Still waiting on lab results (to be done this week).    Will increase lorazepam to 2mg TID in the meantime.

## 2024-10-17 LAB
6MAM UR QL SCN: NEGATIVE
7AMINOCLONAZEPAM UR-MCNC: <50 NG/ML
ABNORMAL SPECIMEN VALIDITY TEST: ABNORMAL
ALPHA-HYDROXYALPRAZOLAM, QUANTITATIVE, URINE: <50 NG/ML
ALPHA-HYDROXYMIDAZOLAM, QUANTITATIVE, URINE: <50 NG/ML
ALPHA-HYDROXYTRIAZOLAM, QUANTITATIVE, URINE: <50 NG/ML
ALPRAZ UR-MCNC: <50 NG/ML
AMPHET UR QL SCN: NEGATIVE
BARBITURATES UR QL SCN: NEGATIVE
BENZODIAZ UR QL: POSITIVE
BUPRENORPHINE UR QL: NEGATIVE
CANNABINOIDS UR QL SCN: NEGATIVE
CHLORDIAZEP UR CFM-MCNC: <50 NG/ML
CLONAZEPAM, QUANTITATIVE, URINE: <50 NG/ML
COCAINE UR QL SCN: NEGATIVE
COMPLIANCE DRUG ANALYSIS, URINE: NORMAL
DIAZEPAM URINE QUANT: <50 NG/ML
ETHANOL UR-MCNC: NOT DETECTED MG/DL
FENTANYL UR QL: NEGATIVE
FLUNITRAZEPAM, QUANTITATIVE, URINE: <50 NG/ML
FLURAZEPAM, QUANTITATIVE, URINE: <50 NG/ML
INTEGRITY CHECK, CREATININE, URINE: 192.8 MG/DL (ref 22–250)
INTEGRITY CHECK, OXIDANT, URINE: <40 MG/L
INTEGRITY CHECK, PH, URINE: 5.9 (ref 4.5–9)
INTEGRITY CHECK, SPECIFIC GRAVITY, URINE: 1.03 (ref 1–1.03)
LORAZEPAM UR QL: >1000 NG/ML
METHADONE UR QL: NEGATIVE
MIDAZOLAM URINE QUANT: <50 NG/ML
NORDIAZEPAM URINE QUANT: <50 NG/ML
OPIATES UR QL SCN: NEGATIVE
OXAZEPAM UR QL: <50 NG/ML
OXYCODONE UR QL SCN: NEGATIVE
PCP UR QL SCN: NEGATIVE
TEMAZEPAM, QUANTITATIVE, URINE: <50 NG/ML
TEST INFORMATION: ABNORMAL
TRAMADOL, URINE: NEGATIVE

## 2024-10-17 RX ORDER — LORAZEPAM 2 MG/1
TABLET ORAL
Qty: 90 TABLET | Refills: 1 | Status: SHIPPED | OUTPATIENT
Start: 2024-10-17

## 2024-10-30 DIAGNOSIS — F25.0 SCHIZOAFFECTIVE DISORDER, BIPOLAR TYPE (MULTI): ICD-10-CM

## 2024-10-30 RX ORDER — CLOZAPINE 50 MG/1
50 TABLET ORAL 2 TIMES DAILY
Qty: 62 TABLET | Refills: 0 | Status: SHIPPED | OUTPATIENT
Start: 2024-10-30

## 2024-11-13 ASSESSMENT — ENCOUNTER SYMPTOMS
TROUBLE SWALLOWING: 0
GASTROINTESTINAL NEGATIVE: 1
DIZZINESS: 0
CONFUSION: 0
SEIZURES: 0
SPEECH DIFFICULTY: 0
TREMORS: 0
CHOKING: 0

## 2024-11-13 NOTE — PROGRESS NOTES
Outpatient Adult IDD Psychiatry    A HIPAA-compliant interactive audio and video telecommunication system which permits real time communications between the patient (at the originating site) and provider (at the distant site) was utilized to provide this telehealth service.     The patient, family, caregivers and guardian (as appropriate) have provided consent to conduct treatment via this telehealth service.      The patient's identity and physical location were verified at the time of this visit.     Present for appointment: Gasper, Loretta Lares staff (Sophia), and Aleja staff (Heather).    Appointment location: [facility].  [address] [place], [place] [id number]    SUBJECTIVE    Gasper has not had any acute or significant health problems over the past few months.      We had to make a few medication adjustments for Gasper since last office visit due to elevated and irritable mood, increased hyperactivity/impulsivity, and lack of sleep.      He had a great Halloween and is looking forward to [facility], [place], and his birthday at the end of the year.    He does not seem to be having any problems with sedation, confusion, disorientation, or altered sensorium.  He seems to be awake, alert, and active during the daytime.      No other side effects identified today.  AIMS exam is negative.    Review of Systems   HENT:  Negative for dental problem, drooling and trouble swallowing.    Respiratory:  Negative for choking.    Cardiovascular:  Negative for chest pain.   Gastrointestinal: Negative.    Genitourinary:  Negative for enuresis.   Musculoskeletal:  Negative for gait problem.   Neurological:  Negative for dizziness, tremors, seizures and speech difficulty.   Psychiatric/Behavioral:  Negative for confusion and suicidal ideas.       Controlled Substance Evaluation  OARRS/PDMP reviewed: Roland Hampton MD on 11/13/2024  6:57 PM  Is the patient prescribed a combination of a benzodiazepine and opioid? No  I have  personally reviewed the OARRS report for Gasper Burns.   I have considered the risks of abuse, dependence, addiction and diversion.    I believe that it is clinically appropriate for Gasper Burns to be prescribed this medication.    Last Urine Drug Screen: 10/16/2024  No results found for this or any previous visit (from the past 8760 hours).    Controlled Substance Agreement: 09/27/2024  Reviewed Controlled Substance Agreement, including but not limited to:  Benefits, risks, and alternatives to treatment with a controlled substance medication(s).    Prescribed Controlled Substances:  > Benzodiazepines: Lorazepam  What is the patient's goal of therapy? Akathisia  Is this being achieved with current treatment? Yes  Activities of Daily Living:   Is your overall impression that this patient is benefiting (symptom reduction outweighs side effects) from benzodiazepine therapy? Yes   1. Physical Functioning: Same  2. Family Relationship: Same  3. Social Relationship: Same  4. Mood: Same  5. Sleep Patterns: Same  6. Overall Function: Same     MEDICATION HISTORY  Aripiprazole  Artane  Carbamazepine  Depakote - hyperammonemic encephalopathy  Escitalopram  Haldol - severe EPS  Invega Sustenna  Lithium  Methylphenidate - increased irritability  Metoprolol  Mirtazapine  Quetiapine  Risperidone  Sertraline  Trazodone  Zolpidem    CURRENT MEDICATIONS    Current Outpatient Medications:     asenapine (Saphris) SL tablet, Place 1 tablet (10 mg) under the tongue once daily at bedtime., Disp: 31 tablet, Rfl: 11    benztropine (Cogentin) 0.5 mg tablet, Take 1 tablet (0.5 mg) by mouth 2 times a day., Disp: 62 tablet, Rfl: 11    cloNIDine (Catapres) 0.1 mg tablet, 1 TAB BY MOUTH TWICE A DAY AT 8AM & 3PM, Disp: 62 tablet, Rfl: 11    cloZAPine (Clozaril) 100 mg tablet, Take 1 tablet (100 mg) by mouth once daily in the morning., Disp: 31 tablet, Rfl: 11    cloZAPine (Clozaril) 200 mg tablet, Take 2 tablets (400 mg) by mouth once daily  at bedtime., Disp: 62 tablet, Rfl: 11    cloZAPine (Clozaril) 50 mg tablet, Take 1 tablet (50 mg) by mouth 2 times a day., Disp: 62 tablet, Rfl: 0    docusate sodium (Colace) 100 mg capsule, Take 2 capsules (200 mg) by mouth once daily in the morning., Disp: 62 capsule, Rfl: 11    loratadine (Claritin) 10 mg tablet, Take 1 tablet (10 mg) by mouth once daily., Disp: , Rfl:     LORazepam (Ativan) 2 mg tablet, 1 TAB BY MOUTH THREE TIMES PER DAY (8AM, 3PM, 8PM), Disp: 90 tablet, Rfl: 1    melatonin 12 mg tablet, Take 1 tablet by mouth once daily at bedtime., Disp: 31 tablet, Rfl: 11    omeprazole (PriLOSEC) 20 mg DR capsule, Take 1 capsule (20 mg) by mouth once daily in the morning. 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   Bx Specialist ?   Nicotine None   Alcohol None   Other drugs None     OBJECTIVE    Lab Results   Component Value Date    HGB 14.4 12/29/2020    PLT 87 (L) 12/29/2020    NEUTROABS 3.25 12/29/2020    GLUCOSE 111 (H) 12/29/2020     12/29/2020    K 3.9 12/29/2020    CO2 27 12/29/2020    CALCIUM 8.8 12/29/2020    CREATININE 0.92 12/29/2020    AST 24 12/29/2020    ALT 36 12/29/2020    HGBA1C 5.2 08/04/2023    AMMONIA 45 12/30/2020    TSH 1.68 12/30/2020     Therapeutic Drug Monitoring  01/31/2024: Oxcarbazepine level = 34 [3-35] (1800mg/day)  12/20/2023: Oxcarbazepine level = 34 [3-35] (1800mg/day)  12/20/2023: Clozapine level = 411 (500mg/day)  07/06/2023: Oxcarbazepine level = 31 [3-35] (1800mg/day)  06/00/2022:  Clozapine level = 367 (450mg/day)  06/00/2022: Oxcarbazepine level = 33 [3-35] (1800mg/day)    Electrocardiograms  05/02/2022: Sinus tachycardia, , QTc 445    MENTAL STATUS EXAM  General/Appearance: Appropriate dress/hygiene/grooming. No drooling.  Attitude/Behavior: Actively engages with interview. Calm/pleasant. Cooperative.  Speech/Communication:  Less hypertalkative than usual, but a little slurred/dysarthric.  Motor: No abnormal or involuntary movements observed.  Gait: Not assessed at this visit.  Mood: Appears to be in good spirits.  Affect: Euthymic. Full range.  Thought processes: Goal-directed. Organized/coherent. Tangential.  Thought content: Non-sequiturs.  Perception: Does not appear to be experiencing or responding to hallucinations.  Sensorium/Cognition: Oriented to self and surroundings. Intellectual impairment.  Memory: Recent & remote recall is intact.  Insight: Minimal.  Judgment: Fair.     ASSESSMENT  Gasper seems to be doing well currently.  Continue current medications and re-check updated clozapine and oxcarbazepine levels with November's CBC.    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    PLAN  -- Continue clozapine 150mg AM and 450mg HS for psychosis and mood stabilization  -- Continue asenapine 10mg HS for psychosis and mood stabilization  -- Continue oxcarbazepine 600mg AM and 900mg HS for mood stabilization and seizure prevention  -- Continue lorazepam 2mg TID (AM, 15:00, HS) for anxiety, mood, akathisia, and seizure prevention  -- Continue clonidine 0.1mg BID (AM & 15:00) for impulsivity and hyperactivity  -- Continue melatonin 12mg HS for sleep  -- Continue benztropine 0.5mg BID (AM & 15:00) for EPS  -- Follow up 8 weeks    Roland Hampton MD    Prep time on date of the patient encounter: 0 minutes   Time spent directly with  patient/family/caregiver: 30 minutes   Additional time spent on patient care activities: 0 minutes   Documentation time: 5 minutes   Other time spent: 0 minutes   Total time on date of patient encounter: 35 minutes

## 2024-11-14 ENCOUNTER — TELEPHONE (OUTPATIENT)
Dept: BEHAVIORAL HEALTH | Facility: CLINIC | Age: 44
End: 2024-11-14

## 2024-11-14 ENCOUNTER — APPOINTMENT (OUTPATIENT)
Dept: BEHAVIORAL HEALTH | Facility: CLINIC | Age: 44
End: 2024-11-14
Payer: MEDICARE

## 2024-11-14 DIAGNOSIS — F25.0 SCHIZOAFFECTIVE DISORDER, BIPOLAR TYPE (MULTI): Primary | ICD-10-CM

## 2024-11-14 DIAGNOSIS — F70 MILD INTELLECTUAL DISABILITY: ICD-10-CM

## 2024-11-14 DIAGNOSIS — F84.0 AUTISM SPECTRUM DISORDER (HHS-HCC): ICD-10-CM

## 2024-11-14 DIAGNOSIS — G21.19 DRUG-INDUCED PARKINSONISM (MULTI): ICD-10-CM

## 2024-11-14 PROCEDURE — G2211 COMPLEX E/M VISIT ADD ON: HCPCS | Performed by: PSYCHIATRY & NEUROLOGY

## 2024-11-14 PROCEDURE — 99214 OFFICE O/P EST MOD 30 MIN: CPT | Performed by: PSYCHIATRY & NEUROLOGY

## 2024-11-14 PROCEDURE — 1036F TOBACCO NON-USER: CPT | Performed by: PSYCHIATRY & NEUROLOGY

## 2024-11-14 RX ORDER — CLOZAPINE 50 MG/1
50 TABLET ORAL 2 TIMES DAILY
Qty: 62 TABLET | Refills: 11 | Status: SHIPPED | OUTPATIENT
Start: 2024-11-14

## 2024-11-14 RX ORDER — LORAZEPAM 2 MG/1
TABLET ORAL
Qty: 90 TABLET | Refills: 2 | Status: SHIPPED | OUTPATIENT
Start: 2024-11-14 | End: 2024-11-14 | Stop reason: SDUPTHER

## 2024-11-14 RX ORDER — LORAZEPAM 2 MG/1
TABLET ORAL
Qty: 93 TABLET | Refills: 2 | Status: SHIPPED | OUTPATIENT
Start: 2024-11-14

## 2024-11-14 NOTE — PATIENT INSTRUCTIONS
Continue current medications.  Orders attached for one-time clozapine level and Trileptal (oxcarbazepine) level.  Next appointment: Monday 1/06/25 at 9:00 AM virtual.

## 2024-11-15 NOTE — CODE DOCUMENTATION
Patient arrived to facility from home family stated patient went to lay down about 510 pm complaining not feeling well. Down time unknown. Per family Denied falls or accidents. CPR continued.

## 2024-11-15 NOTE — ED PROVIDER NOTES
[facility] EMERGENCY DEPARTMENT  EMERGENCY DEPARTMENT ENCOUNTER        Pt Name: See Mcdaniels  MRN: [id number]  Birthdate 1980  Date of evaluation: 11/15/2024  Provider: Lizzy Nath DO  PCP: Karon Sears MD  Note Started: 6:36 PM EST 11/15/24    CHIEF COMPLAINT       No chief complaint on file.      HISTORY OF PRESENT ILLNESS: 1 or more Elements   History From: EMS    Limitations to history : Altered Mental Status    See Mcdaniels is a 43 y.o. male who presents in cardiopulmonary arrest.  Intubated with Marko device in place prior to arrival.  Per story from EMS, patient has resided at behavioral health house, noted that he had been going about his regular business today, had gone down for a nap around 1700/1710 and when the caregiver had gone to check on him 10 to 15 minutes later they noticed that he was unresponsive.  Caregiver had checked for a pulse and began CPR.  Called EMS.  EMS notes that they arrived on scene and ACLS protocols were followed.  He was only in asystole on the monitor.  They gave him 5 rounds of epi prior to arrival.      EXTERNAL NOTE REVIEW:      On chart review last saw PCP on 7/23/2024 for rash on his torso.  He had a behavioral health visit yesterday no new medications were added.    REVIEW OF SYSTEMS :      Positives and Pertinent negatives as per HPI.     SURGICAL HISTORY     Past Surgical History:   Procedure Laterality Date    DENTAL SURGERY N/A 2/14/2024    EXAM, RADIOGRAPHS, ADULT PROPHYALXIS, FLOURIDE, RESTORATIONS performed by Odette Mckeon DDS at [facility] [place]    OTHER SURGICAL HISTORY      FASCIITIS AND HEEL SPUR    WISDOM TOOTH EXTRACTION  2006       CURRENTMEDICATIONS       Previous Medications    ACETAMINOPHEN (AMINOFEN) 325 MG TABLET    Take 2 tablets by mouth every 4 hours as needed for Pain or Fever    ASENAPINE MALEATE (SAPHRIS) 10 MG SUBL SUBLINGUAL TABLET    Place 1 tablet under the tongue nightly    BENZTROPINE  patient's bedside. [MB]      ED Course User Index  [MB] Lizzy Nath DO        Medical Decision Making      43-year-old gentleman past medical history of schizoaffective disorder, ADHD, autism, bipolar 1 presented in cardiopulmonary arrest.  Intubated in the field with Marko device in place prior to arrival.  Had received 5 epinephrines prior to arrival with only PEA noted on the monitor.  Patient unable to provide history, brought by EMS.  Approximately 30 minutes of CPR prior to arrival.  Physical exam demonstrating cyanotic facies, pupils fixed and dilated, ventilated breath sounds bilaterally, no abdominal pulsatile masses, delayed cap refill.    Differential diagnosis to include but not limited to cardiac arrest, pulmonary arrest, electrolyte abnormality.    ACLS protocols followed patient's arrival to the emergency room.  ETT placement verified with CMAC, confirmed through vocal cords and patient does have bilateral ventilated breath sounds.  Fast ultrasound performed without evidence of free abdominal fluid.  Central line placed in patient's left femoral vein, initial attempt at right femoral vein however once venipuncture was confirmed wire would not thread.  Procedure was aborted and successfully placed on the left.  He had an IO in his right tib-fib on arrival, failed IO on the left both per EMS.    Given IV magnesium, IV bicarb, multiple rounds of IV epinephrine.  With patient's age unremarkable history was given IV tPA as well.  CPR continued for appropriate period of time following.  RADHA called at 1832.    I did discuss with patient's PCP, plan to make coronary case.    Discussed with patient's mother, aunt, sister, friends.  They note that he had been acting like himself today, had gone to the library, eaten his favorite snacks, they have nothing out of the ordinary.  He had gone to take a nap and when a caregiver had gone to check on him they noticed that he was unresponsive and pulseless.  The

## 2024-11-16 NOTE — ED NOTES
Spoke with coroners office. Pt to go to our morgue until AM.  time approx 0700. Coroners aware that pt was a life banc candidate.

## 2024-11-16 NOTE — ED NOTES
After completed call to [facility] and then called  left message awaiting return call.

## 2025-01-06 ENCOUNTER — APPOINTMENT (OUTPATIENT)
Dept: BEHAVIORAL HEALTH | Facility: CLINIC | Age: 45
End: 2025-01-06
Payer: COMMERCIAL

## 2025-07-09 NOTE — PLAN OF CARE
Problem: Anger Management/Homicidal Ideation:  Goal: Ability to verbalize frustrations and anger appropriately will improve  Description  Ability to verbalize frustrations and anger appropriately will improve  Outcome: Ongoing    PT. MOOD LABILE, BUT IMPROVED CONTROL.   Goal: Absence of angry outbursts  Description  Absence of angry outbursts  6/28/2019 1030 by Antoine Beckett RN  Outcome: Ongoing    NO OUTBURSTS THIS A.M., TONE IS LOUD.  6/28/2019 0534 by Demarcus Lucas RN  Outcome: Ongoing no

## 2025-08-20 DIAGNOSIS — D70.2 DRUG-INDUCED NEUTROPENIA: ICD-10-CM

## (undated) DEVICE — MICRODISSECTION NEEDLE STRAIGHT SLEEVE: Brand: COLORADO

## (undated) DEVICE — ELECTRODE PT RET AD L9FT HI MOIST COND ADH HYDRGEL CORDED

## (undated) DEVICE — DENTAL: Brand: MEDLINE INDUSTRIES, INC.

## (undated) DEVICE — E-Z CLEAN, NON-STICK, PTFE COATED, ELECTROSURGICAL BLADE ELECTRODE, MODIFIED EXTENDED INSULATION, 2.5 INCH (6.35 CM): Brand: MEGADYNE

## (undated) DEVICE — GLOVE ORANGE PI 8   MSG9080

## (undated) DEVICE — TUBING, SUCTION, 3/16" X 12', STRAIGHT: Brand: MEDLINE

## (undated) DEVICE — PENCIL ES L3M BTTN SWCH HOLSTER W/ BLDE ELECTRD EDGE

## (undated) DEVICE — GLOVE SURG SZ 65 THK91MIL LTX FREE SYN POLYISOPRENE

## (undated) DEVICE — GOWN,SIRUS,FABRNF,L,20/CS: Brand: MEDLINE